# Patient Record
Sex: FEMALE | Race: WHITE | Employment: FULL TIME | URBAN - METROPOLITAN AREA
[De-identification: names, ages, dates, MRNs, and addresses within clinical notes are randomized per-mention and may not be internally consistent; named-entity substitution may affect disease eponyms.]

---
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
## 2019-03-26 ENCOUNTER — NURSE TRIAGE (OUTPATIENT)
Dept: OTHER | Facility: CLINIC | Age: 19
End: 2019-03-26

## 2019-03-26 ENCOUNTER — HOSPITAL ENCOUNTER (EMERGENCY)
Age: 19
Discharge: HOME | End: 2019-03-26
Payer: COMMERCIAL

## 2019-03-26 ENCOUNTER — APPOINTMENT (OUTPATIENT)
Dept: GENERAL RADIOLOGY | Age: 19
End: 2019-03-26
Payer: COMMERCIAL

## 2019-03-26 VITALS
BODY MASS INDEX: 45.48 KG/M2 | TEMPERATURE: 97.2 F | WEIGHT: 283 LBS | HEIGHT: 66 IN | DIASTOLIC BLOOD PRESSURE: 89 MMHG | RESPIRATION RATE: 15 BRPM | OXYGEN SATURATION: 99 % | HEART RATE: 84 BPM | SYSTOLIC BLOOD PRESSURE: 133 MMHG

## 2019-03-26 DIAGNOSIS — M62.838 SPASM OF MUSCLE: ICD-10-CM

## 2019-03-26 DIAGNOSIS — S16.1XXA ACUTE STRAIN OF NECK MUSCLE, INITIAL ENCOUNTER: Primary | ICD-10-CM

## 2019-03-26 DIAGNOSIS — S39.012A STRAIN OF LUMBAR REGION, INITIAL ENCOUNTER: ICD-10-CM

## 2019-03-26 LAB
HCG, URINE, POC: NEGATIVE
Lab: NORMAL
NEGATIVE QC PASS/FAIL: NORMAL
POSITIVE QC PASS/FAIL: NORMAL

## 2019-03-26 PROCEDURE — 96372 THER/PROPH/DIAG INJ SC/IM: CPT

## 2019-03-26 PROCEDURE — 72070 X-RAY EXAM THORAC SPINE 2VWS: CPT

## 2019-03-26 PROCEDURE — 6370000000 HC RX 637 (ALT 250 FOR IP): Performed by: NURSE PRACTITIONER

## 2019-03-26 PROCEDURE — 99283 EMERGENCY DEPT VISIT LOW MDM: CPT

## 2019-03-26 PROCEDURE — 72050 X-RAY EXAM NECK SPINE 4/5VWS: CPT

## 2019-03-26 PROCEDURE — 6360000002 HC RX W HCPCS: Performed by: NURSE PRACTITIONER

## 2019-03-26 PROCEDURE — 72110 X-RAY EXAM L-2 SPINE 4/>VWS: CPT

## 2019-03-26 RX ORDER — ORPHENADRINE CITRATE 100 MG/1
100 TABLET, EXTENDED RELEASE ORAL 2 TIMES DAILY
Qty: 20 TABLET | Refills: 0 | Status: SHIPPED | OUTPATIENT
Start: 2019-03-26 | End: 2019-04-05

## 2019-03-26 RX ORDER — ORPHENADRINE CITRATE 30 MG/ML
60 INJECTION INTRAMUSCULAR; INTRAVENOUS ONCE
Status: COMPLETED | OUTPATIENT
Start: 2019-03-26 | End: 2019-03-26

## 2019-03-26 RX ORDER — IBUPROFEN 400 MG/1
400 TABLET ORAL ONCE
Status: COMPLETED | OUTPATIENT
Start: 2019-03-26 | End: 2019-03-26

## 2019-03-26 RX ORDER — NAPROXEN 500 MG/1
500 TABLET ORAL 2 TIMES DAILY PRN
Qty: 28 TABLET | Refills: 0 | Status: SHIPPED | OUTPATIENT
Start: 2019-03-26 | End: 2019-09-14 | Stop reason: ALTCHOICE

## 2019-03-26 RX ADMIN — IBUPROFEN 400 MG: 400 TABLET, FILM COATED ORAL at 22:11

## 2019-03-26 RX ADMIN — ORPHENADRINE CITRATE 60 MG: 30 INJECTION INTRAMUSCULAR; INTRAVENOUS at 22:11

## 2019-03-26 ASSESSMENT — PAIN SCALES - GENERAL
PAINLEVEL_OUTOF10: 3
PAINLEVEL_OUTOF10: 5

## 2019-03-26 ASSESSMENT — PAIN DESCRIPTION - LOCATION: LOCATION: BACK;NECK

## 2019-03-26 ASSESSMENT — PAIN DESCRIPTION - PAIN TYPE: TYPE: ACUTE PAIN

## 2019-09-14 ENCOUNTER — HOSPITAL ENCOUNTER (EMERGENCY)
Age: 19
Discharge: HOME | End: 2019-09-14
Payer: COMMERCIAL

## 2019-09-14 VITALS
RESPIRATION RATE: 16 BRPM | HEIGHT: 66 IN | SYSTOLIC BLOOD PRESSURE: 116 MMHG | HEART RATE: 87 BPM | BODY MASS INDEX: 46.28 KG/M2 | OXYGEN SATURATION: 96 % | TEMPERATURE: 98.8 F | DIASTOLIC BLOOD PRESSURE: 88 MMHG | WEIGHT: 288 LBS

## 2019-09-14 DIAGNOSIS — J02.9 ACUTE PHARYNGITIS, UNSPECIFIED ETIOLOGY: Primary | ICD-10-CM

## 2019-09-14 LAB — STREP GRP A PCR: NEGATIVE

## 2019-09-14 PROCEDURE — 99282 EMERGENCY DEPT VISIT SF MDM: CPT

## 2019-09-14 PROCEDURE — 87880 STREP A ASSAY W/OPTIC: CPT

## 2019-09-14 PROCEDURE — 6370000000 HC RX 637 (ALT 250 FOR IP): Performed by: NURSE PRACTITIONER

## 2019-09-14 RX ORDER — PREDNISONE 20 MG/1
40 TABLET ORAL DAILY
Qty: 10 TABLET | Refills: 0 | Status: SHIPPED | OUTPATIENT
Start: 2019-09-14 | End: 2019-09-19

## 2019-09-14 RX ORDER — FLUTICASONE PROPIONATE 50 MCG
1 SPRAY, SUSPENSION (ML) NASAL DAILY
Qty: 1 BOTTLE | Refills: 0 | Status: SHIPPED | OUTPATIENT
Start: 2019-09-14 | End: 2020-05-25 | Stop reason: ALTCHOICE

## 2019-09-14 RX ORDER — PREDNISONE 20 MG/1
60 TABLET ORAL ONCE
Status: COMPLETED | OUTPATIENT
Start: 2019-09-14 | End: 2019-09-14

## 2019-09-14 RX ORDER — LORATADINE 10 MG/1
10 TABLET ORAL DAILY
Qty: 30 TABLET | Refills: 0 | Status: SHIPPED | OUTPATIENT
Start: 2019-09-14 | End: 2019-10-14

## 2019-09-14 RX ADMIN — PREDNISONE 60 MG: 20 TABLET ORAL at 19:29

## 2020-03-15 ENCOUNTER — HOSPITAL ENCOUNTER (EMERGENCY)
Age: 20
Discharge: HOME | End: 2020-03-15
Payer: COMMERCIAL

## 2020-03-15 VITALS
OXYGEN SATURATION: 98 % | WEIGHT: 288 LBS | DIASTOLIC BLOOD PRESSURE: 90 MMHG | HEART RATE: 99 BPM | TEMPERATURE: 98.9 F | RESPIRATION RATE: 16 BRPM | SYSTOLIC BLOOD PRESSURE: 138 MMHG | BODY MASS INDEX: 46.28 KG/M2 | HEIGHT: 66 IN

## 2020-03-15 LAB
INFLUENZA A BY PCR: NOT DETECTED
INFLUENZA B BY PCR: NOT DETECTED

## 2020-03-15 PROCEDURE — 99282 EMERGENCY DEPT VISIT SF MDM: CPT

## 2020-03-15 PROCEDURE — 87502 INFLUENZA DNA AMP PROBE: CPT

## 2020-03-15 RX ORDER — AMOXICILLIN 500 MG/1
500 CAPSULE ORAL 3 TIMES DAILY
Qty: 21 CAPSULE | Refills: 0 | Status: SHIPPED | OUTPATIENT
Start: 2020-03-15 | End: 2020-03-22

## 2020-03-15 RX ORDER — BROMPHENIRAMINE MALEATE, PSEUDOEPHEDRINE HYDROCHLORIDE, AND DEXTROMETHORPHAN HYDROBROMIDE 2; 30; 10 MG/5ML; MG/5ML; MG/5ML
5 SYRUP ORAL 4 TIMES DAILY PRN
Qty: 120 ML | Refills: 0 | Status: SHIPPED | OUTPATIENT
Start: 2020-03-15 | End: 2020-03-20

## 2020-03-15 ASSESSMENT — PAIN DESCRIPTION - LOCATION: LOCATION: THROAT

## 2020-03-15 ASSESSMENT — PAIN DESCRIPTION - PAIN TYPE: TYPE: ACUTE PAIN

## 2020-03-15 ASSESSMENT — PAIN SCALES - GENERAL: PAINLEVEL_OUTOF10: 3

## 2020-03-15 NOTE — ED PROVIDER NOTES
Independent MLP     HPI: Ivory Vela is a 20 y.o. female with a past medical history of  has a past medical history of Asperger syndrome and Autism disorder. presenting with complaints of a cough with nasal congestion. The patient states that these symptoms began gradually. The history is obtained from the patient. The patient states that he has had some subjective chills at home. Patient does complain of a mild cough associated with it that is nonproductive. Patient denies excessive fatigue or sleeping greater than 18 hours a day. Patient denies exposure to mononucleosis. The patient denies any abdominal pain, left upper quadrant fullness, or early satiety. The patient also denies difficulty breathing, hemoptysis, neck pain/stiffness, or blurry vision. Sx have persisted and are mildly worse which is what prompted the visit today. Has had exposure to sick contacts, states began having onset of symptoms last evening of cough, nasal congestion dry nonproductive cough with generalized body aches and a mild sore throat. States her son is positive influenza A. Denies any fever, body aches or chills.        ROS:   Pertinent positives and negatives are stated within HPI, all other systems reviewed and are negative.      --------------------------------------------- PAST HISTORY ---------------------------------------------  Past Medical History:  has a past medical history of Asperger syndrome and Autism disorder. Past Surgical History:  has a past surgical history that includes REMOVAL FOREIGN BODY EAR and pr lap,cholecystectomy/graph (N/A, 3/15/2018). Social History:  reports that she is a non-smoker but has been exposed to tobacco smoke. She has never used smokeless tobacco. She reports that she does not drink alcohol or use drugs. Family History: family history includes No Known Problems in her father and mother. The patients home medications have been reviewed.     Allergies: Patient has no known

## 2020-05-25 ENCOUNTER — HOSPITAL ENCOUNTER (EMERGENCY)
Age: 20
Discharge: HOME | End: 2020-05-25
Payer: COMMERCIAL

## 2020-05-25 ENCOUNTER — APPOINTMENT (OUTPATIENT)
Dept: GENERAL RADIOLOGY | Age: 20
End: 2020-05-25
Payer: COMMERCIAL

## 2020-05-25 VITALS
SYSTOLIC BLOOD PRESSURE: 126 MMHG | TEMPERATURE: 98 F | DIASTOLIC BLOOD PRESSURE: 82 MMHG | HEART RATE: 109 BPM | OXYGEN SATURATION: 96 % | BODY MASS INDEX: 47.09 KG/M2 | HEIGHT: 66 IN | WEIGHT: 293 LBS | RESPIRATION RATE: 16 BRPM

## 2020-05-25 PROCEDURE — 73630 X-RAY EXAM OF FOOT: CPT

## 2020-05-25 PROCEDURE — 99283 EMERGENCY DEPT VISIT LOW MDM: CPT

## 2020-05-25 RX ORDER — IBUPROFEN 600 MG/1
600 TABLET ORAL EVERY 6 HOURS PRN
Qty: 60 TABLET | Refills: 0 | Status: SHIPPED | OUTPATIENT
Start: 2020-05-25 | End: 2020-06-13

## 2020-05-25 ASSESSMENT — PAIN DESCRIPTION - LOCATION: LOCATION: FOOT

## 2020-05-25 ASSESSMENT — PAIN DESCRIPTION - ORIENTATION: ORIENTATION: RIGHT

## 2020-05-25 ASSESSMENT — PAIN SCALES - GENERAL: PAINLEVEL_OUTOF10: 6

## 2020-05-25 ASSESSMENT — PAIN DESCRIPTION - PAIN TYPE: TYPE: ACUTE PAIN

## 2020-05-25 NOTE — ED PROVIDER NOTES
No Known Problems in her father and mother. The patients home medications have been reviewed. Allergies: Patient has no known allergies. --------------------------------- RESULTS ------------------------------------------  All laboratory and radiology results have been personally reviewed by myself   LABS:  No results found for this visit on 05/25/20. RADIOLOGY:  Interpreted by Radiologist.  XR FOOT RIGHT (MIN 3 VIEWS)   Final Result      No acute fracture is identified.          ----------------- NURSING NOTES AND VITALS REVIEWED ---------------   The nursing notes within the ED encounter and vital signs as below have been reviewed. /82   Pulse 109   Temp 98 °F (36.7 °C) (Infrared)   Resp 16   Ht 5' 6\" (1.676 m)   Wt (!) 308 lb (139.7 kg)   SpO2 96%   BMI 49.71 kg/m²   Oxygen Saturation Interpretation: Normal      --------------------------------PHYSICAL EXAM------------------------------------      Constitutional/General: Alert and oriented x3, well appearing, non toxic in NAD  Head: NC/AT  Eyes: PERRL, EOMI  Mouth: Oropharynx clear, handling secretions, no trismus  Neck: Supple, full ROM, no meningeal signs  Pulmonary: Lungs clear to auscultation bilaterally, no wheezes, rales, or rhonchi. Not in respiratory distress  Cardiovascular:  Regular rate and rhythm, no murmurs, gallops, or rubs. 2+ distal pulses  Extremities: Moves all extremities x 4. The right foot negative for any obvious bruising or deformity. There is some mild swelling over the dorsal surface of the foot proximally. Point tender over the proximal third and fourth metatarsals. Pulses and sensation intact. Good range of motion of the right ankle noted with some discomfort reported with lateral ROM. warm and well perfused  Skin: warm and dry without rash  Neurologic: GCS 15,  Intact.   No focal deficits  Psych: Normal Affect      ------------------------ ED COURSE/MEDICAL DECISION MAKING----------------------  Medications - No data to display      Medical Decision Making:    No acute fracture or bony abnormality of the identified on x-ray. The patient was reassured. Certainly if she has persistent pain she can F/U with Podiatry or Ortho. Pt aware and agreeable to plan       Counseling: The emergency provider has spoken with the patient and discussed todays results, in addition to providing specific details for the plan of care and counseling regarding the diagnosis and prognosis. Questions are answered at this time and they are agreeable with the plan.      ------------------------ IMPRESSION AND DISPOSITION -------------------------------    IMPRESSION  1.  Right foot pain        DISPOSITION  Disposition: Discharge to home  Patient condition is stable                   Sydni Jones PA-C  05/25/20 8540

## 2020-06-13 ENCOUNTER — HOSPITAL ENCOUNTER (EMERGENCY)
Age: 20
Discharge: HOME | End: 2020-06-13
Attending: EMERGENCY MEDICINE
Payer: COMMERCIAL

## 2020-06-13 VITALS
HEIGHT: 66 IN | WEIGHT: 293 LBS | TEMPERATURE: 98 F | DIASTOLIC BLOOD PRESSURE: 67 MMHG | RESPIRATION RATE: 18 BRPM | HEART RATE: 67 BPM | BODY MASS INDEX: 47.09 KG/M2 | OXYGEN SATURATION: 98 % | SYSTOLIC BLOOD PRESSURE: 113 MMHG

## 2020-06-13 LAB
ALBUMIN SERPL-MCNC: 4.2 G/DL (ref 3.5–5.2)
ALP BLD-CCNC: 68 U/L (ref 35–104)
ALT SERPL-CCNC: 24 U/L (ref 0–32)
ANION GAP SERPL CALCULATED.3IONS-SCNC: 7 MMOL/L (ref 7–16)
AST SERPL-CCNC: 21 U/L (ref 0–31)
BASOPHILS ABSOLUTE: 0.07 E9/L (ref 0–0.2)
BASOPHILS RELATIVE PERCENT: 0.7 % (ref 0–2)
BILIRUB SERPL-MCNC: 0.4 MG/DL (ref 0–1.2)
BILIRUBIN URINE: NEGATIVE
BLOOD, URINE: NEGATIVE
BUN BLDV-MCNC: 13 MG/DL (ref 6–20)
CALCIUM SERPL-MCNC: 9.7 MG/DL (ref 8.6–10.2)
CHLORIDE BLD-SCNC: 100 MMOL/L (ref 98–107)
CLARITY: CLEAR
CO2: 26 MMOL/L (ref 22–29)
COLOR: YELLOW
CREAT SERPL-MCNC: 0.5 MG/DL (ref 0.5–1)
EOSINOPHILS ABSOLUTE: 0.19 E9/L (ref 0.05–0.5)
EOSINOPHILS RELATIVE PERCENT: 2 % (ref 0–6)
GFR AFRICAN AMERICAN: >60
GFR NON-AFRICAN AMERICAN: >60 ML/MIN/1.73
GLUCOSE BLD-MCNC: 101 MG/DL (ref 74–99)
GLUCOSE URINE: NEGATIVE MG/DL
HCG, URINE, POC: NEGATIVE
HCT VFR BLD CALC: 41.7 % (ref 34–48)
HEMOGLOBIN: 14.2 G/DL (ref 11.5–15.5)
IMMATURE GRANULOCYTES #: 0.03 E9/L
IMMATURE GRANULOCYTES %: 0.3 % (ref 0–5)
KETONES, URINE: NEGATIVE MG/DL
LACTIC ACID: 0.6 MMOL/L (ref 0.5–2.2)
LEUKOCYTE ESTERASE, URINE: NEGATIVE
LIPASE: 24 U/L (ref 13–60)
LYMPHOCYTES ABSOLUTE: 2.77 E9/L (ref 1.5–4)
LYMPHOCYTES RELATIVE PERCENT: 29.3 % (ref 20–42)
Lab: NORMAL
MCH RBC QN AUTO: 28.9 PG (ref 26–35)
MCHC RBC AUTO-ENTMCNC: 34.1 % (ref 32–34.5)
MCV RBC AUTO: 84.9 FL (ref 80–99.9)
MONOCYTES ABSOLUTE: 0.54 E9/L (ref 0.1–0.95)
MONOCYTES RELATIVE PERCENT: 5.7 % (ref 2–12)
NEGATIVE QC PASS/FAIL: NORMAL
NEUTROPHILS ABSOLUTE: 5.87 E9/L (ref 1.8–7.3)
NEUTROPHILS RELATIVE PERCENT: 62 % (ref 43–80)
NITRITE, URINE: NEGATIVE
PDW BLD-RTO: 12.9 FL (ref 11.5–15)
PH UA: 7 (ref 5–9)
PLATELET # BLD: 288 E9/L (ref 130–450)
PMV BLD AUTO: 10.4 FL (ref 7–12)
POSITIVE QC PASS/FAIL: NORMAL
POTASSIUM SERPL-SCNC: 4.5 MMOL/L (ref 3.5–5)
PROTEIN UA: NEGATIVE MG/DL
RBC # BLD: 4.91 E12/L (ref 3.5–5.5)
SODIUM BLD-SCNC: 133 MMOL/L (ref 132–146)
SPECIFIC GRAVITY UA: 1.02 (ref 1–1.03)
TOTAL PROTEIN: 7.5 G/DL (ref 6.4–8.3)
UROBILINOGEN, URINE: 0.2 E.U./DL
WBC # BLD: 9.5 E9/L (ref 4.5–11.5)

## 2020-06-13 PROCEDURE — 6360000002 HC RX W HCPCS: Performed by: STUDENT IN AN ORGANIZED HEALTH CARE EDUCATION/TRAINING PROGRAM

## 2020-06-13 PROCEDURE — 80053 COMPREHEN METABOLIC PANEL: CPT

## 2020-06-13 PROCEDURE — 99284 EMERGENCY DEPT VISIT MOD MDM: CPT

## 2020-06-13 PROCEDURE — 96374 THER/PROPH/DIAG INJ IV PUSH: CPT

## 2020-06-13 PROCEDURE — 85025 COMPLETE CBC W/AUTO DIFF WBC: CPT

## 2020-06-13 PROCEDURE — 83690 ASSAY OF LIPASE: CPT

## 2020-06-13 PROCEDURE — 81003 URINALYSIS AUTO W/O SCOPE: CPT

## 2020-06-13 PROCEDURE — 2580000003 HC RX 258: Performed by: STUDENT IN AN ORGANIZED HEALTH CARE EDUCATION/TRAINING PROGRAM

## 2020-06-13 PROCEDURE — 83605 ASSAY OF LACTIC ACID: CPT

## 2020-06-13 RX ORDER — 0.9 % SODIUM CHLORIDE 0.9 %
1000 INTRAVENOUS SOLUTION INTRAVENOUS ONCE
Status: COMPLETED | OUTPATIENT
Start: 2020-06-13 | End: 2020-06-13

## 2020-06-13 RX ORDER — ONDANSETRON 4 MG/1
4 TABLET, ORALLY DISINTEGRATING ORAL 3 TIMES DAILY PRN
Qty: 21 TABLET | Refills: 0 | Status: SHIPPED | OUTPATIENT
Start: 2020-06-13 | End: 2020-08-12 | Stop reason: ALTCHOICE

## 2020-06-13 RX ORDER — FAMOTIDINE 20 MG/1
20 TABLET, FILM COATED ORAL DAILY
Qty: 30 TABLET | Refills: 0 | Status: SHIPPED | OUTPATIENT
Start: 2020-06-13 | End: 2020-12-03 | Stop reason: ALTCHOICE

## 2020-06-13 RX ORDER — ONDANSETRON 2 MG/ML
4 INJECTION INTRAMUSCULAR; INTRAVENOUS ONCE
Status: COMPLETED | OUTPATIENT
Start: 2020-06-13 | End: 2020-06-13

## 2020-06-13 RX ADMIN — ONDANSETRON 4 MG: 2 INJECTION INTRAMUSCULAR; INTRAVENOUS at 10:58

## 2020-06-13 RX ADMIN — SODIUM CHLORIDE 1000 ML: 9 INJECTION, SOLUTION INTRAVENOUS at 10:57

## 2020-06-13 ASSESSMENT — ENCOUNTER SYMPTOMS
SORE THROAT: 0
VOMITING: 1
SHORTNESS OF BREATH: 0
ABDOMINAL PAIN: 1
DIARRHEA: 0
CONSTIPATION: 0
CHEST TIGHTNESS: 0
RHINORRHEA: 0
HEMATOCHEZIA: 0
COUGH: 0
HEMATEMESIS: 0
NAUSEA: 1
PHOTOPHOBIA: 0

## 2020-06-13 ASSESSMENT — PAIN DESCRIPTION - LOCATION: LOCATION: ABDOMEN

## 2020-06-13 ASSESSMENT — PAIN DESCRIPTION - PROGRESSION: CLINICAL_PROGRESSION: NOT CHANGED

## 2020-06-13 ASSESSMENT — PAIN DESCRIPTION - PAIN TYPE: TYPE: ACUTE PAIN

## 2020-06-13 ASSESSMENT — PAIN DESCRIPTION - FREQUENCY: FREQUENCY: INTERMITTENT

## 2020-06-13 ASSESSMENT — PAIN DESCRIPTION - DESCRIPTORS: DESCRIPTORS: PATIENT UNABLE TO DESCRIBE

## 2020-06-13 ASSESSMENT — PAIN SCALES - GENERAL: PAINLEVEL_OUTOF10: 5

## 2020-06-20 LAB
SARS-COV-2, PCR: NOT DETECTED
SPECIMEN SOURCE: NORMAL

## 2020-08-12 ENCOUNTER — APPOINTMENT (OUTPATIENT)
Dept: CT IMAGING | Age: 20
End: 2020-08-12
Payer: COMMERCIAL

## 2020-08-12 ENCOUNTER — HOSPITAL ENCOUNTER (EMERGENCY)
Age: 20
Discharge: HOME | End: 2020-08-12
Payer: COMMERCIAL

## 2020-08-12 VITALS
BODY MASS INDEX: 47.09 KG/M2 | TEMPERATURE: 98.3 F | HEIGHT: 66 IN | DIASTOLIC BLOOD PRESSURE: 85 MMHG | OXYGEN SATURATION: 97 % | SYSTOLIC BLOOD PRESSURE: 137 MMHG | WEIGHT: 293 LBS | RESPIRATION RATE: 16 BRPM | HEART RATE: 78 BPM

## 2020-08-12 LAB
ALBUMIN SERPL-MCNC: 4 G/DL (ref 3.5–5.2)
ALP BLD-CCNC: 69 U/L (ref 35–104)
ALT SERPL-CCNC: 23 U/L (ref 0–32)
ANION GAP SERPL CALCULATED.3IONS-SCNC: 12 MMOL/L (ref 7–16)
AST SERPL-CCNC: 22 U/L (ref 0–31)
BACTERIA: ABNORMAL /HPF
BASOPHILS ABSOLUTE: 0.07 E9/L (ref 0–0.2)
BASOPHILS RELATIVE PERCENT: 0.7 % (ref 0–2)
BILIRUB SERPL-MCNC: 0.4 MG/DL (ref 0–1.2)
BILIRUBIN URINE: NEGATIVE
BLOOD, URINE: NEGATIVE
BUN BLDV-MCNC: 8 MG/DL (ref 6–20)
CALCIUM SERPL-MCNC: 9.3 MG/DL (ref 8.6–10.2)
CHLORIDE BLD-SCNC: 102 MMOL/L (ref 98–107)
CLARITY: CLEAR
CO2: 23 MMOL/L (ref 22–29)
COLOR: YELLOW
CREAT SERPL-MCNC: 0.6 MG/DL (ref 0.5–1)
EOSINOPHILS ABSOLUTE: 0.23 E9/L (ref 0.05–0.5)
EOSINOPHILS RELATIVE PERCENT: 2.4 % (ref 0–6)
EPITHELIAL CELLS, UA: ABNORMAL /HPF
GFR AFRICAN AMERICAN: >60
GFR NON-AFRICAN AMERICAN: >60 ML/MIN/1.73
GLUCOSE BLD-MCNC: 88 MG/DL (ref 74–99)
GLUCOSE URINE: NEGATIVE MG/DL
HCG, URINE, POC: NEGATIVE
HCT VFR BLD CALC: 41.6 % (ref 34–48)
HEMOGLOBIN: 14.4 G/DL (ref 11.5–15.5)
IMMATURE GRANULOCYTES #: 0.04 E9/L
IMMATURE GRANULOCYTES %: 0.4 % (ref 0–5)
KETONES, URINE: NEGATIVE MG/DL
LACTIC ACID: 0.8 MMOL/L (ref 0.5–2.2)
LEUKOCYTE ESTERASE, URINE: ABNORMAL
LIPASE: 29 U/L (ref 13–60)
LYMPHOCYTES ABSOLUTE: 3.47 E9/L (ref 1.5–4)
LYMPHOCYTES RELATIVE PERCENT: 36.2 % (ref 20–42)
Lab: NORMAL
MCH RBC QN AUTO: 29 PG (ref 26–35)
MCHC RBC AUTO-ENTMCNC: 34.6 % (ref 32–34.5)
MCV RBC AUTO: 83.9 FL (ref 80–99.9)
MONOCYTES ABSOLUTE: 0.58 E9/L (ref 0.1–0.95)
MONOCYTES RELATIVE PERCENT: 6.1 % (ref 2–12)
NEGATIVE QC PASS/FAIL: NORMAL
NEUTROPHILS ABSOLUTE: 5.19 E9/L (ref 1.8–7.3)
NEUTROPHILS RELATIVE PERCENT: 54.2 % (ref 43–80)
NITRITE, URINE: NEGATIVE
PDW BLD-RTO: 12.5 FL (ref 11.5–15)
PH UA: 7 (ref 5–9)
PLATELET # BLD: 287 E9/L (ref 130–450)
PMV BLD AUTO: 10.5 FL (ref 7–12)
POSITIVE QC PASS/FAIL: NORMAL
POTASSIUM REFLEX MAGNESIUM: 3.9 MMOL/L (ref 3.5–5)
PROTEIN UA: NEGATIVE MG/DL
RBC # BLD: 4.96 E12/L (ref 3.5–5.5)
RBC UA: ABNORMAL /HPF (ref 0–2)
SODIUM BLD-SCNC: 137 MMOL/L (ref 132–146)
SPECIFIC GRAVITY UA: 1.02 (ref 1–1.03)
TOTAL PROTEIN: 7.5 G/DL (ref 6.4–8.3)
UROBILINOGEN, URINE: 0.2 E.U./DL
WBC # BLD: 9.6 E9/L (ref 4.5–11.5)
WBC UA: ABNORMAL /HPF (ref 0–5)

## 2020-08-12 PROCEDURE — 99283 EMERGENCY DEPT VISIT LOW MDM: CPT

## 2020-08-12 PROCEDURE — 81001 URINALYSIS AUTO W/SCOPE: CPT

## 2020-08-12 PROCEDURE — 6360000002 HC RX W HCPCS: Performed by: NURSE PRACTITIONER

## 2020-08-12 PROCEDURE — 83605 ASSAY OF LACTIC ACID: CPT

## 2020-08-12 PROCEDURE — 83690 ASSAY OF LIPASE: CPT

## 2020-08-12 PROCEDURE — 96375 TX/PRO/DX INJ NEW DRUG ADDON: CPT

## 2020-08-12 PROCEDURE — 96374 THER/PROPH/DIAG INJ IV PUSH: CPT

## 2020-08-12 PROCEDURE — 80053 COMPREHEN METABOLIC PANEL: CPT

## 2020-08-12 PROCEDURE — 2580000003 HC RX 258: Performed by: NURSE PRACTITIONER

## 2020-08-12 PROCEDURE — 6360000004 HC RX CONTRAST MEDICATION: Performed by: RADIOLOGY

## 2020-08-12 PROCEDURE — 74178 CT ABD&PLV WO CNTR FLWD CNTR: CPT

## 2020-08-12 PROCEDURE — 85025 COMPLETE CBC W/AUTO DIFF WBC: CPT

## 2020-08-12 RX ORDER — CEFDINIR 300 MG/1
300 CAPSULE ORAL 2 TIMES DAILY
Qty: 20 CAPSULE | Refills: 0 | Status: SHIPPED | OUTPATIENT
Start: 2020-08-12 | End: 2020-08-22

## 2020-08-12 RX ORDER — MORPHINE SULFATE 4 MG/ML
4 INJECTION, SOLUTION INTRAMUSCULAR; INTRAVENOUS ONCE
Status: COMPLETED | OUTPATIENT
Start: 2020-08-12 | End: 2020-08-12

## 2020-08-12 RX ORDER — METRONIDAZOLE 500 MG/1
500 TABLET ORAL 3 TIMES DAILY
Qty: 30 TABLET | Refills: 0 | Status: SHIPPED | OUTPATIENT
Start: 2020-08-12 | End: 2020-08-22

## 2020-08-12 RX ORDER — ONDANSETRON 2 MG/ML
4 INJECTION INTRAMUSCULAR; INTRAVENOUS ONCE
Status: COMPLETED | OUTPATIENT
Start: 2020-08-12 | End: 2020-08-12

## 2020-08-12 RX ORDER — ONDANSETRON 4 MG/1
4 TABLET, ORALLY DISINTEGRATING ORAL EVERY 8 HOURS PRN
Qty: 10 TABLET | Refills: 0 | Status: SHIPPED | OUTPATIENT
Start: 2020-08-12 | End: 2020-12-03 | Stop reason: ALTCHOICE

## 2020-08-12 RX ORDER — 0.9 % SODIUM CHLORIDE 0.9 %
1000 INTRAVENOUS SOLUTION INTRAVENOUS ONCE
Status: COMPLETED | OUTPATIENT
Start: 2020-08-12 | End: 2020-08-12

## 2020-08-12 RX ORDER — HYDROCODONE BITARTRATE AND ACETAMINOPHEN 5; 325 MG/1; MG/1
1 TABLET ORAL EVERY 6 HOURS PRN
Qty: 12 TABLET | Refills: 0 | Status: SHIPPED | OUTPATIENT
Start: 2020-08-12 | End: 2020-08-15

## 2020-08-12 RX ADMIN — ONDANSETRON 4 MG: 2 INJECTION INTRAMUSCULAR; INTRAVENOUS at 15:02

## 2020-08-12 RX ADMIN — SODIUM CHLORIDE 1000 ML: 9 INJECTION, SOLUTION INTRAVENOUS at 14:53

## 2020-08-12 RX ADMIN — MORPHINE SULFATE 4 MG: 4 INJECTION, SOLUTION INTRAMUSCULAR; INTRAVENOUS at 15:02

## 2020-08-12 RX ADMIN — IOPAMIDOL 110 ML: 755 INJECTION, SOLUTION INTRAVENOUS at 15:46

## 2020-08-12 ASSESSMENT — PAIN SCALES - GENERAL: PAINLEVEL_OUTOF10: 6

## 2020-08-12 ASSESSMENT — PAIN DESCRIPTION - PAIN TYPE: TYPE: ACUTE PAIN

## 2020-08-12 NOTE — ED NOTES
Discharge instructions given, medications and follow up instructions reviewed. Patient verbalized understanding, no other noted or stated problems at this time. Patient will follow up with physicians as directed.       Norm Sicard, RN  08/12/20 3520

## 2020-08-12 NOTE — ED NOTES
Bed:  HALL-07  Expected date:   Expected time:   Means of arrival:   Comments:  [address] RN  08/12/20 6240

## 2020-08-13 NOTE — ED PROVIDER NOTES
ED Attending  CC: Sherie          Department of Emergency Medicine   ED  Provider Note  Admit Date/RoomTime: 2020  2:04 PM  ED Room: [place] [address]  MRN: [id number]  Chief Complaint       Abdominal Pain (right side, shooitng pain, constant since 0800 today)    History of Present Illness   Source of history provided by:  patient. History/Exam Limitations: none. Manjit Olguin is a 20 y.o. old female who has a past medical history of:   Past Medical History:   Diagnosis Date    Asperger syndrome     Autism disorder     presents to the emergency department by private vehicle, for complaints of sudden onset aching, cramping, sharp pain in the RLQ without radiation which began a few hour(s) prior to arrival.   There has been no similar episodes in the past.  Since onset the symptoms have been persistent. The pain is associated with abdominal pain, urinary frequency and vomiting. The pain is aggravated by none and relieved by nothing. There has been NO back pain, cloudy urine, constipation, headache, hematuria, urinary incontinence, urinary urgency, vaginal discharge or vaginal itching. GYN History: irregular periods. STD History: no history of PID, STD's. No LMP recorded. Patient has had an implant. Current Pregnancy: No.     Birth Control: Abstinence. Gravid Status:     . ROS   Pertinent positives and negatives are stated within HPI, all other systems reviewed and are negative. Past Surgical History:   Procedure Laterality Date    CHOLECYSTECTOMY      RI LAP,CHOLECYSTECTOMY/GRAPH N/A 3/15/2018    CHOLECYSTECTOMY LAPAROSCOPIC WITH INTRAOP CHOLANGIOGRAM performed by Talita Diaz MD at [facility]     Social History:  reports that she has never smoked. She has never used smokeless tobacco. She reports current alcohol use. She reports that she does not use drugs. Family History: family history includes No Known Problems in her father and mother.    Allergies: Patient has no known allergies. Physical Exam           ED Triage Vitals   BP Temp Temp src Pulse Resp SpO2 Height Weight   08/12/20 1341 08/12/20 1328 -- 08/12/20 1328 08/12/20 1341 08/12/20 1328 08/12/20 1341 08/12/20 1341   137/85 98.3 °F (36.8 °C)  80 16 99 % 5' 6\" (1.676 m) 300 lb (136.1 kg)      Oxygen Saturation Interpretation: Normal.    · General Appearance/Constitutional:  Alert, development consistent with age. · HEENT:  NC/NT. PERRLA. Airway patent. · Neck:  Supple. No lymphadenopathy. · Respiratory:  No retractions. Lungs Clear to auscultation and breath sounds equal.  · CV:  Regular rate and rhythm. · GI:  General Appearance: normal.         Bowel sounds: normal bowel sounds. Distension:  Mild. Tenderness: moderate tenderness is present in the RLQ, no rebound tenderness, no guarding. No evidence of Rovsing sign         Liver: non-tender. Spleen:  enlarged- 2 cm below costal margin. Pulsatile Mass: absent. Hernia:  no inguinal or femoral hernias noted. · Back: CVA Tenderness: Yes, Right. · : Not examined   · integument:  Normal turgor. Warm, dry, without visible rash, unless noted elsewhere. · Lymphatics: No edema, cap.refill <3sec. · Neurological:  Orientation age-appropriate. Motor functions intact.     Lab / Imaging Results   (All laboratory and radiology results have been personally reviewed by myself)  Labs:  Results for orders placed or performed during the hospital encounter of 08/12/20   CBC Auto Differential   Result Value Ref Range    WBC 9.6 4.5 - 11.5 E9/L    RBC 4.96 3.50 - 5.50 E12/L    Hemoglobin 14.4 11.5 - 15.5 g/dL    Hematocrit 41.6 34.0 - 48.0 %    MCV 83.9 80.0 - 99.9 fL    MCH 29.0 26.0 - 35.0 pg    MCHC 34.6 (H) 32.0 - 34.5 %    RDW 12.5 11.5 - 15.0 fL    Platelets 669 102 - 088 E9/L    MPV 10.5 7.0 - 12.0 fL    Neutrophils % 54.2 43.0 - 80.0 %    Immature Granulocytes % 0.4 0.0 - 5.0 %    Lymphocytes % 36.2 20.0 - 42.0 %    Monocytes % 6.1 2.0 - 12.0 %    Eosinophils % 2.4 0.0 - 6.0 %    Basophils % 0.7 0.0 - 2.0 %    Neutrophils Absolute 5.19 1.80 - 7.30 E9/L    Immature Granulocytes # 0.04 E9/L    Lymphocytes Absolute 3.47 1.50 - 4.00 E9/L    Monocytes Absolute 0.58 0.10 - 0.95 E9/L    Eosinophils Absolute 0.23 0.05 - 0.50 E9/L    Basophils Absolute 0.07 0.00 - 0.20 E9/L   Comprehensive Metabolic Panel w/ Reflex to MG   Result Value Ref Range    Sodium 137 132 - 146 mmol/L    Potassium reflex Magnesium 3.9 3.5 - 5.0 mmol/L    Chloride 102 98 - 107 mmol/L    CO2 23 22 - 29 mmol/L    Anion Gap 12 7 - 16 mmol/L    Glucose 88 74 - 99 mg/dL    BUN 8 6 - 20 mg/dL    CREATININE 0.6 0.5 - 1.0 mg/dL    GFR Non-African American >60 >=60 mL/min/1.73    GFR African American >60     Calcium 9.3 8.6 - 10.2 mg/dL    Total Protein 7.5 6.4 - 8.3 g/dL    Alb 4.0 3.5 - 5.2 g/dL    Total Bilirubin 0.4 0.0 - 1.2 mg/dL    Alkaline Phosphatase 69 35 - 104 U/L    ALT 23 0 - 32 U/L    AST 22 0 - 31 U/L   Lipase   Result Value Ref Range    Lipase 29 13 - 60 U/L   Urinalysis, reflex to microscopic   Result Value Ref Range    Color, UA Yellow Straw/Yellow    Clarity, UA Clear Clear    Glucose, Ur Negative Negative mg/dL    Bilirubin Urine Negative Negative    Ketones, Urine Negative Negative mg/dL    Specific Gravity, UA 1.020 1.005 - 1.030    Blood, Urine Negative Negative    pH, UA 7.0 5.0 - 9.0    Protein, UA Negative Negative mg/dL    Urobilinogen, Urine 0.2 <2.0 E.U./dL    Nitrite, Urine Negative Negative    Leukocyte Esterase, Urine TRACE (A) Negative   Lactic Acid, Plasma   Result Value Ref Range    Lactic Acid 0.8 0.5 - 2.2 mmol/L   Microscopic Urinalysis   Result Value Ref Range    WBC, UA 5-10 (A) 0 - 5 /HPF    RBC, UA NONE 0 - 2 /HPF    Epithelial Cells, UA MANY /HPF    Bacteria, UA MANY (A) None Seen /HPF   POC Pregnancy Urine   Result Value Ref Range    HCG, Urine, POC Negative Negative    Lot Number [id number]     Positive QC Pass/Fail Pass     Negative QC Pass/Fail Pass      Imaging: All Radiology results interpreted by Radiologist unless otherwise noted. CT ABDOMEN PELVIS W WO CONTRAST Additional Contrast? None   Final Result   There are multiple diverticula seen    Findings compatible with fatty infiltration of the liver   Splenomegaly   Low density left adnexal mass likely an ovarian cyst                                ED Course / Medical Decision Making     Medications   0.9 % sodium chloride bolus (0 mLs Intravenous Stopped 8/12/20 1553)   ondansetron (ZOFRAN) injection 4 mg (4 mg Intravenous Given 8/12/20 1502)   morphine sulfate (PF) injection 4 mg (4 mg Intravenous Given 8/12/20 1502)   iopamidol (ISOVUE-370) 76 % injection 110 mL (110 mLs Intravenous Given 8/12/20 1546)        Re-examination:  8/12/20       Time: 1430    Patients symptoms are improving. Consults:   None    Procedures:   none    MDM:   CT with contrast to evaluate for pain with palpation to RLQ. Patient also had pain with palpation to CVA so scan without contrast in addition. No evidence of kidney stone. No evidence of appendicitis on scan. Patient's urine did show evidence of UTI. Scan also showed evidence of diverticulitis. Patient treated outpatient for diverticulitis with Omnicef and Flagyl. Frederic Palafox will also cover for UTI. Patient also given antiemetic and narcotic pain medicine for symptomatic relief. Patient advised if symptoms persist or worsen to return emergency department immediately, otherwise follow-up with primary care provider. Patient also seen the emergency department Dr. Pura Glasgow. Counseling: The emergency provider has spoken with the patient and discussed todays results, in addition to providing specific details for the plan of care and counseling regarding the diagnosis and prognosis.   Questions are answered at this time and they are agreeable with the plan for outpatient treatment for diverticulitis, coverage for diverticulitis with Omnicef also covers for UTI. Assessment      1. Diverticulitis of colon    2. Urinary tract infection without hematuria, site unspecified      Plan   Discharge to home  Patient condition is stable    New Medications     Discharge Medication List as of 8/12/2020  5:06 PM      START taking these medications    Details   cefdinir (OMNICEF) 300 MG capsule Take 1 capsule by mouth 2 times daily for 10 days, Disp-20 capsule,R-0Print      metroNIDAZOLE (FLAGYL) 500 MG tablet Take 1 tablet by mouth 3 times daily for 10 days, Disp-30 tablet,R-0Print      ondansetron (ZOFRAN ODT) 4 MG disintegrating tablet Take 1 tablet by mouth every 8 hours as needed for Nausea or Vomiting, Disp-10 tablet,R-0Print      HYDROcodone-acetaminophen (NORCO) 5-325 MG per tablet Take 1 tablet by mouth every 6 hours as needed for Pain for up to 3 days. Intended supply: 3 days. Take lowest dose possible to manage pain, Disp-12 tablet,R-0Print           Electronically signed by AZEEM Baptiste CNP   DD: 8/12/20  **This report was transcribed using voice recognition software. Every effort was made to ensure accuracy; however, inadvertent computerized transcription errors may be present.   END OF ED PROVIDER NOTE        AZEEM Rolon CNP  08/12/20 6360

## 2020-12-03 ENCOUNTER — HOSPITAL ENCOUNTER (EMERGENCY)
Age: 20
Discharge: HOME | End: 2020-12-03
Payer: COMMERCIAL

## 2020-12-03 ENCOUNTER — APPOINTMENT (OUTPATIENT)
Dept: GENERAL RADIOLOGY | Age: 20
End: 2020-12-03
Payer: COMMERCIAL

## 2020-12-03 VITALS
BODY MASS INDEX: 47.09 KG/M2 | TEMPERATURE: 97.3 F | RESPIRATION RATE: 16 BRPM | HEIGHT: 66 IN | DIASTOLIC BLOOD PRESSURE: 82 MMHG | HEART RATE: 82 BPM | OXYGEN SATURATION: 98 % | WEIGHT: 293 LBS | SYSTOLIC BLOOD PRESSURE: 124 MMHG

## 2020-12-03 PROCEDURE — 71045 X-RAY EXAM CHEST 1 VIEW: CPT

## 2020-12-03 PROCEDURE — 99283 EMERGENCY DEPT VISIT LOW MDM: CPT

## 2020-12-03 RX ORDER — BROMPHENIRAMINE MALEATE, PSEUDOEPHEDRINE HYDROCHLORIDE, AND DEXTROMETHORPHAN HYDROBROMIDE 2; 30; 10 MG/5ML; MG/5ML; MG/5ML
5 SYRUP ORAL 4 TIMES DAILY PRN
Qty: 118 ML | Refills: 0 | Status: SHIPPED | OUTPATIENT
Start: 2020-12-03 | End: 2021-01-14

## 2020-12-03 RX ORDER — ALBUTEROL SULFATE 90 UG/1
2 AEROSOL, METERED RESPIRATORY (INHALATION) 4 TIMES DAILY PRN
Qty: 1 INHALER | Refills: 0 | Status: SHIPPED | OUTPATIENT
Start: 2020-12-03 | End: 2021-01-14

## 2020-12-03 ASSESSMENT — PAIN DESCRIPTION - PROGRESSION: CLINICAL_PROGRESSION: NOT CHANGED

## 2020-12-03 ASSESSMENT — PAIN SCALES - GENERAL: PAINLEVEL_OUTOF10: 5

## 2020-12-03 ASSESSMENT — PAIN DESCRIPTION - DESCRIPTORS: DESCRIPTORS: ACHING;CONSTANT

## 2020-12-03 ASSESSMENT — PAIN - FUNCTIONAL ASSESSMENT: PAIN_FUNCTIONAL_ASSESSMENT: PREVENTS OR INTERFERES SOME ACTIVE ACTIVITIES AND ADLS

## 2020-12-03 ASSESSMENT — PAIN DESCRIPTION - LOCATION: LOCATION: GENERALIZED

## 2020-12-03 ASSESSMENT — PAIN DESCRIPTION - FREQUENCY: FREQUENCY: CONTINUOUS

## 2020-12-03 ASSESSMENT — PAIN DESCRIPTION - ONSET: ONSET: ON-GOING

## 2020-12-03 ASSESSMENT — PAIN DESCRIPTION - PAIN TYPE: TYPE: ACUTE PAIN

## 2020-12-04 NOTE — ED NOTES
Patient verbalized understanding of d/c, f/u & Rx instructions. Patient ambulatory to exit.      Darinel Denny RN  12/03/20 1899

## 2020-12-06 NOTE — ED PROVIDER NOTES
Independent MLP      HPI:  12/6/20,   Time: 1:18 PM TRISTON Whitlock is a 23 y.o. female presenting to the ED for cough, sneezing body aches intermittent shortness of breath with coughing low-grade fever and fatigue over the last several days. Patient states that she was tested for Covid and is positive she states that her symptoms have not improved she also states that she has a history of asthma and does not have her inhaler. Patient denies any chest pain she denies any nausea vomiting diarrhea. Patient states that nothing makes her symptoms better nothing makes her symptoms worse. ROS:   Pertinent positives and negatives are stated within HPI, all other systems reviewed and are negative.  --------------------------------------------- PAST HISTORY ---------------------------------------------  Past Medical History:  has a past medical history of Asperger syndrome and Autism disorder. Past Surgical History:  has a past surgical history that includes REMOVAL FOREIGN BODY EAR; pr lap,cholecystectomy/graph (N/A, 3/15/2018); and Cholecystectomy. Social History:  reports that she has never smoked. She has never used smokeless tobacco. She reports current alcohol use. She reports that she does not use drugs. Family History: family history includes No Known Problems in her father and mother. The patients home medications have been reviewed. Allergies: Patient has no known allergies. -------------------------------------------------- RESULTS -------------------------------------------------  All laboratory and radiology results have been personally reviewed by myself   LABS:  No results found for this visit on 12/03/20. RADIOLOGY:  Interpreted by Radiologist.  XR CHEST PORTABLE   Final Result   No acute process.           ------------------------- NURSING NOTES AND VITALS REVIEWED ---------------------------   The nursing notes within the ED encounter and vital signs as below have been reviewed. /82   Pulse 82   Temp 97.3 °F (36.3 °C) (Temporal)   Resp 16   Ht 5' 6\" (1.676 m)   Wt 300 lb (136.1 kg)   LMP 12/03/2020   SpO2 98%   BMI 48.42 kg/m²   Oxygen Saturation Interpretation: Normal      ---------------------------------------------------PHYSICAL EXAM--------------------------------------      Constitutional/General: Alert and oriented x3, well appearing, non toxic in NAD  Head: NC/AT  Eyes: PERRL, EOMI  Mouth: Oropharynx clear, handling secretions, no trismus  Neck: Supple, full ROM, no meningeal signs  Pulmonary: Lungs clear to auscultation bilaterally, no wheezes, rales, or rhonchi. Not in respiratory distress  Cardiovascular:  Regular rate and rhythm, no murmurs, gallops, or rubs. 2+ distal pulses  Abdomen: Soft, non tender, non distended,   Extremities: Moves all extremities x 4. Warm and well perfused  Skin: warm and dry without rash  Neurologic: GCS 15,  Psych: Normal Affect      ------------------------------ ED COURSE/MEDICAL DECISION MAKING----------------------  Medications - No data to display      Medical Decision Making: At this time the patient is without objective evidence of an acute process requiring hospitalization or inpatient management. They have remained hemodynamically stable throughout their entire ED visit and are stable for discharge with outpatient follow-up. The plan has been discussed in detail and they are aware of the specific conditions for emergent return, as well as the importance of follow-up. Patient was given scription for albuterol inhaler and Bromfed. Patient was educated on close follow-up especially due to her history of asthma. Patient at this time is agreeable to plan of care she is nontoxic in appearance she is not hypoxic. Patient is stable for outpatient follow-up. Counseling:    The emergency provider has spoken with the patient and discussed todays results, in addition to providing specific details for the plan of care and counseling regarding the diagnosis and prognosis. Questions are answered at this time and they are agreeable with the plan.      --------------------------------- IMPRESSION AND DISPOSITION ---------------------------------    IMPRESSION  1. Cough    2.  COVID-19        DISPOSITION  Disposition: Discharge to home  Patient condition is good                 Missy Carlton, AZEEM - CNP  12/06/20 3921

## 2021-01-14 ENCOUNTER — APPOINTMENT (OUTPATIENT)
Dept: GENERAL RADIOLOGY | Age: 21
End: 2021-01-14
Payer: COMMERCIAL

## 2021-01-14 ENCOUNTER — HOSPITAL ENCOUNTER (EMERGENCY)
Age: 21
Discharge: HOME | End: 2021-01-14
Payer: COMMERCIAL

## 2021-01-14 VITALS
HEART RATE: 96 BPM | DIASTOLIC BLOOD PRESSURE: 88 MMHG | TEMPERATURE: 96.4 F | WEIGHT: 293 LBS | HEIGHT: 66 IN | RESPIRATION RATE: 16 BRPM | BODY MASS INDEX: 47.09 KG/M2 | OXYGEN SATURATION: 98 % | SYSTOLIC BLOOD PRESSURE: 131 MMHG

## 2021-01-14 DIAGNOSIS — S29.012A UPPER BACK STRAIN, INITIAL ENCOUNTER: Primary | ICD-10-CM

## 2021-01-14 LAB — HCG(URINE) PREGNANCY TEST: NEGATIVE

## 2021-01-14 PROCEDURE — 81025 URINE PREGNANCY TEST: CPT

## 2021-01-14 PROCEDURE — 72072 X-RAY EXAM THORAC SPINE 3VWS: CPT

## 2021-01-14 PROCEDURE — 99283 EMERGENCY DEPT VISIT LOW MDM: CPT

## 2021-01-14 RX ORDER — LIDOCAINE 50 MG/G
1 PATCH TOPICAL DAILY
Qty: 10 PATCH | Refills: 0 | Status: SHIPPED | OUTPATIENT
Start: 2021-01-14 | End: 2021-01-24

## 2021-01-14 RX ORDER — IBUPROFEN 800 MG/1
800 TABLET ORAL EVERY 6 HOURS PRN
Qty: 20 TABLET | Refills: 0 | Status: SHIPPED | OUTPATIENT
Start: 2021-01-14 | End: 2021-02-15 | Stop reason: ALTCHOICE

## 2021-01-14 RX ORDER — CYCLOBENZAPRINE HCL 10 MG
10 TABLET ORAL 3 TIMES DAILY PRN
COMMUNITY
End: 2021-02-15 | Stop reason: ALTCHOICE

## 2021-01-14 ASSESSMENT — PAIN SCALES - GENERAL: PAINLEVEL_OUTOF10: 6

## 2021-01-14 ASSESSMENT — PAIN DESCRIPTION - FREQUENCY: FREQUENCY: CONTINUOUS

## 2021-01-14 ASSESSMENT — PAIN DESCRIPTION - ORIENTATION: ORIENTATION: MID;UPPER

## 2021-01-14 ASSESSMENT — PAIN DESCRIPTION - PAIN TYPE: TYPE: ACUTE PAIN

## 2021-01-14 ASSESSMENT — PAIN DESCRIPTION - DESCRIPTORS: DESCRIPTORS: DISCOMFORT

## 2021-02-15 ENCOUNTER — HOSPITAL ENCOUNTER (EMERGENCY)
Age: 21
Discharge: HOME | End: 2021-02-15
Attending: EMERGENCY MEDICINE
Payer: COMMERCIAL

## 2021-02-15 VITALS
OXYGEN SATURATION: 97 % | WEIGHT: 293 LBS | BODY MASS INDEX: 47.09 KG/M2 | RESPIRATION RATE: 18 BRPM | DIASTOLIC BLOOD PRESSURE: 80 MMHG | HEIGHT: 66 IN | SYSTOLIC BLOOD PRESSURE: 120 MMHG | HEART RATE: 103 BPM | TEMPERATURE: 96.9 F

## 2021-02-15 DIAGNOSIS — H92.02 OTALGIA OF LEFT EAR: Primary | ICD-10-CM

## 2021-02-15 PROCEDURE — 99283 EMERGENCY DEPT VISIT LOW MDM: CPT

## 2021-02-15 PROCEDURE — 6360000002 HC RX W HCPCS: Performed by: EMERGENCY MEDICINE

## 2021-02-15 PROCEDURE — 96372 THER/PROPH/DIAG INJ SC/IM: CPT

## 2021-02-15 RX ORDER — KETOROLAC TROMETHAMINE 30 MG/ML
30 INJECTION, SOLUTION INTRAMUSCULAR; INTRAVENOUS ONCE
Status: COMPLETED | OUTPATIENT
Start: 2021-02-15 | End: 2021-02-15

## 2021-02-15 RX ORDER — PSEUDOEPHEDRINE HYDROCHLORIDE 30 MG/1
30 TABLET ORAL EVERY 4 HOURS PRN
Qty: 30 TABLET | Refills: 1 | Status: SHIPPED | OUTPATIENT
Start: 2021-02-15 | End: 2021-02-22

## 2021-02-15 RX ORDER — FLUTICASONE PROPIONATE 50 MCG
1 SPRAY, SUSPENSION (ML) NASAL DAILY
Qty: 1 BOTTLE | Refills: 0 | Status: SHIPPED | OUTPATIENT
Start: 2021-02-15 | End: 2021-03-03

## 2021-02-15 RX ADMIN — KETOROLAC TROMETHAMINE 30 MG: 30 INJECTION, SOLUTION INTRAMUSCULAR at 20:18

## 2021-02-15 ASSESSMENT — ENCOUNTER SYMPTOMS
EYE DISCHARGE: 0
DIARRHEA: 0
VOMITING: 0
FACIAL SWELLING: 0
SORE THROAT: 0
SHORTNESS OF BREATH: 0
SINUS PRESSURE: 0
WHEEZING: 0
NAUSEA: 0
EYE REDNESS: 0
RHINORRHEA: 0
COUGH: 0
ABDOMINAL DISTENTION: 0
TROUBLE SWALLOWING: 0
EYE PAIN: 0
BACK PAIN: 0

## 2021-02-15 ASSESSMENT — PAIN DESCRIPTION - FREQUENCY: FREQUENCY: CONTINUOUS

## 2021-02-15 ASSESSMENT — PAIN DESCRIPTION - DESCRIPTORS: DESCRIPTORS: SHOOTING;SHARP

## 2021-02-15 ASSESSMENT — PAIN DESCRIPTION - PAIN TYPE: TYPE: ACUTE PAIN

## 2021-02-15 ASSESSMENT — PAIN SCALES - GENERAL: PAINLEVEL_OUTOF10: 5

## 2021-02-16 NOTE — ED PROVIDER NOTES
Department of Emergency Medicine   ED  Provider Note  Admit Date/RoomTime: 2/15/2021  7:47 PM  ED Room: 09/09              2/15/21  8:16 PM EST    History of Present Illness:   Duy Wade is a 23 y.o. female presenting to the ED for otalgia, beginning today. The complaint has been persistent, moderate in severity, and worsened by burping, speaking loudly, coughing, as well as applying pressure in front of tragus. Patient reports she is chronically itchy ears with intermittent episodes of pain today she has an onset pop in her left ear with pain. She has not taken anything for the pain. She reports her PCP doctor for her chronically itchy ears and he told her to apply cream.  She has never seen an ENT doctor. She reports she was recently treated with amoxicillin for strep throat and is no longer having throat pain. Review of Systems:   Pertinent positives and negatives are stated within HPI, all other systems reviewed and are negative. Review of Systems   Constitutional: Negative for chills, fatigue and fever. HENT: Positive for ear pain. Negative for congestion, dental problem, facial swelling, hearing loss, rhinorrhea, sinus pressure, sore throat and trouble swallowing. Eyes: Negative for pain, discharge and redness. Respiratory: Negative for cough, shortness of breath and wheezing. Cardiovascular: Negative for chest pain. Gastrointestinal: Negative for abdominal distention, diarrhea, nausea and vomiting. Genitourinary: Negative for dysuria and frequency. Musculoskeletal: Negative for arthralgias and back pain. Skin: Negative for rash and wound. Neurological: Negative for weakness and headaches. Hematological: Negative for adenopathy.    All other systems reviewed and are negative.           --------------------------------------------- PAST HISTORY ---------------------------------------------  Past Medical History:  has a past medical history of Asperger syndrome and normal. No respiratory distress. Breath sounds: Normal breath sounds. No wheezing or rales. Abdominal:      General: Bowel sounds are normal.      Palpations: Abdomen is soft. Tenderness: There is no abdominal tenderness. There is no guarding or rebound. Skin:     General: Skin is warm and dry. Neurological:      Mental Status: She is alert and oriented to person, place, and time. Cranial Nerves: No cranial nerve deficit. Coordination: Coordination normal.            -------------------------------------------------- RESULTS -------------------------------------------------  All laboratory and radiology results have been personally reviewed by myself   LABS:  No results found for this visit on 02/15/21. RADIOLOGY:  Interpreted by Radiologist.  No orders to display           ------------------------------ ED COURSE/MEDICAL DECISION MAKING----------------------  Medications   ketorolac (TORADOL) injection 30 mg (has no administration in time range)               Medical Decision Making:    Patient with acute on chronic left ear pain with a crackling and popping noise. No signs of otitis media or externa. Suspect eustachian tube dysfunction will give symptomatic supportive care and refer to ENT if symptoms persist    Counseling: The emergency provider has spoken with the patient and discussed todays results, in addition to providing specific details for the plan of care and counseling regarding the diagnosis and prognosis. Questions are answered at this time and they are agreeable with the plan.      --------------------------------- IMPRESSION AND DISPOSITION ---------------------------------    IMPRESSION  1. Otalgia of left ear     suspect eustachian tube dysfunction    DISPOSITION  Disposition: Discharge to home  Patient condition is stable      I, Dr. Cherre Dancer, am the primary doctor of record. NOTE: This report was transcribed using voice recognition software.  Effort was made to ensure accuracy; however, inadvertent computerized transcription errors may be present         Marilou Inman DO  02/15/21 2022

## 2021-02-16 NOTE — ED NOTES
Patient verbalized understanding of d/c, f/u & Rx instructions. Patient was provided with a work note as well. Patient ambulatory to exit.       Nila Stern RN  02/15/21 2030

## 2021-02-22 NOTE — PLAN OF CARE
Problem: Pain:  Goal: Control of acute pain  Control of acute pain   Outcome: Met This Shift Trilobed Flap Text: We discussed various closure modalities with the patient, including healing by second intention, primary closure, skin graft and various flaps.  The location and configuration of the defect indicated that a trilobed transposition flap would result in the least disturbance of the position and function of the surrounding anatomic structures, and provide the best result.  The technique, its benefits, alternatives and risks were discussed with the patient.  The patient underwent the procedure as follows: The patient was positioned supine on the operating room table.  The area of the defect and the surrounding skin were anesthetized with buffered 1% lidocaine with epinephrine.  The area was washed with chlorhexidine.  Sterile drapes were applied. \\n\\nThe wound edges were debeveled and extensively undermined.  A trilobed transposition flap was designed, incised, and elevated.  Hemostasis was achieved with spot electrodesiccation.  The quaterinary and tertiary defects created in design of the flap were closed using buried interrupted sutures.  The flap was then transposed to cover the primary defect, trimmed to more accurately fit the defect, and secured in position.  The standing cone so created was removed to fat by triangulation.  The 2nd lobe of the flap was trimmed to fit the secondary defect created in the flap design.  Additional buried interrupted sutures were used to achieve dermal wound apposition and secure the flap in place.  Final cutaneous approximation was achieved with running epidermal sutures.  The closure was manually tested and found to be sound for strength and hemostasis.

## 2021-02-25 ENCOUNTER — TELEPHONE (OUTPATIENT)
Dept: ADMINISTRATIVE | Age: 21
End: 2021-02-25

## 2021-02-25 NOTE — TELEPHONE ENCOUNTER
Patient states her ears are in pain has taken multiple antibiotics showing no relief. Patient has tested positive for strep 10 times in the last 2 months.

## 2021-02-25 NOTE — TELEPHONE ENCOUNTER
Patient is scheduled for next avail for ears but Dr. Manuel Dupont office said they sent over another referral for strep throat and patient needs seen asap.  [phone]

## 2021-03-03 ENCOUNTER — APPOINTMENT (OUTPATIENT)
Dept: GENERAL RADIOLOGY | Age: 21
End: 2021-03-03
Payer: COMMERCIAL

## 2021-03-03 ENCOUNTER — HOSPITAL ENCOUNTER (EMERGENCY)
Age: 21
Discharge: HOME | End: 2021-03-03
Payer: COMMERCIAL

## 2021-03-03 VITALS
SYSTOLIC BLOOD PRESSURE: 124 MMHG | BODY MASS INDEX: 47.09 KG/M2 | HEART RATE: 84 BPM | HEIGHT: 66 IN | WEIGHT: 293 LBS | OXYGEN SATURATION: 98 % | DIASTOLIC BLOOD PRESSURE: 78 MMHG | RESPIRATION RATE: 16 BRPM | TEMPERATURE: 98.1 F

## 2021-03-03 DIAGNOSIS — M79.671 RIGHT FOOT PAIN: Primary | ICD-10-CM

## 2021-03-03 DIAGNOSIS — S93.401A SPRAIN OF RIGHT ANKLE, UNSPECIFIED LIGAMENT, INITIAL ENCOUNTER: ICD-10-CM

## 2021-03-03 PROCEDURE — 99283 EMERGENCY DEPT VISIT LOW MDM: CPT

## 2021-03-03 PROCEDURE — 73610 X-RAY EXAM OF ANKLE: CPT

## 2021-03-03 PROCEDURE — 6370000000 HC RX 637 (ALT 250 FOR IP): Performed by: NURSE PRACTITIONER

## 2021-03-03 PROCEDURE — 73630 X-RAY EXAM OF FOOT: CPT

## 2021-03-03 RX ORDER — IBUPROFEN 800 MG/1
800 TABLET ORAL ONCE
Status: COMPLETED | OUTPATIENT
Start: 2021-03-03 | End: 2021-03-03

## 2021-03-03 RX ORDER — IBUPROFEN 800 MG/1
800 TABLET ORAL EVERY 8 HOURS PRN
Qty: 21 TABLET | Refills: 0 | Status: SHIPPED | OUTPATIENT
Start: 2021-03-03 | End: 2021-05-15

## 2021-03-03 RX ADMIN — IBUPROFEN 800 MG: 800 TABLET, FILM COATED ORAL at 18:50

## 2021-03-03 ASSESSMENT — PAIN SCALES - GENERAL: PAINLEVEL_OUTOF10: 6

## 2021-03-03 ASSESSMENT — PAIN DESCRIPTION - PROGRESSION: CLINICAL_PROGRESSION: GRADUALLY WORSENING

## 2021-03-03 ASSESSMENT — PAIN DESCRIPTION - PAIN TYPE: TYPE: ACUTE PAIN

## 2021-03-03 NOTE — LETTER
[address] [facility] Emergency Department  [id number] [facility] [address]  Phone: [phone]             March 3, 2021    Patient: Orin Camargo   YOB: 2000   Date of Visit: 3/3/2021       To Whom It May Concern:    Christina Grove was seen and treated in our emergency department on 3/3/2021. No work 3/4/2021.       Sincerely,             Signature:__________________________________

## 2021-03-04 NOTE — ED PROVIDER NOTES
[address] [facility] [place]  Department of Emergency Medicine   ED  Encounter Note  Admit Date/RoomTime: 3/3/2021  6:19 PM  ED Room:     NAME: Iliana Beckford  : 2000  MRN: [id number]     Chief Complaint:  Toe Pain (foot started burning today around 8am, took tylenol. Now unable to put pressure on her right foot due to pain. NKI. )    History of Present Illness         Iliana Beckford is a 23 y.o. old female presenting to the emergency department for complaint of right foot pain. She reports that around 8 AM while she was walking at work she develops significant burning to her right foot. Denies any known injury. Denies any medical history including DM. Reports that the pain has increased throughout the day. States that due to the pain she rolled her ankle as well. Denies any previous injury to right foot or ankle. Reports that she has been ambulating but with discomfort. 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 Denies numbness, tingling, paresthesias, extremity weakness, fever, chills, or any other complaints at this time. Since onset the symptoms have been unchanged with ability to bear weight, but with some pain. Her pain is aggraveated by pressure on or palpation of, bending or walking and relieved by nothing. Tetanus Status: up to date. ROS   Pertinent positives and negatives are stated within HPI, all other systems reviewed and are negative. Past Medical History:  has a past medical history of Asperger syndrome and Autism disorder. Surgical History:  has a past surgical history that includes REMOVAL FOREIGN BODY EAR; pr lap,cholecystectomy/graph (N/A, 3/15/2018); and Cholecystectomy. Social History:  reports that she has never smoked. She has never used smokeless tobacco. She reports current alcohol use. She reports that she does not use drugs.     Family History: family history includes No Known Problems in her father and mother. Allergies: Patient has no known allergies. Physical Exam   Oxygen Saturation Interpretation: Normal.        ED Triage Vitals   BP Temp Temp Source Pulse Resp SpO2 Height Weight   03/03/21 1753 03/03/21 1753 03/03/21 1753 03/03/21 1753 03/03/21 1753 03/03/21 1753 03/03/21 1806 03/03/21 1806   124/78 98.1 °F (36.7 °C) Temporal 84 16 98 % 5' 6\" (1.676 m) (!) 308 lb (139.7 kg)         Constitutional:  Alert, development consistent with age. Neck:  Normal ROM. Supple. Foot: Right dorsal 4th, 5th metatarsal(s). Tenderness:  mild. Swelling: None. Deformity: no.              ROM: full range with pain. Skin:  no erythema, rash or wounds noted. Neurovascular: Motor deficit: none. Sensory deficit:   none. Pulse deficit: none. Capillary refill: normal.  Ankle:               Tenderness:  none. Swelling: None. Deformity: no.             ROM: full range of motion. Skin:  normal exam; no wounds, erythema, or swelling. No neurovascular deficits. No motor or sensory deficits. Compartments soft and compressible. Gait:  limp due to affected limb. Lymphatics: No lymphangitis or adenopathy noted. Neurological:  Oriented. Motor functions intact. Lab / Imaging Results   (All laboratory and radiology results have been personally reviewed by myself)  Labs:  No results found for this visit on 03/03/21. Imaging: All Radiology results interpreted by Radiologist unless otherwise noted. XR FOOT RIGHT (MIN 3 VIEWS)   Final Result   No osseous abnormality seen about the right ankle nor right foot at this time. RECOMMENDATION:   In the setting of trauma, if there is persistent symptoms and physical exam   warrants a repeat radiograph in 10-14 days could be considered as occult   fractures may not be evident on initial imaging evaluation.          XR ANKLE importance of follow-up. Plan of Care/Counseling:  I reviewed today's visit with the patient in addition to providing specific details for the plan of care and counseling regarding the diagnosis and prognosis. Questions are answered at this time and are agreeable with the plan. Assessment      1. Right foot pain    2. Sprain of right ankle, unspecified ligament, initial encounter      Plan   Discharge home. Patient condition is good    New Medications     Discharge Medication List as of 3/3/2021  7:51 PM      START taking these medications    Details   ibuprofen (ADVIL;MOTRIN) 800 MG tablet Take 1 tablet by mouth every 8 hours as needed for Pain, Disp-21 tablet, R-0Print           Electronically signed by AZEEM Dickson CNP   DD: 3/3/21  **This report was transcribed using voice recognition software. Every effort was made to ensure accuracy; however, inadvertent computerized transcription errors may be present.   END OF ED PROVIDER NOTE      AZEEM Dickson CNP  03/03/21 2120       AZEEM Dickson CNP  03/03/21 2121

## 2021-04-09 ENCOUNTER — OFFICE VISIT (OUTPATIENT)
Dept: ENT CLINIC | Age: 21
End: 2021-04-09
Payer: COMMERCIAL

## 2021-04-09 VITALS
HEIGHT: 66 IN | OXYGEN SATURATION: 98 % | DIASTOLIC BLOOD PRESSURE: 87 MMHG | BODY MASS INDEX: 47.09 KG/M2 | HEART RATE: 83 BPM | RESPIRATION RATE: 14 BRPM | SYSTOLIC BLOOD PRESSURE: 133 MMHG | WEIGHT: 293 LBS

## 2021-04-09 DIAGNOSIS — J35.1 ENLARGED TONSILS: ICD-10-CM

## 2021-04-09 DIAGNOSIS — Z87.09 HISTORY OF STREP PHARYNGITIS: ICD-10-CM

## 2021-04-09 DIAGNOSIS — L29.9 EAR ITCHING: Primary | ICD-10-CM

## 2021-04-09 PROCEDURE — G8427 DOCREV CUR MEDS BY ELIG CLIN: HCPCS | Performed by: OTOLARYNGOLOGY

## 2021-04-09 PROCEDURE — 4004F PT TOBACCO SCREEN RCVD TLK: CPT | Performed by: OTOLARYNGOLOGY

## 2021-04-09 PROCEDURE — 99204 OFFICE O/P NEW MOD 45 MIN: CPT | Performed by: OTOLARYNGOLOGY

## 2021-04-09 PROCEDURE — G8417 CALC BMI ABV UP PARAM F/U: HCPCS | Performed by: OTOLARYNGOLOGY

## 2021-04-09 RX ORDER — CYCLOBENZAPRINE HCL 10 MG
TABLET ORAL NIGHTLY PRN
Status: ON HOLD | COMMUNITY
End: 2021-08-12

## 2021-04-09 ASSESSMENT — ENCOUNTER SYMPTOMS
TROUBLE SWALLOWING: 0
CONSTIPATION: 0
COLOR CHANGE: 0
VOMITING: 0
BLOOD IN STOOL: 0
DIARRHEA: 0
RHINORRHEA: 0
EYE ITCHING: 0
EYE DISCHARGE: 0
ABDOMINAL PAIN: 0
SORE THROAT: 0
COUGH: 0
NAUSEA: 1
SHORTNESS OF BREATH: 0
WHEEZING: 0

## 2021-04-09 NOTE — PROGRESS NOTES
and Sexual Activity    Alcohol use: Yes     Comment: occasionally    Drug use: No    Sexual activity: Yes     Partners: Male   Lifestyle    Physical activity     Days per week: None     Minutes per session: None    Stress: None   Relationships    Social connections     Talks on phone: None     Gets together: None     Attends Yazidism service: None     Active member of club or organization: None     Attends meetings of clubs or organizations: None     Relationship status: None    Intimate partner violence     Fear of current or ex partner: None     Emotionally abused: None     Physically abused: None     Forced sexual activity: None   Other Topics Concern    None   Social History Narrative    None     No Known Allergies    Review of Systems   Constitutional: Negative for chills and fever. HENT: Positive for ear discharge, ear pain and hearing loss. Negative for congestion, nosebleeds, rhinorrhea, sore throat, tinnitus and trouble swallowing. Eyes: Negative for discharge, itching and visual disturbance. Respiratory: Negative for cough, shortness of breath and wheezing. Cardiovascular: Negative for chest pain and palpitations. Gastrointestinal: Positive for nausea. Negative for abdominal pain, blood in stool, constipation, diarrhea and vomiting. Skin: Negative for color change and rash. Neurological: Negative for dizziness, syncope and light-headedness. Objective:     Vitals:    04/09/21 1151   BP: 133/87   Pulse: 83   Resp: 14   SpO2: 98%     Physical Exam  Vitals signs reviewed. Constitutional:       General: She is awake. She is not in acute distress. Appearance: She is well-developed. She is morbidly obese. She is not ill-appearing, toxic-appearing or diaphoretic. HENT:      Head: Normocephalic and atraumatic. Right Ear: There is no impacted cerumen. Tympanic membrane is not erythematous or bulging. Left Ear: There is no impacted cerumen.  Tympanic membrane is not erythematous or bulging. Ears:      Comments: Bilateral ear canals show scaling of skin with mild inflammation and wet debris      Nose: Congestion present. No nasal tenderness or rhinorrhea. Right Nostril: Foreign body (has nasal piercing) present. No epistaxis. Left Nostril: Foreign body (has nasal piercing) present. No epistaxis. Right Turbinates: Not swollen. Left Turbinates: Not swollen. Mouth/Throat:      Lips: Pink. Mouth: Mucous membranes are moist.      Pharynx: Uvula midline. No oropharyngeal exudate or posterior oropharyngeal erythema. Tonsils: No tonsillar exudate. 2+ on the right. 2+ on the left. Comments: Patient has tongue piercing  Eyes:      General: No scleral icterus. Right eye: No discharge. Left eye: No discharge. Pulmonary:      Effort: No accessory muscle usage or respiratory distress. Skin:     General: Skin is warm and dry. Neurological:      General: No focal deficit present. Mental Status: She is alert. Psychiatric:         Attention and Perception: Attention normal.         Mood and Affect: Mood normal.         Speech: Speech normal.         Behavior: Behavior is cooperative. Thought Content: Thought content normal.         Cognition and Memory: Cognition normal.         Judgment: Judgment normal.         Assessment:       Diagnosis Orders   1. Ear itching     2. History of strep pharyngitis     3. Enlarged tonsils         Plan: For ear itching, will do trial of vinegar and alcohol solution. This should be applied daily to both ear canals. Will follow-up in 2 months regarding ear itching. As patient has only had 1 episode of strep pharyngitis, will continue to monitor. If patient has additional strep infections prior to follow-up appointment in 2 months, will consider tonsillectomy at that time, as patient has enlarged tonsils as well.      Electronically signed by Abril Benavidez DO on 4/9/2021 at 3:08 PM  [facility] Family Medicine Resident, PGY-3                     Boni Key  2000    I have discussed the case, including pertinent history and exam findings with the resident. I have seen and examined the patient and the key elements of the encounter have been performed by me. I agree with the assessment, plan and orders as documented by the  resident              Remainder of medical problems as per  resident note. Patient seen and examined. Agree with above exam, assessment and plan.       Electronically signed by Paradise Ferrell DO on 4/12/21 at 1:53 PM EDT

## 2021-05-15 ENCOUNTER — APPOINTMENT (OUTPATIENT)
Dept: GENERAL RADIOLOGY | Age: 21
End: 2021-05-15
Payer: COMMERCIAL

## 2021-05-15 ENCOUNTER — HOSPITAL ENCOUNTER (EMERGENCY)
Age: 21
Discharge: HOME | End: 2021-05-15
Attending: STUDENT IN AN ORGANIZED HEALTH CARE EDUCATION/TRAINING PROGRAM
Payer: COMMERCIAL

## 2021-05-15 VITALS
BODY MASS INDEX: 47.09 KG/M2 | OXYGEN SATURATION: 98 % | HEIGHT: 66 IN | WEIGHT: 293 LBS | TEMPERATURE: 97.7 F | RESPIRATION RATE: 16 BRPM | SYSTOLIC BLOOD PRESSURE: 134 MMHG | DIASTOLIC BLOOD PRESSURE: 80 MMHG | HEART RATE: 78 BPM

## 2021-05-15 DIAGNOSIS — J40 BRONCHITIS: Primary | ICD-10-CM

## 2021-05-15 LAB
ANION GAP SERPL CALCULATED.3IONS-SCNC: 11 MMOL/L (ref 7–16)
BASOPHILS ABSOLUTE: 0.08 E9/L (ref 0–0.2)
BASOPHILS RELATIVE PERCENT: 0.8 % (ref 0–2)
BUN BLDV-MCNC: 7 MG/DL (ref 6–20)
CALCIUM SERPL-MCNC: 9.9 MG/DL (ref 8.6–10.2)
CHLORIDE BLD-SCNC: 104 MMOL/L (ref 98–107)
CO2: 25 MMOL/L (ref 22–29)
CREAT SERPL-MCNC: 0.6 MG/DL (ref 0.5–1)
D DIMER: <200 NG/ML DDU
EOSINOPHILS ABSOLUTE: 0.32 E9/L (ref 0.05–0.5)
EOSINOPHILS RELATIVE PERCENT: 3.1 % (ref 0–6)
GFR AFRICAN AMERICAN: >60
GFR NON-AFRICAN AMERICAN: >60 ML/MIN/1.73
GLUCOSE BLD-MCNC: 104 MG/DL (ref 74–99)
HCG(URINE) PREGNANCY TEST: NEGATIVE
HCT VFR BLD CALC: 42 % (ref 34–48)
HEMOGLOBIN: 14.8 G/DL (ref 11.5–15.5)
IMMATURE GRANULOCYTES #: 0.03 E9/L
IMMATURE GRANULOCYTES %: 0.3 % (ref 0–5)
LYMPHOCYTES ABSOLUTE: 3.44 E9/L (ref 1.5–4)
LYMPHOCYTES RELATIVE PERCENT: 33.4 % (ref 20–42)
MCH RBC QN AUTO: 29.7 PG (ref 26–35)
MCHC RBC AUTO-ENTMCNC: 35.2 % (ref 32–34.5)
MCV RBC AUTO: 84.2 FL (ref 80–99.9)
MONOCYTES ABSOLUTE: 0.74 E9/L (ref 0.1–0.95)
MONOCYTES RELATIVE PERCENT: 7.2 % (ref 2–12)
NEUTROPHILS ABSOLUTE: 5.7 E9/L (ref 1.8–7.3)
NEUTROPHILS RELATIVE PERCENT: 55.2 % (ref 43–80)
PDW BLD-RTO: 12.4 FL (ref 11.5–15)
PLATELET # BLD: 309 E9/L (ref 130–450)
PMV BLD AUTO: 10.1 FL (ref 7–12)
POTASSIUM SERPL-SCNC: 4 MMOL/L (ref 3.5–5)
RBC # BLD: 4.99 E12/L (ref 3.5–5.5)
SODIUM BLD-SCNC: 140 MMOL/L (ref 132–146)
TROPONIN: <0.01 NG/ML (ref 0–0.03)
WBC # BLD: 10.3 E9/L (ref 4.5–11.5)

## 2021-05-15 PROCEDURE — 84484 ASSAY OF TROPONIN QUANT: CPT

## 2021-05-15 PROCEDURE — U0003 INFECTIOUS AGENT DETECTION BY NUCLEIC ACID (DNA OR RNA); SEVERE ACUTE RESPIRATORY SYNDROME CORONAVIRUS 2 (SARS-COV-2) (CORONAVIRUS DISEASE [COVID-19]), AMPLIFIED PROBE TECHNIQUE, MAKING USE OF HIGH THROUGHPUT TECHNOLOGIES AS DESCRIBED BY CMS-2020-01-R: HCPCS

## 2021-05-15 PROCEDURE — 85025 COMPLETE CBC W/AUTO DIFF WBC: CPT

## 2021-05-15 PROCEDURE — 36415 COLL VENOUS BLD VENIPUNCTURE: CPT

## 2021-05-15 PROCEDURE — 93005 ELECTROCARDIOGRAM TRACING: CPT | Performed by: STUDENT IN AN ORGANIZED HEALTH CARE EDUCATION/TRAINING PROGRAM

## 2021-05-15 PROCEDURE — 85378 FIBRIN DEGRADE SEMIQUANT: CPT

## 2021-05-15 PROCEDURE — 99284 EMERGENCY DEPT VISIT MOD MDM: CPT

## 2021-05-15 PROCEDURE — 81025 URINE PREGNANCY TEST: CPT

## 2021-05-15 PROCEDURE — U0005 INFEC AGEN DETEC AMPLI PROBE: HCPCS

## 2021-05-15 PROCEDURE — 71046 X-RAY EXAM CHEST 2 VIEWS: CPT

## 2021-05-15 PROCEDURE — 80048 BASIC METABOLIC PNL TOTAL CA: CPT

## 2021-05-15 RX ORDER — LISDEXAMFETAMINE DIMESYLATE 10 MG/1
40 CAPSULE ORAL DAILY
Status: ON HOLD | COMMUNITY
End: 2021-08-16 | Stop reason: HOSPADM

## 2021-05-15 RX ORDER — BENZONATATE 200 MG/1
200 CAPSULE ORAL 3 TIMES DAILY PRN
Qty: 30 CAPSULE | Refills: 0 | Status: SHIPPED | OUTPATIENT
Start: 2021-05-15 | End: 2021-05-25

## 2021-05-15 ASSESSMENT — PAIN DESCRIPTION - DESCRIPTORS: DESCRIPTORS: SHARP;SORE

## 2021-05-15 ASSESSMENT — PAIN DESCRIPTION - PAIN TYPE: TYPE: ACUTE PAIN

## 2021-05-15 ASSESSMENT — PAIN DESCRIPTION - PROGRESSION: CLINICAL_PROGRESSION: GRADUALLY WORSENING

## 2021-05-15 ASSESSMENT — PAIN DESCRIPTION - FREQUENCY: FREQUENCY: CONTINUOUS

## 2021-05-15 ASSESSMENT — PAIN DESCRIPTION - ORIENTATION: ORIENTATION: RIGHT;LEFT

## 2021-05-16 LAB
EKG ATRIAL RATE: 81 BPM
EKG P-R INTERVAL: 150 MS
EKG Q-T INTERVAL: 394 MS
EKG QRS DURATION: 108 MS
EKG QTC CALCULATION (BAZETT): 457 MS
EKG R AXIS: 28 DEGREES
EKG T AXIS: 29 DEGREES
EKG VENTRICULAR RATE: 81 BPM

## 2021-05-16 PROCEDURE — 93010 ELECTROCARDIOGRAM REPORT: CPT | Performed by: INTERNAL MEDICINE

## 2021-05-16 NOTE — ED PROVIDER NOTES
HPI:     Peyton Odonnell is a 23 y.o. female presenting to the ED for cough and chest pain, beginning 9:00 this morning. The complaint has been persistent, moderate in severity, and worsened by nothing. Patient states that approximately o'clock this morning she began having some pain in her back and then subsequent began coughing. States that she has had a consistent cough ever since. States that she has some mild chest pain as well and she feels this is likely from the coughing. She states that the chest pain is generalized. Denies any headache, dizziness, fever, shortness of breath, nausea, vomiting, diarrhea, constipation, urinary symptoms. Review of Systems:   Please see HPI above. All bolded are positive. All un-bolded are negative. Constitutional Symptoms: fever, chills, fatigue, generalized weakness, diaphoresis, increase in thirst, loss of appetite  Eyes: vision change   Ears, Nose, Mouth, Throat: hearing loss, nasal congestion, sores in the mouth  Cardiovascular: chest pain, chest heaviness, palpitations  Respiratory: shortness of breath, wheezing, coughing  Gastrointestinal: abdominal pain, nausea, vomiting, diarrhea, constipation, melena, hematochezia, hematemesis  Genitourinary: dysuria, hematuria, increased frequency  Musculoskeletal: lower extremity edema, myalgias, arthralgias, back pain  Integumentary: rashes, itching   Neurological: headache, lightheadedness, dizziness, confusion, syncope, numbness, tingling, focal weakness  Psychiatric: depression, suicidal ideation, anxiety  Endocrine: unintentional weight change  Hematologic/Lymphatic: lymphadenopathy, easy bruising, easy bleeding   Allergic/Immunologic: recurrent infections            --------------------------------------------- PAST HISTORY ---------------------------------------------  Past Medical History:  has a past medical history of Asperger syndrome and Autism disorder.     Past Surgical History:  has a past surgical history that includes REMOVAL FOREIGN BODY EAR; pr lap,cholecystectomy/graph (N/A, 3/15/2018); and Cholecystectomy. Social History:  reports that she has been smoking cigarettes. She has never used smokeless tobacco. She reports current alcohol use. She reports that she does not use drugs. Family History: family history includes No Known Problems in her father and mother. The patients home medications have been reviewed. Allergies: Patient has no known allergies.     -------------------------------------------------- RESULTS -------------------------------------------------  All laboratory and radiology results have been personally reviewed by myself   LABS:  Results for orders placed or performed during the hospital encounter of 05/15/21   CBC auto differential   Result Value Ref Range    WBC 10.3 4.5 - 11.5 E9/L    RBC 4.99 3.50 - 5.50 E12/L    Hemoglobin 14.8 11.5 - 15.5 g/dL    Hematocrit 42.0 34.0 - 48.0 %    MCV 84.2 80.0 - 99.9 fL    MCH 29.7 26.0 - 35.0 pg    MCHC 35.2 (H) 32.0 - 34.5 %    RDW 12.4 11.5 - 15.0 fL    Platelets 191 951 - 962 E9/L    MPV 10.1 7.0 - 12.0 fL    Neutrophils % 55.2 43.0 - 80.0 %    Immature Granulocytes % 0.3 0.0 - 5.0 %    Lymphocytes % 33.4 20.0 - 42.0 %    Monocytes % 7.2 2.0 - 12.0 %    Eosinophils % 3.1 0.0 - 6.0 %    Basophils % 0.8 0.0 - 2.0 %    Neutrophils Absolute 5.70 1.80 - 7.30 E9/L    Immature Granulocytes # 0.03 E9/L    Lymphocytes Absolute 3.44 1.50 - 4.00 E9/L    Monocytes Absolute 0.74 0.10 - 0.95 E9/L    Eosinophils Absolute 0.32 0.05 - 0.50 E9/L    Basophils Absolute 0.08 0.00 - 0.20 A3/X   Basic metabolic panel   Result Value Ref Range    Sodium 140 132 - 146 mmol/L    Potassium 4.0 3.5 - 5.0 mmol/L    Chloride 104 98 - 107 mmol/L    CO2 25 22 - 29 mmol/L    Anion Gap 11 7 - 16 mmol/L    Glucose 104 (H) 74 - 99 mg/dL    BUN 7 6 - 20 mg/dL    CREATININE 0.6 0.5 - 1.0 mg/dL    GFR Non-African American >60 >=60 mL/min/1.73    GFR African American >60 Calcium 9.9 8.6 - 10.2 mg/dL   PREGNANCY, URINE   Result Value Ref Range    HCG(Urine) Pregnancy Test NEGATIVE NEGATIVE   D-Dimer, Quantitative   Result Value Ref Range    D-Dimer, Quant <200 ng/mL DDU   Troponin   Result Value Ref Range    Troponin <0.01 0.00 - 0.03 ng/mL   EKG 12 Lead   Result Value Ref Range    Ventricular Rate 81 BPM    Atrial Rate 81 BPM    P-R Interval 150 ms    QRS Duration 108 ms    Q-T Interval 394 ms    QTc Calculation (Bazett) 457 ms    R Axis 28 degrees    T Axis 29 degrees       RADIOLOGY:  Interpreted by Radiologist.  XR CHEST (2 VW)   Final Result   No evidence of active cardiopulmonary pathology. ------------------------- NURSING NOTES AND VITALS REVIEWED ---------------------------   The nursing notes within the ED encounter and vital signs as below have been reviewed. /80   Pulse 78   Temp 97.7 °F (36.5 °C) (Temporal)   Resp 16   Ht 5' 6\" (1.676 m)   Wt (!) 307 lb (139.3 kg)   SpO2 98%   BMI 49.55 kg/m²   Oxygen Saturation Interpretation: Normal      ---------------------------------------------------PHYSICAL EXAM--------------------------------------      Constitutional/General: Alert and oriented x3, well appearing, non toxic in NAD, morbidly obese   Head: Normocephalic and atraumatic  Eyes: PERRL, EOMI  Mouth: Oropharynx clear, handling secretions, no trismus  Neck: Supple, full ROM, phonation normal, no meningismus  Pulmonary: Lungs clear to auscultation bilaterally, no wheezes, rales, or rhonchi. Not in respiratory distress  Cardiovascular:  Regular rate and rhythm, no murmurs, gallops, or rubs. 2+ distal pulses  Abdomen: Soft, non tender, non distended, no guarding, rigidity, rebound tenderness  Extremities: Moves all extremities x 4. Warm and well perfused  Skin: warm and dry without rash  Neurologic: GCS 15, no focal deficit noted  Psych: Normal Affect      EKG: This EKG is signed and interpreted by me.     Rate: 81  Rhythm: Sinus  Interpretation: Normal sinus rhythm, no acute change noted, no axis deviation, no STEMI,  ms  Comparison: stable as compared to patient's most recent EKG    ------------------------------ ED COURSE/MEDICAL DECISION MAKING----------------------  Medications - No data to display      ED COURSE:       Medical Decision Making:    Labs all within normal limits, EKG negative. Patient will swab for Covid and instructed to isolate until results come back. Dimer was negative as well. I feel patient likely has bronchitis. She is agreeable to discharge at this time. She will be discharged Tessalon Perles. Strict return precautions given. Patient instructed to follow with PCP. She is agreeable to plan. All questions have been answered. Counseling: The emergency provider has spoken with the patient and discussed todays results, in addition to providing specific details for the plan of care and counseling regarding the diagnosis and prognosis. Questions are answered at this time and they are agreeable with the plan.      --------------------------------- IMPRESSION AND DISPOSITION ---------------------------------    IMPRESSION  1. Bronchitis        Discharge Medication List as of 5/15/2021 10:48 PM      START taking these medications    Details   benzonatate (TESSALON) 200 MG capsule Take 1 capsule by mouth 3 times daily as needed for Cough, Disp-30 capsule, R-0Print             DISPOSITION  Disposition: Discharge to home  Patient condition is stable      NOTE: This report was transcribed using voice recognition software.  Every effort was made to ensure accuracy; however, inadvertent computerized transcription errors may be present       Larisa Lawrence DO  Resident  05/15/21 7895

## 2021-05-16 NOTE — ED NOTES
Rounded on pt, advised pt we are just waiting for the rest of the blood work to result. Will continue to monitor.        aSndy Byrnes RN  05/15/21 0043

## 2021-05-17 ENCOUNTER — CARE COORDINATION (OUTPATIENT)
Dept: CARE COORDINATION | Age: 21
End: 2021-05-17

## 2021-05-17 LAB — SARS-COV-2, PCR: NOT DETECTED

## 2021-05-17 NOTE — CARE COORDINATION
Patient contacted regarding [id number] covid. Discussed COVID-19 related testing which was pending at this time. Test results were pending. Patient informed of results, if available? No      Ambulatory Care Manager contacted the patient by telephone to perform post discharge assessment. Call within 2 business days of discharge: Yes. Verified name and  with patient as identifiers. Provided introduction to self, and explanation of the CTN/ACM role, and reason for call due to risk factors for infection and/or exposure to COVID-19. Symptoms reviewed with patient who verbalized the following symptoms: cough, chest pain, no new symptoms and no worsening symptoms. Due to no new or worsening symptoms encounter was not routed to provider for escalation. Discussed follow-up appointments. If no appointment was previously scheduled, appointment scheduling offered: Yes  [facility] follow up appointment(s):   Future Appointments   Date Time Provider Milton Jo   2021  9:15 AM DO Jose J Desir [facility]     Non-[facility] follow up appointment(s): n/a    Non-face-to-face services provided:  Obtained and reviewed discharge summary and/or continuity of care documents     Advance Care Planning:   Does patient have an Advance Directive:  reviewed and current. Educated patient about risk for severe COVID-19 due to risk factors according to CDC guidelines. ACM reviewed discharge instructions, medical action plan and red flag symptoms patient who verbalized understanding. Discussed COVID vaccination status Yes. Education provided on COVID-19 vaccination as appropriate. Discussed exposure protocols and quarantine with CDC Guidelines. Patient was given an opportunity to verbalize any questions and concerns and agrees to contact ACM or health care provider for questions related to their healthcare.     Reviewed and educated patient on any new and changed medications related to discharge diagnosis     Was patient discharged with a pulse oximeter? No Discussed and confirmed pulse oximeter discharge instructions and when to notify provider or seek emergency care. For patients discharged due to monoclonal antibody infusion or pulse ox at discharge; information provided for remote patient monitoring, and agrees to enroll for follow up N/a. Patient's preferred e-mail: n/a   Patient's preferred phone number: n/a  Based on remote monitoring alert triggers, patient will be contacted by nurse care manager for worsening symptoms. ACM provided contact information. No further follow-up call identified based on severity of symptoms and risk factors.

## 2021-06-05 ENCOUNTER — APPOINTMENT (OUTPATIENT)
Dept: CT IMAGING | Age: 21
End: 2021-06-05
Payer: COMMERCIAL

## 2021-06-05 ENCOUNTER — HOSPITAL ENCOUNTER (EMERGENCY)
Age: 21
Discharge: HOME | End: 2021-06-06
Attending: STUDENT IN AN ORGANIZED HEALTH CARE EDUCATION/TRAINING PROGRAM
Payer: COMMERCIAL

## 2021-06-05 DIAGNOSIS — H60.392 INFECTIVE OTITIS EXTERNA OF LEFT EAR: Primary | ICD-10-CM

## 2021-06-05 LAB
ANION GAP SERPL CALCULATED.3IONS-SCNC: 11 MMOL/L (ref 7–16)
BASOPHILS ABSOLUTE: 0.07 E9/L (ref 0–0.2)
BASOPHILS RELATIVE PERCENT: 0.6 % (ref 0–2)
BUN BLDV-MCNC: 9 MG/DL (ref 6–20)
CALCIUM SERPL-MCNC: 9.1 MG/DL (ref 8.6–10.2)
CHLORIDE BLD-SCNC: 103 MMOL/L (ref 98–107)
CO2: 24 MMOL/L (ref 22–29)
CREAT SERPL-MCNC: 0.5 MG/DL (ref 0.5–1)
EOSINOPHILS ABSOLUTE: 0.33 E9/L (ref 0.05–0.5)
EOSINOPHILS RELATIVE PERCENT: 3 % (ref 0–6)
GFR AFRICAN AMERICAN: >60
GFR NON-AFRICAN AMERICAN: >60 ML/MIN/1.73
GLUCOSE BLD-MCNC: 115 MG/DL (ref 74–99)
HCG(URINE) PREGNANCY TEST: NEGATIVE
HCT VFR BLD CALC: 40.3 % (ref 34–48)
HEMOGLOBIN: 14.4 G/DL (ref 11.5–15.5)
IMMATURE GRANULOCYTES #: 0.03 E9/L
IMMATURE GRANULOCYTES %: 0.3 % (ref 0–5)
LYMPHOCYTES ABSOLUTE: 3.03 E9/L (ref 1.5–4)
LYMPHOCYTES RELATIVE PERCENT: 27.6 % (ref 20–42)
MCH RBC QN AUTO: 29.9 PG (ref 26–35)
MCHC RBC AUTO-ENTMCNC: 35.7 % (ref 32–34.5)
MCV RBC AUTO: 83.6 FL (ref 80–99.9)
MONOCYTES ABSOLUTE: 0.77 E9/L (ref 0.1–0.95)
MONOCYTES RELATIVE PERCENT: 7 % (ref 2–12)
NEUTROPHILS ABSOLUTE: 6.74 E9/L (ref 1.8–7.3)
NEUTROPHILS RELATIVE PERCENT: 61.5 % (ref 43–80)
PDW BLD-RTO: 12.4 FL (ref 11.5–15)
PLATELET # BLD: 292 E9/L (ref 130–450)
PMV BLD AUTO: 10.3 FL (ref 7–12)
POTASSIUM SERPL-SCNC: 4 MMOL/L (ref 3.5–5)
RBC # BLD: 4.82 E12/L (ref 3.5–5.5)
SODIUM BLD-SCNC: 138 MMOL/L (ref 132–146)
WBC # BLD: 11 E9/L (ref 4.5–11.5)

## 2021-06-05 PROCEDURE — 96374 THER/PROPH/DIAG INJ IV PUSH: CPT

## 2021-06-05 PROCEDURE — 70487 CT MAXILLOFACIAL W/DYE: CPT

## 2021-06-05 PROCEDURE — 96375 TX/PRO/DX INJ NEW DRUG ADDON: CPT

## 2021-06-05 PROCEDURE — 6360000002 HC RX W HCPCS: Performed by: STUDENT IN AN ORGANIZED HEALTH CARE EDUCATION/TRAINING PROGRAM

## 2021-06-05 PROCEDURE — 6360000004 HC RX CONTRAST MEDICATION: Performed by: RADIOLOGY

## 2021-06-05 PROCEDURE — 81025 URINE PREGNANCY TEST: CPT

## 2021-06-05 PROCEDURE — 36415 COLL VENOUS BLD VENIPUNCTURE: CPT

## 2021-06-05 PROCEDURE — 80048 BASIC METABOLIC PNL TOTAL CA: CPT

## 2021-06-05 PROCEDURE — 85025 COMPLETE CBC W/AUTO DIFF WBC: CPT

## 2021-06-05 PROCEDURE — 99284 EMERGENCY DEPT VISIT MOD MDM: CPT

## 2021-06-05 PROCEDURE — 2580000003 HC RX 258: Performed by: RADIOLOGY

## 2021-06-05 RX ORDER — CIPROFLOXACIN AND DEXAMETHASONE 3; 1 MG/ML; MG/ML
4 SUSPENSION/ DROPS AURICULAR (OTIC) ONCE
Status: DISCONTINUED | OUTPATIENT
Start: 2021-06-06 | End: 2021-06-05

## 2021-06-05 RX ORDER — DEXAMETHASONE SODIUM PHOSPHATE 10 MG/ML
10 INJECTION, SOLUTION INTRAMUSCULAR; INTRAVENOUS ONCE
Status: COMPLETED | OUTPATIENT
Start: 2021-06-05 | End: 2021-06-05

## 2021-06-05 RX ORDER — FENTANYL CITRATE 50 UG/ML
50 INJECTION, SOLUTION INTRAMUSCULAR; INTRAVENOUS ONCE
Status: COMPLETED | OUTPATIENT
Start: 2021-06-05 | End: 2021-06-05

## 2021-06-05 RX ORDER — NEOMYCIN SULFATE, POLYMYXIN B SULFATE AND HYDROCORTISONE 10; 3.5; 1 MG/ML; MG/ML; [USP'U]/ML
4 SUSPENSION/ DROPS AURICULAR (OTIC) ONCE
Status: COMPLETED | OUTPATIENT
Start: 2021-06-06 | End: 2021-06-06

## 2021-06-05 RX ORDER — SODIUM CHLORIDE 0.9 % (FLUSH) 0.9 %
10 SYRINGE (ML) INJECTION PRN
Status: COMPLETED | OUTPATIENT
Start: 2021-06-05 | End: 2021-06-05

## 2021-06-05 RX ADMIN — DEXAMETHASONE SODIUM PHOSPHATE 10 MG: 10 INJECTION, SOLUTION INTRAMUSCULAR; INTRAVENOUS at 22:36

## 2021-06-05 RX ADMIN — SODIUM CHLORIDE, PRESERVATIVE FREE 10 ML: 5 INJECTION INTRAVENOUS at 22:55

## 2021-06-05 RX ADMIN — IOPAMIDOL 75 ML: 755 INJECTION, SOLUTION INTRAVENOUS at 22:55

## 2021-06-05 RX ADMIN — FENTANYL CITRATE 50 MCG: 50 INJECTION, SOLUTION INTRAMUSCULAR; INTRAVENOUS at 22:36

## 2021-06-05 ASSESSMENT — PAIN DESCRIPTION - DESCRIPTORS
DESCRIPTORS: ACHING
DESCRIPTORS: ACHING

## 2021-06-05 ASSESSMENT — PAIN DESCRIPTION - ORIENTATION
ORIENTATION: LEFT
ORIENTATION: LEFT

## 2021-06-05 ASSESSMENT — PAIN DESCRIPTION - LOCATION: LOCATION: EAR

## 2021-06-05 ASSESSMENT — PAIN DESCRIPTION - PROGRESSION: CLINICAL_PROGRESSION: NOT CHANGED

## 2021-06-05 ASSESSMENT — PAIN SCALES - GENERAL
PAINLEVEL_OUTOF10: 6
PAINLEVEL_OUTOF10: 9
PAINLEVEL_OUTOF10: 2

## 2021-06-05 ASSESSMENT — PAIN DESCRIPTION - FREQUENCY: FREQUENCY: CONTINUOUS

## 2021-06-05 ASSESSMENT — PAIN DESCRIPTION - PAIN TYPE
TYPE: ACUTE PAIN
TYPE: ACUTE PAIN

## 2021-06-05 ASSESSMENT — PAIN DESCRIPTION - ONSET: ONSET: ON-GOING

## 2021-06-06 VITALS
SYSTOLIC BLOOD PRESSURE: 138 MMHG | RESPIRATION RATE: 15 BRPM | DIASTOLIC BLOOD PRESSURE: 88 MMHG | HEART RATE: 88 BPM | OXYGEN SATURATION: 98 % | BODY MASS INDEX: 47.09 KG/M2 | TEMPERATURE: 98.1 F | WEIGHT: 293 LBS | HEIGHT: 66 IN

## 2021-06-06 PROCEDURE — 6370000000 HC RX 637 (ALT 250 FOR IP): Performed by: STUDENT IN AN ORGANIZED HEALTH CARE EDUCATION/TRAINING PROGRAM

## 2021-06-06 RX ADMIN — NEOMYCIN SULFATE, POLYMYXIN B SULFATE AND HYDROCORTISONE 4 DROP: 10; 3.5; 1 SUSPENSION/ DROPS AURICULAR (OTIC) at 00:37

## 2021-06-06 NOTE — ED PROVIDER NOTES
HPI:     Naveen Cross is a 23 y.o. female presenting to the ED for left-sided ear pain, beginning 2 days ago. The complaint has been persistent, moderate in severity, and worsened by nothing. Patient states that she said left-sided ear pain for 2 days, states that today became excruciatingly painful so she came to the ER for further evaluation. Denies any other symptoms including headache, dizziness, fever, chest pain, cough, nausea, vomiting, abdominal pain, diarrhea, constipation, urinary symptoms. Review of Systems:   Please see HPI above. All bolded are positive. All un-bolded are negative. Constitutional Symptoms: fever, chills, fatigue, generalized weakness, diaphoresis, increase in thirst, loss of appetite  Eyes: vision change   Ears, Nose, Mouth, Throat: hearing loss, nasal congestion, sores in the mouth, left-sided ear pain  Cardiovascular: chest pain, chest heaviness, palpitations  Respiratory: shortness of breath, wheezing, coughing  Gastrointestinal: abdominal pain, nausea, vomiting, diarrhea, constipation, melena, hematochezia, hematemesis  Genitourinary: dysuria, hematuria, increased frequency  Musculoskeletal: lower extremity edema, myalgias, arthralgias, back pain  Integumentary: rashes, itching   Neurological: headache, lightheadedness, dizziness, confusion, syncope, numbness, tingling, focal weakness  Psychiatric: depression, suicidal ideation, anxiety  Endocrine: unintentional weight change  Hematologic/Lymphatic: lymphadenopathy, easy bruising, easy bleeding   Allergic/Immunologic: recurrent infections            --------------------------------------------- PAST HISTORY ---------------------------------------------  Past Medical History:  has a past medical history of Asperger syndrome and Autism disorder. Past Surgical History:  has a past surgical history that includes REMOVAL FOREIGN BODY EAR; pr lap,cholecystectomy/graph (N/A, 3/15/2018); and Cholecystectomy.     Social RADIOLOGY:  Interpreted by RadiologistNieves Romano   Final Result   1. Left otitis externa. No CT evidence for mastoiditis at this time. 2. Nonspecific fluid in the left middle ear. Correlate with presentation for   superimposed otitis media. 3. Other findings as described.             ------------------------- NURSING NOTES AND VITALS REVIEWED ---------------------------   The nursing notes within the ED encounter and vital signs as below have been reviewed. BP (!) 147/90   Pulse 95   Temp 97.7 °F (36.5 °C) (Temporal)   Resp 16   Ht 5' 6\" (1.676 m)   Wt 300 lb (136.1 kg)   LMP 06/04/2021   SpO2 98%   BMI 48.42 kg/m²   Oxygen Saturation Interpretation: Normal      ---------------------------------------------------PHYSICAL EXAM--------------------------------------      Constitutional/General: Alert and oriented x3, well appearing, non toxic in NAD  Head: Normocephalic and atraumatic  Eyes: PERRL, EOMI  Mouth: Oropharynx clear, handling secretions, no trismus  Ears: Hearing intact, right-sided external ear, canal, tympanic membrane normal. Tenderness to palpation of the left ear, there is erythema along left ear and along the mastoid process. Unable to visualize tympanic membrane secondary to significant otitis externa. Neck: Supple, full ROM, phonation normal, no meningismus, no midline cervical spinal tenderness  Pulmonary: Lungs clear to auscultation bilaterally, no wheezes, rales, or rhonchi. Not in respiratory distress  Cardiovascular:  Regular rate and rhythm, no murmurs, gallops, or rubs. 2+ distal pulses  Abdomen: Soft, non tender, non distended, no guarding, rigidity, rebound tenderness  Extremities: Moves all extremities x 4.  Warm and well perfused  Skin: warm and dry without rash  Neurologic: GCS 15, no focal deficit noted  Psych: Normal Affect      ------------------------------ ED COURSE/MEDICAL DECISION MAKING----------------------  Medications neomycin-polymyxin-hydrocortisone (CORTISPORIN) otic suspension 4 drop (has no administration in time range)   dexamethasone (PF) (DECADRON) injection 10 mg (10 mg Intravenous Given 6/5/21 2236)   fentaNYL (SUBLIMAZE) injection 50 mcg (50 mcg Intravenous Given 6/5/21 2236)   iopamidol (ISOVUE-370) 76 % injection 75 mL (75 mLs Intravenous Given 6/5/21 2255)   sodium chloride flush 0.9 % injection 10 mL (10 mLs Intravenous Given 6/5/21 2255)         ED COURSE:       Medical Decision Making:    Patient presents with otitis externa. There is tenderness and redness to the mastoid, therefore CT of the maxillofacial bones were ordered to rule out mastoiditis. Fortunately, this was negative. Patient given Decadron and fentanyl the emergency department along with Cortisporin. First dose given here in the emergency department. She will be discharged with a bottle. Instructed to follow with PCP and return to the ER if symptoms return or worsen. She verbalized understanding of plan and is agreeable to discharge. All questions have been answered. Counseling: The emergency provider has spoken with the patient and discussed todays results, in addition to providing specific details for the plan of care and counseling regarding the diagnosis and prognosis. Questions are answered at this time and they are agreeable with the plan.      --------------------------------- IMPRESSION AND DISPOSITION ---------------------------------    IMPRESSION  1. Infective otitis externa of left ear        New Prescriptions    No medications on file       DISPOSITION  Disposition: Discharge to home  Patient condition is stable      NOTE: This report was transcribed using voice recognition software.  Every effort was made to ensure accuracy; however, inadvertent computerized transcription errors may be present       DO Becca Dubose  06/06/21 1678

## 2021-06-07 ENCOUNTER — HOSPITAL ENCOUNTER (EMERGENCY)
Age: 21
Discharge: HOME | End: 2021-06-07
Attending: EMERGENCY MEDICINE
Payer: COMMERCIAL

## 2021-06-07 ENCOUNTER — APPOINTMENT (OUTPATIENT)
Dept: GENERAL RADIOLOGY | Age: 21
End: 2021-06-07
Payer: COMMERCIAL

## 2021-06-07 VITALS
HEIGHT: 66 IN | RESPIRATION RATE: 16 BRPM | DIASTOLIC BLOOD PRESSURE: 67 MMHG | BODY MASS INDEX: 47.09 KG/M2 | SYSTOLIC BLOOD PRESSURE: 123 MMHG | OXYGEN SATURATION: 97 % | WEIGHT: 293 LBS | TEMPERATURE: 97.9 F | HEART RATE: 80 BPM

## 2021-06-07 DIAGNOSIS — M25.572 ACUTE LEFT ANKLE PAIN: Primary | ICD-10-CM

## 2021-06-07 PROCEDURE — 99283 EMERGENCY DEPT VISIT LOW MDM: CPT

## 2021-06-07 PROCEDURE — 73610 X-RAY EXAM OF ANKLE: CPT

## 2021-06-07 RX ORDER — NAPROXEN 375 MG/1
375 TABLET ORAL 2 TIMES DAILY WITH MEALS
Qty: 60 TABLET | Refills: 0 | Status: SHIPPED | OUTPATIENT
Start: 2021-06-07 | End: 2021-07-16

## 2021-06-07 ASSESSMENT — PAIN SCALES - GENERAL: PAINLEVEL_OUTOF10: 5

## 2021-06-07 ASSESSMENT — PAIN DESCRIPTION - DESCRIPTORS: DESCRIPTORS: DISCOMFORT

## 2021-06-07 ASSESSMENT — PAIN DESCRIPTION - PAIN TYPE: TYPE: ACUTE PAIN

## 2021-06-07 ASSESSMENT — PAIN DESCRIPTION - LOCATION: LOCATION: ANKLE

## 2021-06-07 ASSESSMENT — PAIN DESCRIPTION - ORIENTATION: ORIENTATION: LEFT

## 2021-06-07 ASSESSMENT — PAIN DESCRIPTION - FREQUENCY: FREQUENCY: CONTINUOUS

## 2021-06-07 NOTE — ED PROVIDER NOTES
This is a 59-year-old female with a past medical history of ADHD who presents to the ED for evaluation of ankle pain. Patient states earlier this morning she was walking up her stairs at home missed a step and fell down. Patient states that she rolled her left ankle and developed pain immediately to her left lateral ankle. Patient states this pain is worse when she tries to walk or bear weight. Patient states she never did hit her head nor did she lose consciousness. Patient describes her pain is severe. Denies any numbness tingling knee pain or ankle pain. Denies any open wounds or other changes. The history is provided by the patient. No  was used. Review of Systems   Musculoskeletal: Negative for gait problem. Skin: Negative for wound. Allergic/Immunologic: Negative for immunocompromised state. Neurological: Negative for syncope. All other systems reviewed and are negative. Physical Exam  Vitals and nursing note reviewed. Constitutional:       General: She is not in acute distress. Appearance: She is well-developed. She is obese. HENT:      Head: Normocephalic and atraumatic. Eyes:      Extraocular Movements: Extraocular movements intact. Neck:      Vascular: No JVD. Cardiovascular:      Rate and Rhythm: Normal rate and regular rhythm. Pulmonary:      Effort: Pulmonary effort is normal.      Breath sounds: No wheezing or rhonchi. Abdominal:      General: There is no distension. Palpations: Abdomen is soft. Musculoskeletal:      Cervical back: Normal range of motion and neck supple. No tenderness. Right lower leg: No edema. Left lower leg: No edema. Comments: Tenderness elicited over left lateral malleolus no obvious deformity able to plantar and dorsiflex ankle. DP and PT pulses intact no significant knee tenderness or hip tenderness to palpation   Skin:     General: Skin is warm and dry.       Capillary Refill: Capillary refill takes less than 2 seconds. Neurological:      General: No focal deficit present. Mental Status: She is alert and oriented to person, place, and time. Mental status is at baseline. Cranial Nerves: No cranial nerve deficit. Psychiatric:         Mood and Affect: Mood normal.         Behavior: Behavior normal.          Procedures     MDM  Number of Diagnoses or Management Options  Acute left ankle pain  Diagnosis management comments: Patient is a 29-year-old female who had a mechanical fall earlier today never did hit her head is complaining of left lateral ankle pain. Patient was nontoxic in no distress. There is no signs of compartment syndrome or vascular compromise. Ridging do not demonstrate any acute fracture. Patient was given naproxen advised to use rest elevation ice and anti-inflammatories. Patient was given strict return precautions and was agreeable plan.                    --------------------------------------------- PAST HISTORY ---------------------------------------------  Past Medical History:  has a past medical history of Asperger syndrome and Autism disorder. Past Surgical History:  has a past surgical history that includes REMOVAL FOREIGN BODY EAR; pr lap,cholecystectomy/graph (N/A, 3/15/2018); and Cholecystectomy. Social History:  reports that she has been smoking cigarettes. She has never used smokeless tobacco. She reports current alcohol use. She reports that she does not use drugs. Family History: family history includes No Known Problems in her father and mother. The patients home medications have been reviewed. Allergies: Patient has no known allergies. -------------------------------------------------- RESULTS -------------------------------------------------  Labs:  No results found for this visit on 06/07/21.     Radiology:  XR ANKLE LEFT (MIN 3 VIEWS)   Final Result   No osseous abnormality             ------------------------- NURSING NOTES

## 2021-06-11 ENCOUNTER — OFFICE VISIT (OUTPATIENT)
Dept: ENT CLINIC | Age: 21
End: 2021-06-11
Payer: COMMERCIAL

## 2021-06-11 VITALS — HEIGHT: 66 IN | WEIGHT: 293 LBS | BODY MASS INDEX: 47.09 KG/M2

## 2021-06-11 DIAGNOSIS — H60.333 ACUTE SWIMMER'S EAR OF BOTH SIDES: Primary | ICD-10-CM

## 2021-06-11 DIAGNOSIS — J35.1 ENLARGED TONSILS: ICD-10-CM

## 2021-06-11 DIAGNOSIS — L29.9 EAR ITCHING: ICD-10-CM

## 2021-06-11 DIAGNOSIS — G47.30 SLEEP-DISORDERED BREATHING: ICD-10-CM

## 2021-06-11 PROCEDURE — 99213 OFFICE O/P EST LOW 20 MIN: CPT | Performed by: OTOLARYNGOLOGY

## 2021-06-11 PROCEDURE — G8427 DOCREV CUR MEDS BY ELIG CLIN: HCPCS | Performed by: OTOLARYNGOLOGY

## 2021-06-11 PROCEDURE — 4130F TOPICAL PREP RX AOE: CPT | Performed by: OTOLARYNGOLOGY

## 2021-06-11 PROCEDURE — G8417 CALC BMI ABV UP PARAM F/U: HCPCS | Performed by: OTOLARYNGOLOGY

## 2021-06-11 PROCEDURE — 4004F PT TOBACCO SCREEN RCVD TLK: CPT | Performed by: OTOLARYNGOLOGY

## 2021-06-11 RX ORDER — ARIPIPRAZOLE 10 MG/1
5 TABLET ORAL DAILY
Status: ON HOLD | COMMUNITY
Start: 2021-06-08 | End: 2021-08-16 | Stop reason: HOSPADM

## 2021-06-11 ASSESSMENT — ENCOUNTER SYMPTOMS
SORE THROAT: 1
ALLERGIC/IMMUNOLOGIC NEGATIVE: 1
EYES NEGATIVE: 1
RESPIRATORY NEGATIVE: 1

## 2021-06-11 NOTE — PATIENT INSTRUCTIONS
Thank you for choosing our [facility] or [facility] practice. We are committed to your medical treatment and  care. If you need to reschedule or cancel your surgery or follow up  appointment, please call the surgery scheduler at [phone]. INSTRUCTIONS FOR SURGERY T&A    Nothing to eat or drink after midnight the night before surgery unless surgery is at [facility] or otherwise instructed by the hospital.    DO NOT TAKE ANY ASPIRIN PRODUCTS 7 days prior to surgery-unless required by your cardiologist or primary care physician. Tylenol only. No Advil, Motrin, Aleve, or Ibuprofen    Any illegal drugs in your system (including Marijuana even if legally prescribed) will result in your surgery being cancelled. Please be sure to check with our office or the hospital on time frame for the drugs to be out of your system. Should your insurance change at any time you must contact our office. Failure to do so may result in your surgery being rescheduled. If you need paperwork filled out for work, you must give the office 2 weeks to complete and submit the forms.       [address], [address] [facility] will call you a couple days prior to your surgery and give you further instructions, if any questions call them at [phone]

## 2021-06-11 NOTE — PROGRESS NOTES
Subjective:      Patient ID:  Francesca Kennedy is a 23 y.o. female. HPI:    Patient presents today for recheck of the bilateral ear. Patient is not doing better. Drops:yes   Type: Cortisporin otic  Pain: yes  Drainage: yes -left   Wick: yes-1 week ago but subsequently fell out  Cerumen impaction: no    She has had left ear pain and drainage for one week that was progressively worsening, describes pain as sharp stabbing in nature, radiating down face, talking made it worse. Was seen in the ED 6 days ago, dx with Otitis externa Left ear, given corticosporin drops with improvement of symptoms, she notes itching and fullness today but without pain. Has not been recently swimming     Patient is also here for follow up of pharyngitis, tonsil hypertrophy. She reports nightly snoring, unrestful sleep, frequent nigh time wakening, day time fatigue. She has had 2 episodes of strep pharyngitis but has none since last visit. Patient's medications, allergies, past medical, surgical, social and family histories were reviewedand updated as appropriate. Review of Systems   Constitutional: Negative. HENT: Positive for ear pain and sore throat. Negative for ear discharge. Eyes: Negative. Respiratory: Negative. Skin: Negative. Allergic/Immunologic: Negative. Neurological: Negative. Hematological: Negative. Psychiatric/Behavioral: Negative. All other systems reviewed and are negative. Objective:   Physical Exam  Vitals reviewed. Constitutional:       Appearance: Normal appearance. HENT:      Head: Normocephalic. Right Ear: Tympanic membrane, ear canal and external ear normal.      Left Ear: Tympanic membrane and external ear normal. Swelling and tenderness present. No middle ear effusion. No mastoid tenderness. Tympanic membrane is not perforated, erythematous, retracted or bulging.       Ears:      Comments: Mild erythema and swelling of canal, no debris noted Nose: Nose normal.      Mouth/Throat:      Mouth: Mucous membranes are moist.      Pharynx: Oropharynx is clear. Uvula midline. Posterior oropharyngeal erythema present. Tonsils: No tonsillar exudate. 3+ on the right. 3+ on the left. Eyes:      Conjunctiva/sclera: Conjunctivae normal.      Pupils: Pupils are equal, round, and reactive to light. Cardiovascular:      Rate and Rhythm: Normal rate. Pulses: Normal pulses. Pulmonary:      Effort: Pulmonary effort is normal.   Musculoskeletal:      Cervical back: Normal range of motion and neck supple. Lymphadenopathy:      Cervical: No cervical adenopathy. Skin:     Capillary Refill: Capillary refill takes less than 2 seconds. Neurological:      Mental Status: She is alert and oriented to person, place, and time. Assessment:       Diagnosis Orders   1. Acute swimmer's ear of both sides     2. Ear itching     3. Enlarged tonsils     4. Sleep-disordered breathing                Plan:      Acute otitis externa  -s/p corticosporin course  -residual erythema and wet edema of the canal  -placed boric acid in the left canal today  -do not place anything in the ear, keep ear clean and dry  -follow up in 2 weeks for reevaluation    Sleep disordered breathing:  I recommend:    Tonsillectomy and possible adenoidectomy. I will not keep the patient overnight for observation after surgery  The procedure risks and benefits were discussed with the patient and family including:      --Bleeding occurs in 1 to 4% of patients  --Poor speech (hyper nasal speech) occurs in 1/3000 patients. --Nasopharyngeal Stenosis  --Chipped Teeth  --Electrocautery Rose  --Death      Pt and family understood and decided to proceed with the surgery. Follow up in 2 week(s)                         Peyton Odonnell  2000    I have discussed the case, including pertinent history and exam findings with the resident.  I have seen and examined the patient and the key

## 2021-06-25 ENCOUNTER — OFFICE VISIT (OUTPATIENT)
Dept: ENT CLINIC | Age: 21
End: 2021-06-25
Payer: COMMERCIAL

## 2021-06-25 VITALS — BODY MASS INDEX: 47.09 KG/M2 | WEIGHT: 293 LBS | HEIGHT: 66 IN

## 2021-06-25 DIAGNOSIS — H60.333 ACUTE SWIMMER'S EAR OF BOTH SIDES: Primary | ICD-10-CM

## 2021-06-25 DIAGNOSIS — J35.1 ENLARGED TONSILS: ICD-10-CM

## 2021-06-25 DIAGNOSIS — L29.9 EAR ITCHING: ICD-10-CM

## 2021-06-25 DIAGNOSIS — Z87.09 HISTORY OF STREP PHARYNGITIS: ICD-10-CM

## 2021-06-25 DIAGNOSIS — G47.30 SLEEP-DISORDERED BREATHING: ICD-10-CM

## 2021-06-25 PROCEDURE — 99213 OFFICE O/P EST LOW 20 MIN: CPT | Performed by: OTOLARYNGOLOGY

## 2021-06-25 PROCEDURE — 4130F TOPICAL PREP RX AOE: CPT | Performed by: OTOLARYNGOLOGY

## 2021-06-25 PROCEDURE — G8427 DOCREV CUR MEDS BY ELIG CLIN: HCPCS | Performed by: OTOLARYNGOLOGY

## 2021-06-25 PROCEDURE — 4004F PT TOBACCO SCREEN RCVD TLK: CPT | Performed by: OTOLARYNGOLOGY

## 2021-06-25 PROCEDURE — G8417 CALC BMI ABV UP PARAM F/U: HCPCS | Performed by: OTOLARYNGOLOGY

## 2021-06-25 RX ORDER — TRIAMCINOLONE ACETONIDE 1 MG/G
CREAM TOPICAL
Qty: 1 TUBE | Refills: 3 | Status: SHIPPED
Start: 2021-06-25 | End: 2021-07-16

## 2021-06-25 ASSESSMENT — ENCOUNTER SYMPTOMS
EYES NEGATIVE: 1
SORE THROAT: 1
RESPIRATORY NEGATIVE: 1
ALLERGIC/IMMUNOLOGIC NEGATIVE: 1

## 2021-06-25 NOTE — PROGRESS NOTES
Subjective:      Patient ID:  Aron Stern is a 23 y.o. female. HPI:    Patient presents today for ear check. Condition has been present for 2 week(s). Boric acid helped but itching continue bilaterally. Past Medical History:   Diagnosis Date    Asperger syndrome     Autism disorder      Past Surgical History:   Procedure Laterality Date    CHOLECYSTECTOMY      [place] LAP,CHOLECYSTECTOMY/GRAPH N/A 3/15/2018    CHOLECYSTECTOMY LAPAROSCOPIC WITH INTRAOP CHOLANGIOGRAM performed by Sigifredo Hightower MD at [address] [facility]       Family History   Problem Relation Age of Onset    No Known Problems Mother     No Known Problems Father      Social History     Socioeconomic History    Marital status: Single     Spouse name: None    Number of children: None    Years of education: None    Highest education level: None   Occupational History    None   Tobacco Use    Smoking status: Current Some Day Smoker     Types: Cigarettes    Smokeless tobacco: Never Used   Vaping Use    Vaping Use: Some days    Start date: 10/2/2015    Substances: Nicotine, Flavoring    Devices: Disposable, Pre-filled or refillable cartridge, Refillable tank, Pre-filled pod   Substance and Sexual Activity    Alcohol use: Yes     Comment: socially    Drug use: No    Sexual activity: Yes     Partners: Male   Other Topics Concern    None   Social History Narrative    None     Social Determinants of Health     Financial Resource Strain:     Difficulty of Paying Living Expenses:    Food Insecurity:     Worried About Running Out of Food in the Last Year:     Ran Out of Food in the Last Year:    Transportation Needs:     Lack of Transportation (Medical):      Lack of Transportation (Non-Medical):    Physical Activity:     Days of Exercise per Week:     Minutes of Exercise per Session:    Stress:     Feeling of Stress :    Social Connections:     Frequency of Communication with Friends and Family:     Frequency of Social Gatherings with Friends and Family:     Attends Denominational Services:     Active Member of Clubs or Organizations:     Attends Club or Organization Meetings:     Marital Status:    Intimate Partner Violence:     Fear of Current or Ex-Partner:     Emotionally Abused:     Physically Abused:     Sexually Abused:      No Known Allergies    Review of Systems   Constitutional: Negative. HENT: Positive for ear pain and sore throat. Negative for ear discharge. Eyes: Negative. Respiratory: Negative. Skin: Negative. Allergic/Immunologic: Negative. Neurological: Negative. Hematological: Negative. Psychiatric/Behavioral: Negative. All other systems reviewed and are negative. Objective: There were no vitals filed for this visit. Physical Exam  Vitals reviewed. Constitutional:       Appearance: Normal appearance. HENT:      Head: Normocephalic. Right Ear: Tympanic membrane, ear canal and external ear normal.      Left Ear: Tympanic membrane and external ear normal. No swelling or tenderness. No middle ear effusion. No mastoid tenderness. Tympanic membrane is not perforated, erythematous, retracted or bulging. Ears:      Comments: Left ear improved     Nose: Nose normal.      Mouth/Throat:      Mouth: Mucous membranes are moist.      Pharynx: Oropharynx is clear. Uvula midline. Posterior oropharyngeal erythema present. Tonsils: No tonsillar exudate. 3+ on the right. 3+ on the left. Eyes:      Conjunctiva/sclera: Conjunctivae normal.      Pupils: Pupils are equal, round, and reactive to light. Cardiovascular:      Rate and Rhythm: Normal rate. Pulses: Normal pulses. Pulmonary:      Effort: Pulmonary effort is normal.   Musculoskeletal:      Cervical back: Normal range of motion and neck supple. Lymphadenopathy:      Cervical: No cervical adenopathy. Skin:     Capillary Refill: Capillary refill takes less than 2 seconds.    Neurological:

## 2021-07-02 ENCOUNTER — OFFICE VISIT (OUTPATIENT)
Dept: ENT CLINIC | Age: 21
End: 2021-07-02
Payer: COMMERCIAL

## 2021-07-02 VITALS — WEIGHT: 293 LBS | BODY MASS INDEX: 47.09 KG/M2 | HEIGHT: 66 IN

## 2021-07-02 DIAGNOSIS — L29.9 EAR ITCHING: ICD-10-CM

## 2021-07-02 DIAGNOSIS — H60.333 ACUTE SWIMMER'S EAR OF BOTH SIDES: Primary | ICD-10-CM

## 2021-07-02 PROCEDURE — 4130F TOPICAL PREP RX AOE: CPT | Performed by: OTOLARYNGOLOGY

## 2021-07-02 PROCEDURE — G8417 CALC BMI ABV UP PARAM F/U: HCPCS | Performed by: OTOLARYNGOLOGY

## 2021-07-02 PROCEDURE — G8427 DOCREV CUR MEDS BY ELIG CLIN: HCPCS | Performed by: OTOLARYNGOLOGY

## 2021-07-02 PROCEDURE — 99213 OFFICE O/P EST LOW 20 MIN: CPT | Performed by: OTOLARYNGOLOGY

## 2021-07-02 PROCEDURE — 4004F PT TOBACCO SCREEN RCVD TLK: CPT | Performed by: OTOLARYNGOLOGY

## 2021-07-02 RX ORDER — CEPHALEXIN 500 MG/1
500 CAPSULE ORAL 2 TIMES DAILY
Qty: 14 CAPSULE | Refills: 0 | Status: SHIPPED | OUTPATIENT
Start: 2021-07-02 | End: 2021-07-09

## 2021-07-02 ASSESSMENT — ENCOUNTER SYMPTOMS
EYES NEGATIVE: 1
SORE THROAT: 1
ALLERGIC/IMMUNOLOGIC NEGATIVE: 1
RESPIRATORY NEGATIVE: 1

## 2021-07-02 NOTE — PROGRESS NOTES
Subjective:      Patient ID:  Ivonne Najera is a 23 y.o. female. HPI:    Patient presents today for itchy ears. Condition has been present for 3 week(s). Ears have been dry but still bothering her. No drainage      Past Medical History:   Diagnosis Date    Asperger syndrome     Autism disorder      Past Surgical History:   Procedure Laterality Date    CHOLECYSTECTOMY      [place] LAP,CHOLECYSTECTOMY/GRAPH N/A 3/15/2018    CHOLECYSTECTOMY LAPAROSCOPIC WITH INTRAOP CHOLANGIOGRAM performed by Dustin Toledo MD at [facility] E[facility]       Family History   Problem Relation Age of Onset    No Known Problems Mother     No Known Problems Father      Social History     Socioeconomic History    Marital status: Single     Spouse name: None    Number of children: None    Years of education: None    Highest education level: None   Occupational History    None   Tobacco Use    Smoking status: Current Some Day Smoker     Types: Cigarettes    Smokeless tobacco: Never Used   Vaping Use    Vaping Use: Some days    Start date: 10/2/2015    Substances: Nicotine, Flavoring    Devices: Disposable, Pre-filled or refillable cartridge, Refillable tank, Pre-filled pod   Substance and Sexual Activity    Alcohol use: Yes     Comment: socially    Drug use: No    Sexual activity: Yes     Partners: Male   Other Topics Concern    None   Social History Narrative    None     Social Determinants of Health     Financial Resource Strain:     Difficulty of Paying Living Expenses:    Food Insecurity:     Worried About Running Out of Food in the Last Year:     Ran Out of Food in the Last Year:    Transportation Needs:     Lack of Transportation (Medical):      Lack of Transportation (Non-Medical):    Physical Activity:     Days of Exercise per Week:     Minutes of Exercise per Session:    Stress:     Feeling of Stress :    Social Connections:     Frequency of Communication with Friends and Family:     Frequency of Social Gatherings with Friends and Family:     Attends Mandaen Services:     Active Member of Clubs or Organizations:     Attends Club or Organization Meetings:     Marital Status:    Intimate Partner Violence:     Fear of Current or Ex-Partner:     Emotionally Abused:     Physically Abused:     Sexually Abused:      No Known Allergies    Review of Systems   Constitutional: Negative. HENT: Positive for ear pain and sore throat. Negative for ear discharge. Eyes: Negative. Respiratory: Negative. Skin: Negative. Allergic/Immunologic: Negative. Neurological: Negative. Hematological: Negative. Psychiatric/Behavioral: Negative. All other systems reviewed and are negative. Objective: There were no vitals filed for this visit. Physical Exam  Vitals reviewed. Constitutional:       Appearance: Normal appearance. HENT:      Head: Normocephalic. Right Ear: Tympanic membrane, ear canal and external ear normal. No swelling or tenderness. No middle ear effusion. No foreign body. No mastoid tenderness. Tympanic membrane is not perforated, erythematous, retracted or bulging. Left Ear: Tympanic membrane and external ear normal. No swelling or tenderness. No middle ear effusion. No mastoid tenderness. Tympanic membrane is not perforated, erythematous, retracted or bulging. Ears:      Comments: Ears normal today     Nose: Nose normal.      Mouth/Throat:      Mouth: Mucous membranes are moist.      Pharynx: Oropharynx is clear. Uvula midline. Posterior oropharyngeal erythema present. Tonsils: No tonsillar exudate. 3+ on the right. 3+ on the left. Eyes:      Conjunctiva/sclera: Conjunctivae normal.      Pupils: Pupils are equal, round, and reactive to light. Cardiovascular:      Rate and Rhythm: Normal rate. Pulses: Normal pulses.    Pulmonary:      Effort: Pulmonary effort is normal.   Musculoskeletal:      Cervical back: Normal range of motion and neck supple. Lymphadenopathy:      Cervical: No cervical adenopathy. Skin:     Capillary Refill: Capillary refill takes less than 2 seconds. Neurological:      Mental Status: She is alert and oriented to person, place, and time. Assessment:       Diagnosis Orders   1. Acute swimmer's ear of both sides     2. Ear itching                Plan:      I will try an aBX to see if it help the ears.    Follow up in 2 week(s)

## 2021-07-08 ENCOUNTER — TELEPHONE (OUTPATIENT)
Dept: ENT CLINIC | Age: 21
End: 2021-07-08

## 2021-07-08 NOTE — TELEPHONE ENCOUNTER
Prior Authorization Form:      DEMOGRAPHICS:                     Patient Name:  Mabel Bear  Patient :  2000            Insurance:  Payor: Leroy Sanders / Plan: Renny Sotelo / Product Type: *No Product type* /   Insurance ID Number:    Payor/Plan Subscr  Sex Relation Sub.  Ins. ID Effective Group Num   1. SHARONATERI - * DRISS WILLS N 2000 Female Self [id number] [address] [facility] [address]         DIAGNOSIS & PROCEDURE:                       Procedure/Operation: Tonsillectomy,Adenoidectomy           CPT Code: 64878    Diagnosis:  Chronic Tonsillitis     ICD10 Code: J35.01    Location:  [facility]    Surgeon:  Lara Hodge INFORMATION:                          Date: 2021    Time: N/A              Anesthesia:  General                                                       Status:  Outpatient        Special Comments:  N/A       Electronically signed by Patricia Aguilar MA on 2021 at 7:55 AM

## 2021-07-13 ENCOUNTER — HOSPITAL ENCOUNTER (EMERGENCY)
Age: 21
Discharge: HOME | End: 2021-07-13
Payer: COMMERCIAL

## 2021-07-13 ENCOUNTER — APPOINTMENT (OUTPATIENT)
Dept: GENERAL RADIOLOGY | Age: 21
End: 2021-07-13
Payer: COMMERCIAL

## 2021-07-13 VITALS
HEART RATE: 105 BPM | TEMPERATURE: 98 F | DIASTOLIC BLOOD PRESSURE: 78 MMHG | RESPIRATION RATE: 14 BRPM | WEIGHT: 293 LBS | OXYGEN SATURATION: 98 % | BODY MASS INDEX: 47.09 KG/M2 | SYSTOLIC BLOOD PRESSURE: 119 MMHG | HEIGHT: 66 IN

## 2021-07-13 DIAGNOSIS — J02.0 STREPTOCOCCAL PHARYNGITIS: Primary | ICD-10-CM

## 2021-07-13 LAB
MONO TEST: NEGATIVE
STREP GRP A PCR: POSITIVE

## 2021-07-13 PROCEDURE — 6370000000 HC RX 637 (ALT 250 FOR IP): Performed by: NURSE PRACTITIONER

## 2021-07-13 PROCEDURE — 86308 HETEROPHILE ANTIBODY SCREEN: CPT

## 2021-07-13 PROCEDURE — 87880 STREP A ASSAY W/OPTIC: CPT

## 2021-07-13 PROCEDURE — 71046 X-RAY EXAM CHEST 2 VIEWS: CPT

## 2021-07-13 PROCEDURE — 99284 EMERGENCY DEPT VISIT MOD MDM: CPT

## 2021-07-13 RX ORDER — IBUPROFEN 800 MG/1
800 TABLET ORAL EVERY 6 HOURS PRN
Qty: 20 TABLET | Refills: 0 | Status: ON HOLD | OUTPATIENT
Start: 2021-07-13 | End: 2021-08-12

## 2021-07-13 RX ORDER — IBUPROFEN 800 MG/1
800 TABLET ORAL ONCE
Status: COMPLETED | OUTPATIENT
Start: 2021-07-13 | End: 2021-07-13

## 2021-07-13 RX ORDER — AMOXICILLIN AND CLAVULANATE POTASSIUM 875; 125 MG/1; MG/1
1 TABLET, FILM COATED ORAL 2 TIMES DAILY
Qty: 20 TABLET | Refills: 0 | Status: SHIPPED | OUTPATIENT
Start: 2021-07-13 | End: 2021-07-23

## 2021-07-13 RX ORDER — PREDNISONE 20 MG/1
60 TABLET ORAL ONCE
Status: COMPLETED | OUTPATIENT
Start: 2021-07-13 | End: 2021-07-13

## 2021-07-13 RX ADMIN — IBUPROFEN 800 MG: 800 TABLET, FILM COATED ORAL at 17:28

## 2021-07-13 RX ADMIN — PREDNISONE 60 MG: 20 TABLET ORAL at 17:29

## 2021-07-13 ASSESSMENT — PAIN SCALES - GENERAL: PAINLEVEL_OUTOF10: 5

## 2021-07-13 NOTE — ED PROVIDER NOTES
[facility]\         [facility]  Department of Emergency Medicine   ED  Encounter Note  Admit Date/RoomTime: 2021  4:22 PM  ED Room: /      NAME: Shayla Huber  : 2000  MRN: [id number]     Chief Complaint:  Cough (pt reports white spots in back of throat. scheduled to have tonsils and adnoids removed in 9 days with pascolini.)    History of Present Illness      Shayla Huber is a 23 y.o. old female who presents to the emergency department by private vehicle, for gradual onset of bilateral sore throat pain with white spots on the back of which occured 2 day(s) prior to arrival. Associated Signs & Symptoms: nonproductive cough Since onset the symptoms have been persistent and gradually worsening. She is drinking plenty of fluids. Reports her boyfriend was recently diagnosed with strep pharyngitis. She denies any COVID-19 exposures or symptoms. Exposed To: Streptococcus: yes. Infectious Mononucleosis:  unknown. Symptoms:  Pain:  Yes. Muffled Voice:  No.                            Hoarse:  No.                            Difficulty with Secretions:  No.    Centor Score (MODIFIED) For Strep Pharyngitis:    Age 3-14 Years   no (0)     Age >44 Years   NO     Exudate or Swelling on Tonsils   yes (1)     Tender/Swollen Anterior Cervical Nodes   yes (1)     Fever? (T > 38°C, 100.4°F)   no (0)     Absence of Cough   no (0)   TOTAL POINTS   2   Score of Zero = Probability of Strep Pharyngitis: 1% - 2.5%. ,   No further testing nor antibiotics. Score of 1 = Probability of Strep Pharyngitis: 5% - 10%. ,   No further testing nor antibiotics. Score of 2 = Probability of Strep Phar: 11% - 17%. Culture/test all, ATB's only for positive culture results. Score of 3 = Probability of Strep Phar: 28% - 35%.    Culture/test all, ATB's only for positive culture results  Score of 4 or 5 = Probability of Strep Pharyngitis: 51% - 53%. Treat empirically with antibiotics. ROS   Pertinent positives and negatives are stated within HPI, all other systems reviewed and are negative. Past Medical History:  has a past medical history of Asperger syndrome and Autism disorder. Surgical History:  has a past surgical history that includes REMOVAL FOREIGN BODY EAR; pr lap,cholecystectomy/graph (N/A, 3/15/2018); and Cholecystectomy. Social History:  reports that she has been smoking cigarettes. She has never used smokeless tobacco. She reports current alcohol use. She reports that she does not use drugs. Family History: family history includes No Known Problems in her father and mother. Allergies: Patient has no known allergies. Physical Exam   Oxygen Saturation Interpretation: Normal.        ED Triage Vitals   BP Temp Temp src Pulse Resp SpO2 Height Weight   119/78  98 °F -- 105  14  98% -- --         Constitutional:  Alert, development consistent with age. .  Ears:  TMs without perforation, injection, or bulging. External canals clear without exudate. Throat: Airway Patent. Teeth and gums normal., moderate erythema, tonsillar hypertrophy, 2+, exudates present, throat culture taken and mucous membranes moist.       Neck/Lymphatic:  Supple. There is no  preauricular, submental, parotid, posterior cervical, supraclavicular, epitrochlear and axillary node tenderness. respiratory:  Clear to auscultation and breath sounds equal.    CV: Regular rate and rhythm, normal heart sounds, without pathological murmurs, ectopy, gallops, or rubs. GI:  Abdomen Soft, nontender, good bowel sounds. No firm or pulsatile mass. Inegument:  No rashes, erythema present. Neurological:  Oriented. Motor functions intact.     Lab / Imaging Results   (All laboratory and radiology results have been personally reviewed by myself)  Labs:  Results for orders placed or performed during the hospital encounter of 07/13/21   Strep Screen Group A Throat Specimen: Throat   Result Value Ref Range    Strep Grp A PCR POSITIVE Negative   Mononucleosis Screen   Result Value Ref Range    Mono Test Negative Negative     Imaging: All Radiology results interpreted by Radiologist unless otherwise noted. XR CHEST (2 VW)   Final Result   No acute process. ED Course / Medical Decision Making     Medications   predniSONE (DELTASONE) tablet 60 mg (60 mg Oral Given 7/13/21 1729)   ibuprofen (ADVIL;MOTRIN) tablet 800 mg (800 mg Oral Given 7/13/21 1728)        Consult(s):   None    Procedure(s):   none    MDM:   Based on high suspicion for Strep as per history/physical findings, Rapid Strep/Throat Culture testing was done and confirms the above findings  Pharyngitis is likely to  be Strep. Antibiotics are indicated at this time based on clinical presentation and physical findings. Not hypoxic, nothing to suggest pneumonia. Patient is well appearing, non toxic and appropriate for outpatient management. Plan of Care: Normal progression of disease discussed. All questions answered. Instruction provided in the use of fluids, vaporizer, acetaminophen, and other OTC medication for symptom control. Extra fluids  Analgesics as needed, dose reviewed. Follow up as needed should symptoms fail to improve. Follow-up in 2 days, or sooner should symptoms worsen. Plan of Care/Counseling:  AZEEM Henderson CNP reviewed today's visit with the patient in addition to providing specific details for the plan of care and counseling regarding the diagnosis and prognosis. Questions are answered at this time and are agreeable with the plan. Assessment     1. Streptococcal pharyngitis      Plan   Discharged home.   Patient condition is good    New Medications     Discharge Medication List as of 7/13/2021  5:41 PM      START taking these medications    Details   amoxicillin-clavulanate (AUGMENTIN) 875-125 MG per tablet Take 1 tablet by mouth 2 times daily for 10 days, Disp-20 tablet, R-0Normal      ibuprofen (ADVIL;MOTRIN) 800 MG tablet Take 1 tablet by mouth every 6 hours as needed for Pain, Disp-20 tablet, R-0Normal           Electronically signed by AZEEM Anguiano CNP   DD: 7/13/21  **This report was transcribed using voice recognition software. Every effort was made to ensure accuracy; however, inadvertent computerized transcription errors may be present.   END OF ED PROVIDER NOTE      AZEEM Anguiano CNP  07/15/21 0102

## 2021-07-13 NOTE — ED NOTES
Patient complains of a cough that has been persistent for roughly a week. Patient states that her boyfriend tested positive for strept throat. Patient has white spots noted on left side of tonsils upon assessment, no complaints of painful swallowing.        Andrew Leal RN  07/13/21 [id number]

## 2021-07-16 ENCOUNTER — OFFICE VISIT (OUTPATIENT)
Dept: ENT CLINIC | Age: 21
End: 2021-07-16
Payer: COMMERCIAL

## 2021-07-16 VITALS — WEIGHT: 293 LBS | BODY MASS INDEX: 47.09 KG/M2 | HEIGHT: 66 IN

## 2021-07-16 DIAGNOSIS — J35.1 ENLARGED TONSILS: ICD-10-CM

## 2021-07-16 DIAGNOSIS — L29.9 EAR ITCHING: Primary | ICD-10-CM

## 2021-07-16 PROCEDURE — 99213 OFFICE O/P EST LOW 20 MIN: CPT | Performed by: OTOLARYNGOLOGY

## 2021-07-16 PROCEDURE — G8417 CALC BMI ABV UP PARAM F/U: HCPCS | Performed by: OTOLARYNGOLOGY

## 2021-07-16 PROCEDURE — 4004F PT TOBACCO SCREEN RCVD TLK: CPT | Performed by: OTOLARYNGOLOGY

## 2021-07-16 PROCEDURE — G8427 DOCREV CUR MEDS BY ELIG CLIN: HCPCS | Performed by: OTOLARYNGOLOGY

## 2021-07-16 RX ORDER — ARIPIPRAZOLE 5 MG/1
TABLET ORAL
COMMUNITY
Start: 2021-06-29 | End: 2021-07-16

## 2021-07-16 RX ORDER — TRIAMCINOLONE ACETONIDE 5 MG/G
0.5 CREAM TOPICAL 3 TIMES DAILY
Status: ON HOLD | COMMUNITY
End: 2021-08-12

## 2021-07-16 RX ORDER — LISDEXAMFETAMINE DIMESYLATE 40 MG/1
CAPSULE ORAL
COMMUNITY
Start: 2021-06-29 | End: 2021-07-16

## 2021-07-16 RX ORDER — TRIAMCINOLONE ACETONIDE 5 MG/G
CREAM TOPICAL
Qty: 1 TUBE | Refills: 3 | Status: SHIPPED
Start: 2021-07-16 | End: 2021-07-16

## 2021-07-16 RX ORDER — ARIPIPRAZOLE 10 MG/1
TABLET ORAL
COMMUNITY
End: 2021-07-16

## 2021-07-16 RX ORDER — IBUPROFEN 800 MG/1
TABLET ORAL
COMMUNITY
End: 2021-07-16

## 2021-07-16 ASSESSMENT — ENCOUNTER SYMPTOMS
RESPIRATORY NEGATIVE: 1
SORE THROAT: 1
EYES NEGATIVE: 1
ALLERGIC/IMMUNOLOGIC NEGATIVE: 1

## 2021-07-16 NOTE — PROGRESS NOTES
Subjective:      Patient ID:  Gonzales Pratt is a 23 y.o. female. HPI:    Patient presents today for reacheck ears. Condition has been present for 3 week(s). PT states her ears are still bothering her. No draining but itchy      Past Medical History:   Diagnosis Date    Asperger syndrome     Autism disorder      Past Surgical History:   Procedure Laterality Date    CHOLECYSTECTOMY      [place] LAP,CHOLECYSTECTOMY/GRAPH N/A 3/15/2018    CHOLECYSTECTOMY LAPAROSCOPIC WITH INTRAOP CHOLANGIOGRAM performed by Lesia Mejia MD at [address] [facility] EAR       Family History   Problem Relation Age of Onset    No Known Problems Mother     No Known Problems Father      Social History     Socioeconomic History    Marital status: Single     Spouse name: None    Number of children: None    Years of education: None    Highest education level: None   Occupational History    None   Tobacco Use    Smoking status: Current Some Day Smoker     Types: Cigarettes    Smokeless tobacco: Never Used   Vaping Use    Vaping Use: Some days    Start date: 10/2/2015    Substances: Nicotine, Flavoring    Devices: Disposable, Pre-filled or refillable cartridge, Refillable tank, Pre-filled pod   Substance and Sexual Activity    Alcohol use: Yes     Comment: socially    Drug use: No    Sexual activity: Yes     Partners: Male   Other Topics Concern    None   Social History Narrative    None     Social Determinants of Health     Financial Resource Strain:     Difficulty of Paying Living Expenses:    Food Insecurity:     Worried About Running Out of Food in the Last Year:     Ran Out of Food in the Last Year:    Transportation Needs:     Lack of Transportation (Medical):      Lack of Transportation (Non-Medical):    Physical Activity:     Days of Exercise per Week:     Minutes of Exercise per Session:    Stress:     Feeling of Stress :    Social Connections:     Frequency of Communication with Friends and Family:     Frequency of Social Gatherings with Friends and Family:     Attends Druze Services:     Active Member of Clubs or Organizations:     Attends Club or Organization Meetings:     Marital Status:    Intimate Partner Violence:     Fear of Current or Ex-Partner:     Emotionally Abused:     Physically Abused:     Sexually Abused:      No Known Allergies    Review of Systems   Constitutional: Negative. HENT: Positive for ear pain and sore throat. Negative for ear discharge. Eyes: Negative. Respiratory: Negative. Skin: Negative. Allergic/Immunologic: Negative. Neurological: Negative. Hematological: Negative. Psychiatric/Behavioral: Negative. All other systems reviewed and are negative. Objective: There were no vitals filed for this visit. Physical Exam  Vitals reviewed. Constitutional:       Appearance: Normal appearance. HENT:      Head: Normocephalic. Right Ear: Tympanic membrane, ear canal and external ear normal. No swelling or tenderness. No middle ear effusion. No foreign body. No mastoid tenderness. Tympanic membrane is not perforated, erythematous, retracted or bulging. Left Ear: Tympanic membrane and external ear normal. No swelling or tenderness. No middle ear effusion. No mastoid tenderness. Tympanic membrane is not perforated, erythematous, retracted or bulging. Ears:      Comments: Ears normal today     Nose: Nose normal.      Mouth/Throat:      Mouth: Mucous membranes are moist.      Pharynx: Oropharynx is clear. Uvula midline. Posterior oropharyngeal erythema present. Tonsils: No tonsillar exudate. 3+ on the right. 3+ on the left. Eyes:      Conjunctiva/sclera: Conjunctivae normal.      Pupils: Pupils are equal, round, and reactive to light. Cardiovascular:      Rate and Rhythm: Normal rate. Pulses: Normal pulses.    Pulmonary:      Effort: Pulmonary effort is normal.   Musculoskeletal:      Cervical back: Normal range of motion and neck supple. Lymphadenopathy:      Cervical: No cervical adenopathy. Skin:     Capillary Refill: Capillary refill takes less than 2 seconds. Neurological:      Mental Status: She is alert and oriented to person, place, and time. Assessment:       Diagnosis Orders   1. Ear itching     2.  Enlarged tonsils                Plan:      I will give the patient a stronger steroid cream kenalog 0.5 %  Follow up in 2 week(s)

## 2021-07-16 NOTE — LETTER
[facility] AT [facility]  21 [facility]  [address] [address]  Phone: [phone]  Fax: [phone] [facility] 18 [facility],         July 16, 2021     Patient: Gonzales Pratt   YOB: 2000   Date of Visit: 7/16/2021       To Whom it May Concern:    Nikolai Galan was seen in my clinic on 7/16/2021. Please excuse patient from work she is able to return to work 7/16/2021. If you have any questions or concerns, please don't hesitate to call.     Sincerely,     Erin Alfonso, DO

## 2021-07-16 NOTE — PROGRESS NOTES
[facility] PRE-ADMISSION TESTING INSTRUCTIONS    The Preadmission Testing patient is instructed accordingly using the following criteria (check applicable):    ARRIVAL INSTRUCTIONS:  [x] Parking the day of Surgery is located in the Main Entrance lot. Upon entering the door, make an immediate right to the surgery reception desk    [x] Bring photo ID and insurance card    [] Bring in a copy of Living will or Durable Power of  papers. [x] Please be sure to arrange for responsible adult to provide transportation to and from the hospital    [x] Please arrange for responsible adult to be with you for the 24 hour period post procedure due to having anesthesia      GENERAL INSTRUCTIONS:    [x] Nothing by mouth after midnight, including gum, candy, mints or water    [x] You may brush your teeth, but do not swallow any water    [x] Take medications as instructed with 1-2 oz of water    [x] Stop herbal supplements and vitamins 5 days prior to procedure    [] Follow preop dosing of blood thinners per physician instructions    [] Take 1/2 dose of evening insulin, but no insulin after midnight    [] No oral diabetic medications after midnight    [] If diabetic and have low blood sugar or feel symptomatic, take 1-2oz apple juice only    [] Bring inhalers day of surgery    [] Bring C-PAP/ Bi-Pap day of surgery    [x] Bring urine specimen day of surgery    [x] Shower or bath with soap, lather and rinse well, AM of Surgery, no lotion, powders or creams to surgical site    [] Follow bowel prep as instructed per surgeon    [x] No tobacco products within 24 hours of surgery     [x] No alcohol or illegal drug use within 24 hours of surgery.     [x] Jewelry, body piercing's, eyeglasses, contact lenses and dentures are not permitted into surgery (bring cases)      [x] Please do not wear any nail polish, make up or hair products on the day of surgery    [x] You can expect a call the business day prior

## 2021-07-16 NOTE — PROGRESS NOTES
Have you been tested for COVID  No vaccinated          Have you been told you were positive for COVID No  Have you had any known exposure to someone that is positive for COVID No  Do you have a cough                   No              Do you have shortness of breath No                 Do you have a sore throat            No                Are you having chills                    No                Are you having muscle aches. No                    Please come to the hospital wearing a mask and have your significant other wear a mask as well. Both of you should check your temperature before leaving to come here,  if it is 100 or higher please call [phone] for instruction.

## 2021-07-21 ENCOUNTER — ANESTHESIA EVENT (OUTPATIENT)
Dept: OPERATING ROOM | Age: 21
End: 2021-07-21
Payer: COMMERCIAL

## 2021-07-21 NOTE — H&P
Subjective:      Patient ID:  Sigifredo Young is a 23 y.o. female.     HPI:     Patient presents today for recheck of the bilateral ear. Patient is not doing better. Drops:yes              Type: Cortisporin otic  Pain: yes  Drainage: yes  -left   Wick: yes-1 week ago but subsequently fell out  Cerumen impaction: no     She has had left ear pain and drainage for one week that was progressively worsening, describes pain as sharp stabbing in nature, radiating down face, talking made it worse. Was seen in the ED 6 days ago, dx with Otitis externa Left ear, given corticosporin drops with improvement of symptoms, she notes itching and fullness today but without pain. Has not been recently swimming      Patient is also here for follow up of pharyngitis, tonsil hypertrophy. She reports nightly snoring, unrestful sleep, frequent nigh time wakening, day time fatigue. She has had 2 episodes of strep pharyngitis but has none since last visit.        Patient's medications, allergies, past medical, surgical, social and family histories were reviewedand updated as appropriate.           Review of Systems   Constitutional: Negative. HENT: Positive for ear pain and sore throat. Negative for ear discharge. Eyes: Negative. Respiratory: Negative. Skin: Negative. Allergic/Immunologic: Negative. Neurological: Negative. Hematological: Negative. Psychiatric/Behavioral: Negative. All other systems reviewed and are negative.                    Objective:   Physical Exam  Vitals reviewed. Constitutional:       Appearance: Normal appearance. HENT:      Head: Normocephalic. Right Ear: Tympanic membrane, ear canal and external ear normal.      Left Ear: Tympanic membrane and external ear normal. Swelling and tenderness present. No middle ear effusion. No mastoid tenderness. Tympanic membrane is not perforated, erythematous, retracted or bulging.       Ears:      Comments: Mild erythema and swelling of canal, no debris noted     Nose: Nose normal.      Mouth/Throat:      Mouth: Mucous membranes are moist.      Pharynx: Oropharynx is clear. Uvula midline. Posterior oropharyngeal erythema present. Tonsils: No tonsillar exudate. 3+ on the right. 3+ on the left. Eyes:      Conjunctiva/sclera: Conjunctivae normal.      Pupils: Pupils are equal, round, and reactive to light. Cardiovascular:      Rate and Rhythm: Normal rate. Pulses: Normal pulses. Pulmonary:      Effort: Pulmonary effort is normal.   Musculoskeletal:      Cervical back: Normal range of motion and neck supple. Lymphadenopathy:      Cervical: No cervical adenopathy. Skin:     Capillary Refill: Capillary refill takes less than 2 seconds. Neurological:      Mental Status: She is alert and oriented to person, place, and time.                Assessment:        Diagnosis Orders   1. Acute swimmer's ear of both sides      2. Ear itching      3. Enlarged tonsils      4. Sleep-disordered breathing                             Plan:      Acute otitis externa  -s/p corticosporin course  -residual erythema and wet edema of the canal  -placed boric acid in the left canal today  -do not place anything in the ear, keep ear clean and dry  -follow up in 2 weeks for reevaluation     Sleep disordered breathing:  I recommend:     Tonsillectomy and possible adenoidectomy. I will not keep the patient overnight for observation after surgery  The procedure risks and benefits were discussed with the patient and family including:      --Bleeding occurs in 1 to 4% of patients  --Poor speech (hyper nasal speech) occurs in 1/3000 patients.   --Nasopharyngeal Stenosis  --Chipped Teeth  --Electrocautery Rose  --Death

## 2021-07-22 ENCOUNTER — ANESTHESIA (OUTPATIENT)
Dept: OPERATING ROOM | Age: 21
End: 2021-07-22
Payer: COMMERCIAL

## 2021-07-22 ENCOUNTER — HOSPITAL ENCOUNTER (OUTPATIENT)
Age: 21
Setting detail: OUTPATIENT SURGERY
Discharge: HOME | End: 2021-07-22
Attending: OTOLARYNGOLOGY | Admitting: OTOLARYNGOLOGY
Payer: COMMERCIAL

## 2021-07-22 VITALS
TEMPERATURE: 97 F | WEIGHT: 293 LBS | HEIGHT: 66 IN | SYSTOLIC BLOOD PRESSURE: 127 MMHG | DIASTOLIC BLOOD PRESSURE: 75 MMHG | BODY MASS INDEX: 47.09 KG/M2 | HEART RATE: 104 BPM | OXYGEN SATURATION: 96 % | RESPIRATION RATE: 18 BRPM

## 2021-07-22 VITALS
SYSTOLIC BLOOD PRESSURE: 147 MMHG | DIASTOLIC BLOOD PRESSURE: 86 MMHG | OXYGEN SATURATION: 100 % | RESPIRATION RATE: 6 BRPM

## 2021-07-22 DIAGNOSIS — G89.18 POST-OP PAIN: Primary | ICD-10-CM

## 2021-07-22 LAB
HCG, URINE, POC: NEGATIVE
Lab: NORMAL
NEGATIVE QC PASS/FAIL: NORMAL
POSITIVE QC PASS/FAIL: NORMAL

## 2021-07-22 PROCEDURE — 7100000011 HC PHASE II RECOVERY - ADDTL 15 MIN: Performed by: OTOLARYNGOLOGY

## 2021-07-22 PROCEDURE — 2500000003 HC RX 250 WO HCPCS: Performed by: NURSE ANESTHETIST, CERTIFIED REGISTERED

## 2021-07-22 PROCEDURE — 6360000002 HC RX W HCPCS: Performed by: ANESTHESIOLOGY

## 2021-07-22 PROCEDURE — 42826 REMOVAL OF TONSILS: CPT | Performed by: OTOLARYNGOLOGY

## 2021-07-22 PROCEDURE — 6360000002 HC RX W HCPCS: Performed by: NURSE ANESTHETIST, CERTIFIED REGISTERED

## 2021-07-22 PROCEDURE — 7100000010 HC PHASE II RECOVERY - FIRST 15 MIN: Performed by: OTOLARYNGOLOGY

## 2021-07-22 PROCEDURE — 88304 TISSUE EXAM BY PATHOLOGIST: CPT

## 2021-07-22 PROCEDURE — 2709999900 HC NON-CHARGEABLE SUPPLY: Performed by: OTOLARYNGOLOGY

## 2021-07-22 PROCEDURE — 2720000010 HC SURG SUPPLY STERILE: Performed by: OTOLARYNGOLOGY

## 2021-07-22 PROCEDURE — 3700000000 HC ANESTHESIA ATTENDED CARE: Performed by: OTOLARYNGOLOGY

## 2021-07-22 PROCEDURE — 3600000012 HC SURGERY LEVEL 2 ADDTL 15MIN: Performed by: OTOLARYNGOLOGY

## 2021-07-22 PROCEDURE — 3600000002 HC SURGERY LEVEL 2 BASE: Performed by: OTOLARYNGOLOGY

## 2021-07-22 PROCEDURE — 7100000001 HC PACU RECOVERY - ADDTL 15 MIN: Performed by: OTOLARYNGOLOGY

## 2021-07-22 PROCEDURE — 2580000003 HC RX 258: Performed by: OTOLARYNGOLOGY

## 2021-07-22 PROCEDURE — 6370000000 HC RX 637 (ALT 250 FOR IP): Performed by: OTOLARYNGOLOGY

## 2021-07-22 PROCEDURE — 3700000001 HC ADD 15 MINUTES (ANESTHESIA): Performed by: OTOLARYNGOLOGY

## 2021-07-22 PROCEDURE — 7100000000 HC PACU RECOVERY - FIRST 15 MIN: Performed by: OTOLARYNGOLOGY

## 2021-07-22 RX ORDER — LIDOCAINE HYDROCHLORIDE 20 MG/ML
INJECTION, SOLUTION EPIDURAL; INFILTRATION; INTRACAUDAL; PERINEURAL PRN
Status: DISCONTINUED | OUTPATIENT
Start: 2021-07-22 | End: 2021-07-22 | Stop reason: SDUPTHER

## 2021-07-22 RX ORDER — ROCURONIUM BROMIDE 10 MG/ML
INJECTION, SOLUTION INTRAVENOUS PRN
Status: DISCONTINUED | OUTPATIENT
Start: 2021-07-22 | End: 2021-07-22 | Stop reason: SDUPTHER

## 2021-07-22 RX ORDER — NEOSTIGMINE METHYLSULFATE 1 MG/ML
INJECTION, SOLUTION INTRAVENOUS PRN
Status: DISCONTINUED | OUTPATIENT
Start: 2021-07-22 | End: 2021-07-22 | Stop reason: SDUPTHER

## 2021-07-22 RX ORDER — PROPOFOL 10 MG/ML
INJECTION, EMULSION INTRAVENOUS PRN
Status: DISCONTINUED | OUTPATIENT
Start: 2021-07-22 | End: 2021-07-22 | Stop reason: SDUPTHER

## 2021-07-22 RX ORDER — SODIUM CHLORIDE 0.9 % (FLUSH) 0.9 %
5-40 SYRINGE (ML) INJECTION PRN
Status: DISCONTINUED | OUTPATIENT
Start: 2021-07-22 | End: 2021-07-22 | Stop reason: HOSPADM

## 2021-07-22 RX ORDER — SODIUM CHLORIDE 0.9 % (FLUSH) 0.9 %
5-40 SYRINGE (ML) INJECTION EVERY 12 HOURS SCHEDULED
Status: DISCONTINUED | OUTPATIENT
Start: 2021-07-22 | End: 2021-07-22 | Stop reason: HOSPADM

## 2021-07-22 RX ORDER — SODIUM CHLORIDE 9 MG/ML
25 INJECTION, SOLUTION INTRAVENOUS PRN
Status: DISCONTINUED | OUTPATIENT
Start: 2021-07-22 | End: 2021-07-22 | Stop reason: HOSPADM

## 2021-07-22 RX ORDER — PREDNISONE 20 MG/1
40 TABLET ORAL DAILY
Qty: 5 TABLET | Refills: 0 | Status: SHIPPED | OUTPATIENT
Start: 2021-07-22 | End: 2021-07-27

## 2021-07-22 RX ORDER — FENTANYL CITRATE 50 UG/ML
INJECTION, SOLUTION INTRAMUSCULAR; INTRAVENOUS PRN
Status: DISCONTINUED | OUTPATIENT
Start: 2021-07-22 | End: 2021-07-22 | Stop reason: SDUPTHER

## 2021-07-22 RX ORDER — ONDANSETRON 4 MG/1
4 TABLET, ORALLY DISINTEGRATING ORAL EVERY 8 HOURS PRN
Qty: 30 TABLET | Refills: 0 | Status: SHIPPED | OUTPATIENT
Start: 2021-07-22 | End: 2021-08-01

## 2021-07-22 RX ORDER — DEXAMETHASONE SODIUM PHOSPHATE 4 MG/ML
INJECTION, SOLUTION INTRA-ARTICULAR; INTRALESIONAL; INTRAMUSCULAR; INTRAVENOUS; SOFT TISSUE PRN
Status: DISCONTINUED | OUTPATIENT
Start: 2021-07-22 | End: 2021-07-22 | Stop reason: SDUPTHER

## 2021-07-22 RX ORDER — MIDAZOLAM HYDROCHLORIDE 1 MG/ML
INJECTION INTRAMUSCULAR; INTRAVENOUS PRN
Status: DISCONTINUED | OUTPATIENT
Start: 2021-07-22 | End: 2021-07-22 | Stop reason: SDUPTHER

## 2021-07-22 RX ORDER — MEPERIDINE HYDROCHLORIDE 25 MG/ML
12.5 INJECTION INTRAMUSCULAR; INTRAVENOUS; SUBCUTANEOUS EVERY 5 MIN PRN
Status: DISCONTINUED | OUTPATIENT
Start: 2021-07-22 | End: 2021-07-22 | Stop reason: HOSPADM

## 2021-07-22 RX ORDER — HYDROCODONE BITARTRATE AND ACETAMINOPHEN 7.5; 325 MG/1; MG/1
1 TABLET ORAL EVERY 6 HOURS PRN
Qty: 28 TABLET | Refills: 0 | Status: SHIPPED | OUTPATIENT
Start: 2021-07-22 | End: 2021-07-29

## 2021-07-22 RX ORDER — GLYCOPYRROLATE 1 MG/5 ML
SYRINGE (ML) INTRAVENOUS PRN
Status: DISCONTINUED | OUTPATIENT
Start: 2021-07-22 | End: 2021-07-22 | Stop reason: SDUPTHER

## 2021-07-22 RX ORDER — ONDANSETRON 2 MG/ML
INJECTION INTRAMUSCULAR; INTRAVENOUS PRN
Status: DISCONTINUED | OUTPATIENT
Start: 2021-07-22 | End: 2021-07-22 | Stop reason: SDUPTHER

## 2021-07-22 RX ORDER — ONDANSETRON 2 MG/ML
4 INJECTION INTRAMUSCULAR; INTRAVENOUS
Status: DISCONTINUED | OUTPATIENT
Start: 2021-07-22 | End: 2021-07-22 | Stop reason: HOSPADM

## 2021-07-22 RX ADMIN — HYDROMORPHONE HYDROCHLORIDE 0.5 MG: 1 INJECTION, SOLUTION INTRAMUSCULAR; INTRAVENOUS; SUBCUTANEOUS at 08:31

## 2021-07-22 RX ADMIN — MIDAZOLAM 2 MG: 1 INJECTION INTRAMUSCULAR; INTRAVENOUS at 06:28

## 2021-07-22 RX ADMIN — SODIUM CHLORIDE: 9 INJECTION, SOLUTION INTRAVENOUS at 06:48

## 2021-07-22 RX ADMIN — HYDROCODONE BITARTRATE AND ACETAMINOPHEN 5 ML: 7.5; 325 SOLUTION ORAL at 10:00

## 2021-07-22 RX ADMIN — PROPOFOL 200 MG: 10 INJECTION, EMULSION INTRAVENOUS at 06:58

## 2021-07-22 RX ADMIN — FENTANYL CITRATE 100 MCG: 50 INJECTION, SOLUTION INTRAMUSCULAR; INTRAVENOUS at 06:59

## 2021-07-22 RX ADMIN — Medication 0.6 MG: at 07:50

## 2021-07-22 RX ADMIN — ROCURONIUM BROMIDE 50 MG: 10 INJECTION, SOLUTION INTRAVENOUS at 06:59

## 2021-07-22 RX ADMIN — LIDOCAINE HYDROCHLORIDE 100 MG: 20 INJECTION, SOLUTION EPIDURAL; INFILTRATION; INTRACAUDAL; PERINEURAL at 06:59

## 2021-07-22 RX ADMIN — Medication 3 MG: at 07:50

## 2021-07-22 RX ADMIN — Medication 0.2 MG: at 07:08

## 2021-07-22 RX ADMIN — DEXAMETHASONE SODIUM PHOSPHATE 10 MG: 4 INJECTION, SOLUTION INTRAMUSCULAR; INTRAVENOUS at 07:08

## 2021-07-22 RX ADMIN — MEPERIDINE HYDROCHLORIDE 12.5 MG: 25 INJECTION, SOLUTION INTRAMUSCULAR; INTRAVENOUS; SUBCUTANEOUS at 08:26

## 2021-07-22 RX ADMIN — ONDANSETRON 4 MG: 2 INJECTION INTRAMUSCULAR; INTRAVENOUS at 07:08

## 2021-07-22 ASSESSMENT — PULMONARY FUNCTION TESTS
PIF_VALUE: 25
PIF_VALUE: 24
PIF_VALUE: 24
PIF_VALUE: 20
PIF_VALUE: 24
PIF_VALUE: 30
PIF_VALUE: 5
PIF_VALUE: 25
PIF_VALUE: 0
PIF_VALUE: 24
PIF_VALUE: 21
PIF_VALUE: 18
PIF_VALUE: 24
PIF_VALUE: 2
PIF_VALUE: 21
PIF_VALUE: 24
PIF_VALUE: 24
PIF_VALUE: 1
PIF_VALUE: 24
PIF_VALUE: 25
PIF_VALUE: 20
PIF_VALUE: 20
PIF_VALUE: 24
PIF_VALUE: 18
PIF_VALUE: 24
PIF_VALUE: 24
PIF_VALUE: 2
PIF_VALUE: 24
PIF_VALUE: 24
PIF_VALUE: 25
PIF_VALUE: 24
PIF_VALUE: 24
PIF_VALUE: 21
PIF_VALUE: 18
PIF_VALUE: 21
PIF_VALUE: 18
PIF_VALUE: 24
PIF_VALUE: 22
PIF_VALUE: 25
PIF_VALUE: 24
PIF_VALUE: 21
PIF_VALUE: 0
PIF_VALUE: 24
PIF_VALUE: 20
PIF_VALUE: 0
PIF_VALUE: 1
PIF_VALUE: 21
PIF_VALUE: 24
PIF_VALUE: 3
PIF_VALUE: 0
PIF_VALUE: 21
PIF_VALUE: 24
PIF_VALUE: 3
PIF_VALUE: 27
PIF_VALUE: 21
PIF_VALUE: 24
PIF_VALUE: 25
PIF_VALUE: 24
PIF_VALUE: 21
PIF_VALUE: 7
PIF_VALUE: 22
PIF_VALUE: 2
PIF_VALUE: 24
PIF_VALUE: 6
PIF_VALUE: 20

## 2021-07-22 ASSESSMENT — PAIN DESCRIPTION - PAIN TYPE
TYPE: SURGICAL PAIN

## 2021-07-22 ASSESSMENT — PAIN DESCRIPTION - LOCATION
LOCATION: THROAT

## 2021-07-22 ASSESSMENT — PAIN SCALES - GENERAL
PAINLEVEL_OUTOF10: 3
PAINLEVEL_OUTOF10: 8
PAINLEVEL_OUTOF10: 3
PAINLEVEL_OUTOF10: 6

## 2021-07-22 ASSESSMENT — PAIN DESCRIPTION - ORIENTATION
ORIENTATION: INNER

## 2021-07-22 ASSESSMENT — PAIN DESCRIPTION - FREQUENCY
FREQUENCY: CONTINUOUS
FREQUENCY: INTERMITTENT

## 2021-07-22 ASSESSMENT — PAIN DESCRIPTION - DESCRIPTORS
DESCRIPTORS: DISCOMFORT
DESCRIPTORS: SORE

## 2021-07-22 ASSESSMENT — PAIN - FUNCTIONAL ASSESSMENT: PAIN_FUNCTIONAL_ASSESSMENT: 0-10

## 2021-07-22 NOTE — H&P
H&P reviewed, no changes. No issues. Questions and concerns were answered to the patient's satisfaction.  Will proceed with procedure    Will discuss with attending     Electronically signed by Maria Luisa Burns DO on 7/22/21 at 6:20 AM EDT

## 2021-07-22 NOTE — OP NOTE
7/22/2021  Merari Tatum  [id number]      Pre-operative Diagnosis: snoring, tonsillar hypertrophy or recurrent tonsillitis    Post-operative Diagnosis: same    Procedure: Tonsillectomy     Anesthesia: General endotracheal anesthesia    Surgeons/Assistants: Mary Hinson    Estimated Blood Loss: less than 50     Complications: None    Specimens: was Obtained: bilateral tonsils    Indication: Pt presented with a history of snoring, tonsillar hypertrophy or recurrent tonsillitis for the last few years. Procedure: Pt was consented preoperatively. Taken back into the OR and identified appropriately. Placed in a supine position and given to anesthesia for general induction. Once properly intubated, pt was turned to a 90 deg angle from anesthesia. Pt was prepped and draped in a sterile fashion. A False Pass-Jon mouth gag was placed into the pt's oral cavity to give exposure to the tonsils. The instrument was suspended from the angel stand. Starting with the Right, the tonsil was grasped with a curved calvin forceps and retracted medially to expose the capsule. The Coblator instrument, with a setting of 7 ablate and 3 coag, was used to dissect the tonsil from the capsule and tonsil bed. Bleeding was controlled with the coag setting of the coblator. The tonsillar tissue was noted to be chronically inflamed and very friable. Once the tonsil was removed, it was given off the table for permanent pathology. Attention was then turned to the Left tonsil, the tonsil was grasped with a curved calvin forceps and retracted medially to expose the capsule. The Coblator instrument, with a setting of 7 ablate and 3 coag, was used to dissect the tonsil from the capsule and tonsil bed. Bleeding was controlled with the coag setting of the coblator. he tonsillar tissue was noted to be chronically inflamed and very friable. Once the tonsil was removed, it was given off the table for permanent pathology.   The coblator was then turned to a setting of 1 ablate and 5 coag. The tonsillar beds were then treated until hemostasis was achieved. Tannic powder was applied over bilateral tonsillar beds. A red rubber catheter was placed into the nasopharynx and the adenoid pad was evaluated. Minimal adenoid tissue was seen, no adenoidectomy was performed. Pt's stomach was suctioned out with a Naoma Sump, minimal bleeding seen. Pt was turned back to anesthesia for awakening. Pt tolerated procedure well.

## 2021-07-22 NOTE — ANESTHESIA PRE PROCEDURE
Department of Anesthesiology  Preprocedure Note       Name:  Susan Varela   Age:  23 y.o.  :  2000                                          MRN:  [id number]         Date:  2021      Surgeon: Grabiel Goodman):  Shon Lackey DO    Procedure: Procedure(s):  TONSILLECTOMY POSSIBLE ADENOIDECTOMY    Medications prior to admission:   Prior to Admission medications    Medication Sig Start Date End Date Taking? Authorizing Provider   triamcinolone (ARISTOCORT) 0.5 % cream Apply 0.5 mg topically 3 times daily Apply topically 3 times daily. Historical Provider, MD   amoxicillin-clavulanate (AUGMENTIN) 875-125 MG per tablet Take 1 tablet by mouth 2 times daily for 10 days 21  AZEEM Laird CNP   ibuprofen (ADVIL;MOTRIN) 800 MG tablet Take 1 tablet by mouth every 6 hours as needed for Pain 21  AZEEM Laird CNP   ARIPiprazole (ABILIFY) 10 MG tablet Take 5 mg by mouth daily  21   Historical Provider, MD   Lisdexamfetamine Dimesylate (VYVANSE) 10 MG CAPS Take 40 mg by mouth daily. Historical Provider, MD   cyclobenzaprine (FLEXERIL) 10 MG tablet nightly as needed     Historical Provider, MD       Current medications:    No current facility-administered medications for this visit. No current outpatient medications on file.      Facility-Administered Medications Ordered in Other Visits   Medication Dose Route Frequency Provider Last Rate Last Admin    sodium chloride flush 0.9 % injection 5-40 mL  5-40 mL Intravenous 2 times per day Gretta Sifuentes DO        sodium chloride flush 0.9 % injection 5-40 mL  5-40 mL Intravenous PRN Rito Hinson, DO        0.9 % sodium chloride infusion  25 mL Intravenous PRN Kilee A Sravan, DO           Allergies:  No Known Allergies    Problem List:    Patient Active Problem List   Diagnosis Code    Right-sided abdominal pain of unknown cause R10.9    36 weeks gestation of pregnancy Z3A.36    Elevated blood pressure affecting pregnancy in third trimester, antepartum O16.3    37 weeks gestation of pregnancy Z3A.37    Abdominal pain, right upper quadrant R10.11    Symptomatic cholelithiasis K80.20    Severe major depression (HCC) F32.2    Major depressive disorder, severe (HCC) F32.2       Past Medical History:        Diagnosis Date    Asperger syndrome     Autism disorder        Past Surgical History:        Procedure Laterality Date    CHOLECYSTECTOMY      AL LAP,CHOLECYSTECTOMY/GRAPH N/A 3/15/2018    CHOLECYSTECTOMY LAPAROSCOPIC WITH INTRAOP CHOLANGIOGRAM performed by Bridget Souza MD [facility]         Social History:    Social History     Tobacco Use    Smoking status: Current Some Day Smoker     Packs/day: 0.50     Years: 1.00     Pack years: 0.50     Types: Cigarettes    Smokeless tobacco: Never Used   Substance Use Topics    Alcohol use: Yes     Comment:  1 vodka per week                                Ready to quit: Not Answered  Counseling given: Not Answered      Vital Signs (Current): There were no vitals filed for this visit.                                            BP Readings from Last 3 Encounters:   07/22/21 126/68   07/13/21 119/78   06/07/21 123/67       NPO Status:                                                                                 BMI:   Wt Readings from Last 3 Encounters:   07/22/21 (!) 303 lb (137.4 kg)   07/16/21 300 lb (136.1 kg)   07/13/21 300 lb (136.1 kg)     There is no height or weight on file to calculate BMI.    CBC:   Lab Results   Component Value Date    WBC 11.0 06/05/2021    RBC 4.82 06/05/2021    HGB 14.4 06/05/2021    HCT 40.3 06/05/2021    MCV 83.6 06/05/2021    RDW 12.4 06/05/2021     06/05/2021       CMP:   Lab Results   Component Value Date     06/05/2021    K 4.0 06/05/2021    K 3.9 08/12/2020     06/05/2021    CO2 24 06/05/2021    BUN 9 06/05/2021    CREATININE 0.5 06/05/2021    GFRAA >60 06/05/2021    LABGLOM >60 06/05/2021    GLUCOSE 115 06/05/2021    PROT 7.5 08/12/2020    CALCIUM 9.1 06/05/2021    BILITOT 0.4 08/12/2020    ALKPHOS 69 08/12/2020    AST 22 08/12/2020    ALT 23 08/12/2020       POC Tests: No results for input(s): POCGLU, POCNA, POCK, POCCL, POCBUN, POCHEMO, POCHCT in the last 72 hours. Coags:   Lab Results   Component Value Date    PROTIME 10.7 02/03/2018    INR 1.0 02/03/2018    APTT 27.2 02/03/2018       HCG (If Applicable):   Lab Results   Component Value Date    PREGTESTUR NEGATIVE 06/05/2021        ABGs: No results found for: PHART, PO2ART, NKI1RMO, PXR8DPA, BEART, I1HBPPQM     Type & Screen (If Applicable):  No results found for: [facility]    Anesthesia Evaluation  Patient summary reviewed no history of anesthetic complications:   Airway: Mallampati: II  TM distance: >3 FB   Neck ROM: full  Mouth opening: > = 3 FB Dental:          Pulmonary:Negative Pulmonary ROS breath sounds clear to auscultation                             Cardiovascular:        (-) hypertension and past MI    ECG reviewed  Rhythm: regular  Rate: normal                    Neuro/Psych:   (+) psychiatric history (autism, Asperger;  appears normal functioning;  answered all questions appropriately ): stable with treatmentdepression/anxiety             GI/Hepatic/Renal:   (+) morbid obesity          Endo/Other: Negative Endo/Other ROS                    Abdominal:   (+) obese,           Vascular: negative vascular ROS. Other Findings:               Anesthesia Plan      general     ASA 3       Induction: intravenous. MIPS: Postoperative opioids intended and Prophylactic antiemetics administered. Anesthetic plan and risks discussed with patient and spouse. Plan discussed with CRNA.                   Ayo Kingston MD   7/22/2021
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
## 2021-07-22 NOTE — ANESTHESIA POSTPROCEDURE EVALUATION
Department of Anesthesiology  Postprocedure Note    Patient: Gonzales Pratt  MRN: [id number]  YOB: 2000  Date of evaluation: 7/22/2021  Time:  9:56 AM     Procedure Summary     Date: 07/22/21 Room / Location: [facility] OR 09 / [facility]    Anesthesia Start: 3704 Anesthesia Stop: 0813    Procedure: TONSILLECTOMY (N/A Throat) Diagnosis: (TONSILLAR HYPERTROPHY SLEEP DISORDERED BREATHING)    Surgeons: Erin Alfonso DO Responsible Provider: Arlinda Mcburney, MD    Anesthesia Type: general ASA Status: 3          Anesthesia Type: general    Maria Luisa Phase I: Maria Luisa Score: 10    Maria Luisa Phase II:      Last vitals: Reviewed and per EMR flowsheets.        Anesthesia Post Evaluation    Level of consciousness: awake and alert  Airway patency: patent  Nausea & Vomiting: no vomiting and no nausea  Complications: no  Cardiovascular status: hemodynamically stable  Respiratory status: acceptable  Hydration status: stable

## 2021-07-23 ENCOUNTER — TELEPHONE (OUTPATIENT)
Dept: ADMINISTRATIVE | Age: 21
End: 2021-07-23

## 2021-07-23 NOTE — TELEPHONE ENCOUNTER
[facility] will contact patient about scheduled appointment.     Electronically signed by Fahad Sapp MA on 7/23/21 at 9:23 AM EDT

## 2021-08-05 ENCOUNTER — OFFICE VISIT (OUTPATIENT)
Dept: ENT CLINIC | Age: 21
End: 2021-08-05

## 2021-08-05 VITALS
SYSTOLIC BLOOD PRESSURE: 120 MMHG | BODY MASS INDEX: 47.09 KG/M2 | HEIGHT: 66 IN | DIASTOLIC BLOOD PRESSURE: 89 MMHG | TEMPERATURE: 98.3 F | OXYGEN SATURATION: 98 % | HEART RATE: 117 BPM | WEIGHT: 293 LBS

## 2021-08-05 DIAGNOSIS — Z90.89 S/P TONSILLECTOMY AND ADENOIDECTOMY: Primary | ICD-10-CM

## 2021-08-05 PROCEDURE — 99024 POSTOP FOLLOW-UP VISIT: CPT | Performed by: OTOLARYNGOLOGY

## 2021-08-05 PROCEDURE — 4004F PT TOBACCO SCREEN RCVD TLK: CPT | Performed by: OTOLARYNGOLOGY

## 2021-08-05 PROCEDURE — G8417 CALC BMI ABV UP PARAM F/U: HCPCS | Performed by: OTOLARYNGOLOGY

## 2021-08-05 PROCEDURE — G8427 DOCREV CUR MEDS BY ELIG CLIN: HCPCS | Performed by: OTOLARYNGOLOGY

## 2021-08-05 NOTE — LETTER
[facility]  [address]  [address] [address]  Phone: [phone]  Fax: [phone] [address],         August 5, 2021     Patient: Candace Gallagher   YOB: 2000   Date of Visit: 8/5/2021       To Whom it May Concern:    Please excuse Precious Kelley from work for the following dates for surgery and recovery. 7/22/21-8/7/2. If you have any questions or concerns, please don't hesitate to call.     Sincerely,     Bruce Sever, DO

## 2021-08-05 NOTE — PROGRESS NOTES
Subjective:      Patient ID:   Josephine Chavez is a 23 y. o.female. HPI Comments: Pt returns for recheck after T&A. Pt had problems with dehydration and pain but is now improved. Complaining of slight difficulty with swallowing liquids. Review of Systems   HENT: Positive for sore throat, trouble swallowing and voice change. All other systems reviewed and are negative. Objective:   Physical Exam   Constitutional: She appears well-developed and well-nourished. HENT:   Head: Normocephalic and atraumatic. Right Ear: Tympanic membrane, external ear, pinna and canal normal.   Left Ear: Tympanic membrane, external ear, pinna and canal normal.   Nose: Nose normal.   Mouth/Throat: Mucous membranes are moist. Dentition is normal. Oropharynx is clear. Tonsillar fossa healing well with minimal eschar bilaterally   Eyes: Conjunctivae are normal. Pupils are equal, round, and reactive to light. Neck: Normal range of motion. Neck supple. Cardiovascular: Regular rhythm, S1 normal and S2 normal.    Pulmonary/Chest: Effort normal and breath sounds normal.   Abdominal: Full and soft. Bowel sounds are normal.   Musculoskeletal: Normal range of motion. Neurological: She is alert. Skin: Skin is warm and moist.       Assessment:       Diagnosis Orders   1. S/P tonsillectomy and adenoidectomy                Plan:      Pt may return to normal activities. Follow up in 3-4 weeks for recheck. Patient seen, examined and case discussed with Dr. Iris Irving    Electronically signed by Anna Marie Sampson DO on 8/5/2021 at 3:11 PM            Josephine Chavez  2000    I have discussed the case, including pertinent history and exam findings with the resident. I have seen and examined the patient and the key elements of the encounter have been performed by me. I agree with the assessment, plan and orders as documented by the  resident              Remainder of medical problems as per  resident note. Patient seen and examined. Agree with above exam, assessment and plan.       Electronically signed by Darcy Montgomery DO on 8/18/21 at 9:51 AM EDT

## 2021-08-11 ENCOUNTER — HOSPITAL ENCOUNTER (INPATIENT)
Age: 21
LOS: 5 days | Discharge: HOME | DRG: 753 | End: 2021-08-16
Attending: EMERGENCY MEDICINE | Admitting: PSYCHIATRY & NEUROLOGY
Payer: COMMERCIAL

## 2021-08-11 DIAGNOSIS — F32.A DEPRESSION, UNSPECIFIED DEPRESSION TYPE: Primary | ICD-10-CM

## 2021-08-11 PROBLEM — R45.851 DEPRESSION WITH SUICIDAL IDEATION: Status: ACTIVE | Noted: 2021-08-11

## 2021-08-11 LAB
ACETAMINOPHEN LEVEL: <5 MCG/ML (ref 10–30)
ALBUMIN SERPL-MCNC: 4.2 G/DL (ref 3.5–5.2)
ALP BLD-CCNC: 67 U/L (ref 35–104)
ALT SERPL-CCNC: 32 U/L (ref 0–32)
AMPHETAMINE SCREEN, URINE: POSITIVE
ANION GAP SERPL CALCULATED.3IONS-SCNC: 12 MMOL/L (ref 7–16)
AST SERPL-CCNC: 19 U/L (ref 0–31)
BARBITURATE SCREEN URINE: NOT DETECTED
BASOPHILS ABSOLUTE: 0.09 E9/L (ref 0–0.2)
BASOPHILS RELATIVE PERCENT: 1.2 % (ref 0–2)
BENZODIAZEPINE SCREEN, URINE: NOT DETECTED
BILIRUB SERPL-MCNC: 0.5 MG/DL (ref 0–1.2)
BILIRUBIN URINE: NEGATIVE
BLOOD, URINE: NEGATIVE
BUN BLDV-MCNC: 8 MG/DL (ref 6–20)
CALCIUM SERPL-MCNC: 9.2 MG/DL (ref 8.6–10.2)
CANNABINOID SCREEN URINE: NOT DETECTED
CHLORIDE BLD-SCNC: 105 MMOL/L (ref 98–107)
CLARITY: CLEAR
CO2: 22 MMOL/L (ref 22–29)
COCAINE METABOLITE SCREEN URINE: NOT DETECTED
COLOR: YELLOW
CREAT SERPL-MCNC: 0.6 MG/DL (ref 0.5–1)
EOSINOPHILS ABSOLUTE: 0.29 E9/L (ref 0.05–0.5)
EOSINOPHILS RELATIVE PERCENT: 3.7 % (ref 0–6)
ETHANOL: <10 MG/DL (ref 0–0.08)
FENTANYL SCREEN, URINE: NOT DETECTED
GFR AFRICAN AMERICAN: >60
GFR NON-AFRICAN AMERICAN: >60 ML/MIN/1.73
GLUCOSE BLD-MCNC: 85 MG/DL (ref 74–99)
GLUCOSE URINE: NEGATIVE MG/DL
HCG(URINE) PREGNANCY TEST: NEGATIVE
HCT VFR BLD CALC: 38.5 % (ref 34–48)
HEMOGLOBIN: 13.6 G/DL (ref 11.5–15.5)
IMMATURE GRANULOCYTES #: 0.02 E9/L
IMMATURE GRANULOCYTES %: 0.3 % (ref 0–5)
INFLUENZA A: NOT DETECTED
INFLUENZA B: NOT DETECTED
KETONES, URINE: NEGATIVE MG/DL
LEUKOCYTE ESTERASE, URINE: NEGATIVE
LYMPHOCYTES ABSOLUTE: 2.84 E9/L (ref 1.5–4)
LYMPHOCYTES RELATIVE PERCENT: 36.5 % (ref 20–42)
Lab: ABNORMAL
MCH RBC QN AUTO: 29.4 PG (ref 26–35)
MCHC RBC AUTO-ENTMCNC: 35.3 % (ref 32–34.5)
MCV RBC AUTO: 83.3 FL (ref 80–99.9)
METHADONE SCREEN, URINE: NOT DETECTED
MONOCYTES ABSOLUTE: 0.38 E9/L (ref 0.1–0.95)
MONOCYTES RELATIVE PERCENT: 4.9 % (ref 2–12)
NEUTROPHILS ABSOLUTE: 4.16 E9/L (ref 1.8–7.3)
NEUTROPHILS RELATIVE PERCENT: 53.4 % (ref 43–80)
NITRITE, URINE: NEGATIVE
OPIATE SCREEN URINE: NOT DETECTED
OXYCODONE URINE: NOT DETECTED
PDW BLD-RTO: 12.6 FL (ref 11.5–15)
PH UA: 5.5 (ref 5–9)
PHENCYCLIDINE SCREEN URINE: NOT DETECTED
PLATELET # BLD: 305 E9/L (ref 130–450)
PMV BLD AUTO: 10.1 FL (ref 7–12)
POTASSIUM REFLEX MAGNESIUM: 3.8 MMOL/L (ref 3.5–5)
PROTEIN UA: NEGATIVE MG/DL
RBC # BLD: 4.62 E12/L (ref 3.5–5.5)
SALICYLATE, SERUM: <0.3 MG/DL (ref 0–30)
SARS-COV-2 RNA, RT PCR: NOT DETECTED
SODIUM BLD-SCNC: 139 MMOL/L (ref 132–146)
SPECIFIC GRAVITY UA: >=1.03 (ref 1–1.03)
TOTAL PROTEIN: 7.4 G/DL (ref 6.4–8.3)
TRICYCLIC ANTIDEPRESSANTS SCREEN SERUM: NEGATIVE NG/ML
UROBILINOGEN, URINE: 0.2 E.U./DL
WBC # BLD: 7.8 E9/L (ref 4.5–11.5)

## 2021-08-11 PROCEDURE — 80307 DRUG TEST PRSMV CHEM ANLYZR: CPT

## 2021-08-11 PROCEDURE — 93005 ELECTROCARDIOGRAM TRACING: CPT | Performed by: EMERGENCY MEDICINE

## 2021-08-11 PROCEDURE — 82077 ASSAY SPEC XCP UR&BREATH IA: CPT

## 2021-08-11 PROCEDURE — 80053 COMPREHEN METABOLIC PANEL: CPT

## 2021-08-11 PROCEDURE — 36415 COLL VENOUS BLD VENIPUNCTURE: CPT

## 2021-08-11 PROCEDURE — 80061 LIPID PANEL: CPT

## 2021-08-11 PROCEDURE — 81003 URINALYSIS AUTO W/O SCOPE: CPT

## 2021-08-11 PROCEDURE — 99284 EMERGENCY DEPT VISIT MOD MDM: CPT

## 2021-08-11 PROCEDURE — 87636 SARSCOV2 & INF A&B AMP PRB: CPT

## 2021-08-11 PROCEDURE — 83036 HEMOGLOBIN GLYCOSYLATED A1C: CPT

## 2021-08-11 PROCEDURE — 1240000000 HC EMOTIONAL WELLNESS R&B

## 2021-08-11 PROCEDURE — 6370000000 HC RX 637 (ALT 250 FOR IP): Performed by: PSYCHIATRY & NEUROLOGY

## 2021-08-11 PROCEDURE — 81025 URINE PREGNANCY TEST: CPT

## 2021-08-11 PROCEDURE — 80143 DRUG ASSAY ACETAMINOPHEN: CPT

## 2021-08-11 PROCEDURE — 85025 COMPLETE CBC W/AUTO DIFF WBC: CPT

## 2021-08-11 PROCEDURE — 80179 DRUG ASSAY SALICYLATE: CPT

## 2021-08-11 RX ORDER — DIAZEPAM 5 MG/1
10 TABLET ORAL EVERY 6 HOURS PRN
Status: DISCONTINUED | OUTPATIENT
Start: 2021-08-11 | End: 2021-08-16 | Stop reason: HOSPADM

## 2021-08-11 RX ORDER — HALOPERIDOL 5 MG
5 TABLET ORAL EVERY 6 HOURS PRN
Status: DISCONTINUED | OUTPATIENT
Start: 2021-08-11 | End: 2021-08-16 | Stop reason: HOSPADM

## 2021-08-11 RX ORDER — HALOPERIDOL 5 MG/ML
5 INJECTION INTRAMUSCULAR EVERY 6 HOURS PRN
Status: DISCONTINUED | OUTPATIENT
Start: 2021-08-11 | End: 2021-08-16 | Stop reason: HOSPADM

## 2021-08-11 RX ORDER — HYDROXYZINE PAMOATE 50 MG/1
50 CAPSULE ORAL 3 TIMES DAILY PRN
Status: DISCONTINUED | OUTPATIENT
Start: 2021-08-11 | End: 2021-08-16 | Stop reason: HOSPADM

## 2021-08-11 RX ORDER — MAGNESIUM HYDROXIDE/ALUMINUM HYDROXICE/SIMETHICONE 120; 1200; 1200 MG/30ML; MG/30ML; MG/30ML
30 SUSPENSION ORAL PRN
Status: DISCONTINUED | OUTPATIENT
Start: 2021-08-11 | End: 2021-08-16 | Stop reason: HOSPADM

## 2021-08-11 RX ORDER — ACETAMINOPHEN 325 MG/1
650 TABLET ORAL EVERY 6 HOURS PRN
Status: DISCONTINUED | OUTPATIENT
Start: 2021-08-11 | End: 2021-08-16 | Stop reason: HOSPADM

## 2021-08-11 RX ORDER — DIVALPROEX SODIUM 250 MG/1
250 TABLET, DELAYED RELEASE ORAL 2 TIMES DAILY
Status: DISCONTINUED | OUTPATIENT
Start: 2021-08-12 | End: 2021-08-12

## 2021-08-11 RX ADMIN — HYDROXYZINE PAMOATE 50 MG: 50 CAPSULE ORAL at 23:04

## 2021-08-11 ASSESSMENT — PATIENT HEALTH QUESTIONNAIRE - PHQ9: SUM OF ALL RESPONSES TO PHQ QUESTIONS 1-9: 21

## 2021-08-11 ASSESSMENT — SLEEP AND FATIGUE QUESTIONNAIRES
DO YOU USE A SLEEP AID: NO
AVERAGE NUMBER OF SLEEP HOURS: 5
RESTFUL SLEEP: NO
DO YOU HAVE DIFFICULTY SLEEPING: YES
DIFFICULTY FALLING ASLEEP: YES
DIFFICULTY ARISING: YES
SLEEP PATTERN: DIFFICULTY FALLING ASLEEP;DIFFICULTY ARISING
DIFFICULTY STAYING ASLEEP: YES

## 2021-08-11 ASSESSMENT — LIFESTYLE VARIABLES: HISTORY_ALCOHOL_USE: YES

## 2021-08-11 ASSESSMENT — PAIN SCALES - GENERAL: PAINLEVEL_OUTOF10: 0
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
## 2021-08-11 NOTE — ED PROVIDER NOTES
distress  Cardiovascular:  Regular rate. Regular rhythm. No murmurs, gallops, or rubs. 2+ distal pulses  Chest: No chest wall tenderness  GI:  Abdomen Soft, Non tender, Non distended. +BS. No organomegaly, no palpable masses,  No rebound, guarding, or rigidity. Musculoskeletal: Moves all extremities x 4. Warm and well perfused, no clubbing, cyanosis, or edema. Capillary refill <3 seconds  Integument: skin warm and dry. No rashes. Lymphatic: no lymphadenopathy noted  Neurologic: GCS 15, no focal deficits, symmetric strength 5/5 in the upper and lower extremities bilaterally  Psychiatric: flat Affect    -------------------------------------------------- RESULTS -------------------------------------------------  I have personally reviewed all laboratory and imaging results for this patient. Results are listed below.      LABS:  Results for orders placed or performed during the hospital encounter of 08/11/21   COVID-19 & Influenza Combo    Specimen: Nasopharyngeal Swab   Result Value Ref Range    SARS-CoV-2 RNA, RT PCR NOT DETECTED NOT DETECTED    INFLUENZA A NOT DETECTED NOT DETECTED    INFLUENZA B NOT DETECTED NOT DETECTED   CBC Auto Differential   Result Value Ref Range    WBC 7.8 4.5 - 11.5 E9/L    RBC 4.62 3.50 - 5.50 E12/L    Hemoglobin 13.6 11.5 - 15.5 g/dL    Hematocrit 38.5 34.0 - 48.0 %    MCV 83.3 80.0 - 99.9 fL    MCH 29.4 26.0 - 35.0 pg    MCHC 35.3 (H) 32.0 - 34.5 %    RDW 12.6 11.5 - 15.0 fL    Platelets 375 155 - 825 E9/L    MPV 10.1 7.0 - 12.0 fL    Neutrophils % 53.4 43.0 - 80.0 %    Immature Granulocytes % 0.3 0.0 - 5.0 %    Lymphocytes % 36.5 20.0 - 42.0 %    Monocytes % 4.9 2.0 - 12.0 %    Eosinophils % 3.7 0.0 - 6.0 %    Basophils % 1.2 0.0 - 2.0 %    Neutrophils Absolute 4.16 1.80 - 7.30 E9/L    Immature Granulocytes # 0.02 E9/L    Lymphocytes Absolute 2.84 1.50 - 4.00 E9/L    Monocytes Absolute 0.38 0.10 - 0.95 E9/L    Eosinophils Absolute 0.29 0.05 - 0.50 E9/L    Basophils Absolute 0.09 0.00 non-specific EKG  Comparison: None      ------------------------- NURSING NOTES AND VITALS REVIEWED ---------------------------   The nursing notes within the ED encounter and vital signs as below have been reviewed by myself. /80   Pulse 100   Temp 98 °F (36.7 °C)   Resp 18   SpO2 96%   Oxygen Saturation Interpretation: Normal    The patients available past medical records and past encounters were reviewed. ------------------------------ ED COURSE/MEDICAL DECISION MAKING----------------------  Medications - No data to display      ED COURSE:       Medical Decision Making:    Medically clear, psych admit per social work      This patient's ED course included: a personal history and physicial examination    This patient has remained hemodynamically stable during their ED course. Re-Evaluations:             Re-evaluation. Patients symptoms show no change            Consultations:             Social work            Counseling: The emergency provider has spoken with the patient and discussed todays results, in addition to providing specific details for the plan of care and counseling regarding the diagnosis and prognosis. Questions are answered at this time and they are agreeable with the plan.       --------------------------------- IMPRESSION AND DISPOSITION ---------------------------------    IMPRESSION  1. Depression, unspecified depression type        DISPOSITION  Disposition: Admit to mental health unit - medically cleared for admission  Patient condition is stable    NOTE: This report was transcribed using voice recognition software.  Every effort was made to ensure accuracy; however, inadvertent computerized transcription errors may be present        Celeste Rose MD  08/11/21 0316

## 2021-08-12 PROBLEM — F60.89 CLUSTER B PERSONALITY DISORDER (HCC): Status: ACTIVE | Noted: 2021-08-12

## 2021-08-12 PROBLEM — F31.81 MILD MIXED BIPOLAR II DISORDER (HCC): Status: ACTIVE | Noted: 2021-08-12

## 2021-08-12 LAB
CHOLESTEROL, TOTAL: 174 MG/DL (ref 0–199)
EKG ATRIAL RATE: 78 BPM
EKG P AXIS: 0 DEGREES
EKG P-R INTERVAL: 148 MS
EKG Q-T INTERVAL: 404 MS
EKG QRS DURATION: 108 MS
EKG QTC CALCULATION (BAZETT): 460 MS
EKG R AXIS: 27 DEGREES
EKG T AXIS: 31 DEGREES
EKG VENTRICULAR RATE: 78 BPM
HBA1C MFR BLD: 5.2 % (ref 4–5.6)
HDLC SERPL-MCNC: 42 MG/DL
LDL CHOLESTEROL CALCULATED: 119 MG/DL (ref 0–99)
TRIGL SERPL-MCNC: 66 MG/DL (ref 0–149)
VLDLC SERPL CALC-MCNC: 13 MG/DL

## 2021-08-12 PROCEDURE — 6370000000 HC RX 637 (ALT 250 FOR IP): Performed by: PSYCHIATRY & NEUROLOGY

## 2021-08-12 PROCEDURE — 6370000000 HC RX 637 (ALT 250 FOR IP): Performed by: NURSE PRACTITIONER

## 2021-08-12 PROCEDURE — 99221 1ST HOSP IP/OBS SF/LOW 40: CPT | Performed by: NURSE PRACTITIONER

## 2021-08-12 PROCEDURE — 93010 ELECTROCARDIOGRAM REPORT: CPT | Performed by: INTERNAL MEDICINE

## 2021-08-12 PROCEDURE — 1240000000 HC EMOTIONAL WELLNESS R&B

## 2021-08-12 RX ORDER — CITALOPRAM 20 MG/1
10 TABLET ORAL DAILY
Status: DISCONTINUED | OUTPATIENT
Start: 2021-08-12 | End: 2021-08-16 | Stop reason: HOSPADM

## 2021-08-12 RX ORDER — ARIPIPRAZOLE 10 MG/1
5 TABLET ORAL DAILY
Status: ON HOLD | COMMUNITY
End: 2021-08-16 | Stop reason: HOSPADM

## 2021-08-12 RX ORDER — OXCARBAZEPINE 300 MG/1
150 TABLET, FILM COATED ORAL 2 TIMES DAILY
Status: DISCONTINUED | OUTPATIENT
Start: 2021-08-12 | End: 2021-08-13

## 2021-08-12 RX ADMIN — ACETAMINOPHEN 650 MG: 325 TABLET ORAL at 08:17

## 2021-08-12 RX ADMIN — DIVALPROEX SODIUM 250 MG: 250 TABLET, DELAYED RELEASE ORAL at 09:29

## 2021-08-12 RX ADMIN — OXCARBAZEPINE 150 MG: 300 TABLET, FILM COATED ORAL at 12:25

## 2021-08-12 RX ADMIN — ACETAMINOPHEN 650 MG: 325 TABLET ORAL at 12:49

## 2021-08-12 RX ADMIN — OXCARBAZEPINE 150 MG: 300 TABLET, FILM COATED ORAL at 20:34

## 2021-08-12 RX ADMIN — CITALOPRAM HYDROBROMIDE 10 MG: 20 TABLET ORAL at 12:25

## 2021-08-12 ASSESSMENT — SLEEP AND FATIGUE QUESTIONNAIRES
DIFFICULTY ARISING: YES
DIFFICULTY FALLING ASLEEP: YES
DO YOU HAVE DIFFICULTY SLEEPING: YES
SLEEP PATTERN: DIFFICULTY FALLING ASLEEP;DIFFICULTY ARISING
RESTFUL SLEEP: NO
DIFFICULTY STAYING ASLEEP: YES
AVERAGE NUMBER OF SLEEP HOURS: 5
DO YOU USE A SLEEP AID: NO

## 2021-08-12 ASSESSMENT — LIFESTYLE VARIABLES: HISTORY_ALCOHOL_USE: YES

## 2021-08-12 ASSESSMENT — PATIENT HEALTH QUESTIONNAIRE - PHQ9: SUM OF ALL RESPONSES TO PHQ QUESTIONS 1-9: 21

## 2021-08-12 ASSESSMENT — PAIN SCALES - GENERAL
PAINLEVEL_OUTOF10: 3
PAINLEVEL_OUTOF10: 3
PAINLEVEL_OUTOF10: 0

## 2021-08-12 NOTE — PROGRESS NOTES
Unable to verify home medications. [facility] pharmacy closed at this time.            .Electronically signed by Marbella Chopra RN on 8/11/2021 at 11:20 PM

## 2021-08-12 NOTE — PLAN OF CARE
Denies suicidal thoughts or intent to harm herself or others. No voiced delusions. Denies hallucinations.
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
## 2021-08-12 NOTE — PROGRESS NOTES
Complains of \"dizziness\" after dinner. Blaming it on a med she took yesterday (vistaril). States she is getting a panic attack and requesting a \"not as ordered\" medication. Explained to her she should try to relax laying down and wait for the dizziness to pass.

## 2021-08-12 NOTE — PLAN OF CARE
Problem: Suicide risk  Goal: Provide patient with safe environment  Description: Provide patient with safe environment  Outcome: Met This Shift     Problem: Depressive Behavior With or Without Suicide Precautions:  Goal: Ability to disclose and discuss suicidal ideas will improve  Description: Ability to disclose and discuss suicidal ideas will improve  Outcome: Met This Shift  Goal: Absence of self-harm  Description: Absence of self-harm  Outcome: Met This Shift     Problem: Depressive Behavior With or Without Suicide Precautions:  Goal: Able to verbalize acceptance of life and situations over which he or she has no control  Description: Able to verbalize acceptance of life and situations over which he or she has no control  Outcome: Not Met This Shift  Goal: Able to verbalize and/or display a decrease in depressive symptoms  Description: Able to verbalize and/or display a decrease in depressive symptoms  Outcome: Not Met This Shift

## 2021-08-12 NOTE — PLAN OF CARE
Denies SI/HI  denies hallucinations   complaint of a headache this a.m.  medicated with tylenol   needy this a.m.  asking if she will be getting her vivance    will continue to monitor
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
## 2021-08-12 NOTE — PROGRESS NOTES
[facility]  Admission Note     Admission Type:   Admission Type: Involuntary    Reason for admission:  Reason for Admission: \"I wanted to kill myself. I was going to shoot myself. \"    PATIENT STRENGTHS:  Strengths: Communication, Positive Support, Social Skills    Patient Strengths and Limitations:  Limitations: Hopeless about future, General negative or hopeless attitude about future/recovery    Addictive Behavior:   Addictive Behavior  In the past 3 months, have you felt or has someone told you that you have a problem with:  : Shopping  Do you have a history of Chemical Use?: No  Do you have a history of Alcohol Use?: Yes  Do you have a history of Street Drug Abuse?: No  Histroy of Prescripton Drug Abuse?: No    Medical Problems:   Past Medical History:   Diagnosis Date    Asperger syndrome     Autism disorder        Status EXAM:  Status and Exam  Normal: No  Facial Expression: Sad, Worried  Affect: Congruent  Level of Consciousness: Alert  Mood:Normal: No  Mood: Depressed, Anxious, Sad  Motor Activity:Normal: Yes  Interview Behavior: Cooperative  Preception: [place] to Person, Sandra Grime to Time, [place] to Place, [place] to Situation  Attention:Normal: No  Attention: Unable to Concentrate  Thought Processes: Circumstantial  Thought Content:Normal: No  Thought Content: Preoccupations  Hallucinations: None  Delusions: No  Memory:Normal: Yes  Insight and Judgment: No  Insight and Judgment: Poor Judgment, Poor Insight  Present Suicidal Ideation: No  Present Homicidal Ideation: No    Tobacco Screening:  Practical Counseling, on admission, chapin X, if applicable and completed (first 3 are required if patient doesn't refuse):            (x)  Recognizing danger situations (included triggers and roadblocks)                    (x)  Coping skills (new ways to manage stress, exercise, relaxation techniques, changing routine, distraction)                                                           (x)  Basic information about quitting (benefits of quitting, techniques in how to quit, available resources  ( ) Referral for counseling faxed to [facility]                                  () Patient refused counseling  ( ) Patient has not smoked in the last 30 days    Metabolic Screening:    Lab Results   Component Value Date    LABA1C 5.4 08/09/2018       Lab Results   Component Value Date    CHOL 126 08/09/2018     Lab Results   Component Value Date    TRIG 127 08/09/2018     Lab Results   Component Value Date    HDL 29 08/09/2018     No components found for: [facility] AND TREAT[facility] [place]  Lab Results   Component Value Date    LABVLDL 25 08/09/2018         Body mass index is 48.42 kg/m². BP Readings from Last 2 Encounters:   08/11/21 (!) 139/93   08/05/21 120/89           Pt admitted with followings belongings:  Dentures: None  Vision - Corrective Lenses: None  Hearing Aid: None  Jewelry: None  Body Piercings Removed: No  Clothing: Pants, Shirt, Socks, Undergarments (Comment), Jacket / coat, Footwear, Pajamas  Were All Patient Medications Collected?: Not Applicable  Other Valuables: Cell phone, Elda Lis (stuffed animal, book)     Patient's home medications were n/a. Patient oriented to surroundings and program expectations and copy of patient rights given. Received admission packet:  Yes. Consents reviewed, signed Yes. Refused n/a. Patient verbalize understanding:  Yes. Patient education on precautions:  Yes                   Julian Aguilar RN

## 2021-08-12 NOTE — ED NOTES
The pt was accepted to [facility] bed 3912. Disposition called to Bret Carroll in admitting.      [facility]  08/11/21 [id number]

## 2021-08-12 NOTE — PLAN OF CARE
[address] [place] [address]  Initial Interdisciplinary Treatment Plan NOTE    Review Date & Time: 8/12/21 0900    Patient was in treatment team    Admission Type:   Admission Type: Involuntary    Reason for admission:  Reason for Admission: \"I wanted to kill myself. I was going to shoot myself. \"      Estimated Length of Stay Update:  3-5 days   Estimated Discharge Date Update: 5-7 days    EDUCATION:   Learner Progress Toward Treatment Goals: Reviewed results and recommendations of this team    Method: Group    Outcome: Verbalized understanding    PATIENT GOALS:     PLAN/TREATMENT RECOMMENDATIONS UPDATE:day 3    GOALS UPDATE:   Time frame for Short-Term Goals: 3-5 days    Cindy Gilbert RN

## 2021-08-13 PROCEDURE — 6370000000 HC RX 637 (ALT 250 FOR IP): Performed by: NURSE PRACTITIONER

## 2021-08-13 PROCEDURE — 99232 SBSQ HOSP IP/OBS MODERATE 35: CPT | Performed by: NURSE PRACTITIONER

## 2021-08-13 PROCEDURE — 6370000000 HC RX 637 (ALT 250 FOR IP): Performed by: PSYCHIATRY & NEUROLOGY

## 2021-08-13 PROCEDURE — 1240000000 HC EMOTIONAL WELLNESS R&B

## 2021-08-13 RX ORDER — OXCARBAZEPINE 300 MG/1
300 TABLET, FILM COATED ORAL 2 TIMES DAILY
Status: DISCONTINUED | OUTPATIENT
Start: 2021-08-13 | End: 2021-08-16 | Stop reason: HOSPADM

## 2021-08-13 RX ADMIN — CITALOPRAM HYDROBROMIDE 10 MG: 20 TABLET ORAL at 09:17

## 2021-08-13 RX ADMIN — ACETAMINOPHEN 650 MG: 325 TABLET ORAL at 21:26

## 2021-08-13 RX ADMIN — OXCARBAZEPINE 300 MG: 300 TABLET, FILM COATED ORAL at 21:04

## 2021-08-13 RX ADMIN — OXCARBAZEPINE 150 MG: 300 TABLET, FILM COATED ORAL at 09:17

## 2021-08-13 RX ADMIN — HYDROXYZINE PAMOATE 50 MG: 50 CAPSULE ORAL at 17:04

## 2021-08-13 ASSESSMENT — PAIN SCALES - GENERAL
PAINLEVEL_OUTOF10: 3
PAINLEVEL_OUTOF10: 0

## 2021-08-13 NOTE — PROGRESS NOTES
Attended morning community meeting. Updated with staffing, and daily expectations. Shared goal for the day as to catch up on sleep and try to do groups.

## 2021-08-13 NOTE — PROGRESS NOTES
English Pt. Reports improved mood, less suicidal thoughts. Pt. Is medication compliant and reports drowsiness from same.

## 2021-08-13 NOTE — PLAN OF CARE
[facility]  Day 3 Interdisciplinary Treatment Plan NOTE    Review Date & Time: 8/13/21 1000    Patient was in treatment team    Estimated Length of Stay Update:   7 DAYS  Estimated Discharge Date Update: MONDAY    EDUCATION:   Learner Progress Toward Treatment Goals: Reviewed results and recommendations of this team    Method: Small group    Outcome: Verbalized understanding and Needs reinforcement    PATIENT GOALS: \"CATCH UP ON MY SLEEP AND TRY TO DO GROUPS\"    PLAN/TREATMENT RECOMMENDATIONS UPDATE: CONTINUE MEDICATIONS, SUPPORTIVE CARE, ENCOURAGE GROUPS, DISCHARGE PLANNING AND FOLLOW UP FOR MONDAY    GOALS UPDATE:   Time frame for Short-Term Goals:  7 DAYS      Jazlyn Catalan RN

## 2021-08-13 NOTE — GROUP NOTE
Group Therapy Note    Date: 8/13/2021    Group Start Time: 1005  Group End Time: 1344  Group Topic: Psychoeducation    [facility]  [id number] [place]                                                                        Group Therapy Note    Date: 8/13/2021    Type of Group: Psychoeducation    Wellness Binder Information  Module Name: goals and change       Patient's Goal: patient will be able to id small daily changes that can help pt make small changes to be well. Notes: pleasant and engaged in group. Status After Intervention:  Improved    Participation Level:  Active Listener and Interactive    Participation Quality: Appropriate, Attentive, Sharing, and Supportive      Speech:normal      Thought Process/Content: Logical      Affective Functioning: Congruent      Mood: euthymic      Level of consciousness:  Alert, Oriented x4, and Attentive      Response to Learning: Able to verbalize/acknowledge new learning, Able to retain information, and Progressing to goal      Endings: None Reported    Modes of Intervention: Education, Support, Socialization, Exploration, and Problem-solving      Discipline Responsible: Psychoeducational Specialist      Signature:  Kehinde Rodríguez     Group Therapy Note

## 2021-08-13 NOTE — GROUP NOTE
Group Therapy Note    Date: 8/13/2021    Group Start Time: 1100  Group End Time: 0018  Group Topic: Cognitive Skills    [facility]  Michael 15, MSW, LSW        Group Therapy Note    Attendees: 8         Patient's Goal:  To discuss grounding techniques. Notes:  Pt was engaged in group discussion and activity. Pt was receptive of group topic. Pt made positive connections with other group members. Status After Intervention:  Unchanged    Participation Level:  Active Listener    Participation Quality: Appropriate      Speech:  normal      Thought Process/Content: Logical      Affective Functioning: Congruent      Mood: anxious      Level of consciousness:  Alert and Oriented x4      Response to Learning: Able to verbalize current knowledge/experience      Endings: None Reported    Modes of Intervention: Education, Support, Socialization, Exploration, Clarifying, Problem-solving and Activity      Discipline Responsible: /Counselor

## 2021-08-13 NOTE — PROGRESS NOTES
Attended afternoon meet and greet. Updated on staffing and evening expectations. Participated in family feud trivia.

## 2021-08-13 NOTE — PROGRESS NOTES
BEHAVIORAL HEALTH FOLLOW-UP NOTE     8/13/2021     Patient was seen and examined in person, Chart reviewed   Patient's case discussed with staff/team    Chief Complaint: \" I have been sleeping because I am tired. \"    Interim History: Patient seen during treatment team.  She states that she has been sleeping because she is tired. She has been medication compliant she denies any side effects of medications. She denies SI/HI intent or plan denies any auditory or visual hallucinations. She is encouraged to attend groups.       Appetite:   [x] Normal/Unchanged  [] Increased  [] Decreased      Sleep:       [x] Normal/Unchanged  [] Fair       [] Poor              Energy:    [x] Normal/Unchanged  [] Increased  [] Decreased        SI [] Present  [x] Absent    HI  []Present  [x] Absent     Aggression:  [] yes  [x] no    Patient is [x] able  [] unable to CONTRACT FOR SAFETY     PAST MEDICAL/PSYCHIATRIC HISTORY:   Past Medical History:   Diagnosis Date    Asperger syndrome     Autism disorder        FAMILY/SOCIAL HISTORY:  Family History   Problem Relation Age of Onset    No Known Problems Mother     No Known Problems Father      Social History     Socioeconomic History    Marital status: Single     Spouse name: Not on file    Number of children: Not on file    Years of education: Not on file    Highest education level: Not on file   Occupational History    Not on file   Tobacco Use    Smoking status: Current Some Day Smoker     Packs/day: 1.00     Years: 1.00     Pack years: 1.00     Types: Cigarettes    Smokeless tobacco: Never Used    Tobacco comment: vaping   Vaping Use    Vaping Use: Every day    Start date: 10/2/2015    Substances: Nicotine, Flavoring    Devices: Disposable, Pre-filled or refillable cartridge, Refillable tank, Pre-filled pod   Substance and Sexual Activity    Alcohol use: Yes     Comment:  1 vodka per week    Drug use: No    Sexual activity: Yes     Partners: Male   Other Topics Concern    Not on file   Social History Narrative    Not on file     Social Determinants of Health     Financial Resource Strain:     Difficulty of Paying Living Expenses:    Food Insecurity:     Worried About Running Out of Food in the Last Year:     [address] in the Last Year:    Transportation Needs:     Lack of Transportation (Medical):  Lack of Transportation (Non-Medical):    Physical Activity:     Days of Exercise per Week:     Minutes of Exercise per Session:    Stress:     Feeling of Stress :    Social Connections:     Frequency of Communication with Friends and Family:     Frequency of Social Gatherings with Friends and Family:     Attends [employer] Services:     Active Member of Clubs or Organizations:     Attends Club or Organization Meetings:     Marital Status:    Intimate Partner Violence:     Fear of Current or Ex-Partner:     Emotionally Abused:     Physically Abused:     Sexually Abused:            ROS:  [x] All negative/unchanged except if checked.  Explain positive(checked items) below:  [] Constitutional  [] Eyes  [] Ear/Nose/Mouth/Throat  [] Respiratory  [] CV  [] GI  []   [] Musculoskeletal  [] Skin/Breast  [] Neurological  [] Endocrine  [] Heme/Lymph  [] Allergic/Immunologic    Explanation:     MEDICATIONS:    Current Facility-Administered Medications:     OXcarbazepine (TRILEPTAL) tablet 300 mg, 300 mg, Oral, BID, AZEEM Gonsalez CNP    citalopram (CELEXA) tablet 10 mg, 10 mg, Oral, Daily, AZEEM Monroy CNP, 10 mg at 08/13/21 0917    acetaminophen (TYLENOL) tablet 650 mg, 650 mg, Oral, Q6H PRN, Rocael Powers MD, 650 mg at 08/12/21 1249    magnesium hydroxide (MILK OF MAGNESIA) 400 MG/5ML suspension 30 mL, 30 mL, Oral, Daily PRN, Rocael Powers MD    aluminum & magnesium hydroxide-simethicone (MAALOX) 200-200-20 MG/5ML suspension 30 mL, 30 mL, Oral, PRN, Rocael Powers MD    hydrOXYzine (VISTARIL) capsule 50 mg, 50 mg, Oral, TID PRN, Juli Cash MD, 50 mg at 08/11/21 2304    haloperidol (HALDOL) tablet 5 mg, 5 mg, Oral, Q6H PRN **OR** haloperidol lactate (HALDOL) injection 5 mg, 5 mg, Intramuscular, Q6H PRN, Juli Cash MD    diazePAM (VALIUM) tablet 10 mg, 10 mg, Oral, Q6H PRN, Juli Cash MD      Examination:  BP (!) 127/58   Pulse 65   Temp 99 °F (37.2 °C) (Temporal)   Resp 16   Ht 5' 6\" (1.676 m)   Wt 300 lb (136.1 kg)   SpO2 97%   BMI 48.42 kg/m²   Gait - steady  Medication side effects(SE): Denies    Mental Status Examination:    Level of consciousness:  within normal limits   Appearance:  fair grooming and fair hygiene  Behavior/Motor:  no abnormalities noted  Attitude toward examiner:  cooperative  Speech:  spontaneous, normal rate and normal volume   Mood: \" I am feeling better. \"  Affect: Appropriate and pleasant  Thought processes: Linear without flight of ideas loose associations  Thought content: Devoid of any auditory visual loose Nations delusions during the perceptual arise. Denies SI/HI intent or plan  Cognition:  oriented to person, place, and time   Concentration intact  Insight improving  Judgement improving    ASSESSMENT:   Patient symptoms are:  [] Well controlled  [x] Improving  [] Worsening  [] No change      Diagnosis:   Principal Problem:    Mild mixed bipolar II disorder (HCC)  Active Problems:    Cluster B personality disorder ([facility] [address]  Resolved Problems:    * No resolved hospital problems.  *      LABS:    Recent Labs     08/11/21  1823   WBC 7.8   HGB 13.6        Recent Labs     08/11/21  1823      K 3.8      CO2 22   BUN 8   CREATININE 0.6   GLUCOSE 85     Recent Labs     08/11/21  1823   BILITOT 0.5   ALKPHOS 67   AST 19   ALT 32     Lab Results   Component Value Date    LABAMPH POSITIVE 08/11/2021    BARBSCNU NOT DETECTED 08/11/2021    LABBENZ NOT DETECTED 08/11/2021    LABMETH NOT DETECTED 08/11/2021    OPIATESCREENURINE NOT DETECTED 08/11/2021 PHENCYCLIDINESCREENURINE NOT DETECTED 08/11/2021    ETOH <10 08/11/2021     No results found for: TSH, FREET4  No results found for: LITHIUM  No results found for: VALPROATE, CBMZ        Treatment Plan:  Reviewed current Medications with the patient. Risks, benefits, side effects, drug-to-drug interactions and alternatives to treatment were discussed. Collateral information:   CD evaluation  Encourage patient to attend group and other milieu activities.   Discharge planning discussed with the patient and treatment team.    Increase Trileptal to 300 mg twice daily for mood stabilization  Continue Celexa 10 mg daily for impulsivity related to cluster B personality disorder    PSYCHOTHERAPY/COUNSELING:  [x] Therapeutic interview  [x] Supportive  [] CBT  [] Ongoing  [] Other    [x] Patient continues to need, on a daily basis, active treatment furnished directly by or requiring the supervision of inpatient psychiatric personnel      Anticipated Length of stay: 3 to 7 days based on stability            Electronically signed by AZEEM Alvarado CNP on 1/91/0182 at 9:42 AM

## 2021-08-13 NOTE — PROGRESS NOTES
Pt. Denies hallucinations. No voiced delusions. Attends groups and is medication compliant. No self harm, contracts for safety.

## 2021-08-14 PROCEDURE — 1240000000 HC EMOTIONAL WELLNESS R&B

## 2021-08-14 PROCEDURE — 6370000000 HC RX 637 (ALT 250 FOR IP): Performed by: PSYCHIATRY & NEUROLOGY

## 2021-08-14 PROCEDURE — 6370000000 HC RX 637 (ALT 250 FOR IP): Performed by: NURSE PRACTITIONER

## 2021-08-14 PROCEDURE — 99231 SBSQ HOSP IP/OBS SF/LOW 25: CPT | Performed by: NURSE PRACTITIONER

## 2021-08-14 RX ADMIN — OXCARBAZEPINE 300 MG: 300 TABLET, FILM COATED ORAL at 10:09

## 2021-08-14 RX ADMIN — OXCARBAZEPINE 300 MG: 300 TABLET, FILM COATED ORAL at 20:17

## 2021-08-14 RX ADMIN — CITALOPRAM HYDROBROMIDE 10 MG: 20 TABLET ORAL at 10:09

## 2021-08-14 RX ADMIN — HYDROXYZINE PAMOATE 50 MG: 50 CAPSULE ORAL at 17:02

## 2021-08-14 RX ADMIN — ACETAMINOPHEN 650 MG: 325 TABLET ORAL at 16:04

## 2021-08-14 RX ADMIN — NICOTINE POLACRILEX 4 MG: 2 GUM, CHEWING BUCCAL at 20:55

## 2021-08-14 ASSESSMENT — PAIN SCALES - GENERAL
PAINLEVEL_OUTOF10: 0
PAINLEVEL_OUTOF10: 4
PAINLEVEL_OUTOF10: 0
PAINLEVEL_OUTOF10: 0

## 2021-08-14 ASSESSMENT — PAIN DESCRIPTION - DESCRIPTORS: DESCRIPTORS: ACHING;DULL;DISCOMFORT

## 2021-08-14 ASSESSMENT — PAIN DESCRIPTION - PAIN TYPE: TYPE: ACUTE PAIN

## 2021-08-14 ASSESSMENT — PAIN DESCRIPTION - LOCATION: LOCATION: BACK

## 2021-08-14 ASSESSMENT — PAIN DESCRIPTION - ORIENTATION: ORIENTATION: MID

## 2021-08-14 ASSESSMENT — PAIN DESCRIPTION - ONSET: ONSET: UNABLE TO TELL

## 2021-08-14 ASSESSMENT — PAIN DESCRIPTION - FREQUENCY: FREQUENCY: INTERMITTENT

## 2021-08-14 NOTE — GROUP NOTE
Group Therapy Note    Date: 8/14/2021    Group Start Time: 1000  Group End Time: 8277  Group Topic: Psychoeducation    [facility]    [place], [place]                                                                        Group Therapy Note    Date: 8/14/2021    Type of Group: Psychoeducation    Wellness Binder Information  Module Name:  coping skills   Session Number: na    Patient's Goal: patient will be able to id beneficial coping skills to help one manage stressors. Notes:  pleasant and sharing thru group, able to id specific skills he/she will use in future. Status After Intervention:  Improved    Participation Level:  Active Listener and Interactive    Participation Quality: Appropriate, Attentive, and Sharing      Speech:normal       Thought Process/Content: Logical      Affective Functioning: Congruent      Mood: euthymic      Level of consciousness:  Alert, Oriented x4, and Attentive      Response to Learning: Able to verbalize/acknowledge new learning, Able to retain information, and Progressing to goal      Endings: None Reported    Modes of Intervention: Education, Support, Socialization, Exploration, and Problem-solving      Discipline Responsible: Psychoeducational Specialist      Signature:  Lois Deutsch

## 2021-08-14 NOTE — PLAN OF CARE
Problem: Depressive Behavior With or Without Suicide Precautions:  Goal: Able to verbalize and/or display a decrease in depressive symptoms  Description: Able to verbalize and/or display a decrease in depressive symptoms  8/14/2021 1418 by Alana Aguillon RN  Outcome: Ongoing     Problem: Depressive Behavior With or Without Suicide Precautions:  Goal: Ability to disclose and discuss suicidal ideas will improve  Description: Ability to disclose and discuss suicidal ideas will improve  8/14/2021 1418 by Reanna Andrews RN  Outcome: Ongoing

## 2021-08-14 NOTE — PROGRESS NOTES
BEHAVIORAL HEALTH FOLLOW-UP NOTE     8/14/2021     Patient was seen and examined in person, Chart reviewed   Patient's case discussed with staff/team    Chief Complaint: \" I have been sleeping because I am tired. \"    Interim History: Patient seen in her room, she is bright laughing social demonstrating a good sense of humor. She is linear and goal-directed. She states that she has been sleeping because she is tired. She has been medication compliant she denies any side effects of medications. She denies SI/HI intent or plan denies any auditory or visual hallucinations. She is encouraged to attend groups.       Appetite:   [x] Normal/Unchanged  [] Increased  [] Decreased      Sleep:       [x] Normal/Unchanged  [] Fair       [] Poor              Energy:    [x] Normal/Unchanged  [] Increased  [] Decreased        SI [] Present  [x] Absent    HI  []Present  [x] Absent     Aggression:  [] yes  [x] no    Patient is [x] able  [] unable to CONTRACT FOR SAFETY     PAST MEDICAL/PSYCHIATRIC HISTORY:   Past Medical History:   Diagnosis Date    Asperger syndrome     Autism disorder        FAMILY/SOCIAL HISTORY:  Family History   Problem Relation Age of Onset    No Known Problems Mother     No Known Problems Father      Social History     Socioeconomic History    Marital status: Single     Spouse name: Not on file    Number of children: Not on file    Years of education: Not on file    Highest education level: Not on file   Occupational History    Not on file   Tobacco Use    Smoking status: Current Some Day Smoker     Packs/day: 1.00     Years: 1.00     Pack years: 1.00     Types: Cigarettes    Smokeless tobacco: Never Used    Tobacco comment: vaping   Vaping Use    Vaping Use: Every day    Start date: 10/2/2015    Substances: Nicotine, Flavoring    Devices: Disposable, Pre-filled or refillable cartridge, Refillable tank, Pre-filled pod   Substance and Sexual Activity    Alcohol use: Yes     Comment:  1 vodka suspension 30 mL, 30 mL, Oral, PRN, Elijah Reyes MD    hydrOXYzine (VISTARIL) capsule 50 mg, 50 mg, Oral, TID PRN, Elijah Reyes MD, 50 mg at 08/13/21 1704    haloperidol (HALDOL) tablet 5 mg, 5 mg, Oral, Q6H PRN **OR** haloperidol lactate (HALDOL) injection 5 mg, 5 mg, Intramuscular, Q6H PRN, Elijah Reyes MD    diazePAM (VALIUM) tablet 10 mg, 10 mg, Oral, Q6H PRN, Elijah Reyes MD      Examination:  /64   Pulse 62   Temp 97.8 °F (36.6 °C)   Resp 15   Ht 5' 6\" (1.676 m)   Wt 300 lb (136.1 kg)   SpO2 97%   BMI 48.42 kg/m²   Gait - steady  Medication side effects(SE): Denies    Mental Status Examination:    Level of consciousness:  within normal limits   Appearance:  fair grooming and fair hygiene  Behavior/Motor:  no abnormalities noted  Attitude toward examiner:  cooperative  Speech:  spontaneous, normal rate and normal volume   Mood: \" I am feeling better. \"  Affect: Appropriate and pleasant  Thought processes: Linear without flight of ideas loose associations  Thought content: Devoid of any auditory visual loose [place] delusions during the perceptual arise. Denies SI/HI intent or plan  Cognition:  oriented to person, place, and time   Concentration intact  Insight improving  Judgement improving    ASSESSMENT:   Patient symptoms are:  [] Well controlled  [x] Improving  [] Worsening  [] No change      Diagnosis:   Principal Problem:    Mild mixed bipolar II disorder (HCC)  Active Problems:    [facility]  Resolved Problems:    * No resolved hospital problems.  *      LABS:    Recent Labs     08/11/21  1823   WBC 7.8   HGB 13.6        Recent Labs     08/11/21  1823      K 3.8      CO2 22   BUN 8   CREATININE 0.6   GLUCOSE 85     Recent Labs     08/11/21  1823   BILITOT 0.5   ALKPHOS 67   AST 19   ALT 32     Lab Results   Component Value Date    LABAMPH POSITIVE 08/11/2021    BARBSCNU NOT DETECTED 08/11/2021    LABBENZ NOT DETECTED 08/11/2021    LABMETH NOT DETECTED 08/11/2021    OPIATESCREENURINE NOT DETECTED 08/11/2021    PHENCYCLIDINESCREENURINE NOT DETECTED 08/11/2021    ETOH <10 08/11/2021     No results found for: TSH, FREET4  No results found for: LITHIUM  No results found for: VALPROATE, CBMZ        Treatment Plan:  The patient's diagnosis, treatment plan, medication management were formulated after patient was seen directly by the attending physician and myself and all relevant documentation was reviewed. Reviewed current Medications with the patient. Risk, benefit, side effects, possible outcomes of the medication and alternatives discussed with the patient and the patient demonstrated understanding. The patient was also educated that the outcome of treatment will depend on the medication compliance as directed by the prescribers along with regular follow-up, compliance with the labs and other work-up, as clinically indicated. Collateral information:   CD evaluation  Encourage patient to attend group and other milieu activities. Discharge planning discussed with the patient and treatment team.    Increase Trileptal to 300 mg twice daily for mood stabilization  Continue Celexa 10 mg daily for impulsivity related to cluster B personality disorder    PSYCHOTHERAPY/COUNSELING:  [x] Therapeutic interview  [x] Supportive  [] CBT  [] Ongoing  [] Other    [x] Patient continues to need, on a daily basis, active treatment furnished directly by or requiring the supervision of inpatient psychiatric personnel      Anticipated Length of stay: 3 to 7 days based on stability       NOTE: This report was transcribed using voice recognition software. Every effort was made to ensure accuracy; however, inadvertent computerized transcription errors may be present.     Electronically signed by AZEEM Travis CNP on 8/14/2021 at 12:33 PM

## 2021-08-15 PROCEDURE — 6370000000 HC RX 637 (ALT 250 FOR IP): Performed by: NURSE PRACTITIONER

## 2021-08-15 PROCEDURE — 99231 SBSQ HOSP IP/OBS SF/LOW 25: CPT | Performed by: NURSE PRACTITIONER

## 2021-08-15 PROCEDURE — 1240000000 HC EMOTIONAL WELLNESS R&B

## 2021-08-15 PROCEDURE — 6370000000 HC RX 637 (ALT 250 FOR IP): Performed by: PSYCHIATRY & NEUROLOGY

## 2021-08-15 RX ADMIN — ACETAMINOPHEN 650 MG: 325 TABLET ORAL at 21:58

## 2021-08-15 RX ADMIN — NICOTINE POLACRILEX 4 MG: 2 GUM, CHEWING BUCCAL at 11:17

## 2021-08-15 RX ADMIN — OXCARBAZEPINE 300 MG: 300 TABLET, FILM COATED ORAL at 09:14

## 2021-08-15 RX ADMIN — OXCARBAZEPINE 300 MG: 300 TABLET, FILM COATED ORAL at 21:56

## 2021-08-15 RX ADMIN — CITALOPRAM HYDROBROMIDE 10 MG: 20 TABLET ORAL at 09:14

## 2021-08-15 RX ADMIN — NICOTINE POLACRILEX 4 MG: 2 GUM, CHEWING BUCCAL at 18:49

## 2021-08-15 ASSESSMENT — PAIN SCALES - GENERAL
PAINLEVEL_OUTOF10: 2
PAINLEVEL_OUTOF10: 0
PAINLEVEL_OUTOF10: 0

## 2021-08-15 NOTE — PLAN OF CARE
Problem: Depressive Behavior With or Without Suicide Precautions:  Goal: Able to verbalize and/or display a decrease in depressive symptoms  Description: Able to verbalize and/or display a decrease in depressive symptoms  8/15/2021 0858 by Cammie Cabrera RN  Outcome: Ongoing     Problem: Depressive Behavior With or Without Suicide Precautions:  Goal: Ability to disclose and discuss suicidal ideas will improve  Description: Ability to disclose and discuss suicidal ideas will improve  8/15/2021 0858 by Cammie Cabrera RN  Outcome: Ongoing

## 2021-08-15 NOTE — PLAN OF CARE
Problem: Depressive Behavior With or Without Suicide Precautions:  Goal: Able to verbalize and/or display a decrease in depressive symptoms  Description: Able to verbalize and/or display a decrease in depressive symptoms  8/15/2021 1738 by Baudilio Roque RN  Outcome: Met This Shift     Problem: Depressive Behavior With or Without Suicide Precautions:  Goal: Ability to disclose and discuss suicidal ideas will improve  Description: Ability to disclose and discuss suicidal ideas will improve  8/15/2021 1738 by Baudilio Roque RN  Outcome: Met This Shift   Pt has been out on the unit and is social with peers. Her affect is bright and she states that she is feeling good. She denies any harmful ideations and states that she is ready to go home tomorrow.

## 2021-08-15 NOTE — GROUP NOTE
Group Therapy Note    Date: 8/15/2021    Group Start Time: 4692  Group End Time: 3485  Group Topic: Healthy Living/Wellness    [facility]  [id number], [facility]        Patient attended morning community meeting. Updated on staffing and daily expectations. Able to set goal for the day. Participated in music therapy group. Able to clearly state music preference and song choice. Patient 1 of 17 in attendance.

## 2021-08-15 NOTE — PROGRESS NOTES
BEHAVIORAL HEALTH FOLLOW-UP NOTE     8/15/2021     Patient was seen and examined in person, Chart reviewed   Patient's case discussed with staff/team    Chief Complaint: \" Really looking forward to being discharged, I have another job lined up. \"    Interim History: Patient seen in her room, she is bright laughing social demonstrating a good sense of humor. She is linear and goal-directed tells me she is excited because she has another job lined up. She is looking forward to discharge so she can pursue this job opportunity. She states that she has been sleeping because she is tired. She has been medication compliant she denies any side effects of medications. She denies SI/HI intent or plan denies any auditory or visual hallucinations.         Appetite:   [x] Normal/Unchanged  [] Increased  [] Decreased      Sleep:       [x] Normal/Unchanged  [] Fair       [] Poor              Energy:    [x] Normal/Unchanged  [] Increased  [] Decreased        SI [] Present  [x] Absent    HI  []Present  [x] Absent     Aggression:  [] yes  [x] no    Patient is [x] able  [] unable to CONTRACT FOR SAFETY     PAST MEDICAL/PSYCHIATRIC HISTORY:   Past Medical History:   Diagnosis Date    Asperger syndrome     Autism disorder        FAMILY/SOCIAL HISTORY:  Family History   Problem Relation Age of Onset    No Known Problems Mother     No Known Problems Father      Social History     Socioeconomic History    Marital status: Single     Spouse name: Not on file    Number of children: Not on file    Years of education: Not on file    Highest education level: Not on file   Occupational History    Not on file   Tobacco Use    Smoking status: Current Some Day Smoker     Packs/day: 1.00     Years: 1.00     Pack years: 1.00     Types: Cigarettes    Smokeless tobacco: Never Used    Tobacco comment: vaping   Vaping Use    Vaping Use: Every day    Start date: 10/2/2015    Substances: Nicotine, Flavoring    Devices: Disposable, Pre-filled or refillable cartridge, Refillable tank, Pre-filled pod   Substance and Sexual Activity    Alcohol use: Yes     Comment:  1 vodka per week    Drug use: No    Sexual activity: Yes     Partners: Male   Other Topics Concern    Not on file   Social History Narrative    Not on file     Social Determinants of Health     Financial Resource Strain:     Difficulty of Paying Living Expenses:    Food Insecurity:     Worried About Running Out of Food in the Last Year:     [address] in the Last Year:    Transportation Needs:     Lack of Transportation (Medical):  Lack of Transportation (Non-Medical):    Physical Activity:     Days of Exercise per Week:     Minutes of Exercise per Session:    Stress:     Feeling of Stress :    Social Connections:     Frequency of Communication with Friends and Family:     Frequency of Social Gatherings with Friends and Family:     Attends [employer] Services:     Active Member of Clubs or Organizations:     Attends Club or Organization Meetings:     Marital Status:    Intimate Partner Violence:     Fear of Current or Ex-Partner:     Emotionally Abused:     Physically Abused:     Sexually Abused:            ROS:  [x] All negative/unchanged except if checked.  Explain positive(checked items) below:  [] Constitutional  [] Eyes  [] Ear/Nose/Mouth/Throat  [] Respiratory  [] CV  [] GI  []   [] Musculoskeletal  [] Skin/Breast  [] Neurological  [] Endocrine  [] Heme/Lymph  [] Allergic/Immunologic    Explanation:     MEDICATIONS:    Current Facility-Administered Medications:     nicotine polacrilex (NICORETTE) gum 4 mg, 4 mg, Oral, PRN, Charyl Bamberger Dellick, APRN - CNP, 4 mg at 08/15/21 1117    OXcarbazepine (TRILEPTAL) tablet 300 mg, 300 mg, Oral, BID, Charyl Bamberger Dellick, APRN - CNP, 961 mg at 08/15/21 0914    citalopram (CELEXA) tablet 10 mg, 10 mg, Oral, Daily, Charyl Bamberger Dellick, APRN - CNP, 10 mg at 08/15/21 0914    acetaminophen (TYLENOL) tablet 650 mg, 650 mg, Oral, Q6H PRN, Braulio Aquino MD, 650 mg at 08/14/21 1604    magnesium hydroxide (MILK OF MAGNESIA) 400 MG/5ML suspension 30 mL, 30 mL, Oral, Daily PRN, Braulio Aquino MD    aluminum & magnesium hydroxide-simethicone (MAALOX) 200-200-20 MG/5ML suspension 30 mL, 30 mL, Oral, PRN, Braulio Aquino MD    hydrOXYzine (VISTARIL) capsule 50 mg, 50 mg, Oral, TID PRN, Braulio Aquino MD, 50 mg at 08/14/21 1702    haloperidol (HALDOL) tablet 5 mg, 5 mg, Oral, Q6H PRN **OR** haloperidol lactate (HALDOL) injection 5 mg, 5 mg, Intramuscular, Q6H PRN, Braulio Aquino MD    diazePAM (VALIUM) tablet 10 mg, 10 mg, Oral, Q6H PRN, Braulio Aquino MD      Examination:  /73   Pulse 71   Temp 98.5 °F (36.9 °C)   Resp 15   Ht 5' 6\" (1.676 m)   Wt 300 lb (136.1 kg)   SpO2 97%   BMI 48.42 kg/m²   Gait - steady  Medication side effects(SE): Denies    Mental Status Examination:    Level of consciousness:  within normal limits   Appearance:  fair grooming and fair hygiene  Behavior/Motor:  no abnormalities noted  Attitude toward examiner:  cooperative  Speech:  spontaneous, normal rate and normal volume   Mood: \" I am feeling better. \"  Affect: Appropriate and pleasant  Thought processes: Linear without flight of ideas loose associations  Thought content: Devoid of any auditory visual loose [place] delusions during the perceptual arise. Denies SI/HI intent or plan  Cognition:  oriented to person, place, and time   Concentration intact  Insight improving  Judgement improving    ASSESSMENT:   Patient symptoms are:  [x] Well controlled  [x] Improving  [] Worsening  [] No change      Diagnosis:   Principal Problem:    Mild mixed bipolar II disorder (HCC)  Active Problems:    Cluster B personality disorder ([facility] [address]  Resolved Problems:    * No resolved hospital problems. *      LABS:    No results for input(s): WBC, HGB, PLT in the last 72 hours.   No results for input(s): NA, K, CL, CO2, BUN, CREATININE, GLUCOSE in the last 72 hours. No results for input(s): BILITOT, ALKPHOS, AST, ALT in the last 72 hours. Lab Results   Component Value Date    LABAMPH POSITIVE 08/11/2021    BARBSCNU NOT DETECTED 08/11/2021    LABBENZ NOT DETECTED 08/11/2021    LABMETH NOT DETECTED 08/11/2021    OPIATESCREENURINE NOT DETECTED 08/11/2021    PHENCYCLIDINESCREENURINE NOT DETECTED 08/11/2021    ETOH <10 08/11/2021     No results found for: TSH, FREET4  No results found for: LITHIUM  No results found for: VALPROATE, CBMZ        Treatment Plan:  The patient's diagnosis, treatment plan, medication management were formulated after patient was seen directly by the attending physician and myself and all relevant documentation was reviewed. Reviewed current Medications with the patient. Risk, benefit, side effects, possible outcomes of the medication and alternatives discussed with the patient and the patient demonstrated understanding. The patient was also educated that the outcome of treatment will depend on the medication compliance as directed by the prescribers along with regular follow-up, compliance with the labs and other work-up, as clinically indicated. Collateral information:   CD evaluation  Encourage patient to attend group and other milieu activities. Discharge planning discussed with the patient and treatment team.    Increase Trileptal to 300 mg twice daily for mood stabilization  Continue Celexa 10 mg daily for impulsivity related to cluster B personality disorder    PSYCHOTHERAPY/COUNSELING:  [x] Therapeutic interview  [x] Supportive  [] CBT  [] Ongoing  [] Other    [x] Patient continues to need, on a daily basis, active treatment furnished directly by or requiring the supervision of inpatient psychiatric personnel      Anticipated Length of stay: 3 to 7 days based on stability       NOTE: This report was transcribed using voice recognition software.  Every effort was made to ensure accuracy; however, inadvertent computerized transcription errors may be present.     Electronically signed by AZEEM Palma CNP on 8/15/2021 at 12:18 PM

## 2021-08-15 NOTE — PLAN OF CARE
Problem: Suicide risk  Goal: Provide patient with safe environment  Description: Provide patient with safe environment  Outcome: Met This Shift     Problem: Depressive Behavior With or Without Suicide Precautions:  Goal: Able to verbalize acceptance of life and situations over which he or she has no control  Description: Able to verbalize acceptance of life and situations over which he or she has no control  Outcome: Ongoing  Goal: Able to verbalize and/or display a decrease in depressive symptoms  Description: Able to verbalize and/or display a decrease in depressive symptoms  8/14/2021 2300 by Minus Opitz, RN  Outcome: Met This Shift  8/14/2021 1418 by Sendy Graham RN  Outcome: Ongoing  Goal: Ability to disclose and discuss suicidal ideas will improve  Description: Ability to disclose and discuss suicidal ideas will improve  8/14/2021 2300 by Minus Opitz, RN  Outcome: Met This Shift  8/14/2021 1418 by Sendy Graham RN  Outcome: Ongoing  Goal: Absence of self-harm  Description: Absence of self-harm  Outcome: Met This Shift       Pt denies suicidal ideations, homicidal ideations and hallucinations. PT is out on the unit at intervals. Pt able to make needs known. Anxious and worried. Pt is concerned about not being able to drink on Trileptal. Pt given education on medication. Cooperative. Medication compliant. Attended group. Appetite appropriate. Will continue to monitor.

## 2021-08-16 VITALS
OXYGEN SATURATION: 97 % | BODY MASS INDEX: 47.09 KG/M2 | WEIGHT: 293 LBS | TEMPERATURE: 96.9 F | SYSTOLIC BLOOD PRESSURE: 129 MMHG | DIASTOLIC BLOOD PRESSURE: 78 MMHG | HEART RATE: 76 BPM | HEIGHT: 66 IN | RESPIRATION RATE: 15 BRPM

## 2021-08-16 PROCEDURE — 6370000000 HC RX 637 (ALT 250 FOR IP): Performed by: NURSE PRACTITIONER

## 2021-08-16 PROCEDURE — 99239 HOSP IP/OBS DSCHRG MGMT >30: CPT | Performed by: NURSE PRACTITIONER

## 2021-08-16 RX ORDER — OXCARBAZEPINE 300 MG/1
300 TABLET, FILM COATED ORAL 2 TIMES DAILY
Qty: 60 TABLET | Refills: 0 | Status: SHIPPED | OUTPATIENT
Start: 2021-08-16 | End: 2022-04-21

## 2021-08-16 RX ORDER — CITALOPRAM 10 MG/1
10 TABLET ORAL DAILY
Qty: 30 TABLET | Refills: 0 | Status: SHIPPED | OUTPATIENT
Start: 2021-08-17 | End: 2022-10-25

## 2021-08-16 RX ADMIN — NICOTINE POLACRILEX 4 MG: 2 GUM, CHEWING BUCCAL at 08:37

## 2021-08-16 RX ADMIN — OXCARBAZEPINE 300 MG: 300 TABLET, FILM COATED ORAL at 08:37

## 2021-08-16 RX ADMIN — CITALOPRAM HYDROBROMIDE 10 MG: 20 TABLET ORAL at 08:37

## 2021-08-16 ASSESSMENT — PAIN SCALES - GENERAL: PAINLEVEL_OUTOF10: 0

## 2021-08-16 NOTE — CARE COORDINATION
In order to ensure appropriate transition and discharge planning is in place, the following documents have been transmitted to [facility], as the new outpatient provider:     The d/c diagnosis was transmitted to the next care provider   The reason for hospitalization was transmitted to the next care provider   The d/c medications (dosage and indication) were transmitted to the next care provider    The continuing care plan was transmitted to the next care provider

## 2021-08-16 NOTE — CARE COORDINATION
SW called pt's boyfriend Belén [phone] and [facility] to discuss final DC plan. CELE received a call from pt's boyfriend belén who has no concerns about pt's DC plan and he will be able to pick pt up today when she is ready.

## 2021-08-16 NOTE — PROGRESS NOTES
CLINICAL PHARMACY NOTE: MEDS TO BEDS    Total # of Prescriptions Filled: 2   The following medications were delivered to the patient:  · TRILEPTAL 300 MG  · CELEXA 10 MG    Additional Documentation:

## 2021-08-16 NOTE — GROUP NOTE
Group Therapy Note    Date: 8/16/2021    Group Start Time: 1010  Group End Time: 2531  Group Topic: Psychoeducation    [facility]  [id number] [place], [place]        Group Therapy Note    Date: 8/16/2021  Number of Participants: 14  Type of Group: Psychoeducation  Wellness Binder Information  Module Name:  increasing your own happiness   Patient's Goal:  patient will be able to id ways to increase his/her own happiness on a daily basis. Notes:  pleasant and engaged in group. Status After Intervention:  Improved  Participation Level:  Active Listener and Interactive  Participation Quality: Appropriate, Attentive, and Sharing  Speech:  normal   Thought Process/Content: Logical  Affective Functioning: Congruent  Mood: euthymic  Level of consciousness:  Alert, Oriented x4, and Attentive  Response to Learning: Able to verbalize/acknowledge new learning, Able to retain information, and Progressing to goal  Endings: None Reported  Modes of Intervention: Education, Support, Socialization, and Problem-solving  Discipline Responsible: Psychoeducational Specialist      Signature:  Jose Barker

## 2021-08-16 NOTE — PROGRESS NOTES
[facility]  Discharge Note    Pt discharged with followings belongings:   Dentures: None  Vision - Corrective Lenses: None  Hearing Aid: None  Jewelry: None  Body Piercings Removed: No  Clothing: Pants, Shirt, Socks, Undergarments (Comment), Jacket / coat, Footwear, Pajamas  Were All Patient Medications Collected?: Not Applicable  Other Valuables: Cell phone, Elda Lis (stuffed animal, book)   Valuables sent home with patient. Patient education on aftercare instructions: YES. Patient verbalize understanding of AVS:  YES.     Status EXAM upon discharge:  Status and Exam  Normal: Yes  Facial Expression: Brightened  Affect: Appropriate  Level of Consciousness: Alert  Mood:Normal: Yes  Mood: Anxious  Motor Activity:Normal: Yes  Motor Activity: Decreased  Interview Behavior: Cooperative  Preception: [place] to Person, Sandra Grime to Time, [place] to Place, [place] to Situation  Attention:Normal: No  Attention: Distractible  Thought Processes: Perseveration  Thought Content:Normal: No  Thought Content: Preoccupations  Hallucinations: None  Delusions: No  Memory:Normal: Yes  Insight and Judgment: No  Insight and Judgment: Poor Insight  Present Suicidal Ideation: No  Present Homicidal Ideation: No      Metabolic Screening:    Lab Results   Component Value Date    LABA1C 5.2 08/11/2021       Lab Results   Component Value Date    CHOL 174 08/11/2021    CHOL 126 08/09/2018     Lab Results   Component Value Date    TRIG 66 08/11/2021    TRIG 127 08/09/2018     Lab Results   Component Value Date    HDL 42 08/11/2021    HDL 29 08/09/2018     No components found for: [facility]  Lab Results   Component Value Date    LABVLDL 13 08/11/2021    LABVLDL 25 08/09/2018       Arminda Maldonado RN

## 2021-08-16 NOTE — PROGRESS NOTES
Denies suicidal ideation, denies homicidal ideation, and denies hallucinations. PT. SIGNED IN VOLUNTARILY AFTER VOLUNTARY RIGHTS REVIEWED.

## 2021-08-16 NOTE — SUICIDE SAFETY PLAN
SAFETY PLAN    A suicide Safety Plan is a document that supports someone when they are having thoughts of suicide. Warning Signs that indicate a suicidal crisis may be developing: What (situations, thoughts, feelings, body sensations, behaviors, etc.) do you experience that lets you know you are beginning to think about suicide? \"IF I AM HAVING THOUGHTS OF HURTING MYSELF, IF I CAN'T CONTROL MY EMOTIONS\"    Internal Coping Strategies:  What things can I do (relaxation techniques, hobbies, physical activities, etc.) to take my mind off my problems without contacting another person? '[address], TALK TO A FRIEND\"    People and social settings that provide distraction: Who can I call or where can I go to distract me? \"TALK TO A FRIEND,GO TO THE PARK\"    People whom I can ask for help: Who can I call when I need help - for example, friends, family, clergy, someone else? \"STEPHANIE,MY PSYCHIATRIST, OR MY PARENTS\"    Professionals or Behavioral Health agencies I can contact during a crisis: Who can I call for help - for example, my doctor, my psychiatrist, my psychologist, a mental health provider, a suicide hotline? CALL 211     3. Suicide Prevention Lifeline: 9-425-429-TALK (6648)        Making the environment safe: How can I make my environment (house/apartment/living space) safer? For example, can I remove guns, medications, and other items?  \"IT'S SAFE NOW\"

## 2021-08-16 NOTE — DISCHARGE SUMMARY
DISCHARGE SUMMARY        Patient personally seen and examined by me mental status examination performed. I agree the below assessment by the medical student. Psychomotor evaluation revealed no agitation retardation any abnormal movements. Her eye contact is fair her speech is normal rate rhythm and tone. Her mood is \"I am feeling a lot better. \"  Affect is mood congruent appropriate pleasant. Thought process is linear without flight of ideas loose associations. Thought contents devoid of any auditory visualizations delusions or other perceptual abnormalities.   She denies suicidal homicidal ideations intent or plan her impulse control is adequate her cognitive function was to be at her baseline her insight judgment is fair she is alert oriented time place and person        Patient ID:  Randie Dubin  [id number]  70 y.o.  2000    Admit date: 8/11/2021    Discharge date and time: 8/16/2021    Admitting Physician: Adis Cruz MD     Discharge Physician: Dr Wendi Marcos MD    Discharge Diagnoses:   Patient Active Problem List   Diagnosis    Right-sided abdominal pain of unknown cause    36 weeks gestation of pregnancy    Elevated blood pressure affecting pregnancy in third trimester, antepartum    37 weeks gestation of pregnancy    Abdominal pain, right upper quadrant    Symptomatic cholelithiasis    Severe major depression ([address]    Major depressive disorder, severe (HCC)    Chronic tonsillitis    Mild mixed bipolar II disorder ([address]    Cluster B personality disorder ([address]       Admission Condition: poor    Discharged Condition: stable    Admission Circumstance: presented to the ED after threatening to kill herself and her boyfriend with a gun they own        PAST MEDICAL/PSYCHIATRIC HISTORY:   Past Medical History:   Diagnosis Date    Asperger syndrome     Autism disorder        FAMILY/SOCIAL HISTORY:  Family History   Problem Relation Age of Onset    No Known Problems Mother     No Known Problems Father      Social History     Socioeconomic History    Marital status: Single     Spouse name: Not on file    Number of children: Not on file    Years of education: Not on file    Highest education level: Not on file   Occupational History    Not on file   Tobacco Use    Smoking status: Current Some Day Smoker     Packs/day: 1.00     Years: 1.00     Pack years: 1.00     Types: Cigarettes    Smokeless tobacco: Never Used    Tobacco comment: vaping   Vaping Use    Vaping Use: Every day    Start date: 10/2/2015    Substances: Nicotine, Flavoring    Devices: Disposable, Pre-filled or refillable cartridge, Refillable tank, Pre-filled pod   Substance and Sexual Activity    Alcohol use: Yes     Comment:  1 vodka per week    Drug use: No    Sexual activity: Yes     Partners: Male   Other Topics Concern    Not on file   Social History Narrative    Not on file     Social Determinants of Health     Financial Resource Strain:     Difficulty of Paying Living Expenses:    Food Insecurity:     Worried About Running Out of Food in the Last Year:     Ran Out of Food in the Last Year:    Transportation Needs:     Lack of Transportation (Medical):      Lack of Transportation (Non-Medical):    Physical Activity:     Days of Exercise per Week:     Minutes of Exercise per Session:    Stress:     Feeling of Stress :    Social Connections:     Frequency of Communication with Friends and Family:     Frequency of Social Gatherings with Friends and Family:     Attends Restorationist Services:     Active Member of Clubs or Organizations:     Attends Club or Organization Meetings:     Marital Status:    Intimate Partner Violence:     Fear of Current or Ex-Partner:     Emotionally Abused:     Physically Abused:     Sexually Abused:        MEDICATIONS:    Current Facility-Administered Medications:     nicotine polacrilex (NICORETTE) gum 4 mg, 4 mg, Oral, Shane CRUMP APRN - CNP, 4 mg at 08/16/21 3762    OXcarbazepine (TRILEPTAL) tablet 300 mg, 300 mg, Oral, BID, Chepe Garza, APRN - CNP, 280 mg at 08/16/21 0837    citalopram (CELEXA) tablet 10 mg, 10 mg, Oral, Daily, Avinash Mart APRN - CNP, 10 mg at 08/16/21 0837    acetaminophen (TYLENOL) tablet 650 mg, 650 mg, Oral, Q6H PRN, Juli Cash MD, 650 mg at 08/15/21 2158    magnesium hydroxide (MILK OF MAGNESIA) 400 MG/5ML suspension 30 mL, 30 mL, Oral, Daily PRN, Juli Cash MD    aluminum & magnesium hydroxide-simethicone (MAALOX) 200-200-20 MG/5ML suspension 30 mL, 30 mL, Oral, PRN, Juli Cash MD    hydrOXYzine (VISTARIL) capsule 50 mg, 50 mg, Oral, TID PRN, Juli Cash MD, 50 mg at 08/14/21 1702    haloperidol (HALDOL) tablet 5 mg, 5 mg, Oral, Q6H PRN **OR** haloperidol lactate (HALDOL) injection 5 mg, 5 mg, Intramuscular, Q6H PRN, Juli Cash MD    diazePAM (VALIUM) tablet 10 mg, 10 mg, Oral, Q6H PRN, Juli Cash MD    Examination:  /78   Pulse 76   Temp 96.9 °F (36.1 °C)   Resp 15   Ht 5' 6\" (1.676 m)   Wt 300 lb (136.1 kg)   SpO2 97%   BMI 48.42 kg/m²   Gait - steady    HOSPITAL COURSE[de-identified]  Patient was admitted to the unit on 8/11/2021 and was monitored closely for suicidal ideations. Patient was evaluated and treated with Celexa 10mg  daily for impulsivity related to Cluster B personality disorder and started on Trileptal 150mg BID and then increased to Trileptol 300mg BID for mood stabilization. Medical events were insignificant and patient continued to improve on the floor. She started to come out of her room she was attending groups and socializing with her peers. She never made any suicidal statements or gestures while on the unit.  obtained confirmation from her boyfriend who was able to voice any concerns that he had. He reported no safety concerns or access to any weapons. Treatment team felt the patient obtained the maximum benefit for her hospitalization.  She was set up with outpatient mental health agency for out patient follow up services. She was up on the unit attending groups and socializing with peers. She vehemently denied any suicidal or homicidal ideations intent or plan. She was eating well and sleeping well there are no neurovegetative signs or symptoms of depression. She denies any auditory or visual hallucinations. There are no overt or covert signs of psychosis. Patient no longer meets criteria for inpatient hospitalization. No AVH or paranoid thoughts  No hopeless or worthless feeling  No active SI/HI    Appetite:  [x] Normal  [] Increased  [] Decreased    Sleep:       [x] Normal  [] Fair       [] Poor            Energy:    [x] Normal  [] Increased  [] Decreased     SI [] Present  [x] Absent  HI  []Present  [x] Absent   Aggression:  [] yes  [x] no  Patient is [x] able  [] unable to CONTRACT FOR SAFETY   Medication side effects(SE):  [x] None(Psych.  Meds.) [] Other      Mental Status Examination on discharge:    Level of consciousness:  within normal limits   Appearance:  well-appearing  Behavior/Motor:  no abnormalities noted  Attitude toward examiner:  attentive and good eye contact  Speech:  spontaneous, normal rate and normal volume   Mood: \"I am feeling better\"  Affect: Appropriate and pleasant   Thought processes:  Linear without flight of ideas and loose associations   Thought content: Devoid of auditory and visual hallucinations delusions or any other perceptual abnormalities denies suicidal and homicidal ideation intent or plan  Cognition:  oriented to person, place, and time   Concentration intact  Memory intact  Insight good   Judgement fair   Fund of Knowledge adequate      ASSESSMENT:  Patient symptoms are:  [x] Well controlled  [x] Improving  [] Worsening  [] No change    Reason for more than one antipsychotic:  [x] N/A  [] 3 Failed Monotherapy attempts (Drugs tried:)  [] Crossover to a new antipsychotic  [] Taper to Monotherapy from Polypharmacy  [] Augmentation of clozapine therapy due to treatment resistance to single therapy    Diagnosis:  Principal Problem:    Mild mixed bipolar II disorder (HCC)  Active Problems:    Cluster B personality disorder ([facility]  Resolved Problems:    * No resolved hospital problems. *      LABS:    No results for input(s): WBC, HGB, PLT in the last 72 hours. No results for input(s): NA, K, CL, CO2, BUN, CREATININE, GLUCOSE in the last 72 hours. No results for input(s): BILITOT, ALKPHOS, AST, ALT in the last 72 hours. Lab Results   Component Value Date    LABAMPH POSITIVE 08/11/2021    BARBSCNU NOT DETECTED 08/11/2021    LABBENZ NOT DETECTED 08/11/2021    LABMETH NOT DETECTED 08/11/2021    OPIATESCREENURINE NOT DETECTED 08/11/2021    PHENCYCLIDINESCREENURINE NOT DETECTED 08/11/2021    ETOH <10 08/11/2021     No results found for: TSH, FREET4  No results found for: LITHIUM  No results found for: VALPROATE, CBMZ    RISK ASSESSMENT AT DISCHARGE: Low risk for suicide and homicide. Treatment Plan:  Reviewed current Medications with the patient. Education provided on the complaince with treatment. Risks, benefits, side effects, drug-to-drug interactions and alternatives to treatment were discussed. Encourage patient to attend outpatient follow up appointment and therapy. Patient was advised to call the outpatient provider, visit the nearest ED or call 911 if symptoms are not manageable. Patient's family member was contacted prior to the discharge. Medication List      ASK your doctor about these medications    * ARIPiprazole 10 MG tablet  Commonly known as: ABILIFY     * ARIPiprazole 10 MG tablet  Commonly known as: ABILIFY     sertraline 50 MG tablet  Commonly known as: ZOLOFT     Vyvanse 10 MG Caps  Generic drug: Lisdexamfetamine Dimesylate         * This list has 2 medication(s) that are the same as other medications prescribed for you.  Read the directions carefully, and ask your doctor or other care provider to review them with you.               Patient is counseled she must been compliant with all medications outpatient follow-up appointments    Patient is discharged home in stable condition    TIME SPEND - 35 MINUTES TO COMPLETE THE EVALUATION, DISCHARGE SUMMARY, MEDICATION RECONCILIATION AND FOLLOW UP CARE     Signed:  Demarco Castro  8/16/2021  11:12 AM

## 2021-08-16 NOTE — PROGRESS NOTES
Attended community meeting. Updated on staffing and daily expectations. Shared goal for the day as to be patient.

## 2021-08-16 NOTE — SUICIDE SAFETY PLAN
SAFETY PLAN    A suicide Safety Plan is a document that supports someone when they are having thoughts of suicide. Warning Signs that indicate a suicidal crisis may be developing: What (situations, thoughts, feelings, body sensations, behaviors, etc.) do you experience that lets you know you are beginning to think about suicide? \"IF IM HAVING SUICIDAL THOUGHTS, THOUGHTS OF [address] SOMEONE ELSE\"    Internal Coping Strategies:  What things can I do (relaxation techniques, hobbies, physical activities, etc.) to take my mind off my problems without contacting another ? \"READING COLORING AND PLAYING VIDEO GAMES\"    People and social settings that provide distraction: Who can I call or where can I go to distract me? \"STEPHANIE, FRIENDS OR PSYCHIATRIST, GOING TO THE PARK\"    People whom I can ask for help: Who can I call when I need help - for example, friends, family, clergy, someone else? \"STEPHANIE OR MY PHARMACIST    Professionals or Behavioral Health agencies I can contact during a crisis: Who can I call for help - for example, my doctor, my psychiatrist, my psychologist, a mental health provider, a suicide hotline? CALL 211     3. Suicide Prevention Lifeline: 1-031-443-TALK (3879)    Making the environment safe: How can I make my environment (house/apartment/living space) safer? For example, can I remove guns, medications, and other items?   \"I MADE MY ENVIRONMENT SAFE ALREADY\"

## 2021-08-17 NOTE — FLOWSHEET NOTE
Pt stated that she needs a work excuse. SW provided her with SW phone number. Pt stated she is feeling better than when she was admitted.  Outpatient appt is tomorrow

## 2021-09-17 ENCOUNTER — APPOINTMENT (OUTPATIENT)
Dept: GENERAL RADIOLOGY | Age: 21
End: 2021-09-17
Payer: COMMERCIAL

## 2021-09-17 ENCOUNTER — HOSPITAL ENCOUNTER (EMERGENCY)
Age: 21
Discharge: HOME | End: 2021-09-18
Payer: COMMERCIAL

## 2021-09-17 VITALS
BODY MASS INDEX: 47.09 KG/M2 | OXYGEN SATURATION: 97 % | WEIGHT: 293 LBS | RESPIRATION RATE: 16 BRPM | HEIGHT: 66 IN | DIASTOLIC BLOOD PRESSURE: 92 MMHG | SYSTOLIC BLOOD PRESSURE: 130 MMHG | TEMPERATURE: 98.8 F | HEART RATE: 105 BPM

## 2021-09-17 DIAGNOSIS — J98.01 BRONCHOSPASM: ICD-10-CM

## 2021-09-17 DIAGNOSIS — J18.9 PNEUMONIA OF BOTH LOWER LOBES DUE TO INFECTIOUS ORGANISM: Primary | ICD-10-CM

## 2021-09-17 LAB
ALBUMIN SERPL-MCNC: 4.3 G/DL (ref 3.5–5.2)
ALP BLD-CCNC: 74 U/L (ref 35–104)
ALT SERPL-CCNC: 41 U/L (ref 0–32)
ANION GAP SERPL CALCULATED.3IONS-SCNC: 14 MMOL/L (ref 7–16)
AST SERPL-CCNC: 35 U/L (ref 0–31)
BACTERIA: ABNORMAL /HPF
BASOPHILS ABSOLUTE: 0.09 E9/L (ref 0–0.2)
BASOPHILS RELATIVE PERCENT: 0.9 % (ref 0–2)
BILIRUB SERPL-MCNC: 0.5 MG/DL (ref 0–1.2)
BILIRUBIN URINE: NEGATIVE
BLOOD, URINE: NEGATIVE
BUN BLDV-MCNC: 7 MG/DL (ref 6–20)
CALCIUM SERPL-MCNC: 9.3 MG/DL (ref 8.6–10.2)
CHLORIDE BLD-SCNC: 104 MMOL/L (ref 98–107)
CLARITY: CLEAR
CO2: 20 MMOL/L (ref 22–29)
COLOR: YELLOW
CREAT SERPL-MCNC: 0.6 MG/DL (ref 0.5–1)
D DIMER: 259 NG/ML DDU
EOSINOPHILS ABSOLUTE: 0.34 E9/L (ref 0.05–0.5)
EOSINOPHILS RELATIVE PERCENT: 3.4 % (ref 0–6)
GFR AFRICAN AMERICAN: >60
GFR NON-AFRICAN AMERICAN: >60 ML/MIN/1.73
GLUCOSE BLD-MCNC: 117 MG/DL (ref 74–99)
GLUCOSE URINE: NEGATIVE MG/DL
HCG(URINE) PREGNANCY TEST: NEGATIVE
HCT VFR BLD CALC: 42.4 % (ref 34–48)
HEMOGLOBIN: 15 G/DL (ref 11.5–15.5)
IMMATURE GRANULOCYTES #: 0.06 E9/L
IMMATURE GRANULOCYTES %: 0.6 % (ref 0–5)
KETONES, URINE: NEGATIVE MG/DL
LEUKOCYTE ESTERASE, URINE: NEGATIVE
LYMPHOCYTES ABSOLUTE: 2.31 E9/L (ref 1.5–4)
LYMPHOCYTES RELATIVE PERCENT: 22.8 % (ref 20–42)
MCH RBC QN AUTO: 30 PG (ref 26–35)
MCHC RBC AUTO-ENTMCNC: 35.4 % (ref 32–34.5)
MCV RBC AUTO: 84.8 FL (ref 80–99.9)
MONOCYTES ABSOLUTE: 0.75 E9/L (ref 0.1–0.95)
MONOCYTES RELATIVE PERCENT: 7.4 % (ref 2–12)
NEUTROPHILS ABSOLUTE: 6.59 E9/L (ref 1.8–7.3)
NEUTROPHILS RELATIVE PERCENT: 64.9 % (ref 43–80)
NITRITE, URINE: NEGATIVE
PDW BLD-RTO: 12.6 FL (ref 11.5–15)
PH UA: 6 (ref 5–9)
PLATELET # BLD: 310 E9/L (ref 130–450)
PMV BLD AUTO: 10.2 FL (ref 7–12)
POTASSIUM REFLEX MAGNESIUM: 4.3 MMOL/L (ref 3.5–5)
PROTEIN UA: NEGATIVE MG/DL
RBC # BLD: 5 E12/L (ref 3.5–5.5)
RBC UA: ABNORMAL /HPF (ref 0–2)
REASON FOR REJECTION: NORMAL
REJECTED TEST: NORMAL
SARS-COV-2, NAAT: NOT DETECTED
SODIUM BLD-SCNC: 138 MMOL/L (ref 132–146)
SPECIFIC GRAVITY UA: <=1.005 (ref 1–1.03)
TOTAL PROTEIN: 7.6 G/DL (ref 6.4–8.3)
UROBILINOGEN, URINE: 0.2 E.U./DL
WBC # BLD: 10.1 E9/L (ref 4.5–11.5)
WBC UA: ABNORMAL /HPF (ref 0–5)

## 2021-09-17 PROCEDURE — 71045 X-RAY EXAM CHEST 1 VIEW: CPT

## 2021-09-17 PROCEDURE — 6370000000 HC RX 637 (ALT 250 FOR IP): Performed by: PHYSICIAN ASSISTANT

## 2021-09-17 PROCEDURE — 2580000003 HC RX 258: Performed by: PHYSICIAN ASSISTANT

## 2021-09-17 PROCEDURE — 85378 FIBRIN DEGRADE SEMIQUANT: CPT

## 2021-09-17 PROCEDURE — 6360000002 HC RX W HCPCS: Performed by: PHYSICIAN ASSISTANT

## 2021-09-17 PROCEDURE — 99284 EMERGENCY DEPT VISIT MOD MDM: CPT

## 2021-09-17 PROCEDURE — 96375 TX/PRO/DX INJ NEW DRUG ADDON: CPT

## 2021-09-17 PROCEDURE — 85025 COMPLETE CBC W/AUTO DIFF WBC: CPT

## 2021-09-17 PROCEDURE — 80053 COMPREHEN METABOLIC PANEL: CPT

## 2021-09-17 PROCEDURE — 87635 SARS-COV-2 COVID-19 AMP PRB: CPT

## 2021-09-17 PROCEDURE — 81001 URINALYSIS AUTO W/SCOPE: CPT

## 2021-09-17 PROCEDURE — 36415 COLL VENOUS BLD VENIPUNCTURE: CPT

## 2021-09-17 PROCEDURE — 81025 URINE PREGNANCY TEST: CPT

## 2021-09-17 PROCEDURE — 93005 ELECTROCARDIOGRAM TRACING: CPT | Performed by: PHYSICIAN ASSISTANT

## 2021-09-17 PROCEDURE — 96365 THER/PROPH/DIAG IV INF INIT: CPT

## 2021-09-17 RX ORDER — IPRATROPIUM BROMIDE AND ALBUTEROL SULFATE 2.5; .5 MG/3ML; MG/3ML
1 SOLUTION RESPIRATORY (INHALATION) ONCE
Status: COMPLETED | OUTPATIENT
Start: 2021-09-17 | End: 2021-09-17

## 2021-09-17 RX ORDER — DEXAMETHASONE SODIUM PHOSPHATE 10 MG/ML
6 INJECTION, SOLUTION INTRAMUSCULAR; INTRAVENOUS ONCE
Status: COMPLETED | OUTPATIENT
Start: 2021-09-17 | End: 2021-09-17

## 2021-09-17 RX ORDER — 0.9 % SODIUM CHLORIDE 0.9 %
1000 INTRAVENOUS SOLUTION INTRAVENOUS ONCE
Status: COMPLETED | OUTPATIENT
Start: 2021-09-17 | End: 2021-09-18

## 2021-09-17 RX ORDER — DOXYCYCLINE HYCLATE 100 MG/1
100 CAPSULE ORAL ONCE
Status: COMPLETED | OUTPATIENT
Start: 2021-09-17 | End: 2021-09-17

## 2021-09-17 RX ADMIN — IPRATROPIUM BROMIDE AND ALBUTEROL SULFATE 1 AMPULE: .5; 2.5 SOLUTION RESPIRATORY (INHALATION) at 22:27

## 2021-09-17 RX ADMIN — IPRATROPIUM BROMIDE AND ALBUTEROL SULFATE 1 AMPULE: .5; 2.5 SOLUTION RESPIRATORY (INHALATION) at 20:59

## 2021-09-17 RX ADMIN — DEXAMETHASONE SODIUM PHOSPHATE 6 MG: 10 INJECTION, SOLUTION INTRAMUSCULAR; INTRAVENOUS at 21:36

## 2021-09-17 RX ADMIN — CEFTRIAXONE 1000 MG: 1 INJECTION, POWDER, FOR SOLUTION INTRAMUSCULAR; INTRAVENOUS at 23:27

## 2021-09-17 RX ADMIN — SODIUM CHLORIDE 1000 ML: 9 INJECTION, SOLUTION INTRAVENOUS at 21:36

## 2021-09-17 RX ADMIN — DOXYCYCLINE HYCLATE 100 MG: 100 CAPSULE ORAL at 23:27

## 2021-09-17 NOTE — ED PROVIDER NOTES
Independent:    HPI:  9/17/21, Time: 7:57 PM EDT         Nely Hallman is a 24 y.o. female presenting to the ED for cough and congestion, beginning over the last 3 to 4 days. The patient states she was at a local concert over the last week and and was unmasked and around a lot of people. She states that she was outdoors and there was a lot of dust around and she thought she stirred up some allergies. She began to have a persistent cough followed by congestion which seemed to get worse each day this week. She states she then began to have some more persistent cough and body aches and generalized ill feeling. She denies any loss of taste or smell. 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  She normally wears a mask when she works at the nursing facility regularly. Her cough has been productive of some discolored mucus. She has not recorded an actual fever at home. The complaint has been persistent, moderate in severity, and worsened by her cough. She reports that her boyfriend came with her and he also has a cough, but not as severe as her. Review of Systems:   A complete review of systems was performed and pertinent positives and negatives are stated within HPI, all other systems reviewed and are negative.      -------------------------------------------- PAST HISTORY ---------------------------------------------  Past Medical History:  has a past medical history of Asperger syndrome and Autism disorder. Past Surgical History:  has a past surgical history that includes REMOVAL FOREIGN BODY EAR; pr lap,cholecystectomy/graph (N/A, 3/15/2018); Cholecystectomy; and Tonsillectomy and adenoidectomy (N/A, 7/22/2021). Social History:  reports that she has been smoking cigarettes. She has a 1.00 pack-year smoking history.  She has never used smokeless tobacco. She reports current alcohol use. She reports that she does not use drugs. Family History: family history includes No Known Problems in her father and mother. The patients home medications have been reviewed. Allergies: Patient has no known allergies.     -------------------------------------------------- RESULTS -------------------------------------------------  All laboratory and radiology results have been personally reviewed by myself   LABS:  Results for orders placed or performed during the hospital encounter of 09/17/21   COVID-19, Rapid    Specimen: Nasopharyngeal Swab   Result Value Ref Range    SARS-CoV-2, NAAT Not Detected Not Detected   Urinalysis with Microscopic   Result Value Ref Range    Color, UA Yellow Straw/Yellow    Clarity, UA Clear Clear    Glucose, Ur Negative Negative mg/dL    Bilirubin Urine Negative Negative    Ketones, Urine Negative Negative mg/dL    Specific Gravity, UA <=1.005 1.005 - 1.030    Blood, Urine Negative Negative    pH, UA 6.0 5.0 - 9.0    Protein, UA Negative Negative mg/dL    Urobilinogen, Urine 0.2 <2.0 E.U./dL    Nitrite, Urine Negative Negative    Leukocyte Esterase, Urine Negative Negative    WBC, UA 0-1 0 - 5 /HPF    RBC, UA NONE 0 - 2 /HPF    Bacteria, UA FEW (A) None Seen /HPF   Pregnancy, urine   Result Value Ref Range    HCG(Urine) Pregnancy Test NEGATIVE NEGATIVE   CBC auto differential   Result Value Ref Range    WBC 10.1 4.5 - 11.5 E9/L    RBC 5.00 3.50 - 5.50 E12/L    Hemoglobin 15.0 11.5 - 15.5 g/dL    Hematocrit 42.4 34.0 - 48.0 %    MCV 84.8 80.0 - 99.9 fL    MCH 30.0 26.0 - 35.0 pg    MCHC 35.4 (H) 32.0 - 34.5 %    RDW 12.6 11.5 - 15.0 fL    Platelets 888 354 - 692 E9/L    MPV 10.2 7.0 - 12.0 fL    Neutrophils % 64.9 43.0 - 80.0 %    Immature Granulocytes % 0.6 0.0 - 5.0 %    Lymphocytes % 22.8 20.0 - 42.0 %    Monocytes % 7.4 2.0 - 12.0 %    Eosinophils % 3.4 0.0 - 6.0 %    Basophils % 0.9 0.0 - 2.0 %    Neutrophils Absolute 6.59 1.80 - 7.30 E9/L    Immature Granulocytes # (!) 130/92   Pulse 105   Temp 98.8 °F (37.1 °C) (Temporal)   Resp 16   Ht 5' 6\" (1.676 m)   Wt (!) 312 lb (141.5 kg)   SpO2 97%   BMI 50.36 kg/m²   Oxygen Saturation Interpretation: Normal      ---------------------------------------------------PHYSICAL EXAM--------------------------------------      Constitutional/General: Alert and oriented x3, ill, but stable appearing, non toxic in NAD  Head: Normocephalic and atraumatic  Eyes: PERRL, EOMI  Ears: TM's dull, mild erythema  Nose: Turbinates swollen, some thick mucus noted  Mouth: Posterior pharyngeal erythema, no uvular edema, thick yellow-tinged mucus to posterior throat, handling secretions, no trismus  Neck: Supple, full ROM, some shotty anterior cervical nodes, no rigidity or stridor  Pulmonary: Lungs with forced expiratory wheezes noted and some scattered coarse breath sounds , no obvious area of consolidation. Not in respiratory distress  Cardiovascular: Tachycardic rate, appears regular, 2+ distal pulses  Abdomen: Soft, obese, nontender, bowel sounds active  Extremities: Moves all extremities x 4. No pitting edema.   Warm and well perfused  Skin: warm and dry without rash  Neurologic: GCS 15  Psych: Normal Affect      ------------------------------ ED COURSE/MEDICAL DECISION MAKING----------------------  Medications   ipratropium-albuterol (DUONEB) nebulizer solution 1 ampule (1 ampule Inhalation Given 9/17/21 2059)   dexamethasone (PF) (DECADRON) injection 6 mg (6 mg IntraVENous Given 9/17/21 2136)   0.9 % sodium chloride bolus (0 mLs IntraVENous Stopped 9/18/21 0014)   ipratropium-albuterol (DUONEB) nebulizer solution 1 ampule (1 ampule Inhalation Given 9/17/21 2227)   cefTRIAXone (ROCEPHIN) 1,000 mg in sterile water 10 mL IV syringe (0 mg IntraVENous Stopped 9/18/21 0000)   doxycycline hyclate (VIBRAMYCIN) capsule 100 mg (100 mg Oral Given 9/17/21 2327)         ED COURSE:       Medical Decision Making:    Patient to ER, complaints of persistent cough and congestion and feeling some mild SOB onset over the last 3 to 4 days. Patient was at local concert, no mask wearing and patient not COVID vaccinated. Patient with tachycardic rate, pulse ox stable on room air. Patient agrees to undergo Covid testing, chest x-ray as well as labs. Patient EKG noted and sinus tachycardia appreciated. Patient given some IV dexamethasone, several Duonebs and IVF. Patient feeling improved after medications given. Patient HR improved and lung findings improved as well. CXR noted and findings suggestive of bilateral pneumonia. Patient Covid rapid was negative, patient advised would recommend COVID PCR, but she declined. Labs noted, DDimer slightly elevated, but with pulse ox at 100%, CTA of chest discussed with patient and did not appear clinically indicated with CXR findings noted. Patient was given IV Rocephin and oral doxycycline. She will be discharged home on oral Omnicef and oral doxycycline, prednisone wean and albuterol inhaler. She was advised need for close follow-up with PCP as well as need to repeat chest x-ray in a few weeks. She was advised of my concerns that without Covid PCR I cannot be 100% sure that she does not have Covid. She does work at a nursing facility and she states she does get frequent Covid testing. I did give her a note for work that as long as her symptoms continue to improve she can return to work next Wednesday however if her symptoms worsen she was advised reevaluation sooner. She will monitor her oxygen and symptoms and advise reevaluation if her symptoms change or worsen. She was advised to social distance and good handwashing. Counseling: The emergency provider has spoken with the patient and spouse/SO and discussed todays results, in addition to providing specific details for the plan of care and counseling regarding the diagnosis and prognosis.   Questions are answered at this time and they are agreeable with the plan.      --------------------------------- IMPRESSION AND DISPOSITION ---------------------------------    IMPRESSION  1. Pneumonia of both lower lobes due to infectious organism    2. Bronchospasm        DISPOSITION  Disposition: Discharge to home  Patient condition is stable      NOTE: This report was transcribed using voice recognition software.  Every effort was made to ensure accuracy; however, inadvertent computerized transcription errors may be present       Darshana Gary PA-C  09/18/21 4031

## 2021-09-18 LAB
EKG ATRIAL RATE: 105 BPM
EKG P AXIS: 11 DEGREES
EKG P-R INTERVAL: 142 MS
EKG Q-T INTERVAL: 378 MS
EKG QRS DURATION: 92 MS
EKG QTC CALCULATION (BAZETT): 499 MS
EKG R AXIS: 41 DEGREES
EKG T AXIS: 38 DEGREES
EKG VENTRICULAR RATE: 105 BPM

## 2021-09-18 PROCEDURE — 96365 THER/PROPH/DIAG IV INF INIT: CPT

## 2021-09-18 PROCEDURE — 93010 ELECTROCARDIOGRAM REPORT: CPT | Performed by: INTERNAL MEDICINE

## 2021-09-18 RX ORDER — ALBUTEROL SULFATE 90 UG/1
2 AEROSOL, METERED RESPIRATORY (INHALATION) 4 TIMES DAILY PRN
Qty: 18 G | Refills: 0 | Status: SHIPPED | OUTPATIENT
Start: 2021-09-18 | End: 2022-07-07

## 2021-09-18 RX ORDER — PREDNISONE 10 MG/1
TABLET ORAL
Qty: 21 TABLET | Refills: 0 | Status: SHIPPED | OUTPATIENT
Start: 2021-09-18 | End: 2021-10-25

## 2021-09-18 RX ORDER — GUAIFENESIN 600 MG/1
600 TABLET, EXTENDED RELEASE ORAL 2 TIMES DAILY
Qty: 30 TABLET | Refills: 0 | Status: SHIPPED | OUTPATIENT
Start: 2021-09-18 | End: 2021-10-03

## 2021-09-18 RX ORDER — DOXYCYCLINE HYCLATE 100 MG
100 TABLET ORAL 2 TIMES DAILY
Qty: 20 TABLET | Refills: 0 | Status: SHIPPED | OUTPATIENT
Start: 2021-09-18 | End: 2021-09-28

## 2021-09-18 RX ORDER — CEFDINIR 300 MG/1
300 CAPSULE ORAL 2 TIMES DAILY
Qty: 20 CAPSULE | Refills: 0 | Status: SHIPPED | OUTPATIENT
Start: 2021-09-18 | End: 2021-09-28

## 2021-10-25 ENCOUNTER — APPOINTMENT (OUTPATIENT)
Dept: CT IMAGING | Age: 21
End: 2021-10-25
Payer: COMMERCIAL

## 2021-10-25 ENCOUNTER — HOSPITAL ENCOUNTER (EMERGENCY)
Age: 21
Discharge: HOME | End: 2021-10-25
Payer: COMMERCIAL

## 2021-10-25 VITALS
TEMPERATURE: 98.6 F | WEIGHT: 293 LBS | DIASTOLIC BLOOD PRESSURE: 84 MMHG | HEART RATE: 98 BPM | OXYGEN SATURATION: 97 % | RESPIRATION RATE: 16 BRPM | SYSTOLIC BLOOD PRESSURE: 138 MMHG | BODY MASS INDEX: 50.2 KG/M2

## 2021-10-25 DIAGNOSIS — J01.00 ACUTE MAXILLARY SINUSITIS, RECURRENCE NOT SPECIFIED: Primary | ICD-10-CM

## 2021-10-25 DIAGNOSIS — R51.9 ACUTE NONINTRACTABLE HEADACHE, UNSPECIFIED HEADACHE TYPE: ICD-10-CM

## 2021-10-25 PROCEDURE — 70450 CT HEAD/BRAIN W/O DYE: CPT

## 2021-10-25 PROCEDURE — 96375 TX/PRO/DX INJ NEW DRUG ADDON: CPT

## 2021-10-25 PROCEDURE — 6370000000 HC RX 637 (ALT 250 FOR IP): Performed by: PHYSICIAN ASSISTANT

## 2021-10-25 PROCEDURE — 96374 THER/PROPH/DIAG INJ IV PUSH: CPT

## 2021-10-25 PROCEDURE — 99283 EMERGENCY DEPT VISIT LOW MDM: CPT

## 2021-10-25 PROCEDURE — 6360000002 HC RX W HCPCS: Performed by: PHYSICIAN ASSISTANT

## 2021-10-25 PROCEDURE — 2580000003 HC RX 258: Performed by: PHYSICIAN ASSISTANT

## 2021-10-25 RX ORDER — AMOXICILLIN AND CLAVULANATE POTASSIUM 875; 125 MG/1; MG/1
1 TABLET, FILM COATED ORAL 2 TIMES DAILY
Qty: 20 TABLET | Refills: 0 | Status: SHIPPED | OUTPATIENT
Start: 2021-10-25 | End: 2021-11-04

## 2021-10-25 RX ORDER — KETOROLAC TROMETHAMINE 30 MG/ML
30 INJECTION, SOLUTION INTRAMUSCULAR; INTRAVENOUS ONCE
Status: COMPLETED | OUTPATIENT
Start: 2021-10-25 | End: 2021-10-25

## 2021-10-25 RX ORDER — 0.9 % SODIUM CHLORIDE 0.9 %
1500 INTRAVENOUS SOLUTION INTRAVENOUS ONCE
Status: COMPLETED | OUTPATIENT
Start: 2021-10-25 | End: 2021-10-25

## 2021-10-25 RX ORDER — DIPHENHYDRAMINE HYDROCHLORIDE 50 MG/ML
50 INJECTION INTRAMUSCULAR; INTRAVENOUS ONCE
Status: COMPLETED | OUTPATIENT
Start: 2021-10-25 | End: 2021-10-25

## 2021-10-25 RX ORDER — AMOXICILLIN AND CLAVULANATE POTASSIUM 875; 125 MG/1; MG/1
1 TABLET, FILM COATED ORAL ONCE
Status: COMPLETED | OUTPATIENT
Start: 2021-10-25 | End: 2021-10-25

## 2021-10-25 RX ORDER — FLUTICASONE PROPIONATE 50 MCG
2 SPRAY, SUSPENSION (ML) NASAL DAILY
Qty: 16 G | Refills: 0 | Status: SHIPPED | OUTPATIENT
Start: 2021-10-25

## 2021-10-25 RX ORDER — LISDEXAMFETAMINE DIMESYLATE 40 MG/1
70 CAPSULE ORAL DAILY
COMMUNITY
End: 2022-10-25

## 2021-10-25 RX ORDER — IBUPROFEN 800 MG/1
800 TABLET ORAL
Qty: 90 TABLET | Refills: 0 | Status: SHIPPED | OUTPATIENT
Start: 2021-10-25 | End: 2022-07-07

## 2021-10-25 RX ORDER — METOCLOPRAMIDE HYDROCHLORIDE 5 MG/ML
10 INJECTION INTRAMUSCULAR; INTRAVENOUS ONCE
Status: COMPLETED | OUTPATIENT
Start: 2021-10-25 | End: 2021-10-25

## 2021-10-25 RX ADMIN — DIPHENHYDRAMINE HYDROCHLORIDE 50 MG: 50 INJECTION, SOLUTION INTRAMUSCULAR; INTRAVENOUS at 20:05

## 2021-10-25 RX ADMIN — AMOXICILLIN AND CLAVULANATE POTASSIUM 1 TABLET: 875; 125 TABLET, FILM COATED ORAL at 20:56

## 2021-10-25 RX ADMIN — SODIUM CHLORIDE 1500 ML: 9 INJECTION, SOLUTION INTRAVENOUS at 20:04

## 2021-10-25 RX ADMIN — KETOROLAC TROMETHAMINE 30 MG: 30 INJECTION, SOLUTION INTRAMUSCULAR at 20:05

## 2021-10-25 RX ADMIN — METOCLOPRAMIDE 10 MG: 5 INJECTION, SOLUTION INTRAMUSCULAR; INTRAVENOUS at 20:05

## 2021-10-25 ASSESSMENT — PAIN SCALES - GENERAL
PAINLEVEL_OUTOF10: 6
PAINLEVEL_OUTOF10: 7

## 2021-10-25 NOTE — Clinical Note
Lore Stock was seen and treated in our emergency department on 10/25/2021. She may return to work on 10/27/2021. Recommend off work on 10-26-21. If you have any questions or concerns, please don't hesitate to call.       Martine Terrazas PA-C

## 2021-10-26 NOTE — ED PROVIDER NOTES
Independent:      HPI:  10/25/21, Time: 8:12 PM EDT         Patricia Taylor is a 24 y.o. female presenting to the ED for evaluation of severe headache onset around 11am today. The patient reports she has had a lot of pressure to her head with persistent headache, nausea and some blurring of her vision. She recently was in ER about 1 month ago with pneumonia. She completed her medication and has been doing well. She reports she tried to take some medication for the headache and drink some caffeine and lay down at home without relief and decided to get checked as the headache was not improving. She reports no numbness or tingling to her upper or lower extremities. She denies any chance of pregnancy. The complaint has been constant, moderate to severe in severity. Review of Systems:   A complete review of systems was performed and pertinent positives and negatives are stated within HPI, all other systems reviewed and are negative.      --------------------------------------------- PAST HISTORY ---------------------------------------------  Past Medical History:  has a past medical history of Asperger syndrome and Autism disorder. Past Surgical History:  has a past surgical history that includes REMOVAL FOREIGN BODY EAR; pr lap,cholecystectomy/graph (N/A, 3/15/2018); Cholecystectomy; and Tonsillectomy and adenoidectomy (N/A, 7/22/2021). Social History:  reports that she has been smoking cigarettes. She has a 1.00 pack-year smoking history. She has never used smokeless tobacco. She reports current alcohol use. She reports that she does not use drugs. Family History: family history includes No Known Problems in her father and mother. The patients home medications have been reviewed. Allergies: Patient has no known allergies.     -------------------------------------------------- RESULTS -------------------------------------------------  All laboratory and radiology results have been personally reviewed by myself   LABS:  No results found for this visit on 10/25/21. RADIOLOGY:  Interpreted by Radiologist.  [address]   Final Result   No acute intracranial abnormality. Sinus disease as noted above.             ------------------------- NURSING NOTES AND VITALS REVIEWED ---------------------------   The nursing notes within the ED encounter and vital signs as below have been reviewed. /84   Pulse 98   Temp 98.6 °F (37 °C) (Temporal)   Resp 16   Wt (!) 311 lb (141.1 kg)   SpO2 97%   BMI 50.20 kg/m²   Oxygen Saturation Interpretation: Normal      ---------------------------------------------------PHYSICAL EXAM--------------------------------------      Constitutional/General: Alert and oriented x3, stable appearing, non toxic in NAD  Head: Normocephalic and atraumatic  Eyes: PERRL, EOMI, some light sensitivity initially  Ears and Nose: no discharge or bleeding. Some frontal sinus tenderness to palpation. Mouth: Oropharynx clear, tongue midline,  handling secretions, no trismus  Neck: Supple, full ROM, no rigidity or stridor. Pulmonary: Lungs clear to auscultation bilaterally,  Not in respiratory distress  Cardiovascular:  Regular rate and rhythm, . 2+ distal pulses  Abdomen: Soft, non tender, BS active. Extremities: Moves all extremities x 4. Full active ROM of UE and LE bilaterally. Muscle strength 5/5 equal bilaterally. Warm and well perfused  Skin: warm and dry without rash  Neurologic: GCS 15, CN 2 through 12 grossly intact.   Psych: Normal Affect      ------------------------------ ED COURSE/MEDICAL DECISION MAKING----------------------  Medications   0.9 % sodium chloride bolus (0 mLs IntraVENous Stopped 10/25/21 2148)   diphenhydrAMINE (BENADRYL) injection 50 mg (50 mg IntraVENous Given 10/25/21 2005)   metoclopramide (REGLAN) injection 10 mg (10 mg IntraVENous Given 10/25/21 2005)   ketorolac (TORADOL) injection 30 mg (30 mg IntraVENous Given 10/25/21 2005) amoxicillin-clavulanate (AUGMENTIN) 875-125 MG per tablet 1 tablet (1 tablet Oral Given 10/25/21 2056)         ED COURSE:       Medical Decision Making:    Patient to ER, complaints of persistent headache onset today with nausea. Patient advised would recommend CT imaging of head, IVF as well as Toradol, Reglan and Benadryl. Patient resting, much improvement with meds given, HA resolved. Patient advised of CT findings and evidence of sinus disease. Would recommend oral antibiotic(Augmentin),  Flonase as well as OTC non drowsy Sudafed. Recommend off work tomorrow, rest.  Rx's sent to pharmacy. Close follow up with PCP. Counseling: The emergency provider has spoken with the patient and spouse/SO and discussed todays results, in addition to providing specific details for the plan of care and counseling regarding the diagnosis and prognosis. Questions are answered at this time and they are agreeable with the plan.      --------------------------------- IMPRESSION AND DISPOSITION ---------------------------------    IMPRESSION  1. Acute maxillary sinusitis, recurrence not specified    2. Acute nonintractable headache, unspecified headache type        DISPOSITION  Disposition: Discharge to home  Patient condition is stable      NOTE: This report was transcribed using voice recognition software.  Every effort was made to ensure accuracy; however, inadvertent computerized transcription errors may be present       James Wall PA-C  10/26/21 1415

## 2021-10-26 NOTE — ED NOTES
Iv established and labs drawn/sent, medicated per orders, liter ns bolus infusing,     Waleska Morales RN  10/25/21 2007

## 2021-11-10 ENCOUNTER — OFFICE VISIT (OUTPATIENT)
Dept: ENT CLINIC | Age: 21
End: 2021-11-10
Payer: COMMERCIAL

## 2021-11-10 VITALS
OXYGEN SATURATION: 96 % | TEMPERATURE: 97.8 F | HEART RATE: 90 BPM | SYSTOLIC BLOOD PRESSURE: 123 MMHG | HEIGHT: 66 IN | BODY MASS INDEX: 47.09 KG/M2 | WEIGHT: 293 LBS | DIASTOLIC BLOOD PRESSURE: 73 MMHG

## 2021-11-10 DIAGNOSIS — M26.623 BILATERAL TEMPOROMANDIBULAR JOINT PAIN: Primary | ICD-10-CM

## 2021-11-10 DIAGNOSIS — L30.9 DERMATITIS OF EXTERNAL EAR: ICD-10-CM

## 2021-11-10 PROCEDURE — 4004F PT TOBACCO SCREEN RCVD TLK: CPT | Performed by: OTOLARYNGOLOGY

## 2021-11-10 PROCEDURE — 99213 OFFICE O/P EST LOW 20 MIN: CPT | Performed by: OTOLARYNGOLOGY

## 2021-11-10 PROCEDURE — G8484 FLU IMMUNIZE NO ADMIN: HCPCS | Performed by: OTOLARYNGOLOGY

## 2021-11-10 PROCEDURE — G8427 DOCREV CUR MEDS BY ELIG CLIN: HCPCS | Performed by: OTOLARYNGOLOGY

## 2021-11-10 PROCEDURE — G8417 CALC BMI ABV UP PARAM F/U: HCPCS | Performed by: OTOLARYNGOLOGY

## 2021-11-10 ASSESSMENT — ENCOUNTER SYMPTOMS
EYES NEGATIVE: 1
ALLERGIC/IMMUNOLOGIC NEGATIVE: 1
RESPIRATORY NEGATIVE: 1
SORE THROAT: 1

## 2021-11-10 NOTE — PROGRESS NOTES
Transportation (Non-Medical): Not on file   Physical Activity:     Days of Exercise per Week: Not on file    Minutes of Exercise per Session: Not on file   Stress:     Feeling of Stress : Not on file   Social Connections:     Frequency of Communication with Friends and Family: Not on file    Frequency of Social Gatherings with Friends and Family: Not on file    Attends Protestant Services: Not on file    Active Member of Clubs or Organizations: Not on file    Attends Club or Organization Meetings: Not on file    Marital Status: Not on file   Intimate Partner Violence:     Fear of Current or Ex-Partner: Not on file    Emotionally Abused: Not on file    Physically Abused: Not on file    Sexually Abused: Not on file   Housing Stability:     Unable to Pay for Housing in the Last Year: Not on file    Number of [place] in the Last Year: Not on file    Unstable Housing in the Last Year: Not on file     No Known Allergies    Review of Systems   Constitutional: Negative. HENT: Positive for ear pain and sore throat. Negative for ear discharge. Eyes: Negative. Respiratory: Negative. Skin: Negative. Allergic/Immunologic: Negative. Neurological: Negative. Hematological: Negative. Psychiatric/Behavioral: Negative. All other systems reviewed and are negative. Objective:     Vitals:    11/10/21 1452   BP: 123/73   Pulse: 90   Temp: 97.8 °F (36.6 °C)   SpO2: 96%     Physical Exam  Vitals reviewed. Constitutional:       Appearance: Normal appearance. HENT:      Head: Normocephalic. Right Ear: Tympanic membrane, ear canal and external ear normal. No swelling or tenderness. No middle ear effusion. No foreign body. No mastoid tenderness. Tympanic membrane is not perforated, erythematous, retracted or bulging. Left Ear: Tympanic membrane and external ear normal. No swelling or tenderness. No middle ear effusion. No foreign body. No mastoid tenderness.  Tympanic membrane is not perforated, erythematous, retracted or bulging. Ears:      Comments: Ears normal today     Nose: Nose normal.      Mouth/Throat:      Mouth: Mucous membranes are moist.      Pharynx: Oropharynx is clear. Uvula midline. No posterior oropharyngeal erythema. Tonsils: No tonsillar exudate. 3+ on the right. 3+ on the left. Eyes:      Conjunctiva/sclera: Conjunctivae normal.      Pupils: Pupils are equal, round, and reactive to light. Cardiovascular:      Rate and Rhythm: Normal rate. Pulses: Normal pulses. Pulmonary:      Effort: Pulmonary effort is normal.   Musculoskeletal:      Cervical back: Normal range of motion and neck supple. Lymphadenopathy:      Cervical: No cervical adenopathy. Skin:     Capillary Refill: Capillary refill takes less than 2 seconds. Neurological:      Mental Status: She is alert and oriented to person, place, and time. Assessment:       Diagnosis Orders   1. Bilateral temporomandibular joint pain  Amb External Referral To Physical Therapy   2. Dermatitis of external ear  Angelina Khalil MD,  Dermatology, [facility] (Critical access hospital)              Plan:      . TMJ  Discussed causes of TMJ and the management of it. Patient is to start a soft diet for the next week with warm compresses to the area and OTC NSAIDS. I will also refer pt to TMJ PT for therapy to see if this helps      Also send to dermatologist for evaluation of potential dermatitis of ears.    Follow up prn

## 2021-11-11 ENCOUNTER — TELEPHONE (OUTPATIENT)
Dept: ENT CLINIC | Age: 21
End: 2021-11-11

## 2021-11-11 DIAGNOSIS — L30.9 DERMATITIS OF EXTERNAL EAR: Primary | ICD-10-CM

## 2022-03-24 PROBLEM — L40.9 PSORIASIS: Status: ACTIVE | Noted: 2020-10-21

## 2022-03-24 PROBLEM — H66.009 ACUTE SUPPURATIVE OTITIS MEDIA: Status: ACTIVE | Noted: 2022-03-24

## 2022-03-24 PROBLEM — F84.5 ASPERGER'S DISORDER: Status: ACTIVE | Noted: 2020-08-26

## 2022-03-24 PROBLEM — R51.9 HEADACHE: Status: ACTIVE | Noted: 2022-01-21

## 2022-03-24 PROBLEM — R74.01 ELEVATION OF LEVEL OF TRANSAMINASE AND LACTIC ACID DEHYDROGENASE (LDH): Status: ACTIVE | Noted: 2022-01-21

## 2022-03-24 PROBLEM — J34.89 POSTERIOR RHINORRHEA: Status: ACTIVE | Noted: 2022-03-24

## 2022-03-24 PROBLEM — F34.1 DYSTHYMIA: Status: ACTIVE | Noted: 2020-07-22

## 2022-03-24 PROBLEM — L70.9 ACNE: Status: ACTIVE | Noted: 2022-03-24

## 2022-03-24 PROBLEM — K57.32 DIVERTICULITIS OF COLON: Status: ACTIVE | Noted: 2020-08-26

## 2022-03-24 PROBLEM — L20.9 ATOPIC DERMATITIS: Status: ACTIVE | Noted: 2020-07-21

## 2022-03-24 PROBLEM — T14.8XXA: Status: ACTIVE | Noted: 2022-03-24

## 2022-03-24 PROBLEM — M79.672 PAIN IN LEFT FOOT: Status: ACTIVE | Noted: 2021-09-15

## 2022-03-24 PROBLEM — M54.50 LOW BACK PAIN: Status: ACTIVE | Noted: 2020-07-22

## 2022-03-24 PROBLEM — R19.7 DIARRHEA: Status: ACTIVE | Noted: 2020-09-18

## 2022-03-24 PROBLEM — R10.30 INGUINAL PAIN: Status: ACTIVE | Noted: 2021-03-17

## 2022-03-24 PROBLEM — F43.20 ADJUSTMENT REACTION: Status: ACTIVE | Noted: 2020-07-21

## 2022-03-24 PROBLEM — K62.5 PAINLESS RECTAL BLEEDING: Status: ACTIVE | Noted: 2020-09-18

## 2022-03-24 PROBLEM — B35.6 TINEA CRURIS: Status: ACTIVE | Noted: 2022-01-21

## 2022-03-24 PROBLEM — R73.01 IMPAIRED FASTING GLUCOSE: Status: ACTIVE | Noted: 2021-06-11

## 2022-03-24 PROBLEM — K60.2 ANAL FISSURE: Status: ACTIVE | Noted: 2022-03-24

## 2022-03-24 PROBLEM — E55.9 VITAMIN D DEFICIENCY: Status: ACTIVE | Noted: 2020-10-28

## 2022-03-24 PROBLEM — R42 DIZZINESS: Status: ACTIVE | Noted: 2022-03-24

## 2022-03-24 PROBLEM — F90.9 ATTENTION DEFICIT HYPERACTIVITY DISORDER: Status: ACTIVE | Noted: 2022-03-18

## 2022-03-24 PROBLEM — K64.4 EXTERNAL HEMORRHOIDS: Status: ACTIVE | Noted: 2020-08-26

## 2022-03-24 PROBLEM — R74.02 ELEVATION OF LEVEL OF TRANSAMINASE AND LACTIC ACID DEHYDROGENASE (LDH): Status: ACTIVE | Noted: 2022-01-21

## 2022-03-24 PROBLEM — E66.9 OBESITY WITH BODY MASS INDEX 30 OR GREATER: Status: ACTIVE | Noted: 2020-07-22

## 2022-03-24 PROBLEM — K76.0 STEATOSIS OF LIVER: Status: ACTIVE | Noted: 2020-08-26

## 2022-03-24 PROBLEM — E66.01 MORBID OBESITY (HCC): Status: ACTIVE | Noted: 2020-07-21

## 2022-03-24 RX ORDER — CYCLOBENZAPRINE HCL 10 MG
TABLET ORAL
COMMUNITY
Start: 2021-09-15

## 2022-03-24 RX ORDER — UREA 10 %
LOTION (ML) TOPICAL
COMMUNITY
End: 2022-03-24

## 2022-03-24 RX ORDER — CHOLECALCIFEROL (VITAMIN D3) 125 MCG
1 CAPSULE ORAL
COMMUNITY
Start: 2022-02-25

## 2022-03-24 RX ORDER — LIDOCAINE 50 MG/G
PATCH TOPICAL
COMMUNITY
End: 2022-04-21

## 2022-04-15 ENCOUNTER — APPOINTMENT (OUTPATIENT)
Dept: ULTRASOUND IMAGING | Age: 22
End: 2022-04-15
Payer: COMMERCIAL

## 2022-04-15 ENCOUNTER — HOSPITAL ENCOUNTER (EMERGENCY)
Age: 22
Discharge: HOME | End: 2022-04-16
Attending: EMERGENCY MEDICINE
Payer: COMMERCIAL

## 2022-04-15 DIAGNOSIS — R10.30 LOWER ABDOMINAL PAIN: Primary | ICD-10-CM

## 2022-04-15 LAB
ALBUMIN SERPL-MCNC: 3.8 G/DL (ref 3.5–5.2)
ALP BLD-CCNC: 74 U/L (ref 35–104)
ALT SERPL-CCNC: 28 U/L (ref 0–32)
ANION GAP SERPL CALCULATED.3IONS-SCNC: 14 MMOL/L (ref 7–16)
AST SERPL-CCNC: 21 U/L (ref 0–31)
BASOPHILS ABSOLUTE: 0.07 E9/L (ref 0–0.2)
BASOPHILS RELATIVE PERCENT: 0.7 % (ref 0–2)
BILIRUB SERPL-MCNC: 0.3 MG/DL (ref 0–1.2)
BILIRUBIN URINE: NEGATIVE
BLOOD, URINE: NEGATIVE
BUN BLDV-MCNC: 8 MG/DL (ref 6–20)
CALCIUM SERPL-MCNC: 9 MG/DL (ref 8.6–10.2)
CHLORIDE BLD-SCNC: 103 MMOL/L (ref 98–107)
CLARITY: NORMAL
CO2: 23 MMOL/L (ref 22–29)
COLOR: YELLOW
CREAT SERPL-MCNC: 0.5 MG/DL (ref 0.5–1)
EOSINOPHILS ABSOLUTE: 0.38 E9/L (ref 0.05–0.5)
EOSINOPHILS RELATIVE PERCENT: 3.8 % (ref 0–6)
GFR AFRICAN AMERICAN: >60
GFR NON-AFRICAN AMERICAN: >60 ML/MIN/1.73
GLUCOSE BLD-MCNC: 116 MG/DL (ref 74–99)
GLUCOSE URINE: NEGATIVE MG/DL
HCG, URINE, POC: NEGATIVE
HCT VFR BLD CALC: 40.1 % (ref 34–48)
HEMOGLOBIN: 13.9 G/DL (ref 11.5–15.5)
IMMATURE GRANULOCYTES #: 0.03 E9/L
IMMATURE GRANULOCYTES %: 0.3 % (ref 0–5)
KETONES, URINE: NEGATIVE MG/DL
LACTIC ACID: 1.3 MMOL/L (ref 0.5–2.2)
LEUKOCYTE ESTERASE, URINE: NEGATIVE
LYMPHOCYTES ABSOLUTE: 3.1 E9/L (ref 1.5–4)
LYMPHOCYTES RELATIVE PERCENT: 31.3 % (ref 20–42)
Lab: NORMAL
MCH RBC QN AUTO: 29.7 PG (ref 26–35)
MCHC RBC AUTO-ENTMCNC: 34.7 % (ref 32–34.5)
MCV RBC AUTO: 85.7 FL (ref 80–99.9)
MONOCYTES ABSOLUTE: 0.72 E9/L (ref 0.1–0.95)
MONOCYTES RELATIVE PERCENT: 7.3 % (ref 2–12)
NEGATIVE QC PASS/FAIL: NORMAL
NEUTROPHILS ABSOLUTE: 5.62 E9/L (ref 1.8–7.3)
NEUTROPHILS RELATIVE PERCENT: 56.6 % (ref 43–80)
NITRITE, URINE: NEGATIVE
PDW BLD-RTO: 12.6 FL (ref 11.5–15)
PH UA: 6.5 (ref 5–9)
PLATELET # BLD: 271 E9/L (ref 130–450)
PMV BLD AUTO: 10.3 FL (ref 7–12)
POSITIVE QC PASS/FAIL: NORMAL
POTASSIUM SERPL-SCNC: 3.9 MMOL/L (ref 3.5–5)
PROTEIN UA: NEGATIVE MG/DL
RBC # BLD: 4.68 E12/L (ref 3.5–5.5)
SODIUM BLD-SCNC: 140 MMOL/L (ref 132–146)
SPECIFIC GRAVITY UA: 1.02 (ref 1–1.03)
TOTAL PROTEIN: 6.7 G/DL (ref 6.4–8.3)
UROBILINOGEN, URINE: 0.2 E.U./DL
WBC # BLD: 9.9 E9/L (ref 4.5–11.5)

## 2022-04-15 PROCEDURE — 93975 VASCULAR STUDY: CPT

## 2022-04-15 PROCEDURE — 96372 THER/PROPH/DIAG INJ SC/IM: CPT

## 2022-04-15 PROCEDURE — 2580000003 HC RX 258: Performed by: EMERGENCY MEDICINE

## 2022-04-15 PROCEDURE — 80053 COMPREHEN METABOLIC PANEL: CPT

## 2022-04-15 PROCEDURE — 81001 URINALYSIS AUTO W/SCOPE: CPT

## 2022-04-15 PROCEDURE — 76856 US EXAM PELVIC COMPLETE: CPT

## 2022-04-15 PROCEDURE — 85025 COMPLETE CBC W/AUTO DIFF WBC: CPT

## 2022-04-15 PROCEDURE — 83605 ASSAY OF LACTIC ACID: CPT

## 2022-04-15 PROCEDURE — 99284 EMERGENCY DEPT VISIT MOD MDM: CPT

## 2022-04-15 PROCEDURE — 76830 TRANSVAGINAL US NON-OB: CPT

## 2022-04-15 PROCEDURE — 96374 THER/PROPH/DIAG INJ IV PUSH: CPT

## 2022-04-15 RX ORDER — SODIUM CHLORIDE 9 MG/ML
INJECTION, SOLUTION INTRAVENOUS CONTINUOUS
Status: DISCONTINUED | OUTPATIENT
Start: 2022-04-15 | End: 2022-04-16 | Stop reason: HOSPADM

## 2022-04-15 RX ADMIN — SODIUM CHLORIDE: 9 INJECTION, SOLUTION INTRAVENOUS at 22:47

## 2022-04-16 ENCOUNTER — APPOINTMENT (OUTPATIENT)
Dept: CT IMAGING | Age: 22
End: 2022-04-16
Payer: COMMERCIAL

## 2022-04-16 ENCOUNTER — APPOINTMENT (OUTPATIENT)
Dept: GENERAL RADIOLOGY | Age: 22
End: 2022-04-16
Payer: COMMERCIAL

## 2022-04-16 VITALS
OXYGEN SATURATION: 99 % | HEART RATE: 70 BPM | WEIGHT: 293 LBS | BODY MASS INDEX: 47.09 KG/M2 | HEIGHT: 66 IN | SYSTOLIC BLOOD PRESSURE: 120 MMHG | TEMPERATURE: 97.9 F | DIASTOLIC BLOOD PRESSURE: 70 MMHG | RESPIRATION RATE: 16 BRPM

## 2022-04-16 LAB
BACTERIA: ABNORMAL /HPF
CLUE CELLS: NORMAL
RBC UA: ABNORMAL /HPF (ref 0–2)
SOURCE WET PREP: NORMAL
TRICHOMONAS PREP: NORMAL
WBC UA: ABNORMAL /HPF (ref 0–5)
YEAST WET PREP: NORMAL

## 2022-04-16 PROCEDURE — 87491 CHLMYD TRACH DNA AMP PROBE: CPT

## 2022-04-16 PROCEDURE — 87210 SMEAR WET MOUNT SALINE/INK: CPT

## 2022-04-16 PROCEDURE — 6360000002 HC RX W HCPCS: Performed by: EMERGENCY MEDICINE

## 2022-04-16 PROCEDURE — 6360000004 HC RX CONTRAST MEDICATION: Performed by: RADIOLOGY

## 2022-04-16 PROCEDURE — 74177 CT ABD & PELVIS W/CONTRAST: CPT

## 2022-04-16 PROCEDURE — 6370000000 HC RX 637 (ALT 250 FOR IP): Performed by: EMERGENCY MEDICINE

## 2022-04-16 PROCEDURE — 87591 N.GONORRHOEAE DNA AMP PROB: CPT

## 2022-04-16 PROCEDURE — 71045 X-RAY EXAM CHEST 1 VIEW: CPT

## 2022-04-16 RX ORDER — DOXYCYCLINE HYCLATE 100 MG/1
100 CAPSULE ORAL ONCE
Status: COMPLETED | OUTPATIENT
Start: 2022-04-16 | End: 2022-04-16

## 2022-04-16 RX ORDER — METRONIDAZOLE 500 MG/1
500 TABLET ORAL 2 TIMES DAILY
Qty: 20 TABLET | Refills: 0 | Status: SHIPPED | OUTPATIENT
Start: 2022-04-16 | End: 2022-04-26

## 2022-04-16 RX ORDER — DOXYCYCLINE HYCLATE 100 MG
100 TABLET ORAL 2 TIMES DAILY
Qty: 20 TABLET | Refills: 0 | Status: SHIPPED | OUTPATIENT
Start: 2022-04-16 | End: 2022-04-26

## 2022-04-16 RX ORDER — FLUCONAZOLE 150 MG/1
150 TABLET ORAL ONCE
Qty: 1 TABLET | Refills: 0 | Status: SHIPPED | OUTPATIENT
Start: 2022-04-16 | End: 2022-04-16

## 2022-04-16 RX ORDER — KETOROLAC TROMETHAMINE 30 MG/ML
15 INJECTION, SOLUTION INTRAMUSCULAR; INTRAVENOUS ONCE
Status: COMPLETED | OUTPATIENT
Start: 2022-04-16 | End: 2022-04-16

## 2022-04-16 RX ORDER — NAPROXEN 500 MG/1
500 TABLET ORAL 2 TIMES DAILY
Qty: 14 TABLET | Refills: 0 | Status: SHIPPED | OUTPATIENT
Start: 2022-04-16 | End: 2022-07-07

## 2022-04-16 RX ORDER — CEFTRIAXONE 1 G/1
500 INJECTION, POWDER, FOR SOLUTION INTRAMUSCULAR; INTRAVENOUS ONCE
Status: COMPLETED | OUTPATIENT
Start: 2022-04-16 | End: 2022-04-16

## 2022-04-16 RX ADMIN — KETOROLAC TROMETHAMINE 15 MG: 30 INJECTION, SOLUTION INTRAMUSCULAR at 00:34

## 2022-04-16 RX ADMIN — CEFTRIAXONE 500 MG: 1 INJECTION, POWDER, FOR SOLUTION INTRAMUSCULAR; INTRAVENOUS at 03:23

## 2022-04-16 RX ADMIN — IOPAMIDOL 90 ML: 755 INJECTION, SOLUTION INTRAVENOUS at 02:10

## 2022-04-16 RX ADMIN — DOXYCYCLINE HYCLATE 100 MG: 100 CAPSULE ORAL at 03:23

## 2022-04-16 ASSESSMENT — PAIN SCALES - GENERAL: PAINLEVEL_OUTOF10: 3

## 2022-04-16 NOTE — ED PROVIDER NOTES
HPI:  4/15/22, Time: 8:30 PM EDT         Ivonne Najera is a 24 y.o. female presenting to the ED for lower abdominal pain, beginning short time ago. The complaint has been persistent, moderate in severity, and worsened by nothing. Patient was on the toilet and reportedly started having sudden onset lower abdominal pain. Patient reporting no vomiting or diarrhea. She reports she is urinating and defecating. Patient was given Bentyl prior to arrival.  Patient still having lower abdominal pain. Patient reporting no flank pain. She reports no chest pain or difficulty breathing she reports no headache. ROS:   Pertinent positives and negatives are stated within HPI, all other systems reviewed and are negative.  --------------------------------------------- PAST HISTORY ---------------------------------------------  Past Medical History:  has a past medical history of Asperger syndrome and Autism disorder. Past Surgical History:  has a past surgical history that includes REMOVAL FOREIGN BODY EAR; pr lap,cholecystectomy/graph (N/A, 3/15/2018); Cholecystectomy; and Tonsillectomy and adenoidectomy (N/A, 7/22/2021). Social History:  reports that she has been smoking cigarettes. She has a 6.00 pack-year smoking history. She has never used smokeless tobacco. She reports current alcohol use. She reports that she does not use drugs. Family History: family history includes No Known Problems in her father and mother. The patients home medications have been reviewed. Allergies: Patient has no known allergies.     ---------------------------------------------------PHYSICAL EXAM--------------------------------------    Constitutional/General: Alert and oriented x3, mild distress  Head: Normocephalic and atraumatic  Eyes: PERRL, EOMI  Mouth: Oropharynx clear, handling secretions, no trismus  Neck: Supple, full ROM, non tender to palpation in the midline, no stridor, no crepitus, no meningeal signs  Pulmonary: Metabolic Panel   Result Value Ref Range    Sodium 140 132 - 146 mmol/L    Potassium 3.9 3.5 - 5.0 mmol/L    Chloride 103 98 - 107 mmol/L    CO2 23 22 - 29 mmol/L    Anion Gap 14 7 - 16 mmol/L    Glucose 116 (H) 74 - 99 mg/dL    BUN 8 6 - 20 mg/dL    CREATININE 0.5 0.5 - 1.0 mg/dL    GFR Non-African American >60 >=60 mL/min/1.73    GFR African American >60     Calcium 9.0 8.6 - 10.2 mg/dL    Total Protein 6.7 6.4 - 8.3 g/dL    Albumin 3.8 3.5 - 5.2 g/dL    Total Bilirubin 0.3 0.0 - 1.2 mg/dL    Alkaline Phosphatase 74 35 - 104 U/L    ALT 28 0 - 32 U/L    AST 21 0 - 31 U/L   Urinalysis   Result Value Ref Range    Color, UA Yellow Straw/Yellow    Clarity, UA SL CLOUDY Clear    Glucose, Ur Negative Negative mg/dL    Bilirubin Urine Negative Negative    Ketones, Urine Negative Negative mg/dL    Specific Gravity, UA 1.025 1.005 - 1.030    Blood, Urine Negative Negative    pH, UA 6.5 5.0 - 9.0    Protein, UA Negative Negative mg/dL    Urobilinogen, Urine 0.2 <2.0 E.U./dL    Nitrite, Urine Negative Negative    Leukocyte Esterase, Urine Negative Negative   Lactic Acid   Result Value Ref Range    Lactic Acid 1.3 0.5 - 2.2 mmol/L   Microscopic Urinalysis   Result Value Ref Range    WBC, UA NONE 0 - 5 /HPF    RBC, UA NONE 0 - 2 /HPF    Bacteria, UA FEW (A) None Seen /HPF   POC Pregnancy Urine Qual   Result Value Ref Range    HCG, Urine, POC Negative Negative    Lot Number [id number]     Positive QC Pass/Fail Pass     Negative QC Pass/Fail Pass        RADIOLOGY:  Interpreted by Radiologist.  CT ABDOMEN PELVIS W IV CONTRAST Additional Contrast? None   Final Result   No acute abdominopelvic abnormality. XR CHEST PORTABLE   Final Result   No acute process. US PELVIS COMPLETE   Final Result   Right ovary not visualized. No evidence of left ovarian torsion. US DUP ABD PEL RETRO SCROT COMPLETE   Final Result   Right ovary not visualized. No evidence of left ovarian torsion.          US NON OB TRANSVAGINAL Final Result   Right ovary not visualized. No evidence of left ovarian torsion.                   ------------------------- NURSING NOTES AND VITALS REVIEWED ---------------------------   The nursing notes within the ED encounter and vital signs as below have been reviewed by myself. /66   Pulse 68   Temp 97.9 °F (36.6 °C)   Resp 16   Ht 5' 6\" (1.676 m)   Wt (!) 308 lb (139.7 kg)   SpO2 98%   BMI 49.71 kg/m²   Oxygen Saturation Interpretation: Normal    The patients available past medical records and past encounters were reviewed. ------------------------------ ED COURSE/MEDICAL DECISION MAKING----------------------  Medications   0.9 % sodium chloride infusion ( IntraVENous New Bag 4/15/22 8237)   doxycycline hyclate (VIBRAMYCIN) capsule 100 mg (has no administration in time range)   cefTRIAXone (ROCEPHIN) injection 500 mg (has no administration in time range)   ketorolac (TORADOL) injection 15 mg (15 mg IntraVENous Given 4/16/22 0034)   iopamidol (ISOVUE-370) 76 % injection 90 mL (90 mLs IntraVENous Given 4/16/22 0210)             Medical Decision Making:    Present here because sudden onset lower abdominal pain she received fentanyl prior to arrival.  Patient reporting pain mainly in her lower abdomen. Patient having no vomiting no urinary symptoms. Patient reporting no fever no chills no chest pain patient's labs noted reviewed CT and ultrasound noted she was made aware unable to visualize right ovary. But CT shows no mass. Patient pelvic exam positive for adnexal tenderness and cervical motion tenderness. Patient had culture sent she was ordered antibiotics here. Patient was made aware of findings and plan and need for follow-up with her PCP as well as OB/GYN. Re-Evaluations:             Re-evaluation. Patients symptoms are improving      Consultations:                 Critical Care:          This patient's ED course included: a personal history and physicial eaxmination    This patient has been closely monitored during their ED course. Counseling: The emergency provider has spoken with the patient and discussed todays results, in addition to providing specific details for the plan of care and counseling regarding the diagnosis and prognosis. Questions are answered at this time and they are agreeable with the plan.       --------------------------------- IMPRESSION AND DISPOSITION ---------------------------------    IMPRESSION  1. Lower abdominal pain        DISPOSITION  Disposition: Discharge to home  Patient condition is stable        NOTE: This report was transcribed using voice recognition software.  Every effort was made to ensure accuracy; however, inadvertent computerized transcription errors may be present            Charles Jensen MD  04/16/22 8856

## 2022-04-19 LAB
C TRACH DNA GENITAL QL NAA+PROBE: NEGATIVE
N. GONORRHOEAE DNA: NEGATIVE
SOURCE: NORMAL

## 2022-04-21 ENCOUNTER — HOSPITAL ENCOUNTER (EMERGENCY)
Age: 22
Discharge: HOME | End: 2022-04-21
Attending: STUDENT IN AN ORGANIZED HEALTH CARE EDUCATION/TRAINING PROGRAM
Payer: COMMERCIAL

## 2022-04-21 VITALS
OXYGEN SATURATION: 99 % | DIASTOLIC BLOOD PRESSURE: 88 MMHG | HEART RATE: 96 BPM | SYSTOLIC BLOOD PRESSURE: 149 MMHG | TEMPERATURE: 97.3 F | RESPIRATION RATE: 16 BRPM

## 2022-04-21 DIAGNOSIS — T78.40XA ALLERGIC REACTION, INITIAL ENCOUNTER: Primary | ICD-10-CM

## 2022-04-21 PROCEDURE — 6370000000 HC RX 637 (ALT 250 FOR IP): Performed by: STUDENT IN AN ORGANIZED HEALTH CARE EDUCATION/TRAINING PROGRAM

## 2022-04-21 PROCEDURE — 99283 EMERGENCY DEPT VISIT LOW MDM: CPT

## 2022-04-21 RX ORDER — PREDNISONE 20 MG/1
60 TABLET ORAL ONCE
Status: COMPLETED | OUTPATIENT
Start: 2022-04-21 | End: 2022-04-21

## 2022-04-21 RX ORDER — ALBUTEROL SULFATE 90 UG/1
2 AEROSOL, METERED RESPIRATORY (INHALATION) 4 TIMES DAILY PRN
Qty: 18 G | Refills: 0 | Status: SHIPPED | OUTPATIENT
Start: 2022-04-21 | End: 2022-10-25

## 2022-04-21 RX ORDER — FAMOTIDINE 20 MG/1
20 TABLET, FILM COATED ORAL ONCE
Status: COMPLETED | OUTPATIENT
Start: 2022-04-21 | End: 2022-04-21

## 2022-04-21 RX ORDER — FAMOTIDINE 20 MG/1
20 TABLET, FILM COATED ORAL 2 TIMES DAILY
Qty: 30 TABLET | Refills: 0 | Status: SHIPPED | OUTPATIENT
Start: 2022-04-21 | End: 2022-07-07

## 2022-04-21 RX ORDER — DIPHENHYDRAMINE HCL 25 MG
25 TABLET ORAL ONCE
Status: COMPLETED | OUTPATIENT
Start: 2022-04-21 | End: 2022-04-21

## 2022-04-21 RX ORDER — DIPHENHYDRAMINE HCL 25 MG
25 CAPSULE ORAL NIGHTLY
Qty: 10 CAPSULE | Refills: 0 | Status: SHIPPED | OUTPATIENT
Start: 2022-04-21 | End: 2022-05-01

## 2022-04-21 RX ORDER — PREDNISONE 20 MG/1
20 TABLET ORAL DAILY
Qty: 5 TABLET | Refills: 0 | Status: SHIPPED | OUTPATIENT
Start: 2022-04-21 | End: 2022-04-26

## 2022-04-21 RX ADMIN — PREDNISONE 60 MG: 20 TABLET ORAL at 10:37

## 2022-04-21 RX ADMIN — DIPHENHYDRAMINE HCL 25 MG: 25 TABLET ORAL at 10:37

## 2022-04-21 RX ADMIN — FAMOTIDINE 20 MG: 20 TABLET ORAL at 10:37

## 2022-04-21 ASSESSMENT — PAIN - FUNCTIONAL ASSESSMENT
PAIN_FUNCTIONAL_ASSESSMENT: NONE - DENIES PAIN
PAIN_FUNCTIONAL_ASSESSMENT: NONE - DENIES PAIN

## 2022-04-21 NOTE — ED PROVIDER NOTES
Department of Emergency Medicine   ED  Provider Note  Admit Date/RoomTime: 4/21/2022 10:14 AM  ED Room: 11/11          History of Present Illness:  4/21/22, Time: 10:35 AM EDT  Chief Complaint   Patient presents with    Medication Reaction     started doxycycline on Sunday, today has been itching, abd cramps/diarrhea, sob. Cielo Crawford is a 24 y.o. female presenting to the ED for Allergic reaction. The patient has skin itching. This is been present since taking her doxycycline. Nothing to better or worse it is constant duration. She states she has been on doxycycline as well as received ceftriaxone and Flagyl for STDs. Her symptoms are resolved with regards to the discharge now. However, she denies any throat closure. She states that she does hear some very mild wheezing that she has noticed. Nothing otherwise makes her symptoms better or worse they are constant duration. Review of Systems:  Review of systems obtained and negative unless stated otherwise above in the HPI.    --------------------------------------------- PAST HISTORY ---------------------------------------------  Past Medical History:  has a past medical history of Asperger syndrome and Autism disorder. Past Surgical History:  has a past surgical history that includes REMOVAL FOREIGN BODY EAR; pr lap,cholecystectomy/graph (N/A, 3/15/2018); Cholecystectomy; and Tonsillectomy and adenoidectomy (N/A, 7/22/2021). Social History:  reports that she has been smoking cigarettes. She has a 6.00 pack-year smoking history. She has never used smokeless tobacco. She reports current alcohol use. She reports that she does not use drugs. Family History: family history includes No Known Problems in her father and mother. . Unless otherwise noted, family history is non contributory    The patients home medications have been reviewed.     Allergies: Doxycycline    I have reviewed the past medical history, past surgical history, social history, and family history    ---------------------------------------------------PHYSICAL EXAM--------------------------------------    Constitutional: Appears in no distress  Head: Normocephalic, atraumatic  Eyes: Non-icteric slcera, no conjunctival injection  ENT: Moist mucous membranes, no uvular edema, no posterior oropharyngeal edema  Neck: Trachea midline, no JVD  Respiratory: Nonlabored respirations. Lungs clear to auscultation bilaterally, slight bibasilar wheeze, rales, or rhonchi. Cardiovascular: Regular rate. Regular rhythm. No murmurs, no gallops, no rubs. Gastrointestinal: Abdomen Soft, Non tender, Non distended. No rebound tenderness, guarding, or rigidity. Extremities: No lower extremity edema  Genitourinary: No CVA tenderness, no suprapubic tenderness  Musculoskeletal: Moves all extremities, no deformity  Skin: Excoriation and redness of the skin without hives  Neurologic: Alert, symmetric facies, no aphasia    -------------------------------------------------- RESULTS -------------------------------------------------  I have personally reviewed all laboratory and imaging results for this patient. Results are listed below. LABS: (Lab results interpreted by me)  No results found for this visit on 04/21/22.,       RADIOLOGY:  Interpreted by Radiologist unless otherwise specified  No orders to display         ------------------------- NURSING NOTES AND VITALS REVIEWED ---------------------------   The nursing notes within the ED encounter and vital signs as below have been reviewed by myself  BP (!) 149/88   Pulse 96   Temp 97.3 °F (36.3 °C) (Infrared)   Resp 16   SpO2 99%     Oxygen Saturation Interpretation: Normal    The patients available past medical records and past encounters were reviewed.         ------------------------------ ED COURSE/MEDICAL DECISION MAKING----------------------  Medications   predniSONE (DELTASONE) tablet 60 mg (has no administration in time range) diphenhydrAMINE (BENADRYL) tablet 25 mg (has no administration in time range)   famotidine (PEPCID) tablet 20 mg (has no administration in time range)        Re-Evaluations: This patient's ED course included:a personal history and physicial examination and re-evaluation prior to disposition    This patient has remained hemodynamically stable during their ED course. Consultations:  None    Medical Decision Making:   Patient presents emergency department with skin itching as well as redness. She has evidence of a mild acute allergic reaction possibly secondary to doxycycline. She will be started on Benadryl, steroids, famotidine and an albuterol inhaler per her request.  She is low risk for PE. Her only risk factor is IUD. No airway involvement is noted. She is well-appearing without evidence of anaphylaxis. She will be discharged with outpatient medication. She is advised to discontinue all of her medications as her STD test were negative    Counseling: The emergency provider has spoken with the patient and discussed todays results, in addition to providing specific details for the plan of care and counseling regarding the diagnosis and prognosis. Questions are answered at this time and they are agreeable with the plan.       --------------------------------- IMPRESSION AND DISPOSITION ---------------------------------    IMPRESSION  1. Allergic reaction, initial encounter        DISPOSITION  Disposition: Discharge to home  Patient condition is stable    I, Dr. Therman Osgood, am the primary provider of record    Therman Osgood, DO  Emergency Medicine    NOTE: This report was transcribed using voice recognition software.  Every effort was made to ensure accuracy; however, inadvertent computerized transcription errors may be present         Melissa Kohler DO  04/21/22 1037

## 2022-04-21 NOTE — Clinical Note
Negro Pacheco was seen and treated in our emergency department on 4/21/2022. She may return to work on 04/23/2022. If you have any questions or concerns, please don't hesitate to call.       Iris Sanchez, DO

## 2022-04-21 NOTE — ED NOTES
Pt given work excuse and discharge summary with education on allergic reaction, pt also aware to  her prescriptions at assigned pharmacy and to follow up with her primary care provider     Ulysses Donald RN  04/21/22 1992

## 2022-07-05 ENCOUNTER — APPOINTMENT (OUTPATIENT)
Dept: CT IMAGING | Age: 22
End: 2022-07-05
Payer: COMMERCIAL

## 2022-07-05 ENCOUNTER — HOSPITAL ENCOUNTER (EMERGENCY)
Age: 22
Discharge: HOME | End: 2022-07-05
Attending: EMERGENCY MEDICINE
Payer: COMMERCIAL

## 2022-07-05 VITALS
DIASTOLIC BLOOD PRESSURE: 66 MMHG | TEMPERATURE: 97.4 F | SYSTOLIC BLOOD PRESSURE: 113 MMHG | OXYGEN SATURATION: 99 % | RESPIRATION RATE: 16 BRPM | HEART RATE: 78 BPM

## 2022-07-05 DIAGNOSIS — R51.9 ACUTE NONINTRACTABLE HEADACHE, UNSPECIFIED HEADACHE TYPE: ICD-10-CM

## 2022-07-05 DIAGNOSIS — R19.7 DIARRHEA, UNSPECIFIED TYPE: ICD-10-CM

## 2022-07-05 DIAGNOSIS — J01.90 ACUTE SINUSITIS, RECURRENCE NOT SPECIFIED, UNSPECIFIED LOCATION: Primary | ICD-10-CM

## 2022-07-05 LAB
ALBUMIN SERPL-MCNC: 4.2 G/DL (ref 3.5–5.2)
ALP BLD-CCNC: 76 U/L (ref 35–104)
ALT SERPL-CCNC: 23 U/L (ref 0–32)
ANION GAP SERPL CALCULATED.3IONS-SCNC: 11 MMOL/L (ref 7–16)
AST SERPL-CCNC: 19 U/L (ref 0–31)
BACTERIA: ABNORMAL /HPF
BASOPHILS ABSOLUTE: 0.06 E9/L (ref 0–0.2)
BASOPHILS RELATIVE PERCENT: 0.9 % (ref 0–2)
BILIRUB SERPL-MCNC: 0.6 MG/DL (ref 0–1.2)
BILIRUBIN URINE: NEGATIVE
BLOOD, URINE: NEGATIVE
BUN BLDV-MCNC: 6 MG/DL (ref 6–20)
CALCIUM SERPL-MCNC: 9.5 MG/DL (ref 8.6–10.2)
CHLORIDE BLD-SCNC: 105 MMOL/L (ref 98–107)
CLARITY: CLEAR
CO2: 24 MMOL/L (ref 22–29)
COLOR: YELLOW
CREAT SERPL-MCNC: 0.6 MG/DL (ref 0.5–1)
EOSINOPHILS ABSOLUTE: 0.19 E9/L (ref 0.05–0.5)
EOSINOPHILS RELATIVE PERCENT: 2.7 % (ref 0–6)
EPITHELIAL CELLS, UA: ABNORMAL /HPF
GFR AFRICAN AMERICAN: >60
GFR NON-AFRICAN AMERICAN: >60 ML/MIN/1.73
GLUCOSE BLD-MCNC: 89 MG/DL (ref 74–99)
GLUCOSE URINE: NEGATIVE MG/DL
HCG(URINE) PREGNANCY TEST: NEGATIVE
HCT VFR BLD CALC: 42.1 % (ref 34–48)
HEMOGLOBIN: 15 G/DL (ref 11.5–15.5)
IMMATURE GRANULOCYTES #: 0.03 E9/L
IMMATURE GRANULOCYTES %: 0.4 % (ref 0–5)
KETONES, URINE: NEGATIVE MG/DL
LEUKOCYTE ESTERASE, URINE: NEGATIVE
LYMPHOCYTES ABSOLUTE: 2.12 E9/L (ref 1.5–4)
LYMPHOCYTES RELATIVE PERCENT: 30.3 % (ref 20–42)
MCH RBC QN AUTO: 30.3 PG (ref 26–35)
MCHC RBC AUTO-ENTMCNC: 35.6 % (ref 32–34.5)
MCV RBC AUTO: 85.1 FL (ref 80–99.9)
MONOCYTES ABSOLUTE: 0.55 E9/L (ref 0.1–0.95)
MONOCYTES RELATIVE PERCENT: 7.9 % (ref 2–12)
MUCUS: PRESENT /LPF
NEUTROPHILS ABSOLUTE: 4.05 E9/L (ref 1.8–7.3)
NEUTROPHILS RELATIVE PERCENT: 57.8 % (ref 43–80)
NITRITE, URINE: NEGATIVE
PDW BLD-RTO: 12.3 FL (ref 11.5–15)
PH UA: 6 (ref 5–9)
PLATELET # BLD: 277 E9/L (ref 130–450)
PMV BLD AUTO: 10 FL (ref 7–12)
POTASSIUM REFLEX MAGNESIUM: 4.5 MMOL/L (ref 3.5–5)
PROTEIN UA: NEGATIVE MG/DL
RBC # BLD: 4.95 E12/L (ref 3.5–5.5)
RBC UA: ABNORMAL /HPF (ref 0–2)
SARS-COV-2, NAAT: NOT DETECTED
SODIUM BLD-SCNC: 140 MMOL/L (ref 132–146)
SPECIFIC GRAVITY UA: 1.02 (ref 1–1.03)
TOTAL PROTEIN: 7.6 G/DL (ref 6.4–8.3)
UROBILINOGEN, URINE: 0.2 E.U./DL
WBC # BLD: 7 E9/L (ref 4.5–11.5)
WBC UA: ABNORMAL /HPF (ref 0–5)

## 2022-07-05 PROCEDURE — 96375 TX/PRO/DX INJ NEW DRUG ADDON: CPT

## 2022-07-05 PROCEDURE — 81001 URINALYSIS AUTO W/SCOPE: CPT

## 2022-07-05 PROCEDURE — 93005 ELECTROCARDIOGRAM TRACING: CPT | Performed by: EMERGENCY MEDICINE

## 2022-07-05 PROCEDURE — 85025 COMPLETE CBC W/AUTO DIFF WBC: CPT

## 2022-07-05 PROCEDURE — 81025 URINE PREGNANCY TEST: CPT

## 2022-07-05 PROCEDURE — 2580000003 HC RX 258: Performed by: EMERGENCY MEDICINE

## 2022-07-05 PROCEDURE — 70450 CT HEAD/BRAIN W/O DYE: CPT

## 2022-07-05 PROCEDURE — 99284 EMERGENCY DEPT VISIT MOD MDM: CPT

## 2022-07-05 PROCEDURE — 96374 THER/PROPH/DIAG INJ IV PUSH: CPT

## 2022-07-05 PROCEDURE — 80053 COMPREHEN METABOLIC PANEL: CPT

## 2022-07-05 PROCEDURE — 87635 SARS-COV-2 COVID-19 AMP PRB: CPT

## 2022-07-05 PROCEDURE — 36415 COLL VENOUS BLD VENIPUNCTURE: CPT

## 2022-07-05 PROCEDURE — 6360000002 HC RX W HCPCS: Performed by: EMERGENCY MEDICINE

## 2022-07-05 RX ORDER — AMOXICILLIN AND CLAVULANATE POTASSIUM 875; 125 MG/1; MG/1
1 TABLET, FILM COATED ORAL 2 TIMES DAILY
Qty: 20 TABLET | Refills: 0 | Status: SHIPPED | OUTPATIENT
Start: 2022-07-05 | End: 2022-07-15

## 2022-07-05 RX ORDER — DIPHENHYDRAMINE HYDROCHLORIDE 50 MG/ML
50 INJECTION INTRAMUSCULAR; INTRAVENOUS ONCE
Status: COMPLETED | OUTPATIENT
Start: 2022-07-05 | End: 2022-07-05

## 2022-07-05 RX ORDER — PROCHLORPERAZINE EDISYLATE 5 MG/ML
10 INJECTION INTRAMUSCULAR; INTRAVENOUS ONCE
Status: COMPLETED | OUTPATIENT
Start: 2022-07-05 | End: 2022-07-05

## 2022-07-05 RX ORDER — 0.9 % SODIUM CHLORIDE 0.9 %
1000 INTRAVENOUS SOLUTION INTRAVENOUS ONCE
Status: COMPLETED | OUTPATIENT
Start: 2022-07-05 | End: 2022-07-05

## 2022-07-05 RX ADMIN — PROCHLORPERAZINE EDISYLATE 10 MG: 5 INJECTION INTRAMUSCULAR; INTRAVENOUS at 14:55

## 2022-07-05 RX ADMIN — DIPHENHYDRAMINE HYDROCHLORIDE 50 MG: 50 INJECTION, SOLUTION INTRAMUSCULAR; INTRAVENOUS at 14:55

## 2022-07-05 RX ADMIN — SODIUM CHLORIDE 1000 ML: 9 INJECTION, SOLUTION INTRAVENOUS at 14:54

## 2022-07-05 ASSESSMENT — PAIN SCALES - GENERAL: PAINLEVEL_OUTOF10: 7

## 2022-07-05 ASSESSMENT — PAIN DESCRIPTION - DESCRIPTORS: DESCRIPTORS: ACHING;DISCOMFORT;THROBBING

## 2022-07-05 ASSESSMENT — PAIN DESCRIPTION - LOCATION: LOCATION: HEAD

## 2022-07-05 ASSESSMENT — ENCOUNTER SYMPTOMS
ABDOMINAL PAIN: 0
NAUSEA: 0
VOMITING: 0
DIARRHEA: 1
EYE REDNESS: 0
SHORTNESS OF BREATH: 0

## 2022-07-05 ASSESSMENT — PAIN - FUNCTIONAL ASSESSMENT: PAIN_FUNCTIONAL_ASSESSMENT: 0-10

## 2022-07-05 NOTE — ED PROVIDER NOTES
Chief complaint: Headache and diarrhea      HPI:  7/5/22, Time: 2:25 PM EDT    HPI             Osbaldo Brice is a 24 y.o. female presenting to the ED for headache and diarrhea. History is obtained from patient as well as patient's medical record. Patient's presenting emergency department with a 4-month history of headaches. She states these are described as throbbing located posterior to her eyes. These are moderate in severity. This did wax and wane with no particular any relieving factors. Nothing noted to make them better or worse. She states that she currently has an appointment upcoming with a neurologist.  She states that she woke up today with worsening in her headache as well diffuse body aches as well as multiple episodes of loose stool. There is no melena hematochezia. She denies any fevers, chest pain, shortness of breath, dysuria or hematuria. She denies any known exposure to COVID-19. She states that she did take a negative test at work. ROS:   Review of Systems   Constitutional: Negative for chills and fatigue. HENT: Negative for congestion. Eyes: Negative for redness. Respiratory: Negative for shortness of breath. Cardiovascular: Negative for chest pain. Gastrointestinal: Positive for diarrhea. Negative for abdominal pain, nausea and vomiting. Genitourinary: Negative for dysuria. Musculoskeletal: Positive for arthralgias and myalgias. Skin: Negative for rash. Neurological: Positive for headaches. Negative for light-headedness. Psychiatric/Behavioral: Negative for confusion. All other systems reviewed and are negative.      --------------------------------------------- PAST HISTORY ---------------------------------------------  Past Medical History:  has a past medical history of Asperger syndrome and Autism disorder.     Past Surgical History:  has a past surgical history that includes REMOVAL FOREIGN BODY EAR; pr lap,cholecystectomy/graph (N/A, 3/15/2018); Cholecystectomy; and Tonsillectomy and adenoidectomy (N/A, 7/22/2021). Social History:  reports that she has been smoking cigarettes. She has a 6.00 pack-year smoking history. She has never used smokeless tobacco. She reports current alcohol use. She reports that she does not use drugs. Family History: family history includes No Known Problems in her father and mother. The patients home medications have been reviewed. Allergies: Doxycycline    ---------------------------------------------------PHYSICAL EXAM--------------------------------------    Constitutional/General: Alert and oriented x3, well appearing, non toxic in NAD  Head: Normocephalic and atraumatic  Mouth: Oropharynx clear, handling secretions, no trismus  Neck: Supple, full ROM,  Pulmonary: Lungs clear to auscultation bilaterally, no wheezes, rales, or rhonchi. Not in respiratory distress  Cardiovascular: Tachycardic rate. Regular rhythm. No murmurs  Chest: no chest wall tenderness  Abdomen: Soft. Non tender. Non distended. No rebound, guarding, or rigidity. No pulsatile masses appreciated. Musculoskeletal: Moves all extremities x 4. Warm and well perfused, no clubbing, cyanosis, or edema. Capillary refill <3 seconds  Skin: warm and dry. No rashes. Neurologic: GCS 15, CN 2-12 grossly intact, no focal deficits, symmetric strength 5/5 in the upper and lower extremities bilaterally. Speech normal. Normal finger to nose. No drift.  s  Psych: Normal Affect    -------------------------------------------------- RESULTS -------------------------------------------------  I have personally reviewed all laboratory and imaging results for this patient. Results are listed below.      LABS:  Results for orders placed or performed during the hospital encounter of 07/05/22   COVID-19, Rapid    Specimen: Nasopharyngeal Swab   Result Value Ref Range    SARS-CoV-2, NAAT Not Detected Not Detected   CBC with Auto Differential   Result Value Ref Range WBC 7.0 4.5 - 11.5 E9/L    RBC 4.95 3.50 - 5.50 E12/L    Hemoglobin 15.0 11.5 - 15.5 g/dL    Hematocrit 42.1 34.0 - 48.0 %    MCV 85.1 80.0 - 99.9 fL    MCH 30.3 26.0 - 35.0 pg    MCHC 35.6 (H) 32.0 - 34.5 %    RDW 12.3 11.5 - 15.0 fL    Platelets 762 395 - 857 E9/L    MPV 10.0 7.0 - 12.0 fL    Neutrophils % 57.8 43.0 - 80.0 %    Immature Granulocytes % 0.4 0.0 - 5.0 %    Lymphocytes % 30.3 20.0 - 42.0 %    Monocytes % 7.9 2.0 - 12.0 %    Eosinophils % 2.7 0.0 - 6.0 %    Basophils % 0.9 0.0 - 2.0 %    Neutrophils Absolute 4.05 1.80 - 7.30 E9/L    Immature Granulocytes # 0.03 E9/L    Lymphocytes Absolute 2.12 1.50 - 4.00 E9/L    Monocytes Absolute 0.55 0.10 - 0.95 E9/L    Eosinophils Absolute 0.19 0.05 - 0.50 E9/L    Basophils Absolute 0.06 0.00 - 0.20 E9/L   Comprehensive Metabolic Panel w/ Reflex to MG   Result Value Ref Range    Sodium 140 132 - 146 mmol/L    Potassium reflex Magnesium 4.5 3.5 - 5.0 mmol/L    Chloride 105 98 - 107 mmol/L    CO2 24 22 - 29 mmol/L    Anion Gap 11 7 - 16 mmol/L    Glucose 89 74 - 99 mg/dL    BUN 6 6 - 20 mg/dL    CREATININE 0.6 0.5 - 1.0 mg/dL    GFR Non-African American >60 >=60 mL/min/1.73    GFR African American >60     Calcium 9.5 8.6 - 10.2 mg/dL    Total Protein 7.6 6.4 - 8.3 g/dL    Albumin 4.2 3.5 - 5.2 g/dL    Total Bilirubin 0.6 0.0 - 1.2 mg/dL    Alkaline Phosphatase 76 35 - 104 U/L    ALT 23 0 - 32 U/L    AST 19 0 - 31 U/L   Pregnancy, urine   Result Value Ref Range    HCG(Urine) Pregnancy Test NEGATIVE NEGATIVE   Urinalysis with Microscopic   Result Value Ref Range    Color, UA Yellow Straw/Yellow    Clarity, UA Clear Clear    Glucose, Ur Negative Negative mg/dL    Bilirubin Urine Negative Negative    Ketones, Urine Negative Negative mg/dL    Specific Gravity, UA 1.025 1.005 - 1.030    Blood, Urine Negative Negative    pH, UA 6.0 5.0 - 9.0    Protein, UA Negative Negative mg/dL    Urobilinogen, Urine 0.2 <2.0 E.U./dL    Nitrite, Urine Negative Negative    Leukocyte discharged home with antibiotic and symptomatic treatment. She will follow-up outpatient. Re-Evaluations/Consultations:             ED Course as of 08/14/22 [facility] Jul 05, 2022   1642 The patient is in the bed no acute distress. Results of today discussed. The patient will be discharged [MT]      ED Course User Index  [MT] Kita Byers DO               This patient's ED course included: History, physical examination, reevaluation prior to disposition, labs, imaging, IV medications, IV fluid    This patient has remained hemodynamically stable during their ED course. Counseling: The emergency provider has spoken with the patient and discussed todays results, in addition to providing specific details for the plan of care and counseling regarding the diagnosis and prognosis. Questions are answered at this time and they are agreeable with the plan.       --------------------------------- IMPRESSION AND DISPOSITION ---------------------------------    IMPRESSION  1. Acute sinusitis, recurrence not specified, unspecified location    2. Acute nonintractable headache, unspecified headache type    3. Diarrhea, unspecified type        DISPOSITION  Disposition: Discharge to home  Patient condition is stable        NOTE: This report was transcribed using voice recognition software.  Every effort was made to ensure accuracy; however, inadvertent computerized transcription errors may be present         Kita Byers, DO  07/08/22 [id number] [facility], DO  08/14/22 [id number]

## 2022-07-05 NOTE — ED NOTES
Patient discharged to home states understanding of home instructions      Alex Soliz RN  07/05/22 [id number]

## 2022-07-05 NOTE — ED NOTES
Patient discharged to home states understanding of home instructions      Tariq Marrero RN  07/05/22 3883

## 2022-07-06 LAB
EKG ATRIAL RATE: 89 BPM
EKG P AXIS: 13 DEGREES
EKG P-R INTERVAL: 150 MS
EKG Q-T INTERVAL: 398 MS
EKG QRS DURATION: 110 MS
EKG QTC CALCULATION (BAZETT): 484 MS
EKG R AXIS: 41 DEGREES
EKG T AXIS: 37 DEGREES
EKG VENTRICULAR RATE: 89 BPM

## 2022-07-07 ENCOUNTER — OFFICE VISIT (OUTPATIENT)
Dept: NEUROLOGY | Age: 22
End: 2022-07-07
Payer: COMMERCIAL

## 2022-07-07 VITALS
WEIGHT: 293 LBS | TEMPERATURE: 97 F | OXYGEN SATURATION: 95 % | HEART RATE: 112 BPM | HEIGHT: 67 IN | BODY MASS INDEX: 45.99 KG/M2 | DIASTOLIC BLOOD PRESSURE: 89 MMHG | SYSTOLIC BLOOD PRESSURE: 133 MMHG

## 2022-07-07 DIAGNOSIS — Z72.820 SLEEP DEPRIVATION: ICD-10-CM

## 2022-07-07 DIAGNOSIS — G93.2 BIH (BENIGN INTRACRANIAL HYPERTENSION): Primary | ICD-10-CM

## 2022-07-07 PROCEDURE — 99205 OFFICE O/P NEW HI 60 MIN: CPT | Performed by: CLINICAL NURSE SPECIALIST

## 2022-07-07 PROCEDURE — G8417 CALC BMI ABV UP PARAM F/U: HCPCS | Performed by: CLINICAL NURSE SPECIALIST

## 2022-07-07 PROCEDURE — 4004F PT TOBACCO SCREEN RCVD TLK: CPT | Performed by: CLINICAL NURSE SPECIALIST

## 2022-07-07 PROCEDURE — G8428 CUR MEDS NOT DOCUMENT: HCPCS | Performed by: CLINICAL NURSE SPECIALIST

## 2022-07-07 RX ORDER — LISDEXAMFETAMINE DIMESYLATE 70 MG/1
CAPSULE ORAL
COMMUNITY
Start: 2022-06-10

## 2022-07-07 RX ORDER — CITALOPRAM 20 MG/1
TABLET ORAL
COMMUNITY
Start: 2022-06-10

## 2022-07-07 NOTE — PROGRESS NOTES
Silvio Roque is a 24 y.o. right handed woman    Past Medical History:     Past Medical History:   Diagnosis Date    Asperger syndrome     Autism disorder      Past Surgical History:     Past Surgical History:   Procedure Laterality Date    CHOLECYSTECTOMY      NV LAP,CHOLECYSTECTOMY/GRAPH N/A 3/15/2018    CHOLECYSTECTOMY LAPAROSCOPIC WITH INTRAOP CHOLANGIOGRAM performed by Lilly Sanford MD at [address] N/A 7/22/2021    TONSILLECTOMY performed by [employer] DO Ziyad at [facility] OR     Allergies:     Doxycycline    Medications:     Prior to Admission medications    Medication Sig Start Date End Date Taking? Authorizing Provider   VYVANSE 70 MG capsule TAKE ONE CAPSULE BY MOUTH DAILY FOR 30 DAYS 6/10/22  Yes Historical Provider, MD   citalopram (CELEXA) 20 MG tablet TAKE ONE TABLET BY MOUTH DAILY WITH 10 MG TO EQUAL 30 MG DOSE. 6/10/22  Yes Historical Provider, MD   amoxicillin-clavulanate (AUGMENTIN) 875-125 MG per tablet Take 1 tablet by mouth 2 times daily for 10 days 7/5/22 7/15/22 Yes Elsa Aguilar,    albuterol sulfate HFA (VENTOLIN HFA) 108 (90 Base) MCG/ACT inhaler Inhale 2 puffs into the lungs 4 times daily as needed for Wheezing 4/21/22  Yes Suzanna Meza DO   cyclobenzaprine (FLEXERIL) 10 MG tablet TAKE ONE TABLET BY MOUTH THREE TIMES A DAY 9/15/21  Yes Historical Provider, MD   melatonin 5 MG TABS tablet 1 mg  2/25/22  Yes Historical Provider, MD   Lisdexamfetamine Dimesylate (VYVANSE) 40 MG CAPS Take 70 mg by mouth daily.     Yes Historical Provider, MD   ibuprofen (ADVIL;MOTRIN) 800 MG tablet Take 1 tablet by mouth 3 times daily (with meals) 10/25/21  Yes Francisco Leon PA-C   fluticasone [facility]) 50 MCG/ACT nasal spray 2 sprays by Each Nostril route daily 10/25/21  Yes Francisco Leon PA-C   famotidine (PEPCID) 20 MG tablet Take 1 tablet by mouth 2 times daily for 15 days 4/21/22 5/6/22  Suzanna Meza DO naproxen (NAPROSYN) 500 MG tablet Take 1 tablet by mouth 2 times daily for 7 days 4/16/22 4/23/22  Carlotta Rojas MD   albuterol sulfate HFA (VENTOLIN HFA) 108 (90 Base) MCG/ACT inhaler Inhale 2 puffs into the lungs 4 times daily as needed for Wheezing 9/18/21   Eber Driver PA-C   citalopram (CELEXA) 10 MG tablet Take 1 tablet by mouth daily  Patient taking differently: Take 30 mg by mouth daily  8/17/21 4/82/99  Jay Raza, APRN - CNP       Social History:     Social History     Tobacco Use    Smoking status: Current Some Day Smoker     Packs/day: 1.00     Years: 6.00     Pack years: 6.00     Types: Cigarettes    Smokeless tobacco: Never Used    Tobacco comment: vaping   Vaping Use    Vaping Use: Every day    Start date: 10/2/2015    Substances: Nicotine, Flavoring    Devices: Disposable, Pre-filled or refillable cartridge, Refillable tank, Pre-filled pod   Substance Use Topics    Alcohol use: Yes     Comment: twice monthly    Drug use: No       Family History:     Family History   Problem Relation Age of Onset    No Known Problems Mother     No Known Problems Father         History of Present Illness:     She was referred to our office for headaches. She is here alone and appears to be an excellent historian. Patient states that she began having her \"normal\" headaches when she was about 14years of age. At that time she would describe maybe 1 headache a week however they seem to be tolerable encompassing both temporal regions and at times making her jaw quite painful. Unfortunately in October 2021 she started developing a headache almost every day she describes some days to be excruciating and sees no relief with any over-the-counter medications. Became concerned with increasing blurred vision however never went to an ophthalmologist.    Reportedly, her providers were hesitant to initiate any migraine medicine because of interactions with much of her other medications.   She has tried over-the-counter medications without any success. Her headaches are severe she does describe a pulsating sensations. She is unable to fall asleep and states that she has a hard time sleeping at baseline. She denies ever having a sleep study but does report previous EEGs and epilepsy monitoring unit evaluations though no seizures were ever diagnosed. This evaluation occurred when she was younger-she reports 10years of age.     She denies any loss of vision but does describe some blurred vision  She describes neck discomfort as well as shoulder pains    No prior stroke reported      Objective:   /89 (Site: Left Upper Arm)   Pulse (!) 112   Temp 97 °F (36.1 °C)   Ht 5' 7\" (1.702 m)   Wt (!) 307 lb (139.3 kg)   SpO2 95%   BMI 48.08 kg/m²      General appearance: alert, appears stated age and cooperative  Head: Normocephalic, without obvious abnormality, atraumatic  Extremities: no cyanosis or edema  Pulses: 2+ and symmetric  Skin: no rashes or lesions      Masses to palpation of both trapezius muscles and occipital ridges    Funduscopic examination attempted at this time-however unable to locate her optic disc due to her marked miosis    Mental Status: Alert, oriented to person place and year     Speech: clear  Language: appropriate     Cranial Nerves:  I: smell    II: visual acuity     II: visual fields Full    II: pupils CRISTINA   III,VII: ptosis None   III,IV,VI: extraocular muscles  EOMI without nystagmus    V: mastication Normal   V: facial light touch sensation  Normal   V,VII: corneal reflex  Present   VII: facial muscle function - upper     VII: facial muscle function - lower Normal   VIII: hearing Normal   IX: soft palate elevation  Normal   IX,X: gag reflex    XI: trapezius strength  5/5   XI: sternocleidomastoid strength 5/5   XI: neck extension strength  5/5   XII: tongue strength  Normal     Motor:  5/5 throughout  Normal bulk and tone   No drift    Sensory:  LT normal  Vibration normal     Coordination:   FN, FFM and HOPE normal    Gait:  Normal     DTR:   No reflexes    No Babinski  No Candelario's     Laboratory/Radiology:     CBC with Differential:    Lab Results   Component Value Date/Time    WBC 7.0 07/05/2022 02:30 PM    RBC 4.95 07/05/2022 02:30 PM    HGB 15.0 07/05/2022 02:30 PM    HCT 42.1 07/05/2022 02:30 PM     07/05/2022 02:30 PM    MCV 85.1 07/05/2022 02:30 PM    MCH 30.3 07/05/2022 02:30 PM    MCHC 35.6 07/05/2022 02:30 PM    RDW 12.3 07/05/2022 02:30 PM    LYMPHOPCT 30.3 07/05/2022 02:30 PM    MONOPCT 7.9 07/05/2022 02:30 PM    BASOPCT 0.9 07/05/2022 02:30 PM    MONOSABS 0.55 07/05/2022 02:30 PM    LYMPHSABS 2.12 07/05/2022 02:30 PM    EOSABS 0.19 07/05/2022 02:30 PM    BASOSABS 0.06 07/05/2022 02:30 PM     CMP:    Lab Results   Component Value Date/Time     07/05/2022 02:30 PM    K 4.5 07/05/2022 02:30 PM     07/05/2022 02:30 PM    CO2 24 07/05/2022 02:30 PM    BUN 6 07/05/2022 02:30 PM    CREATININE 0.6 07/05/2022 02:30 PM    GFRAA >60 07/05/2022 02:30 PM    LABGLOM >60 07/05/2022 02:30 PM    GLUCOSE 89 07/05/2022 02:30 PM    PROT 7.6 07/05/2022 02:30 PM    LABALBU 4.2 07/05/2022 02:30 PM    CALCIUM 9.5 07/05/2022 02:30 PM    BILITOT 0.6 07/05/2022 02:30 PM    ALKPHOS 76 07/05/2022 02:30 PM    AST 19 07/05/2022 02:30 PM    ALT 23 07/05/2022 02:30 PM       CT Head: 7/5/2022  No acute intracranial abnormalities    I independently reviewed the labs and imaging studies today. Assessment:     Because of my inability to view her fundus as well as her descriptions in HPI, I do not believe it to be unreasonable to obtain spinal fluid analysis looking for an opening pressure for possible pseudotumor cerebri/benign intracranial hypertension    Marked sleep deprivation which also could be contributing to her chronic daily headaches      Plan:      We will set her up for interventional radiology spinal fluid analysis/opening pressure    Sleep evaluation will also be obtained    Pending on above findings we will plan on appropriate treatment    She will call when above is complete    We did discuss emergency department evaluation should her headaches worsen however it has been 9 months since her symptomology onset.     AZEEM Daley - CNS  10:42 AM  7/7/2022

## 2022-08-05 ENCOUNTER — APPOINTMENT (OUTPATIENT)
Dept: ULTRASOUND IMAGING | Age: 22
End: 2022-08-05
Payer: COMMERCIAL

## 2022-08-05 LAB
ANION GAP SERPL CALCULATED.3IONS-SCNC: 14 MMOL/L (ref 7–16)
BACTERIA: ABNORMAL /HPF
BASOPHILS ABSOLUTE: 0.07 E9/L (ref 0–0.2)
BASOPHILS RELATIVE PERCENT: 0.7 % (ref 0–2)
BILIRUBIN URINE: NEGATIVE
BLOOD, URINE: ABNORMAL
BUN BLDV-MCNC: 5 MG/DL (ref 6–20)
CALCIUM SERPL-MCNC: 9.3 MG/DL (ref 8.6–10.2)
CHLORIDE BLD-SCNC: 104 MMOL/L (ref 98–107)
CLARITY: CLEAR
CO2: 20 MMOL/L (ref 22–29)
COLOR: YELLOW
CREAT SERPL-MCNC: 0.5 MG/DL (ref 0.5–1)
EOSINOPHILS ABSOLUTE: 0.28 E9/L (ref 0.05–0.5)
EOSINOPHILS RELATIVE PERCENT: 2.9 % (ref 0–6)
GFR AFRICAN AMERICAN: >60
GFR NON-AFRICAN AMERICAN: >60 ML/MIN/1.73
GLUCOSE BLD-MCNC: 122 MG/DL (ref 74–99)
GLUCOSE URINE: NEGATIVE MG/DL
HCG, URINE, POC: NEGATIVE
HCT VFR BLD CALC: 42.8 % (ref 34–48)
HEMOGLOBIN: 15 G/DL (ref 11.5–15.5)
IMMATURE GRANULOCYTES #: 0.03 E9/L
IMMATURE GRANULOCYTES %: 0.3 % (ref 0–5)
KETONES, URINE: NEGATIVE MG/DL
LEUKOCYTE ESTERASE, URINE: ABNORMAL
LYMPHOCYTES ABSOLUTE: 2.87 E9/L (ref 1.5–4)
LYMPHOCYTES RELATIVE PERCENT: 30.1 % (ref 20–42)
Lab: NORMAL
MCH RBC QN AUTO: 30.2 PG (ref 26–35)
MCHC RBC AUTO-ENTMCNC: 35 % (ref 32–34.5)
MCV RBC AUTO: 86.1 FL (ref 80–99.9)
MONOCYTES ABSOLUTE: 0.53 E9/L (ref 0.1–0.95)
MONOCYTES RELATIVE PERCENT: 5.6 % (ref 2–12)
NEGATIVE QC PASS/FAIL: NORMAL
NEUTROPHILS ABSOLUTE: 5.74 E9/L (ref 1.8–7.3)
NEUTROPHILS RELATIVE PERCENT: 60.4 % (ref 43–80)
NITRITE, URINE: NEGATIVE
PDW BLD-RTO: 12.1 FL (ref 11.5–15)
PH UA: 7 (ref 5–9)
PLATELET # BLD: 293 E9/L (ref 130–450)
PMV BLD AUTO: 10.1 FL (ref 7–12)
POSITIVE QC PASS/FAIL: NORMAL
POTASSIUM REFLEX MAGNESIUM: 3.9 MMOL/L (ref 3.5–5)
PROTEIN UA: NEGATIVE MG/DL
RBC # BLD: 4.97 E12/L (ref 3.5–5.5)
RBC UA: ABNORMAL /HPF (ref 0–2)
SODIUM BLD-SCNC: 138 MMOL/L (ref 132–146)
SPECIFIC GRAVITY UA: 1.02 (ref 1–1.03)
UROBILINOGEN, URINE: 0.2 E.U./DL
WBC # BLD: 9.5 E9/L (ref 4.5–11.5)
WBC UA: ABNORMAL /HPF (ref 0–5)

## 2022-08-05 PROCEDURE — 76830 TRANSVAGINAL US NON-OB: CPT

## 2022-08-05 PROCEDURE — 81001 URINALYSIS AUTO W/SCOPE: CPT

## 2022-08-05 PROCEDURE — 87088 URINE BACTERIA CULTURE: CPT

## 2022-08-05 PROCEDURE — 85025 COMPLETE CBC W/AUTO DIFF WBC: CPT

## 2022-08-05 PROCEDURE — 99284 EMERGENCY DEPT VISIT MOD MDM: CPT

## 2022-08-05 PROCEDURE — 80048 BASIC METABOLIC PNL TOTAL CA: CPT

## 2022-08-06 ENCOUNTER — HOSPITAL ENCOUNTER (EMERGENCY)
Age: 22
Discharge: HOME | End: 2022-08-06
Payer: COMMERCIAL

## 2022-08-06 VITALS
HEIGHT: 67 IN | DIASTOLIC BLOOD PRESSURE: 61 MMHG | WEIGHT: 293 LBS | BODY MASS INDEX: 45.99 KG/M2 | OXYGEN SATURATION: 97 % | RESPIRATION RATE: 18 BRPM | TEMPERATURE: 98.5 F | HEART RATE: 102 BPM | SYSTOLIC BLOOD PRESSURE: 125 MMHG

## 2022-08-06 DIAGNOSIS — N39.0 URINARY TRACT INFECTION WITHOUT HEMATURIA, SITE UNSPECIFIED: ICD-10-CM

## 2022-08-06 DIAGNOSIS — T83.9XXA COMPLICATION OF INTRAUTERINE DEVICE (IUD), UNSPECIFIED COMPLICATION, INITIAL ENCOUNTER (HCC): Primary | ICD-10-CM

## 2022-08-06 LAB
CLUE CELLS: NORMAL
SOURCE WET PREP: NORMAL
TRICHOMONAS PREP: NORMAL
YEAST WET PREP: NORMAL

## 2022-08-06 PROCEDURE — 87491 CHLMYD TRACH DNA AMP PROBE: CPT

## 2022-08-06 PROCEDURE — 87591 N.GONORRHOEAE DNA AMP PROB: CPT

## 2022-08-06 PROCEDURE — 6370000000 HC RX 637 (ALT 250 FOR IP): Performed by: PHYSICIAN ASSISTANT

## 2022-08-06 PROCEDURE — 87210 SMEAR WET MOUNT SALINE/INK: CPT

## 2022-08-06 RX ORDER — CEFDINIR 300 MG/1
300 CAPSULE ORAL 2 TIMES DAILY
Qty: 14 CAPSULE | Refills: 0 | Status: SHIPPED | OUTPATIENT
Start: 2022-08-06 | End: 2022-08-13

## 2022-08-06 RX ORDER — OXYCODONE HYDROCHLORIDE AND ACETAMINOPHEN 5; 325 MG/1; MG/1
1 TABLET ORAL EVERY 8 HOURS PRN
Qty: 9 TABLET | Refills: 0 | Status: SHIPPED | OUTPATIENT
Start: 2022-08-06 | End: 2022-08-09

## 2022-08-06 RX ORDER — OXYCODONE HYDROCHLORIDE AND ACETAMINOPHEN 5; 325 MG/1; MG/1
1 TABLET ORAL ONCE
Status: COMPLETED | OUTPATIENT
Start: 2022-08-06 | End: 2022-08-06

## 2022-08-06 RX ADMIN — OXYCODONE AND ACETAMINOPHEN 1 TABLET: 5; 325 TABLET ORAL at 00:18

## 2022-08-06 ASSESSMENT — ENCOUNTER SYMPTOMS
VOMITING: 0
CHEST TIGHTNESS: 0
SORE THROAT: 0
COLOR CHANGE: 0
FACIAL SWELLING: 0
SINUS PRESSURE: 0
TROUBLE SWALLOWING: 0
DIARRHEA: 0
SHORTNESS OF BREATH: 0
ABDOMINAL PAIN: 0
BACK PAIN: 0
CONSTIPATION: 0
COUGH: 0
SINUS PAIN: 0
NAUSEA: 0

## 2022-08-06 ASSESSMENT — PAIN DESCRIPTION - LOCATION: LOCATION: ABDOMEN;BACK

## 2022-08-06 ASSESSMENT — PAIN DESCRIPTION - ORIENTATION: ORIENTATION: LOWER

## 2022-08-06 ASSESSMENT — PAIN SCALES - GENERAL: PAINLEVEL_OUTOF10: 5

## 2022-08-06 NOTE — LETTER
[facility] Emergency Department  [address]  [place] [address]  Phone: [phone]               August 6, 2022    Patient: Valeriano Canas   YOB: 2000   Date of Visit: 8/5/2022       To Whom It May Concern:    Marjorie Watkins was seen and treated in our emergency department on 8/5/2022. She may return to work on 8/9/2022. The patient was seen on 8/5/2022 due to issues that began on 08/03/2022.        Sincerely,       Karen Davila PA-C         Signature:__________________________________

## 2022-08-06 NOTE — ED PROVIDER NOTES
color change, pallor and rash. Neurological:  Negative for dizziness, weakness, light-headedness and headaches. Psychiatric/Behavioral:  Negative for agitation, behavioral problems and confusion. Pertinent positives and negatives are stated within HPI, all other systems reviewed and are negative.      --------------------------------------------- PAST HISTORY ---------------------------------------------  Past Medical History:  has a past medical history of Asperger syndrome and Autism disorder. Past Surgical History:  has a past surgical history that includes REMOVAL FOREIGN BODY EAR; pr lap,cholecystectomy/graph (N/A, 3/15/2018); Cholecystectomy; and Tonsillectomy and adenoidectomy (N/A, 7/22/2021). Social History:  reports that she has been smoking cigarettes. She has a 6.00 pack-year smoking history. She has never used smokeless tobacco. She reports current alcohol use. She reports that she does not use drugs. Family History: family history includes No Known Problems in her father and mother. The patients home medications have been reviewed.     Allergies: Doxycycline    -------------------------------------------------- RESULTS -------------------------------------------------  All laboratory and radiology results have been personally reviewed by myself   LABS:  Results for orders placed or performed during the hospital encounter of 08/06/22   Wet prep, genital    Specimen: Vaginal   Result Value Ref Range    Trichomonas Prep None Seen     Yeast, Wet Prep None Seen     Clue Cells, Wet Prep None Seen     Source Wet Prep VAGINAL    Urinalysis with Microscopic   Result Value Ref Range    Color, UA Yellow Straw/Yellow    Clarity, UA Clear Clear    Glucose, Ur Negative Negative mg/dL    Bilirubin Urine Negative Negative    Ketones, Urine Negative Negative mg/dL    Specific Gravity, UA 1.025 1.005 - 1.030    Blood, Urine MODERATE (A) Negative    pH, UA 7.0 5.0 - 9.0    Protein, UA Negative Negative mg/dL    Urobilinogen, Urine 0.2 <2.0 E.U./dL    Nitrite, Urine Negative Negative    Leukocyte Esterase, Urine TRACE (A) Negative    WBC, UA 5-10 (A) 0 - 5 /HPF    RBC, UA 1-3 0 - 2 /HPF    Bacteria, UA FEW (A) None Seen /HPF   Basic Metabolic Panel w/ Reflex to MG   Result Value Ref Range    Sodium 138 132 - 146 mmol/L    Potassium reflex Magnesium 3.9 3.5 - 5.0 mmol/L    Chloride 104 98 - 107 mmol/L    CO2 20 (L) 22 - 29 mmol/L    Anion Gap 14 7 - 16 mmol/L    Glucose 122 (H) 74 - 99 mg/dL    BUN 5 (L) 6 - 20 mg/dL    Creatinine 0.5 0.5 - 1.0 mg/dL    GFR Non-African American >60 >=60 mL/min/1.73    GFR African American >60     Calcium 9.3 8.6 - 10.2 mg/dL   CBC with Auto Differential   Result Value Ref Range    WBC 9.5 4.5 - 11.5 E9/L    RBC 4.97 3.50 - 5.50 E12/L    Hemoglobin 15.0 11.5 - 15.5 g/dL    Hematocrit 42.8 34.0 - 48.0 %    MCV 86.1 80.0 - 99.9 fL    MCH 30.2 26.0 - 35.0 pg    MCHC 35.0 (H) 32.0 - 34.5 %    RDW 12.1 11.5 - 15.0 fL    Platelets 486 665 - 167 E9/L    MPV 10.1 7.0 - 12.0 fL    Neutrophils % 60.4 43.0 - 80.0 %    Immature Granulocytes % 0.3 0.0 - 5.0 %    Lymphocytes % 30.1 20.0 - 42.0 %    Monocytes % 5.6 2.0 - 12.0 %    Eosinophils % 2.9 0.0 - 6.0 %    Basophils % 0.7 0.0 - 2.0 %    Neutrophils Absolute 5.74 1.80 - 7.30 E9/L    Immature Granulocytes # 0.03 E9/L    Lymphocytes Absolute 2.87 1.50 - 4.00 E9/L    Monocytes Absolute 0.53 0.10 - 0.95 E9/L    Eosinophils Absolute 0.28 0.05 - 0.50 E9/L    Basophils Absolute 0.07 0.00 - 0.20 E9/L   POC Pregnancy Urine   Result Value Ref Range    HCG, Urine, POC Negative Negative    Lot Number [id number]     Positive QC Pass/Fail Pass     Negative QC Pass/Fail Pass        RADIOLOGY:  Interpreted by Radiologist.  US NON OB TRANSVAGINAL   Final Result   1. The IUD is low-lying, with the tip in the cervix.    2. 4.5 x 2.7 x 2.7 cm left ovarian simple cyst.  Per the recommendations of   the [employer] of Radiology, no imaging follow-up is necessary. 3. The right ovary could not be visualized. RECOMMENDATIONS:   Unavailable             ------------------------- NURSING NOTES AND VITALS REVIEWED ---------------------------   The nursing notes within the ED encounter and vital signs as below have been reviewed. /61   Pulse (!) 102   Temp 98.5 °F (36.9 °C)   Resp 18   Ht 5' 7\" (1.702 m)   Wt (!) 307 lb (139.3 kg)   SpO2 97%   BMI 48.08 kg/m²   Oxygen Saturation Interpretation: Normal      ---------------------------------------------------PHYSICAL EXAM--------------------------------------    Physical Exam  Vitals and nursing note reviewed. Constitutional:       General: She is not in acute distress. Appearance: Normal appearance. She is well-developed. She is not ill-appearing. HENT:      Head: Normocephalic and atraumatic. Mouth/Throat:      Mouth: Mucous membranes are moist.      Pharynx: Oropharynx is clear. Neck:      Vascular: No JVD. Trachea: No tracheal deviation. Cardiovascular:      Rate and Rhythm: Normal rate and regular rhythm. Heart sounds: Normal heart sounds. No murmur heard. Pulmonary:      Effort: Pulmonary effort is normal. No respiratory distress. Breath sounds: Normal breath sounds. Abdominal:      General: Bowel sounds are normal. There is no distension. Palpations: Abdomen is soft. Tenderness: There is no abdominal tenderness. Comments: Suprapubic tenderness to palpation. Genitourinary:     Comments: Small amount of yellow vaginal discharge. Cervical os is closed and unremarkable. No IUD visualized. Musculoskeletal:         General: No swelling or deformity. Normal range of motion. Cervical back: Normal range of motion and neck supple. No rigidity. Skin:     General: Skin is warm and dry. Capillary Refill: Capillary refill takes less than 2 seconds. Coloration: Skin is not pale. Findings: No erythema.    Neurological:      Mental counseling regarding the diagnosis and prognosis. Questions are answered at this time and they are agreeable with the plan.      --------------------------------- IMPRESSION AND DISPOSITION ---------------------------------    IMPRESSION  1. Complication of intrauterine device (IUD), unspecified complication[facility]    2. Urinary tract infection without hematuria, site unspecified        DISPOSITION  Disposition: Discharge to home  Patient condition is good      Electronically signed by Valeria Mahajan PA-C   DD: 8/6/22  **This report was transcribed using voice recognition software. Every effort was made to ensure accuracy; however, inadvertent computerized transcription errors may be present.   END OF ED PROVIDER NOTE         Valeria Mahajan PA-C  08/06/22 [id number]

## 2022-08-06 NOTE — ED NOTES
Department of Emergency Medicine  FIRST PROVIDER TRIAGE NOTE             Independent MLP           8/5/22  9:36 PM EDT    Date of Encounter: 8/5/22   MRN: [id number]      HPI: Silvio Roque is a 24 y.o. female who presents to the ED for No chief complaint on file. Pt states she was supposed to have her IUD removed 2 days ago but the string broke off and then returned removed and now she is having severe lower abdominal pain and vaginal bleeding. ROS: Negative for cp, sob, or urinary complaints. PE: Gen Appearance/Constitutional: alert  Musculoskeletal: moves all extremities x 4     Initial Plan of Care: All treatment areas with department are currently occupied. Plan to order/Initiate the following while awaiting opening in ED: labs and ultrasound.   Initiate Treatment-Testing, Proceed toTreatment Area When Bed Available for ED Attending/MLP to Continue Care    Electronically signed by Baldemar Escobar PA-C   DD: 8/5/22       Baldemar Escobar PA-C  08/05/22 [id number]

## 2022-08-08 ENCOUNTER — HOSPITAL ENCOUNTER (EMERGENCY)
Age: 22
Discharge: HOME | End: 2022-08-08
Attending: STUDENT IN AN ORGANIZED HEALTH CARE EDUCATION/TRAINING PROGRAM
Payer: COMMERCIAL

## 2022-08-08 ENCOUNTER — APPOINTMENT (OUTPATIENT)
Dept: ULTRASOUND IMAGING | Age: 22
End: 2022-08-08
Payer: COMMERCIAL

## 2022-08-08 VITALS
HEART RATE: 90 BPM | RESPIRATION RATE: 18 BRPM | DIASTOLIC BLOOD PRESSURE: 80 MMHG | WEIGHT: 293 LBS | HEIGHT: 67 IN | OXYGEN SATURATION: 100 % | TEMPERATURE: 97 F | BODY MASS INDEX: 45.99 KG/M2 | SYSTOLIC BLOOD PRESSURE: 130 MMHG

## 2022-08-08 DIAGNOSIS — T83.9XXA COMPLICATION OF INTRAUTERINE DEVICE (IUD), UNSPECIFIED COMPLICATION, INITIAL ENCOUNTER (HCC): Primary | ICD-10-CM

## 2022-08-08 DIAGNOSIS — N93.9 VAGINAL BLEEDING: ICD-10-CM

## 2022-08-08 DIAGNOSIS — R10.2 PELVIC PAIN: ICD-10-CM

## 2022-08-08 LAB
ALBUMIN SERPL-MCNC: 4 G/DL (ref 3.5–5.2)
ALP BLD-CCNC: 72 U/L (ref 35–104)
ALT SERPL-CCNC: 45 U/L (ref 0–32)
ANION GAP SERPL CALCULATED.3IONS-SCNC: 11 MMOL/L (ref 7–16)
AST SERPL-CCNC: 37 U/L (ref 0–31)
BACTERIA: ABNORMAL /HPF
BASOPHILS ABSOLUTE: 0.09 E9/L (ref 0–0.2)
BASOPHILS RELATIVE PERCENT: 1 % (ref 0–2)
BILIRUB SERPL-MCNC: 0.5 MG/DL (ref 0–1.2)
BILIRUBIN URINE: NEGATIVE
BLOOD, URINE: NEGATIVE
BUN BLDV-MCNC: 9 MG/DL (ref 6–20)
CALCIUM SERPL-MCNC: 9.3 MG/DL (ref 8.6–10.2)
CHLORIDE BLD-SCNC: 102 MMOL/L (ref 98–107)
CLARITY: ABNORMAL
CO2: 23 MMOL/L (ref 22–29)
COLOR: ABNORMAL
CREAT SERPL-MCNC: 0.5 MG/DL (ref 0.5–1)
EOSINOPHILS ABSOLUTE: 0.31 E9/L (ref 0.05–0.5)
EOSINOPHILS RELATIVE PERCENT: 3.5 % (ref 0–6)
GFR AFRICAN AMERICAN: >60
GFR NON-AFRICAN AMERICAN: >60 ML/MIN/1.73
GLUCOSE BLD-MCNC: 105 MG/DL (ref 74–99)
GLUCOSE URINE: NEGATIVE MG/DL
HCG, URINE, POC: NEGATIVE
HCT VFR BLD CALC: 41.7 % (ref 34–48)
HEMOGLOBIN: 14.8 G/DL (ref 11.5–15.5)
IMMATURE GRANULOCYTES #: 0.05 E9/L
IMMATURE GRANULOCYTES %: 0.6 % (ref 0–5)
KETONES, URINE: NEGATIVE MG/DL
LEUKOCYTE ESTERASE, URINE: NEGATIVE
LYMPHOCYTES ABSOLUTE: 2.75 E9/L (ref 1.5–4)
LYMPHOCYTES RELATIVE PERCENT: 30.9 % (ref 20–42)
Lab: NORMAL
MCH RBC QN AUTO: 30.7 PG (ref 26–35)
MCHC RBC AUTO-ENTMCNC: 35.5 % (ref 32–34.5)
MCV RBC AUTO: 86.5 FL (ref 80–99.9)
MONOCYTES ABSOLUTE: 0.54 E9/L (ref 0.1–0.95)
MONOCYTES RELATIVE PERCENT: 6.1 % (ref 2–12)
NEGATIVE QC PASS/FAIL: NORMAL
NEUTROPHILS ABSOLUTE: 5.17 E9/L (ref 1.8–7.3)
NEUTROPHILS RELATIVE PERCENT: 57.9 % (ref 43–80)
NITRITE, URINE: NEGATIVE
PDW BLD-RTO: 12 FL (ref 11.5–15)
PH UA: 7 (ref 5–9)
PLATELET # BLD: 296 E9/L (ref 130–450)
PMV BLD AUTO: 9.9 FL (ref 7–12)
POSITIVE QC PASS/FAIL: NORMAL
POTASSIUM REFLEX MAGNESIUM: 4 MMOL/L (ref 3.5–5)
PROTEIN UA: NEGATIVE MG/DL
RBC # BLD: 4.82 E12/L (ref 3.5–5.5)
RBC UA: ABNORMAL /HPF (ref 0–2)
SODIUM BLD-SCNC: 136 MMOL/L (ref 132–146)
SPECIFIC GRAVITY UA: 1.01 (ref 1–1.03)
TOTAL PROTEIN: 7.2 G/DL (ref 6.4–8.3)
URINE CULTURE, ROUTINE: NORMAL
UROBILINOGEN, URINE: 0.2 E.U./DL
WBC # BLD: 8.9 E9/L (ref 4.5–11.5)
WBC UA: ABNORMAL /HPF (ref 0–5)

## 2022-08-08 PROCEDURE — 81001 URINALYSIS AUTO W/SCOPE: CPT

## 2022-08-08 PROCEDURE — 96374 THER/PROPH/DIAG INJ IV PUSH: CPT

## 2022-08-08 PROCEDURE — 80053 COMPREHEN METABOLIC PANEL: CPT

## 2022-08-08 PROCEDURE — 76856 US EXAM PELVIC COMPLETE: CPT

## 2022-08-08 PROCEDURE — 6360000002 HC RX W HCPCS: Performed by: STUDENT IN AN ORGANIZED HEALTH CARE EDUCATION/TRAINING PROGRAM

## 2022-08-08 PROCEDURE — 87088 URINE BACTERIA CULTURE: CPT

## 2022-08-08 PROCEDURE — 99284 EMERGENCY DEPT VISIT MOD MDM: CPT

## 2022-08-08 PROCEDURE — 85025 COMPLETE CBC W/AUTO DIFF WBC: CPT

## 2022-08-08 RX ORDER — KETOROLAC TROMETHAMINE 30 MG/ML
15 INJECTION, SOLUTION INTRAMUSCULAR; INTRAVENOUS ONCE
Status: COMPLETED | OUTPATIENT
Start: 2022-08-08 | End: 2022-08-08

## 2022-08-08 RX ADMIN — KETOROLAC TROMETHAMINE 15 MG: 30 INJECTION, SOLUTION INTRAMUSCULAR at 15:39

## 2022-08-08 ASSESSMENT — ENCOUNTER SYMPTOMS
EYES NEGATIVE: 1
DIARRHEA: 0
NAUSEA: 0
VOMITING: 0
SHORTNESS OF BREATH: 0
ABDOMINAL PAIN: 0

## 2022-08-08 ASSESSMENT — PAIN - FUNCTIONAL ASSESSMENT: PAIN_FUNCTIONAL_ASSESSMENT: 0-10

## 2022-08-08 ASSESSMENT — PAIN SCALES - GENERAL: PAINLEVEL_OUTOF10: 8

## 2022-08-08 NOTE — Clinical Note
Lizz Poag was seen and treated in our emergency department on 8/8/2022. She may return to work on 08/10/2022. If you have any questions or concerns, please don't hesitate to call.       Vivi Temple, DO

## 2022-08-08 NOTE — ED PROVIDER NOTES
Elis Manrique is a 39-year-old female who presents to the emergency department for pelvic pain that started 5 days ago. Complaint is moderate in severity, constant, and worse when she bears down. She states that she was seen in the emergency department 2 days ago for the same complaint for dislodged IUD, was discharged with pain medication and was supposed to follow-up with OB yesterday. She states she called multiple times and was not able to get in, ran out of pain medication at home and since the pain is back she came back to the emergency department for evaluation. She also states that she is having some white discharge that is new for her over the past 5 days along with bleeding with nickel sized clots she notices when she urinates. She gets sporadic periods, last was 1 week ago, and is usually spotting. She denies fever, chills, chest pain, shortness of breath, nausea, vomiting, diarrhea, and urinary symptoms have improved since taking Omnicef outpatient. The history is provided by the patient. Review of Systems   Constitutional:  Negative for chills and fever. HENT: Negative. Eyes: Negative. Respiratory:  Negative for shortness of breath. Cardiovascular:  Negative for chest pain. Gastrointestinal:  Negative for abdominal pain, diarrhea, nausea and vomiting. Genitourinary:  Positive for pelvic pain (Radiates to lower quadrants in her back.), vaginal bleeding, vaginal discharge and vaginal pain. Musculoskeletal:  Negative for neck pain and neck stiffness. Skin: Negative. Neurological:  Negative for dizziness, light-headedness and headaches. Psychiatric/Behavioral: Negative. Physical Exam  Constitutional:       Appearance: Normal appearance. HENT:      Head: Normocephalic and atraumatic. Eyes:      Pupils: Pupils are equal, round, and reactive to light. Cardiovascular:      Rate and Rhythm: Normal rate and regular rhythm.    Pulmonary:      Effort: Pulmonary effort is normal.      Breath sounds: No wheezing, rhonchi or rales. Abdominal:      Palpations: Abdomen is soft. Tenderness: There is abdominal tenderness (Left lower and right lower quadrants. ). There is no right CVA tenderness, left CVA tenderness, guarding or rebound. Genitourinary:     General: Normal vulva. Vagina: Vaginal discharge (Milky white discharge) present. Comments: Pelvic exam-bright red oozing from cervix. No clots identified. No IUD identified. Musculoskeletal:      Cervical back: Normal range of motion. No rigidity. Skin:     General: Skin is warm and dry. Capillary Refill: Capillary refill takes less than 2 seconds. Neurological:      General: No focal deficit present. Mental Status: She is alert and oriented to person, place, and time. Procedures     MDM  Number of Diagnoses or Management Options  Complication of intrauterine device (IUD), unspecified complication, initial encounter [facility])  Pelvic pain  Vaginal bleeding  Diagnosis management comments: Patient presented the emergency department for pelvic pain after urgent care try to remove her IUD several days ago pulling the string out, was seen in the ED 2 days ago and was instructed to follow-up with OB/GYN outpatient. She reports she was not able to get in so she return to the emergency department for evaluation. Pelvic exam shows some mild oozing from the cervix opening with some thin white discharge to the vaginal vault. No clots. Labs are unremarkable and ultrasound of abdomen shows no changes to the location of the IUD. Discussed case with Dr. Favian Wooten, who will see the patient tomorrow morning in the office for evaluation. Patient is agreeable with plan for discharge to follow-up with him tomorrow outpatient at the office.   She is aware the signs and symptoms when to return the emergency department for evaluation.               --------------------------------------------- PAST HISTORY ---------------------------------------------  Past Medical History:  has a past medical history of Asperger syndrome and Autism disorder. Past Surgical History:  has a past surgical history that includes REMOVAL FOREIGN BODY EAR; pr lap,cholecystectomy/graph (N/A, 3/15/2018); Cholecystectomy; and Tonsillectomy and adenoidectomy (N/A, 7/22/2021). Social History:  reports that she has been smoking cigarettes. She has a 6.00 pack-year smoking history. She has never used smokeless tobacco. She reports current alcohol use. She reports that she does not use drugs. Family History: family history includes No Known Problems in her father and mother. The patients home medications have been reviewed.     Allergies: Doxycycline    -------------------------------------------------- RESULTS -------------------------------------------------    LABS:  Results for orders placed or performed during the hospital encounter of 08/08/22   Urinalysis with Microscopic   Result Value Ref Range    Color, UA DARK YELLOW (A) Straw/Yellow    Clarity, UA CLOUDY (A) Clear    Glucose, Ur Negative Negative mg/dL    Bilirubin Urine Negative Negative    Ketones, Urine Negative Negative mg/dL    Specific Gravity, UA 1.015 1.005 - 1.030    Blood, Urine Negative Negative    pH, UA 7.0 5.0 - 9.0    Protein, UA Negative Negative mg/dL    Urobilinogen, Urine 0.2 <2.0 E.U./dL    Nitrite, Urine Negative Negative    Leukocyte Esterase, Urine Negative Negative    WBC, UA NONE 0 - 5 /HPF    RBC, UA NONE 0 - 2 /HPF    Bacteria, UA RARE (A) None Seen /HPF   CBC with Auto Differential   Result Value Ref Range    WBC 8.9 4.5 - 11.5 E9/L    RBC 4.82 3.50 - 5.50 E12/L    Hemoglobin 14.8 11.5 - 15.5 g/dL    Hematocrit 41.7 34.0 - 48.0 %    MCV 86.5 80.0 - 99.9 fL    MCH 30.7 26.0 - 35.0 pg    MCHC 35.5 (H) 32.0 - 34.5 %    RDW 12.0 11.5 - 15.0 fL    Platelets 858 155 - 668 E9/L    MPV 9.9 7.0 - 12.0 fL    Neutrophils % 57.9 43.0 - 80.0 %    Immature Granulocytes % 0.6 0.0 - 5.0 %    Lymphocytes % 30.9 20.0 - 42.0 %    Monocytes % 6.1 2.0 - 12.0 %    Eosinophils % 3.5 0.0 - 6.0 %    Basophils % 1.0 0.0 - 2.0 %    Neutrophils Absolute 5.17 1.80 - 7.30 E9/L    Immature Granulocytes # 0.05 E9/L    Lymphocytes Absolute 2.75 1.50 - 4.00 E9/L    Monocytes Absolute 0.54 0.10 - 0.95 E9/L    Eosinophils Absolute 0.31 0.05 - 0.50 E9/L    Basophils Absolute 0.09 0.00 - 0.20 E9/L   Comprehensive Metabolic Panel w/ Reflex to MG   Result Value Ref Range    Sodium 136 132 - 146 mmol/L    Potassium reflex Magnesium 4.0 3.5 - 5.0 mmol/L    Chloride 102 98 - 107 mmol/L    CO2 23 22 - 29 mmol/L    Anion Gap 11 7 - 16 mmol/L    Glucose 105 (H) 74 - 99 mg/dL    BUN 9 6 - 20 mg/dL    Creatinine 0.5 0.5 - 1.0 mg/dL    GFR Non-African American >60 >=60 mL/min/1.73    GFR African American >60     Calcium 9.3 8.6 - 10.2 mg/dL    Total Protein 7.2 6.4 - 8.3 g/dL    Albumin 4.0 3.5 - 5.2 g/dL    Total Bilirubin 0.5 0.0 - 1.2 mg/dL    Alkaline Phosphatase 72 35 - 104 U/L    ALT 45 (H) 0 - 32 U/L    AST 37 (H) 0 - 31 U/L   POC Pregnancy Urine Qual   Result Value Ref Range    HCG, Urine, POC Negative Negative    Lot Number [id number]     Positive QC Pass/Fail Pass     Negative QC Pass/Fail Pass        RADIOLOGY:  Interpreted by Radiologist.  US PELVIS COMPLETE   Final Result   1. Low lying IUD again noted and extending into the cervix, when compared to   the previous transvaginal ultrasound performed 08/05/2022. The IUD is not as   well visualized as on the transvaginal approach. 2.  Simple left ovarian cyst again seen. ------------------------- NURSING NOTES AND VITALS REVIEWED ---------------------------   The nursing notes within the ED encounter and vital signs as below have been reviewed.    /80   Pulse 90   Temp 97 °F (36.1 °C)   Resp 18   Ht 5' 7\" (1.702 m)   Wt (!) 307 lb (139.3 kg)   SpO2 100%   BMI 48.08 kg/m²   Oxygen Saturation Interpretation: Normal      ------------------------------------------ PROGRESS NOTES ------------------------------------------   The emergency provider has spoken with the patient and discussed todays results, in addition to providing specific details for the plan of care and counseling regarding the diagnosis and prognosis. Questions are answered at this time and they are agreeable with the plan.    --------------------------------- ADDITIONAL PROVIDER NOTES ---------------------------------        This patient is stable for discharge. The emergency provider has shared the specific conditions for return, as well as the importance of follow-up.        --------------------------------- IMPRESSION AND DISPOSITION ---------------------------------    IMPRESSION  1. Complication of intrauterine device (IUD), unspecified complication[facility]    2. Pelvic pain    3.  Vaginal bleeding        DISPOSITION  Disposition: Discharge to home  Patient condition is good             DO Becca Snug  08/09/22 5195

## 2022-08-08 NOTE — Clinical Note
Jenaro Green was seen and treated in our emergency department on 8/8/2022. She may return to work on 08/10/2022. If you have any questions or concerns, please don't hesitate to call.       Manjinder Serrano, DO

## 2022-08-10 LAB
C TRACH DNA GENITAL QL NAA+PROBE: NEGATIVE
N. GONORRHOEAE DNA: NEGATIVE

## 2022-08-11 LAB — URINE CULTURE, ROUTINE: NORMAL

## 2022-09-22 ENCOUNTER — APPOINTMENT (OUTPATIENT)
Dept: GENERAL RADIOLOGY | Age: 22
End: 2022-09-22
Payer: COMMERCIAL

## 2022-09-22 ENCOUNTER — HOSPITAL ENCOUNTER (EMERGENCY)
Age: 22
Discharge: HOME | End: 2022-09-22
Attending: EMERGENCY MEDICINE
Payer: COMMERCIAL

## 2022-09-22 VITALS
HEART RATE: 97 BPM | OXYGEN SATURATION: 97 % | SYSTOLIC BLOOD PRESSURE: 100 MMHG | TEMPERATURE: 98.2 F | BODY MASS INDEX: 45.99 KG/M2 | RESPIRATION RATE: 16 BRPM | WEIGHT: 293 LBS | HEIGHT: 67 IN | DIASTOLIC BLOOD PRESSURE: 66 MMHG

## 2022-09-22 DIAGNOSIS — U07.1 COVID-19: Primary | ICD-10-CM

## 2022-09-22 LAB
ANION GAP SERPL CALCULATED.3IONS-SCNC: 10 MMOL/L (ref 7–16)
BASOPHILS ABSOLUTE: 0.07 E9/L (ref 0–0.2)
BASOPHILS RELATIVE PERCENT: 0.8 % (ref 0–2)
BUN BLDV-MCNC: 9 MG/DL (ref 6–20)
CALCIUM SERPL-MCNC: 9.2 MG/DL (ref 8.6–10.2)
CHLORIDE BLD-SCNC: 103 MMOL/L (ref 98–107)
CO2: 22 MMOL/L (ref 22–29)
CREAT SERPL-MCNC: 0.5 MG/DL (ref 0.5–1)
EOSINOPHILS ABSOLUTE: 0.23 E9/L (ref 0.05–0.5)
EOSINOPHILS RELATIVE PERCENT: 2.7 % (ref 0–6)
GFR AFRICAN AMERICAN: >60
GFR NON-AFRICAN AMERICAN: >60 ML/MIN/1.73
GLUCOSE BLD-MCNC: 124 MG/DL (ref 74–99)
HCT VFR BLD CALC: 41.6 % (ref 34–48)
HEMOGLOBIN: 14.7 G/DL (ref 11.5–15.5)
IMMATURE GRANULOCYTES #: 0.03 E9/L
IMMATURE GRANULOCYTES %: 0.4 % (ref 0–5)
LYMPHOCYTES ABSOLUTE: 2.96 E9/L (ref 1.5–4)
LYMPHOCYTES RELATIVE PERCENT: 35.1 % (ref 20–42)
MCH RBC QN AUTO: 30.1 PG (ref 26–35)
MCHC RBC AUTO-ENTMCNC: 35.3 % (ref 32–34.5)
MCV RBC AUTO: 85.1 FL (ref 80–99.9)
MONOCYTES ABSOLUTE: 0.72 E9/L (ref 0.1–0.95)
MONOCYTES RELATIVE PERCENT: 8.5 % (ref 2–12)
NEUTROPHILS ABSOLUTE: 4.43 E9/L (ref 1.8–7.3)
NEUTROPHILS RELATIVE PERCENT: 52.5 % (ref 43–80)
PDW BLD-RTO: 12 FL (ref 11.5–15)
PLATELET # BLD: 284 E9/L (ref 130–450)
PMV BLD AUTO: 10.1 FL (ref 7–12)
POTASSIUM REFLEX MAGNESIUM: 3.8 MMOL/L (ref 3.5–5)
RBC # BLD: 4.89 E12/L (ref 3.5–5.5)
SARS-COV-2, NAAT: DETECTED
SODIUM BLD-SCNC: 135 MMOL/L (ref 132–146)
WBC # BLD: 8.4 E9/L (ref 4.5–11.5)

## 2022-09-22 PROCEDURE — 85025 COMPLETE CBC W/AUTO DIFF WBC: CPT

## 2022-09-22 PROCEDURE — 96374 THER/PROPH/DIAG INJ IV PUSH: CPT

## 2022-09-22 PROCEDURE — 36415 COLL VENOUS BLD VENIPUNCTURE: CPT

## 2022-09-22 PROCEDURE — 80048 BASIC METABOLIC PNL TOTAL CA: CPT

## 2022-09-22 PROCEDURE — 99284 EMERGENCY DEPT VISIT MOD MDM: CPT

## 2022-09-22 PROCEDURE — 71045 X-RAY EXAM CHEST 1 VIEW: CPT

## 2022-09-22 PROCEDURE — 6360000002 HC RX W HCPCS: Performed by: EMERGENCY MEDICINE

## 2022-09-22 PROCEDURE — 2580000003 HC RX 258: Performed by: EMERGENCY MEDICINE

## 2022-09-22 PROCEDURE — 87635 SARS-COV-2 COVID-19 AMP PRB: CPT

## 2022-09-22 RX ORDER — BENZONATATE 100 MG/1
100 CAPSULE ORAL 2 TIMES DAILY PRN
Qty: 20 CAPSULE | Refills: 0 | Status: SHIPPED | OUTPATIENT
Start: 2022-09-22 | End: 2022-09-29

## 2022-09-22 RX ORDER — DEXTROMETHORPHAN HYDROBROMIDE, GUAIFENESIN 5; 100 MG/5ML; MG/5ML
20 LIQUID ORAL EVERY 4 HOURS PRN
Qty: 236 ML | Refills: 0 | Status: SHIPPED | OUTPATIENT
Start: 2022-09-22 | End: 2022-09-29

## 2022-09-22 RX ORDER — 0.9 % SODIUM CHLORIDE 0.9 %
1000 INTRAVENOUS SOLUTION INTRAVENOUS ONCE
Status: COMPLETED | OUTPATIENT
Start: 2022-09-22 | End: 2022-09-22

## 2022-09-22 RX ORDER — KETOROLAC TROMETHAMINE 30 MG/ML
30 INJECTION, SOLUTION INTRAMUSCULAR; INTRAVENOUS ONCE
Status: COMPLETED | OUTPATIENT
Start: 2022-09-22 | End: 2022-09-22

## 2022-09-22 RX ORDER — NAPROXEN 500 MG/1
500 TABLET ORAL 2 TIMES DAILY PRN
Qty: 60 TABLET | Refills: 0 | Status: SHIPPED | OUTPATIENT
Start: 2022-09-22 | End: 2022-10-25

## 2022-09-22 RX ORDER — ONDANSETRON 8 MG/1
8 TABLET, ORALLY DISINTEGRATING ORAL EVERY 8 HOURS PRN
Qty: 12 TABLET | Refills: 1 | Status: SHIPPED | OUTPATIENT
Start: 2022-09-22 | End: 2022-10-25

## 2022-09-22 RX ADMIN — SODIUM CHLORIDE 1000 ML: 9 INJECTION, SOLUTION INTRAVENOUS at 21:03

## 2022-09-22 RX ADMIN — KETOROLAC TROMETHAMINE 30 MG: 30 INJECTION, SOLUTION INTRAMUSCULAR; INTRAVENOUS at 21:03

## 2022-09-22 ASSESSMENT — PAIN DESCRIPTION - LOCATION: LOCATION: GENERALIZED

## 2022-09-22 ASSESSMENT — PAIN - FUNCTIONAL ASSESSMENT: PAIN_FUNCTIONAL_ASSESSMENT: 0-10

## 2022-09-22 NOTE — Clinical Note
Juice Sanchez was seen and treated in our emergency department on 9/22/2022. COVID19 virus is suspected. Per the CDC guidelines we recommend home isolation until the following conditions are all met:    1. At least five days have passed since symptoms first appeared and/or had a close exposure,   2. After home isolation for five days, wearing a mask around others for the next five days,  3. At least 24 have passed since last fever without the use of fever-reducing medications and  4. Symptoms (eg cough, shortness of breath) have improved    If you have any questions or concerns, please don't hesitate to call.     She may return to work/school on 09/29/2022        Monika Holguin MD

## 2022-09-23 ENCOUNTER — CARE COORDINATION (OUTPATIENT)
Dept: CARE COORDINATION | Age: 22
End: 2022-09-23

## 2022-09-23 NOTE — ED NOTES
Discharge instruction given to PT.  With follow up care information      Ginny Martinez, MIGUEL  09/22/22 4831

## 2022-09-23 NOTE — ED NOTES
The following labs labeled with pt sticker and tubed to lab:     [] Blue     [] Remonia Bouquet   [] on ice  [] Green/yellow  [] Green/black [] on ice  [] Yellow  [] Red  [] Pink      [x] COVID-19 swab    [x] Rapid  [] PCR  [] Flu Swab  [] Strep Swab  [] Peds Viral Panel     [] Urine Sample  [] Pelvic Cultures  [] Blood Cultures   [] Wound Cultures          Mariposa Isidro RN  09/22/22 1493

## 2022-09-23 NOTE — ED PROVIDER NOTES
HPI:  9/22/22,   Time: 9:19 PM EDT       Alaina Spencer is a 20 y.o. female presenting to the ED for covid sx, beginning 3 days ago. The complaint has been persistent, mild in severity, and worsened by nothing. Bib ems. Cough/congestion/achy muscles/fatigue. Home covid test pos yesrterday. Pos fever. Pos n/v/d. Non prod cough    Review of Systems:   Pertinent positives and negatives are stated within HPI, all other systems reviewed and are negative.          --------------------------------------------- PAST HISTORY ---------------------------------------------  Past Medical History:  has a past medical history of Asperger syndrome and Autism disorder. Past Surgical History:  has a past surgical history that includes REMOVAL FOREIGN BODY EAR; pr lap,cholecystectomy/graph (N/A, 3/15/2018); Cholecystectomy; and Tonsillectomy and adenoidectomy (N/A, 7/22/2021). Social History:  reports that she has been smoking cigarettes. She has a 6.00 pack-year smoking history. She has never used smokeless tobacco. She reports current alcohol use. She reports that she does not use drugs. Family History: family history includes No Known Problems in her father and mother. The patients home medications have been reviewed. Allergies: Doxycycline        ---------------------------------------------------PHYSICAL EXAM--------------------------------------    Constitutional/General: Alert and oriented x3, well appearing, non toxic in NAD  Head: Normocephalic and atraumatic  Eyes: PERRL, EOMI, conjunctive normal, sclera non icteric  Mouth: Oropharynx clear, handling secretions, no trismus, no asymmetry of the posterior oropharynx or uvular edema  Neck: Supple, full ROM, non tender to palpation in the midline, no stridor, no crepitus, no meningeal signs  Respiratory: Lungs clear to auscultation bilaterally, no wheezes, rales, or rhonchi. Not in respiratory distress  Cardiovascular:  Regular rate. Regular rhythm.  No murmurs, gallops, or rubs. 2+ distal pulses  Chest: No chest wall tenderness  GI:  Abdomen Soft, Non tender, Non distended. +BS. No organomegaly, no palpable masses,  No rebound, guarding, or rigidity. Musculoskeletal: Moves all extremities x 4. Warm and well perfused, no clubbing, cyanosis, or edema. Capillary refill <3 seconds  Integument: skin warm and dry. No rashes. Lymphatic: no lymphadenopathy noted  Neurologic: GCS 15, no focal deficits, symmetric strength 5/5 in the upper and lower extremities bilaterally  Psychiatric: Normal Affect    -------------------------------------------------- RESULTS -------------------------------------------------  I have personally reviewed all laboratory and imaging results for this patient. Results are listed below.      LABS:  Results for orders placed or performed during the hospital encounter of 09/22/22   COVID-19, Rapid    Specimen: Nasopharyngeal Swab   Result Value Ref Range    SARS-CoV-2, NAAT DETECTED (A) Not Detected   CBC with Auto Differential   Result Value Ref Range    WBC 8.4 4.5 - 11.5 E9/L    RBC 4.89 3.50 - 5.50 E12/L    Hemoglobin 14.7 11.5 - 15.5 g/dL    Hematocrit 41.6 34.0 - 48.0 %    MCV 85.1 80.0 - 99.9 fL    MCH 30.1 26.0 - 35.0 pg    MCHC 35.3 (H) 32.0 - 34.5 %    RDW 12.0 11.5 - 15.0 fL    Platelets 230 620 - 562 E9/L    MPV 10.1 7.0 - 12.0 fL    Neutrophils % 52.5 43.0 - 80.0 %    Immature Granulocytes % 0.4 0.0 - 5.0 %    Lymphocytes % 35.1 20.0 - 42.0 %    Monocytes % 8.5 2.0 - 12.0 %    Eosinophils % 2.7 0.0 - 6.0 %    Basophils % 0.8 0.0 - 2.0 %    Neutrophils Absolute 4.43 1.80 - 7.30 E9/L    Immature Granulocytes # 0.03 E9/L    Lymphocytes Absolute 2.96 1.50 - 4.00 E9/L    Monocytes Absolute 0.72 0.10 - 0.95 E9/L    Eosinophils Absolute 0.23 0.05 - 0.50 E9/L    Basophils Absolute 0.07 0.00 - 0.20 R2/J   Basic Metabolic Panel w/ Reflex to MG   Result Value Ref Range    Sodium 135 132 - 146 mmol/L    Potassium reflex Magnesium 3.8 3.5 - 5.0 mmol/L    Chloride 103 98 - 107 mmol/L    CO2 22 22 - 29 mmol/L    Anion Gap 10 7 - 16 mmol/L    Glucose 124 (H) 74 - 99 mg/dL    BUN 9 6 - 20 mg/dL    Creatinine 0.5 0.5 - 1.0 mg/dL    GFR Non-African American >60 >=60 mL/min/1.73    GFR African American >60     Calcium 9.2 8.6 - 10.2 mg/dL       RADIOLOGY:  Interpreted by Radiologist.  XR CHEST PORTABLE   Final Result   No acute process. EKG:  This EKG is signed and interpreted by the EP. Time:   Rate:   Rhythm:   Interpretation:   Comparison:       ------------------------- NURSING NOTES AND VITALS REVIEWED ---------------------------   The nursing notes within the ED encounter and vital signs as below have been reviewed by myself. /66   Pulse 97   Temp 98.2 °F (36.8 °C) (Oral)   Resp 16   Ht 5' 7\" (1.702 m)   Wt (!) 310 lb (140.6 kg)   SpO2 97%   BMI 48.55 kg/m²   Oxygen Saturation Interpretation: Normal    The patients available past medical records and past encounters were reviewed. ------------------------------ ED COURSE/MEDICAL DECISION MAKING----------------------  Medications   0.9 % sodium chloride bolus (0 mLs IntraVENous Stopped 9/22/22 2205)   ketorolac (TORADOL) injection 30 mg (30 mg IntraVENous Given 9/22/22 2103)         ED COURSE:       Medical Decision Making:    Non toxic, vss, low risk, dc with supp care      This patient's ED course included: a personal history and physicial examination    This patient has remained hemodynamically stable during their ED course. Re-Evaluations:             Re-evaluation. Patients symptoms are improving        Counseling: The emergency provider has spoken with the patient and discussed todays results, in addition to providing specific details for the plan of care and counseling regarding the diagnosis and prognosis.   Questions are answered at this time and they are agreeable with the plan.       --------------------------------- IMPRESSION AND DISPOSITION ---------------------------------    IMPRESSION  1. COVID-19        DISPOSITION  Disposition: Discharge to home  Patient condition is stable    NOTE: This report was transcribed using voice recognition software.  Every effort was made to ensure accuracy; however, inadvertent computerized transcription errors may be present        Wai Jensen MD  09/23/22 5898

## 2022-09-23 NOTE — ED NOTES
The following labs labeled with pt sticker and tubed to lab:     [] Blue     [x] Lavender   [] on ice  [x] Green/yellow  [] Green/black [] on ice  [] Yellow  [] Red  [] Pink      [] COVID-19 swab    [] Rapid  [] PCR  [] Flu Swab  [] Strep Swab  [] Peds Viral Panel     [] Urine Sample  [] Pelvic Cultures  [] Blood Cultures   [] Wound Cultures          Argentina Cassidy RN  09/22/22 2114

## 2022-09-23 NOTE — CARE COORDINATION
Date/Time:  9/23/2022 9:11 AM  Attempted to reach patient by telephone. Left HIPAA compliant message requesting a return call. Will attempt to reach patient again.

## 2022-09-26 NOTE — CARE COORDINATION
Date/Time:  9/26/2022 11:32 AM  Attempted to reach patient by telephone. Left HIPAA compliant message requesting a return call. Unable to contact X2 attempts, no further outreach.

## 2022-09-28 ENCOUNTER — TELEPHONE (OUTPATIENT)
Dept: SLEEP MEDICINE | Age: 22
End: 2022-09-28

## 2022-09-28 NOTE — TELEPHONE ENCOUNTER
Attempted to call pt to remind her of VV appt at 1100. MB full and could not leave message. Was able to leave SMS message. Link sent via text and email.

## 2022-10-25 ENCOUNTER — HOSPITAL ENCOUNTER (EMERGENCY)
Age: 22
Discharge: HOME | End: 2022-10-25
Attending: EMERGENCY MEDICINE
Payer: COMMERCIAL

## 2022-10-25 ENCOUNTER — HOSPITAL ENCOUNTER (OUTPATIENT)
Dept: ULTRASOUND IMAGING | Age: 22
Discharge: HOME | End: 2022-10-27
Payer: COMMERCIAL

## 2022-10-25 ENCOUNTER — HOSPITAL ENCOUNTER (OUTPATIENT)
Age: 22
Discharge: HOME | End: 2022-10-27
Payer: COMMERCIAL

## 2022-10-25 VITALS
RESPIRATION RATE: 16 BRPM | BODY MASS INDEX: 45.99 KG/M2 | HEART RATE: 92 BPM | WEIGHT: 293 LBS | OXYGEN SATURATION: 97 % | TEMPERATURE: 97.8 F | SYSTOLIC BLOOD PRESSURE: 118 MMHG | DIASTOLIC BLOOD PRESSURE: 63 MMHG | HEIGHT: 67 IN

## 2022-10-25 DIAGNOSIS — M79.604 RIGHT LEG PAIN: ICD-10-CM

## 2022-10-25 DIAGNOSIS — M79.604 RIGHT LEG PAIN: Primary | ICD-10-CM

## 2022-10-25 LAB
ANION GAP SERPL CALCULATED.3IONS-SCNC: 11 MMOL/L (ref 7–16)
APTT: 35.6 SEC (ref 24.5–35.1)
BASOPHILS ABSOLUTE: 0.08 E9/L (ref 0–0.2)
BASOPHILS RELATIVE PERCENT: 1 % (ref 0–2)
BUN BLDV-MCNC: 7 MG/DL (ref 6–20)
CALCIUM SERPL-MCNC: 9.3 MG/DL (ref 8.6–10.2)
CHLORIDE BLD-SCNC: 105 MMOL/L (ref 98–107)
CO2: 23 MMOL/L (ref 22–29)
CREAT SERPL-MCNC: 0.6 MG/DL (ref 0.5–1)
EOSINOPHILS ABSOLUTE: 0.26 E9/L (ref 0.05–0.5)
EOSINOPHILS RELATIVE PERCENT: 3.1 % (ref 0–6)
GFR SERPL CREATININE-BSD FRML MDRD: >60 ML/MIN/1.73
GLUCOSE BLD-MCNC: 96 MG/DL (ref 74–99)
HCG(URINE) PREGNANCY TEST: NEGATIVE
HCT VFR BLD CALC: 38.4 % (ref 34–48)
HEMOGLOBIN: 13.6 G/DL (ref 11.5–15.5)
IMMATURE GRANULOCYTES #: 0.03 E9/L
IMMATURE GRANULOCYTES %: 0.4 % (ref 0–5)
INR BLD: 1.2
LYMPHOCYTES ABSOLUTE: 3.36 E9/L (ref 1.5–4)
LYMPHOCYTES RELATIVE PERCENT: 40.7 % (ref 20–42)
MAGNESIUM: 1.9 MG/DL (ref 1.6–2.6)
MCH RBC QN AUTO: 29.5 PG (ref 26–35)
MCHC RBC AUTO-ENTMCNC: 35.4 % (ref 32–34.5)
MCV RBC AUTO: 83.3 FL (ref 80–99.9)
MONOCYTES ABSOLUTE: 0.66 E9/L (ref 0.1–0.95)
MONOCYTES RELATIVE PERCENT: 8 % (ref 2–12)
NEUTROPHILS ABSOLUTE: 3.87 E9/L (ref 1.8–7.3)
NEUTROPHILS RELATIVE PERCENT: 46.8 % (ref 43–80)
PDW BLD-RTO: 12.6 FL (ref 11.5–15)
PLATELET # BLD: 283 E9/L (ref 130–450)
PMV BLD AUTO: 10 FL (ref 7–12)
POTASSIUM REFLEX MAGNESIUM: 3.5 MMOL/L (ref 3.5–5)
PROTHROMBIN TIME: 13.7 SEC (ref 9.3–12.4)
RBC # BLD: 4.61 E12/L (ref 3.5–5.5)
SODIUM BLD-SCNC: 139 MMOL/L (ref 132–146)
WBC # BLD: 8.3 E9/L (ref 4.5–11.5)

## 2022-10-25 PROCEDURE — 80048 BASIC METABOLIC PNL TOTAL CA: CPT

## 2022-10-25 PROCEDURE — 81025 URINE PREGNANCY TEST: CPT

## 2022-10-25 PROCEDURE — 85730 THROMBOPLASTIN TIME PARTIAL: CPT

## 2022-10-25 PROCEDURE — 36415 COLL VENOUS BLD VENIPUNCTURE: CPT

## 2022-10-25 PROCEDURE — 83735 ASSAY OF MAGNESIUM: CPT

## 2022-10-25 PROCEDURE — 85610 PROTHROMBIN TIME: CPT

## 2022-10-25 PROCEDURE — 99283 EMERGENCY DEPT VISIT LOW MDM: CPT

## 2022-10-25 PROCEDURE — 85025 COMPLETE CBC W/AUTO DIFF WBC: CPT

## 2022-10-25 PROCEDURE — 93971 EXTREMITY STUDY: CPT

## 2022-10-25 ASSESSMENT — PAIN DESCRIPTION - PAIN TYPE: TYPE: ACUTE PAIN

## 2022-10-25 ASSESSMENT — PAIN - FUNCTIONAL ASSESSMENT
PAIN_FUNCTIONAL_ASSESSMENT: PREVENTS OR INTERFERES SOME ACTIVE ACTIVITIES AND ADLS
PAIN_FUNCTIONAL_ASSESSMENT: NONE - DENIES PAIN
PAIN_FUNCTIONAL_ASSESSMENT: 0-10

## 2022-10-25 ASSESSMENT — LIFESTYLE VARIABLES
HOW OFTEN DO YOU HAVE A DRINK CONTAINING ALCOHOL: MONTHLY OR LESS
HOW MANY STANDARD DRINKS CONTAINING ALCOHOL DO YOU HAVE ON A TYPICAL DAY: 1 OR 2

## 2022-10-25 ASSESSMENT — PAIN DESCRIPTION - FREQUENCY: FREQUENCY: CONTINUOUS

## 2022-10-25 ASSESSMENT — PAIN DESCRIPTION - DESCRIPTORS: DESCRIPTORS: ACHING

## 2022-10-25 ASSESSMENT — PAIN DESCRIPTION - LOCATION: LOCATION: LEG

## 2022-10-25 ASSESSMENT — PAIN SCALES - GENERAL: PAINLEVEL_OUTOF10: 3

## 2022-10-25 ASSESSMENT — PAIN DESCRIPTION - ONSET: ONSET: ON-GOING

## 2022-10-25 ASSESSMENT — PAIN DESCRIPTION - ORIENTATION: ORIENTATION: RIGHT

## 2022-10-27 ASSESSMENT — ENCOUNTER SYMPTOMS
ABDOMINAL PAIN: 0
SHORTNESS OF BREATH: 0
VOMITING: 0
EYE REDNESS: 0
NAUSEA: 0

## 2022-10-28 ENCOUNTER — APPOINTMENT (OUTPATIENT)
Dept: GENERAL RADIOLOGY | Age: 22
End: 2022-10-28
Payer: COMMERCIAL

## 2022-10-28 ENCOUNTER — HOSPITAL ENCOUNTER (EMERGENCY)
Age: 22
Discharge: HOME | End: 2022-10-28
Payer: COMMERCIAL

## 2022-10-28 VITALS
SYSTOLIC BLOOD PRESSURE: 121 MMHG | OXYGEN SATURATION: 99 % | HEART RATE: 95 BPM | RESPIRATION RATE: 16 BRPM | DIASTOLIC BLOOD PRESSURE: 80 MMHG | TEMPERATURE: 98.4 F
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
## 2022-10-28 PROCEDURE — 73130 X-RAY EXAM OF HAND: CPT

## 2022-10-28 PROCEDURE — 99283 EMERGENCY DEPT VISIT LOW MDM: CPT

## 2022-10-28 PROCEDURE — 73090 X-RAY EXAM OF FOREARM: CPT

## 2022-10-28 PROCEDURE — 6370000000 HC RX 637 (ALT 250 FOR IP): Performed by: NURSE PRACTITIONER

## 2022-10-28 RX ORDER — BACITRACIN ZINC 500 [USP'U]/G
OINTMENT TOPICAL ONCE
Status: DISCONTINUED | OUTPATIENT
Start: 2022-10-28 | End: 2022-10-28 | Stop reason: HOSPADM

## 2022-10-28 RX ORDER — IBUPROFEN 800 MG/1
800 TABLET ORAL EVERY 6 HOURS PRN
Qty: 21 TABLET | Refills: 0 | Status: SHIPPED | OUTPATIENT
Start: 2022-10-28

## 2022-10-28 RX ORDER — HYDROCODONE BITARTRATE AND ACETAMINOPHEN 5; 325 MG/1; MG/1
1 TABLET ORAL ONCE
Status: COMPLETED | OUTPATIENT
Start: 2022-10-28 | End: 2022-10-28

## 2022-10-28 RX ORDER — TETANUS AND DIPHTHERIA TOXOIDS ADSORBED 2; 2 [LF]/.5ML; [LF]/.5ML
0.5 INJECTION INTRAMUSCULAR ONCE
Status: DISCONTINUED | OUTPATIENT
Start: 2022-10-28 | End: 2022-10-28

## 2022-10-28 RX ORDER — AMOXICILLIN AND CLAVULANATE POTASSIUM 875; 125 MG/1; MG/1
1 TABLET, FILM COATED ORAL 2 TIMES DAILY
Qty: 14 TABLET | Refills: 0 | Status: SHIPPED | OUTPATIENT
Start: 2022-10-28 | End: 2022-11-04

## 2022-10-28 RX ADMIN — HYDROCODONE BITARTRATE AND ACETAMINOPHEN 1 TABLET: 5; 325 TABLET ORAL at 17:29

## 2022-10-28 ASSESSMENT — PAIN DESCRIPTION - LOCATION
LOCATION: HAND
LOCATION: HAND;ARM

## 2022-10-28 ASSESSMENT — PAIN - FUNCTIONAL ASSESSMENT: PAIN_FUNCTIONAL_ASSESSMENT: 0-10

## 2022-10-28 ASSESSMENT — PAIN DESCRIPTION - ORIENTATION
ORIENTATION: RIGHT
ORIENTATION: RIGHT;LEFT

## 2022-10-28 ASSESSMENT — PAIN SCALES - GENERAL
PAINLEVEL_OUTOF10: 4
PAINLEVEL_OUTOF10: 9

## 2022-10-28 NOTE — ED NOTES
Dog bite form faxed to [facility] Department with confirmation.       Donald Engle RN  10/28/22 3336

## 2022-10-28 NOTE — Clinical Note
Donna Hurtado was seen and treated in our emergency department on 10/28/2022. She may return to work on 10/30/2022. If you have any questions or concerns, please don't hesitate to call.       Lynn Gorman, APRN - CNP

## 2022-10-28 NOTE — ED PROVIDER NOTES
[facility]  HPI:  10/28/22,   Time: 7:15 PM EDT         Jorje Opitz is a 20 y.o. female presenting to the ED for dog bite, beginning prior to arrival ago. The complaint has been persistent, mild in severity, and worsened by nothing. Patient presents with a dog bite to the left fifth finger as well as to the right forearm from her own dog. She states the dog is up-to-date on shots. She is uncertain of her own tetanus status. She is right-hand dominant. All bleeding is currently controlled. She has visible puncture wound to the right forearm and a superficial puncture wound to the left fifth phalanx. ROS:   Pertinent positives and negatives are stated within HPI, all other systems reviewed and are negative.  --------------------------------------------- PAST HISTORY ---------------------------------------------  Past Medical History:  has a past medical history of Anxiety, Asperger syndrome, Autism disorder, Bipolar II disorder ([facility] and Depression. Past Surgical History:  has a past surgical history that includes REMOVAL FOREIGN BODY EAR; pr lap,cholecystectomy/graph (N/A, 3/15/2018); Cholecystectomy; and Tonsillectomy and adenoidectomy (N/A, 7/22/2021). Social History:  reports that she quit smoking about 5 weeks ago. Her smoking use included cigarettes. She has a 6.00 pack-year smoking history. She has never used smokeless tobacco. She reports current alcohol use. She reports that she does not use drugs. Family History: family history includes No Known Problems in her father and mother. The patients home medications have been reviewed. Allergies: Doxycycline    -------------------------------------------------- RESULTS -------------------------------------------------  All laboratory and radiology results have been personally reviewed by myself   LABS:  No results found for this visit on 10/28/22.     RADIOLOGY:  Interpreted by Radiologist.  XR RADIUS ULNA RIGHT (2 VIEWS) Final Result   No acute osseous abnormality. XR HAND LEFT (MIN 3 VIEWS)   Final Result   No acute osseous abnormality.             ------------------------- NURSING NOTES AND VITALS REVIEWED ---------------------------   The nursing notes within the ED encounter and vital signs as below have been reviewed. /80   Pulse 95   Temp 98.4 °F (36.9 °C)   Resp 16   LMP 10/10/2022 (Approximate)   SpO2 99%   Oxygen Saturation Interpretation: Normal      ---------------------------------------------------PHYSICAL EXAM--------------------------------------      Constitutional/General: Alert and oriented x3, well appearing, non toxic in NAD  Head: NC/AT  Eyes: PERRL, EOMI  Mouth: Oropharynx clear, handling secretions, no trismus  Neck: Supple, full ROM, no meningeal signs  Pulmonary: Lungs clear to auscultation bilaterally, no wheezes, rales, or rhonchi. Not in respiratory distress  Cardiovascular:  Regular rate and rhythm, no murmurs, gallops, or rubs. 2+ distal pulses  Abdomen: Soft, non tender, non distended,   Extremities: Moves all extremities x 4. Warm and well perfused, she has 2 superficial puncture wounds to the right forearm. Bleeding is currently controlled. She is full range of motion of the right elbow, right wrist.  Radial pulses palpable 2+ capillary fill less than 3 seconds. She is a superficial puncture wound to the volar surface of the distal left fifth phalanx. She has full range of motion with flexion extension. Capillary refill less than 3 seconds.   Skin: warm and dry without rash  Neurologic: GCS 15,  Psych: Normal Affect      ------------------------------ ED COURSE/MEDICAL DECISION MAKING----------------------  Medications   bacitracin zinc ointment (has no administration in time range)   HYDROcodone-acetaminophen (NORCO) 5-325 MG per tablet 1 tablet (1 tablet Oral Given 10/28/22 1729)         Medical Decision Making:    Tetanus was updated wound care instructions provided will be started on prophylactic antibiotics advised to follow-up with PCP. If any change or worsening symptoms he is advised to go directly to the emergency room. Counseling: The emergency provider has spoken with the patient and discussed todays results, in addition to providing specific details for the plan of care and counseling regarding the diagnosis and prognosis. Questions are answered at this time and they are agreeable with the plan.      --------------------------------- IMPRESSION AND DISPOSITION ---------------------------------    IMPRESSION  1.  Dog bite, initial encounter        DISPOSITION  Disposition: Discharge to home  Patient condition is good                  Shad Burgess, AZEEM - CNP  10/28/22 1916

## 2022-11-07 ENCOUNTER — OFFICE VISIT (OUTPATIENT)
Dept: SLEEP CENTER | Age: 22
End: 2022-11-07
Payer: COMMERCIAL

## 2022-11-07 VITALS
OXYGEN SATURATION: 98 % | WEIGHT: 293 LBS | SYSTOLIC BLOOD PRESSURE: 111 MMHG | HEART RATE: 93 BPM | RESPIRATION RATE: 16 BRPM | DIASTOLIC BLOOD PRESSURE: 77 MMHG | HEIGHT: 67 IN | BODY MASS INDEX: 45.99 KG/M2

## 2022-11-07 DIAGNOSIS — G47.00 INSOMNIA, UNSPECIFIED TYPE: ICD-10-CM

## 2022-11-07 DIAGNOSIS — E66.9 OBESITY, UNSPECIFIED CLASSIFICATION, UNSPECIFIED OBESITY TYPE, UNSPECIFIED WHETHER SERIOUS COMORBIDITY PRESENT: ICD-10-CM

## 2022-11-07 DIAGNOSIS — G47.33 OBSTRUCTIVE SLEEP APNEA: Primary | ICD-10-CM

## 2022-11-07 DIAGNOSIS — G47.19 EXCESSIVE DAYTIME SLEEPINESS: ICD-10-CM

## 2022-11-07 PROCEDURE — G8417 CALC BMI ABV UP PARAM F/U: HCPCS | Performed by: NURSE PRACTITIONER

## 2022-11-07 PROCEDURE — G8484 FLU IMMUNIZE NO ADMIN: HCPCS | Performed by: NURSE PRACTITIONER

## 2022-11-07 PROCEDURE — 99213 OFFICE O/P EST LOW 20 MIN: CPT | Performed by: NURSE PRACTITIONER

## 2022-11-07 PROCEDURE — G8427 DOCREV CUR MEDS BY ELIG CLIN: HCPCS | Performed by: NURSE PRACTITIONER

## 2022-11-07 RX ORDER — BUSPIRONE HYDROCHLORIDE 5 MG/1
5 TABLET ORAL DAILY
COMMUNITY

## 2022-11-07 ASSESSMENT — SLEEP AND FATIGUE QUESTIONNAIRES
HOW LIKELY ARE YOU TO NOD OFF OR FALL ASLEEP WHEN YOU ARE A PASSENGER IN A CAR FOR AN HOUR WITHOUT A BREAK: 3
HOW LIKELY ARE YOU TO NOD OFF OR FALL ASLEEP WHILE SITTING QUIETLY AFTER LUNCH WITHOUT ALCOHOL: 3
HOW LIKELY ARE YOU TO NOD OFF OR FALL ASLEEP WHILE SITTING AND TALKING TO SOMEONE: 0
HOW LIKELY ARE YOU TO NOD OFF OR FALL ASLEEP WHILE WATCHING TV: 3
ESS TOTAL SCORE: 15
HOW LIKELY ARE YOU TO NOD OFF OR FALL ASLEEP WHILE LYING DOWN TO REST IN THE AFTERNOON WHEN CIRCUMSTANCES PERMIT: 3
HOW LIKELY ARE YOU TO NOD OFF OR FALL ASLEEP WHILE SITTING AND READING: 2
HOW LIKELY ARE YOU TO NOD OFF OR FALL ASLEEP WHILE SITTING INACTIVE IN A PUBLIC PLACE: 1
HOW LIKELY ARE YOU TO NOD OFF OR FALL ASLEEP IN A CAR, WHILE STOPPED FOR A FEW MINUTES IN TRAFFIC: 0

## 2022-11-07 NOTE — PATIENT INSTRUCTIONS
Cognitive Behavioral Therapy for Insomnia Resources    Book: Say [place] to Insomnia by Dr. Peggy Choudhary ($12 on [facility])    [address] [facility] To Sleep Module ($40): [email or web address]

## 2022-11-07 NOTE — PROGRESS NOTES
the AM. Former smoker, quit in Sept of last year. Patient has been taking Vyvanse 70 mg daily for ADHD. She does not note an improvement with her energy levels with this medication. Reports a 20 pound weight gain this past year. Denies hypnogogic/hynopompic hallucinations, sleep paralysis, or cataplexy symptoms. Sleep Study History: No previous sleep study. Bed time: 9 :30 pm-- 11 pm     Wake time:  6 am, usually can't wake up until 11   Sleep Latency (min): An hour  Sleep Medications: None  Awakenings:  Rare / bathroom  / falls back asleep quickly   Estimated Sleep time (hours):  3-13  Daytime Naps: Here and there, one - two days a week. Sleep disturbances: None  Sleep Location: Bed  Sleep environment: Sleeps on side, and stomach  Occupation: [employer], working days and evenings (won't wake up until 11  if she is working afternoons)    [address] before bed: phone, up and down- listens to sleep sounds . Caffeine:   1 cup in the am  Coffee    0   Soda    0   Tea  Alcohol: rare  Tobacco: Vape, quit smoking in Sept.     Sleep ROS:     Snoring: Y - loud  Witnessed apneas: Unsure   AM Headache: Y, occasionally, feels more pressure behind her eyes  Dry Mouth: Y  Daytime Sleepiness: Y  Difficulty remembering things: Y  MVA  or near miss accident due to sleepiness in the past? N  Tonsillectomy? Y, removed last July   Have you lost or gained weight recently?  Gained about 20 pounds this year       PARASOMNIAS/ NARCOLEPSY:  Hypnogogic/Hynopompic Hallucinations: N   Sleep paralysis: N   Cataplexy: N   REM behavior symptoms: N  Nightmare: Y,   Sleep Walking: N  Sleep Talking: Y  RLS Symptoms: N    STOP-BANG score of 4/8 for  (+)snoring, (+)daytime fatigue, (-)observed apneas, (-)high blood pressure, (+)BMI>35, (-)age >47, (+)neck circumference >15in, (+)female gender      Sleep Medicine 11/7/2022   Sitting and reading 2   Watching TV 3   Sitting, inactive in a public place (e.g. a theatre or a meeting) 1   As a passenger in a car for an hour without a break 3   Lying down to rest in the afternoon when circumstances permit 3   Sitting and talking to someone 0   Sitting quietly after a lunch without alcohol 3   In a car, while stopped for a few minutes in traffic 0   D[place]ess Sleepiness Score 15   Neck circumference (Inches) 19         PMH:  Past Medical History:   Diagnosis Date    Anxiety     Asperger syndrome     Autism disorder     Bipolar II disorder ([facility] [address]     Depression         PSH:  Past Surgical History:   Procedure Laterality Date    CHOLECYSTECTOMY      WA LAP,CHOLECYSTECTOMY/GRAPH N/A 3/15/2018    CHOLECYSTECTOMY LAPAROSCOPIC WITH INTRAOP CHOLANGIOGRAM performed by Evelyn Zhu MD at [address] [place] [address] AND ADENOIDECTOMY N/A 2021    TONSILLECTOMY performed by Ashish Ruvalcaba DO at [place] [address] Hx:  Social History     Tobacco Use    Smoking status: Former     Packs/day: 1.00     Years: 6.00     Pack years: 6.00     Types: Cigarettes     Quit date: 2022     Years since quittin.1    Smokeless tobacco: Never    Tobacco comments:     vaping   Vaping Use    Vaping Use: Every day    Start date: 10/2/2015    Substances: Nicotine, Flavoring    Devices: Disposable, Pre-filled or refillable cartridge, Refillable tank, Pre-filled pod   Substance Use Topics    Alcohol use: Yes     Comment: twice monthly    Drug use: No        Fam Hx:  Family History   Problem Relation Age of Onset    No Known Problems Mother     No Known Problems Father         Current Outpatient Medications   Medication Sig Dispense Refill    VORTIoxetine (TRINTELLIX) 5 MG tablet Take 10 mg by mouth daily      busPIRone (BUSPAR) 5 MG tablet Take 5 mg by mouth daily      ibuprofen (ADVIL;MOTRIN) 800 MG tablet Take 1 tablet by mouth every 6 hours as needed for Pain 21 tablet 0    brexpiprazole (REXULTI) 1 MG TABS tablet Take 1 mg by mouth daily      VYVANSE 70 MG capsule TAKE ONE CAPSULE BY MOUTH DAILY FOR 30 DAYS      cyclobenzaprine (FLEXERIL) 10 MG tablet 3 times daily as needed      melatonin 5 MG TABS tablet 1 mg nightly      citalopram (CELEXA) 20 MG tablet TAKE ONE TABLET BY MOUTH DAILY WITH 10 MG TO EQUAL 30 MG DOSE. (Patient not taking: Reported on 11/7/2022)      fluticasone (FLONASE) 50 MCG/ACT nasal spray 2 sprays by Each Nostril route daily (Patient not taking: Reported on 11/7/2022) 16 g 0     No current facility-administered medications for this visit. Review of Systems  (Sleep ROS above)     Constitutional: no chills, no fever ,+ fatigue  Eyes: no blurred vision   Cardiovascular: no chest pain, no lower extremity edema. Respiratory: no cough, no shortness of breath   Gastrointestinal:  no nausea,  no vomiting, no diarrhea. Neurological:  no dizziness,  no headache, no memory changes  Endocrine: No feelings of weakness    Objective:   Physical Exam  /77 (Site: Left Upper Arm, Position: Sitting, Cuff Size: Large Adult)   Pulse 93   Resp 16   Ht 5' 7\" (1.702 m)   Wt (!) 323 lb 14.4 oz (146.9 kg)   LMP 10/10/2022 (Approximate)   SpO2 98%   BMI 50.73 kg/m²      Additional Measurements    11/07/22 1104   Neck circumference (Inches): 19       General: No acute distress. BMI of Body mass index is 50.73 kg/m². HEENT: Normocephalic, atraumatic. No oropharyngeal lesions. Neck circumference (Inches): 19  Mallampati class- 3/4  Tonsils- Surgically removed  Neck: Trachea midline  Lungs: Clear to auscultation bilaterally. No wheezes or crackles  Cardiac: Regular rate and rhythm. No murmurs appreciated. Neurologic: Normal gait. Balance intact. Psychiatric: Alert and oriented. Appropriate affect. PERTINENT LAB RESULTS  No results found for: TSH   No results found for: FERRITIN     Assessment:      There are no diagnoses linked to this encounter.    Plan:      Shady Mcdonald is a 20 y.o. female with  has a past medical history of Anxiety, Asperger syndrome, Autism disorder, Bipolar II disorder ([facility] [address] and Depression. She presents as a new patient to Sleep Clinic with symptoms suggestive of suspected obstructive sleep apnea (primarily \snoring, ). An in lab/home sleep study will be performed, followed by possible PAP therapy, if positive. 1. Obstructive Sleep Apnea     -Multiple risk factors with STOP-BANG 4/8. Will proceed with in-lab split-night polysomnography. Order placed today for [facility].   -Discussed pathophysiology of JACQUELINE and its impact on daily well-being, as well as cardiometabolic and neurocognitive health (particularly in moderate-severe cases). -Patient would like to follow up in office after sleep study is completed to further discuss options and to review sleep study.  -Counseled on risks of driving while drowsy.  -Provided handouts on JACQUELINE, and CPAP. -Informed patient to call office if she does not hear from sleep lab about scheduling within 1 to 2 weeks. 2. Excessive Daytime Sleepiness     -Elevated Page 15/24. Likely secondary to untreated JACQUELINE, as above, as well as underlying mental health disorders. Will assess for improvement with PAP therapy.  -Counseled on risks of driving while drowsy. 3. Chronic Sleep Initiation Insomnia     -Patient reports taking about an hour to fall asleep. May be multifactorial: poor sleep hygiene + untreated JACQUELINE + underlying anxiety+ psychophysiologic component.  -Sleep hygiene discussed.  -Introduced Cognitive Behavioral Therapy for Insomnia (first line therapy for psychophysiologic insomnia) as ideal way to manage insomnia symptoms.   -Provided self-guided resources for CBT-I, including Say Renetta to Insomnia by Dr. Jose L Soares, as well as the [facility] online module Go! To Sleep.   -Will assess for improvement with PAP therapy.   -Discussed sleep restriction, avoiding clock watching during the night.      4. Obesity (Body mass index is 50.73 kg/m².)     -Discussed impact of weight gain on JACQUELINE severity. Patient understands that JACQUELINE severity may improve with weight loss but no guarantee of cure can be made. Return in about 2 months (around 1/7/2023) for Sleep Study Results.     Tanvir Chase, APRN-CNP  [address] [place] [address]  P -[phone] option 2  - [phone]

## 2022-11-09 ENCOUNTER — TELEPHONE (OUTPATIENT)
Dept: SLEEP CENTER | Age: 22
End: 2022-11-09

## 2022-11-09 NOTE — TELEPHONE ENCOUNTER
Called patient to schedule sleep study, no answer, mailbox was full could not leave message. Sent SMS message.

## 2022-11-17 ENCOUNTER — HOSPITAL ENCOUNTER (OUTPATIENT)
Dept: SLEEP CENTER | Age: 22
Discharge: HOME | End: 2022-11-17
Payer: COMMERCIAL

## 2022-11-17 DIAGNOSIS — G47.33 OBSTRUCTIVE SLEEP APNEA: ICD-10-CM

## 2022-11-17 PROCEDURE — 95810 POLYSOM 6/> YRS 4/> PARAM: CPT

## 2022-11-25 PROCEDURE — 95810 POLYSOM 6/> YRS 4/> PARAM: CPT | Performed by: INTERNAL MEDICINE

## 2022-11-25 NOTE — PROCEDURES
[address] [place] [address], [address] [place] [address]              DIAGNOSTIC POLYSOMNOGRAPHY SLEEP STUDY REPORT       PATIENT NAME: Jessica Calix               : 2000        MED REC NO:  [id number]     ACCOUNT NO:   [de-identified]  PROVIDER:  Winston Dimas DO  DATE OF STUDY: 2022     SERVICE PROVIDER:  Valente Pham     REFERRING PROVIDER:   Christi HUTSON    AGE: 20 y.o.  yrs      SEX: female           HEIGHT: 77 in         WEIGHT: 320 lbs          BMI: 51.6 kg/m2    NECK CIRCUMFERENCE: 18.5 in    Symptoms: Loud snoring, morning headaches, restless/achy legs, difficulty initiating sleep, sleep paralysis, sleep inertia. The [place] Sleepiness Scale was 5 out of 24 (scores above or equal to 10 are suggestive of hypersomnolence). Indication: Evaluate for obstructive sleep apnea. Medical History: Weight gain, depression, claustrophobia. Medications: Vortioxetine, buspirone, brexpiprazole, lisdexamfetamine, cyclobenzaprine, melatonin. DESCRIPTION: This full night polysomnogram consisted of EEG, EOG, EMG and 2-lead ECG monitoring. Oronasal airflow (nasal pressure transducer and thermistor), chest and abdominal efforts by respiratory inductance plethysmography or polyvinylidene fluoride (PVDF) sensor, and pulse oximetry were monitored as well. Hypopneas were scored as at least a 30% reduction in amplitude of the semi-quantitative flow signal, associated with a 4% or greater oxygen desaturation. Respiratory effort related arousals (RERAs) were scored as at least a 30% reduction in amplitude of the semi-quantitative flow signal, associated with an EEG microarousal.     FINDINGS:  SLEEP CONTINUITY AND SLEEP ARCHITECTURE:  Lights out at 10:10:05 PM and lights were turned on at 5:00:17 AM, for a total recording time (TRT) of 410.2 minutes.  The sleep period lasted 375.1 minutes and the total sleep time (TST) was 346.5 minutes, which resulted in a sleep efficiency (TST÷TRT) of 84.5%. The sleep latency (SL) was 35.1 minutes, and the latency to the first occurrence of Stage R was 125.5 minutes. There were 2 Stage R periods observed on this study night, 31 awakenings (i.e. transitions to Stage W from any sleep stage), and 87 total stage transitions. Wake after sleep onset (WASO) time accounted for 28.6 minutes, while the time spent is each sleep stage was 14.5 minutes (Stage N1); 149.5 minutes (Stage N2); 133.0 minutes (Stage N3); and 49.5 minutes (Stage R). The percentage of Total Sleep Time in each stage was: 4.2% (Stage N1); 43.1% (Stage N2); 38.4% (Stage N3); and 14.3% (Stage R). Sleep architecture was abnormal, increased stage 3 and decreased REM sleep. The microarousal index was normal at 9.5. RESPIRATORY MONITORING:  The patient experienced 1 apnea in total. Of those, 0 were identified as obstructive apneas, 0 were mixed apneas, and 1 were central apneas. This resulted in an apnea index (AI) of 0.2 apneas/hour of sleep. The patient experienced 16 hypopneas in total, which resulted in a hypopnea index (HI) of 2.8 hypopneas/hr. The overall apnea-hypopnea index (AHI) was 2.9 events/hr, while the AHI during Stage R sleep was 17.0 /hr. AHI results by body-position showed: supine AHI = 4.4 /hr; right-side AHI = N/A /hr; left-side AHI = 1.2 /hr; and prone AHI = N/A /hr. For other respiratory disturbances, there were 0 occurrences of Cheyne Wilson breathing, and 0 respiratory effort-related arousals (RERAs). The RERA index was 0.0 events/hr, and the total respiratory disturbance index was 2.9 events/hr. OXIMETRY: Analysis of continuous oxygen saturations showed a mean SpO2 value of 94.5% throughout the study, with a minimum oxygen saturation during sleep of 84.0% and a mean value of 94.4% for the same period. Oxygen saturations were below ?  88% for 1.5 minutes of the time spent asleep. Moderate snoring was noted. EEG: No abnormal epileptiform activity was observed. ECG: The electrocardiogram documented normal sinus rhythm. The average heart rate during sleep was 84 beats per minute. PERIODIC LIMB MOVEMENTS: Few periodic limb movements were noted. EMG/VIDEO MONITORING: Loss of REM atonia, bruxism, and parasomnias were not observed. IMPRESSION:    1. The overall AHI is 2.9/hr therefore not meeting criteria for diagnosis of JACQUELINE. However, during the first REM period when the patient was supine, significantly increased respiratory events were noted. 2.  The patient had increased N3 sleep with prolonged amount of N3 at the beginning of the study, which could be due to chronic sleep deprivation or medication side effects. 3.  Decreased REM sleep noted, 14% of sleep was REM. 4.  A split night study was not performed due to not meeting AHI criteria. RECOMMENDATIONS:    1. N3 rebound and decreased REM sleep may have affected sleep study results. A repeat sleep study could be considered in the future. 2.  Recommend avoidance of supine sleep.

## 2022-12-05 ENCOUNTER — TELEPHONE (OUTPATIENT)
Dept: SLEEP CENTER | Age: 22
End: 2022-12-05

## 2022-12-05 NOTE — TELEPHONE ENCOUNTER
Reviewed sleep study results with patient. No evidence of sleep disordered breathing (overall AHI 2.9). Supine REM sleep showed significant increased respiratory events. REM sleep was slightly decreased. Verbalized these results with patient, advised to avoid supine sleep. Discussed potentially repeating a sleep study in the future given the decreased REM sleep seen, as well as potential night to night variability. At this time, patient would like to wait before proceeding with another sleep study. If weight gain, increased snoring, increased nocturnal awakenings, daytime fatigue, or drowsy driving occur, advised to repeat sleep study.

## 2022-12-14 ENCOUNTER — APPOINTMENT (OUTPATIENT)
Dept: CT IMAGING | Age: 22
End: 2022-12-14
Payer: COMMERCIAL

## 2022-12-14 ENCOUNTER — HOSPITAL ENCOUNTER (EMERGENCY)
Age: 22
Discharge: HOME | End: 2022-12-14
Attending: EMERGENCY MEDICINE
Payer: COMMERCIAL

## 2022-12-14 VITALS
OXYGEN SATURATION: 99 % | DIASTOLIC BLOOD PRESSURE: 88 MMHG | RESPIRATION RATE: 16 BRPM | WEIGHT: 293 LBS | BODY MASS INDEX: 50.59 KG/M2 | SYSTOLIC BLOOD PRESSURE: 132 MMHG | TEMPERATURE: 98 F | HEART RATE: 95 BPM

## 2022-12-14 DIAGNOSIS — R51.9 ACUTE NONINTRACTABLE HEADACHE, UNSPECIFIED HEADACHE TYPE: Primary | ICD-10-CM

## 2022-12-14 LAB
ALBUMIN SERPL-MCNC: 4.4 G/DL (ref 3.5–5.2)
ALP BLD-CCNC: 85 U/L (ref 35–104)
ALT SERPL-CCNC: 52 U/L (ref 0–32)
ANION GAP SERPL CALCULATED.3IONS-SCNC: 11 MMOL/L (ref 7–16)
AST SERPL-CCNC: 35 U/L (ref 0–31)
BASOPHILS ABSOLUTE: 0.09 E9/L (ref 0–0.2)
BASOPHILS RELATIVE PERCENT: 1 % (ref 0–2)
BILIRUB SERPL-MCNC: 0.4 MG/DL (ref 0–1.2)
BILIRUBIN URINE: NEGATIVE
BLOOD, URINE: NEGATIVE
BUN BLDV-MCNC: 9 MG/DL (ref 6–20)
CALCIUM SERPL-MCNC: 9.6 MG/DL (ref 8.6–10.2)
CHLORIDE BLD-SCNC: 103 MMOL/L (ref 98–107)
CLARITY: CLEAR
CO2: 24 MMOL/L (ref 22–29)
COLOR: YELLOW
CREAT SERPL-MCNC: 0.5 MG/DL (ref 0.5–1)
EOSINOPHILS ABSOLUTE: 0.24 E9/L (ref 0.05–0.5)
EOSINOPHILS RELATIVE PERCENT: 2.5 % (ref 0–6)
GFR SERPL CREATININE-BSD FRML MDRD: >60 ML/MIN/1.73
GLUCOSE BLD-MCNC: 115 MG/DL (ref 74–99)
GLUCOSE URINE: NEGATIVE MG/DL
HCG(URINE) PREGNANCY TEST: NEGATIVE
HCT VFR BLD CALC: 43.3 % (ref 34–48)
HEMOGLOBIN: 15.4 G/DL (ref 11.5–15.5)
IMMATURE GRANULOCYTES #: 0.05 E9/L
IMMATURE GRANULOCYTES %: 0.5 % (ref 0–5)
INFLUENZA A: NOT DETECTED
INFLUENZA B: NOT DETECTED
KETONES, URINE: NEGATIVE MG/DL
LEUKOCYTE ESTERASE, URINE: NEGATIVE
LYMPHOCYTES ABSOLUTE: 2.71 E9/L (ref 1.5–4)
LYMPHOCYTES RELATIVE PERCENT: 28.6 % (ref 20–42)
MCH RBC QN AUTO: 29.7 PG (ref 26–35)
MCHC RBC AUTO-ENTMCNC: 35.6 % (ref 32–34.5)
MCV RBC AUTO: 83.6 FL (ref 80–99.9)
MONOCYTES ABSOLUTE: 0.51 E9/L (ref 0.1–0.95)
MONOCYTES RELATIVE PERCENT: 5.4 % (ref 2–12)
NEUTROPHILS ABSOLUTE: 5.86 E9/L (ref 1.8–7.3)
NEUTROPHILS RELATIVE PERCENT: 62 % (ref 43–80)
NITRITE, URINE: NEGATIVE
PDW BLD-RTO: 12.3 FL (ref 11.5–15)
PH UA: 6.5 (ref 5–9)
PLATELET # BLD: 311 E9/L (ref 130–450)
PMV BLD AUTO: 10.1 FL (ref 7–12)
POTASSIUM SERPL-SCNC: 4.3 MMOL/L (ref 3.5–5)
PROTEIN UA: NEGATIVE MG/DL
RBC # BLD: 5.18 E12/L (ref 3.5–5.5)
SARS-COV-2 RNA, RT PCR: NOT DETECTED
SODIUM BLD-SCNC: 138 MMOL/L (ref 132–146)
SPECIFIC GRAVITY UA: 1.02 (ref 1–1.03)
TOTAL PROTEIN: 7.7 G/DL (ref 6.4–8.3)
UROBILINOGEN, URINE: 0.2 E.U./DL
WBC # BLD: 9.5 E9/L (ref 4.5–11.5)

## 2022-12-14 PROCEDURE — 36415 COLL VENOUS BLD VENIPUNCTURE: CPT

## 2022-12-14 PROCEDURE — 85025 COMPLETE CBC W/AUTO DIFF WBC: CPT

## 2022-12-14 PROCEDURE — 81025 URINE PREGNANCY TEST: CPT

## 2022-12-14 PROCEDURE — 6360000002 HC RX W HCPCS: Performed by: STUDENT IN AN ORGANIZED HEALTH CARE EDUCATION/TRAINING PROGRAM

## 2022-12-14 PROCEDURE — 87088 URINE BACTERIA CULTURE: CPT

## 2022-12-14 PROCEDURE — 81003 URINALYSIS AUTO W/O SCOPE: CPT

## 2022-12-14 PROCEDURE — 99284 EMERGENCY DEPT VISIT MOD MDM: CPT

## 2022-12-14 PROCEDURE — 96372 THER/PROPH/DIAG INJ SC/IM: CPT

## 2022-12-14 PROCEDURE — 80053 COMPREHEN METABOLIC PANEL: CPT

## 2022-12-14 PROCEDURE — 6370000000 HC RX 637 (ALT 250 FOR IP): Performed by: STUDENT IN AN ORGANIZED HEALTH CARE EDUCATION/TRAINING PROGRAM

## 2022-12-14 PROCEDURE — 87636 SARSCOV2 & INF A&B AMP PRB: CPT

## 2022-12-14 RX ORDER — ACETAMINOPHEN 325 MG/1
650 TABLET ORAL ONCE
Status: COMPLETED | OUTPATIENT
Start: 2022-12-14 | End: 2022-12-14

## 2022-12-14 RX ORDER — ONDANSETRON 4 MG/1
4 TABLET, ORALLY DISINTEGRATING ORAL EVERY 8 HOURS PRN
Qty: 21 TABLET | Refills: 0 | Status: SHIPPED | OUTPATIENT
Start: 2022-12-14

## 2022-12-14 RX ORDER — ONDANSETRON 4 MG/1
4 TABLET, ORALLY DISINTEGRATING ORAL ONCE
Status: COMPLETED | OUTPATIENT
Start: 2022-12-14 | End: 2022-12-14

## 2022-12-14 RX ORDER — KETOROLAC TROMETHAMINE 30 MG/ML
30 INJECTION, SOLUTION INTRAMUSCULAR; INTRAVENOUS ONCE
Status: COMPLETED | OUTPATIENT
Start: 2022-12-14 | End: 2022-12-14

## 2022-12-14 RX ADMIN — KETOROLAC TROMETHAMINE 30 MG: 30 INJECTION, SOLUTION INTRAMUSCULAR; INTRAVENOUS at 13:56

## 2022-12-14 RX ADMIN — ONDANSETRON 4 MG: 4 TABLET, ORALLY DISINTEGRATING ORAL at 13:57

## 2022-12-14 RX ADMIN — ACETAMINOPHEN 650 MG: 325 TABLET ORAL at 13:57

## 2022-12-14 ASSESSMENT — ENCOUNTER SYMPTOMS
ABDOMINAL PAIN: 0
BACK PAIN: 0
COUGH: 0
COLOR CHANGE: 0
BLOOD IN STOOL: 0
VOMITING: 0
RHINORRHEA: 0
NAUSEA: 1
DIARRHEA: 0
SHORTNESS OF BREATH: 0
CHEST TIGHTNESS: 0
SORE THROAT: 0
CONSTIPATION: 0
PHOTOPHOBIA: 1
ABDOMINAL DISTENTION: 0
EYE PAIN: 0

## 2022-12-14 ASSESSMENT — PAIN - FUNCTIONAL ASSESSMENT: PAIN_FUNCTIONAL_ASSESSMENT: 0-10

## 2022-12-14 ASSESSMENT — PAIN DESCRIPTION - ONSET: ONSET: ON-GOING

## 2022-12-14 ASSESSMENT — PAIN DESCRIPTION - LOCATION: LOCATION: HEAD

## 2022-12-14 ASSESSMENT — PAIN DESCRIPTION - FREQUENCY: FREQUENCY: CONTINUOUS

## 2022-12-14 ASSESSMENT — PAIN SCALES - GENERAL
PAINLEVEL_OUTOF10: 10
PAINLEVEL_OUTOF10: 5

## 2022-12-14 ASSESSMENT — LIFESTYLE VARIABLES
HOW OFTEN DO YOU HAVE A DRINK CONTAINING ALCOHOL: NEVER
HOW MANY STANDARD DRINKS CONTAINING ALCOHOL DO YOU HAVE ON A TYPICAL DAY: PATIENT DOES NOT DRINK

## 2022-12-14 ASSESSMENT — PAIN DESCRIPTION - DESCRIPTORS: DESCRIPTORS: POUNDING

## 2022-12-14 ASSESSMENT — PAIN DESCRIPTION - PAIN TYPE: TYPE: ACUTE PAIN

## 2022-12-14 NOTE — ED NOTES
Department of Emergency Medicine  FIRST PROVIDER TRIAGE NOTE             Independent MLP           12/14/22  11:40 AM EST    Date of Encounter: 12/14/22   MRN: [id number]      HPI: Lily Jones is a 20 y.o. female who presents to the ED for Headache (Headache, feels like she wants to throw up)       ROS: Negative for cp or sob. PE: Gen Appearance/Constitutional: alert  HEENT: NC/NT. PERRLA,  Airway patent. Initial Plan of Care: All treatment areas with department are currently occupied. Plan to order/Initiate the following while awaiting opening in ED: labs and imaging studies.   Initiate Treatment-Testing, Proceed toTreatment Area When Bed Available for ED Attending/MLP to Continue Care    Electronically signed by AZEEM Malin CNP   DD: 12/14/22       AZEEM Carter CNP  12/14/22 5925

## 2022-12-14 NOTE — ED PROVIDER NOTES
Name: Naif Hendrix    MRN: [id number]     Date / Time Roomed:  12/14/2022  1:19 PM  ED Bed Assignment:  09/09    ------------------ History of Present Illness --------------------  12/14/22, Time: 1:32 PM EST   Chief Complaint   Patient presents with    Headache     Headache, feels like she wants to throw up      HPI    Naif Hendrix is a 20 y.o. female, with hx of anxiety, previous headaches, depression, bipolar, who presents to the ED today for headache which started this morning about 9 AM.  Patient relates headache started suddenly has been constant, 5 out of 10, feels dull and achy, with light and sound sensitivity. She states her normal headaches which she gets 4-5 times weekly are usually sharp in nature. She states her headaches are usually sudden in onset previously. She does have some nausea with this however no vomiting. Denies any vision changes, weakness or paresthesias. Symptoms been constant, moderate in severity, no exacerbating relieving factors. She did try to take some ibuprofen earlier this morning which did not seem to help much. The pt denies other ROS at this time. PCP: Marita Ewing DO.    -------------------- PMH --------------------  Past Medical History:  has a past medical history of Anxiety, Asperger syndrome, Autism disorder, Bipolar II disorder ([facility] [address] and Depression. Past Surgical History:  has a past surgical history that includes REMOVAL FOREIGN BODY EAR; pr lap,cholecystectomy/graph (N/A, 3/15/2018); Cholecystectomy; and Tonsillectomy and adenoidectomy (N/A, 7/22/2021). Social History:  reports that she quit smoking about 2 months ago. Her smoking use included cigarettes. She has a 6.00 pack-year smoking history. She has never used smokeless tobacco. She reports current alcohol use. She reports that she does not use drugs. Family History: family history includes No Known Problems in her father and mother.      Allergies: Doxycycline    The patients past medical history has been reviewed. -------------------- Current Meds --------------------  Meds: No current facility-administered medications for this encounter. Current Outpatient Medications:     ondansetron (ZOFRAN-ODT) 4 MG disintegrating tablet, Take 1 tablet by mouth every 8 hours as needed for Nausea or Vomiting, Disp: 21 tablet, Rfl: 0    VORTIoxetine (TRINTELLIX) 5 MG tablet, Take 10 mg by mouth daily, Disp: , Rfl:     busPIRone (BUSPAR) 5 MG tablet, Take 5 mg by mouth daily, Disp: , Rfl:     ibuprofen (ADVIL;MOTRIN) 800 MG tablet, Take 1 tablet by mouth every 6 hours as needed for Pain, Disp: 21 tablet, Rfl: 0    brexpiprazole (REXULTI) 1 MG TABS tablet, Take 1 mg by mouth daily, Disp: , Rfl:     VYVANSE 70 MG capsule, TAKE ONE CAPSULE BY MOUTH DAILY FOR 30 DAYS, Disp: , Rfl:     citalopram (CELEXA) 20 MG tablet, TAKE ONE TABLET BY MOUTH DAILY WITH 10 MG TO EQUAL 30 MG DOSE. (Patient not taking: No sig reported), Disp: , Rfl:     cyclobenzaprine (FLEXERIL) 10 MG tablet, 3 times daily as needed, Disp: , Rfl:     melatonin 5 MG TABS tablet, 1 mg nightly, Disp: , Rfl:     fluticasone (FLONASE) 50 MCG/ACT nasal spray, 2 sprays by Each Nostril route daily (Patient not taking: No sig reported), Disp: 16 g, Rfl: 0     The patients home medications have been reviewed. -------------------- ROS --------------------  Review of Systems   Constitutional:  Negative for chills, fatigue and fever. HENT:  Negative for congestion, rhinorrhea and sore throat. Eyes:  Positive for photophobia. Negative for pain and visual disturbance. Respiratory:  Negative for cough, chest tightness and shortness of breath. Cardiovascular:  Negative for chest pain, palpitations and leg swelling. Gastrointestinal:  Positive for nausea. Negative for abdominal distention, abdominal pain, blood in stool, constipation, diarrhea and vomiting.    Genitourinary:  Negative for difficulty urinating, dysuria, hematuria, vaginal bleeding and vaginal discharge. Musculoskeletal:  Negative for back pain, neck pain and neck stiffness. Skin:  Negative for color change, rash and wound. Neurological:  Positive for headaches (5/10, achy dull). Negative for dizziness, syncope, weakness and light-headedness. Psychiatric/Behavioral:  Negative for agitation, behavioral problems and confusion.       -------------------- PE --------------------  Physical Exam  Constitutional:       General: She is not in acute distress. Appearance: Normal appearance. She is obese. She is not ill-appearing, toxic-appearing or diaphoretic. HENT:      Head: Normocephalic and atraumatic. Right Ear: External ear normal.      Left Ear: External ear normal.      Nose: Nose normal. No rhinorrhea. Mouth/Throat:      Pharynx: Oropharynx is clear. Eyes:      General: No scleral icterus. Right eye: No discharge. Left eye: No discharge. Extraocular Movements: Extraocular movements intact. Conjunctiva/sclera: Conjunctivae normal.      Pupils: Pupils are equal, round, and reactive to light. Cardiovascular:      Rate and Rhythm: Normal rate and regular rhythm. Pulses: Normal pulses. Pulmonary:      Effort: Pulmonary effort is normal. No respiratory distress. Breath sounds: Normal breath sounds. No stridor. Abdominal:      General: Abdomen is flat. There is no distension. Palpations: Abdomen is soft. Tenderness: There is no abdominal tenderness. There is no guarding. Musculoskeletal:         General: No swelling or tenderness. Normal range of motion. Cervical back: Normal range of motion. Right lower leg: No edema. Left lower leg: No edema. Skin:     General: Skin is warm and dry. Capillary Refill: Capillary refill takes less than 2 seconds. Coloration: Skin is not jaundiced. Findings: No erythema or rash. Neurological:      General: No focal deficit present.       Mental Status: She is alert and oriented to person, place, and time. Cranial Nerves: No cranial nerve deficit. Sensory: No sensory deficit. Motor: No weakness. Coordination: Coordination normal.      Gait: Gait normal.   Psychiatric:         Mood and Affect: Mood normal.         Behavior: Behavior normal.      NIH Stroke Scale/Score at time of initial evaluation:  1A: Level of Consciousness 0 - alert; keenly responsive   1B: Ask Month and Age 0 - answers both questions correctly   1C: Tell Patient To Open and Close Eyes, then Hand  Squeeze 0 - performs both tasks correctly   2: Test Horizontal Extraocular Movements 0 - normal   3: Test Visual Fields 0 - no visual loss   4: Test Facial Palsy 0 - normal symmetric movement   5A: Test Left Arm Motor Drift 0 - no drift, limb holds 90 (or 45) degrees for full 10 seconds   5B: Test Right Arm Motor Drift 0 - no drift, limb holds 90 (or 45) degrees for full 10 seconds   6A: Test Left Leg Motor Drift 0 - no drift; leg holds 30 degree position for full 5 seconds   6B: Test Right Leg Motor Drift 0 - no drift; leg holds 30 degree position for full 5 seconds   7: Test Limb Ataxia   (FNF/Heel-Shin) 0 - absent   8: Test Sensation 0 - normal; no sensory loss   9: Test Language/Aphasia 0 - no aphasia, normal   10: Test Dysarthria 0 - normal   11: Test Extinction/Inattention 0 - no abnormality   Total Score: 0   12/14/22 at 1:30 PM EST.      --------------- External Imaging -------------    -------------------- Procedures --------------------    -------------------- MDM --------------------    Pt presented to ED today for moderate headache. Pt alert, oriented, no distress. Vitals stable. NIH 0. No focal deficits. No dis-coordination. Pt ambulating w/o difficulty. Diagnoses considered, but not limited to, include migraine, dehydration, covid / influenza. Intracranial abnormality felt unlikely at this time, advanced imaging felt unnecessary.  Pt overall well appearing, describes HA as only moderate, was sudden in onset however this is typical of her previous headaches. BP wnl. No focal deficits. Labwork ordered, including, but not limited to, CBC, CMP, UA, Urine preg, and COVID & Influenza were were unremarkable and reassuring. Medications given included PO Tylenol (PO). IM toradol. Zofran (PO). Pt's symptoms were much improved after treatment. States HA nearly gone and is feeling well enough to go home. Vitals remained stable. Spoke with pt about results. Further workup felt not indicated at this time. Recommended f/u with PCP. Return precautions were given. Pt amenable to plan and was d/c'd home.    New Prescriptions: Zofran      Final diagnosis: Acute Headache, non-intractable      -------------------- ED COURSE & MEDS GIVEN --------------------  Vitals:    12/14/22 1140   BP:    Pulse: 95   Resp:    Temp:    SpO2:         /88   Pulse 95   Temp 98 °F (36.7 °C) (Oral)   Resp 16   Wt (!) 323 lb (146.5 kg)   SpO2 99%   BMI 50.59 kg/m²             Medications   acetaminophen (TYLENOL) tablet 650 mg (650 mg Oral Given 12/14/22 1357)   ketorolac (TORADOL) injection 30 mg (30 mg IntraMUSCular Given 12/14/22 1356)   ondansetron (ZOFRAN-ODT) disintegrating tablet 4 mg (4 mg Oral Given 12/14/22 1357)       -------------------- RESULTS --------------------  Labs:  Results for orders placed or performed during the hospital encounter of 12/14/22   COVID-19 & Influenza Combo    Specimen: Nasopharyngeal Swab   Result Value Ref Range    SARS-CoV-2 RNA, RT PCR NOT DETECTED NOT DETECTED    INFLUENZA A NOT DETECTED NOT DETECTED    INFLUENZA B NOT DETECTED NOT DETECTED   CBC with Auto Differential   Result Value Ref Range    WBC 9.5 4.5 - 11.5 E9/L    RBC 5.18 3.50 - 5.50 E12/L    Hemoglobin 15.4 11.5 - 15.5 g/dL    Hematocrit 43.3 34.0 - 48.0 %    MCV 83.6 80.0 - 99.9 fL    MCH 29.7 26.0 - 35.0 pg    MCHC 35.6 (H) 32.0 - 34.5 %    RDW 12.3 11.5 - 15.0 fL Platelets 726 037 - 695 E9/L    MPV 10.1 7.0 - 12.0 fL    Neutrophils % 62.0 43.0 - 80.0 %    Immature Granulocytes % 0.5 0.0 - 5.0 %    Lymphocytes % 28.6 20.0 - 42.0 %    Monocytes % 5.4 2.0 - 12.0 %    Eosinophils % 2.5 0.0 - 6.0 %    Basophils % 1.0 0.0 - 2.0 %    Neutrophils Absolute 5.86 1.80 - 7.30 E9/L    Immature Granulocytes # 0.05 E9/L    Lymphocytes Absolute 2.71 1.50 - 4.00 E9/L    Monocytes Absolute 0.51 0.10 - 0.95 E9/L    Eosinophils Absolute 0.24 0.05 - 0.50 E9/L    Basophils Absolute 0.09 0.00 - 0.20 E9/L   Comprehensive Metabolic Panel   Result Value Ref Range    Sodium 138 132 - 146 mmol/L    Potassium 4.3 3.5 - 5.0 mmol/L    Chloride 103 98 - 107 mmol/L    CO2 24 22 - 29 mmol/L    Anion Gap 11 7 - 16 mmol/L    Glucose 115 (H) 74 - 99 mg/dL    BUN 9 6 - 20 mg/dL    Creatinine 0.5 0.5 - 1.0 mg/dL    Est, Glom Filt Rate >60 >=60 mL/min/1.73    Calcium 9.6 8.6 - 10.2 mg/dL    Total Protein 7.7 6.4 - 8.3 g/dL    Albumin 4.4 3.5 - 5.2 g/dL    Total Bilirubin 0.4 0.0 - 1.2 mg/dL    Alkaline Phosphatase 85 35 - 104 U/L    ALT 52 (H) 0 - 32 U/L    AST 35 (H) 0 - 31 U/L   Urinalysis   Result Value Ref Range    Color, UA Yellow Straw/Yellow    Clarity, UA Clear Clear    Glucose, Ur Negative Negative mg/dL    Bilirubin Urine Negative Negative    Ketones, Urine Negative Negative mg/dL    Specific Gravity, UA 1.020 1.005 - 1.030    Blood, Urine Negative Negative    pH, UA 6.5 5.0 - 9.0    Protein, UA Negative Negative mg/dL    Urobilinogen, Urine 0.2 <2.0 E.U./dL    Nitrite, Urine Negative Negative    Leukocyte Esterase, Urine Negative Negative   Pregnancy, Urine   Result Value Ref Range    HCG(Urine) Pregnancy Test NEGATIVE NEGATIVE       Radiology:  No orders to display       -------------------- NURSING NOTES & VITALS --------------------    The nursing notes within the ED encounter and vital signs were reviewed.      Patient Vitals for the past 8 hrs:   BP Temp Temp src Pulse Resp SpO2 Weight   12/14/22 1140 -- -- -- 95 -- -- --   12/14/22 1104 -- -- -- -- -- -- (!) 323 lb (146.5 kg)   12/14/22 1024 132/88 98 °F (36.7 °C) Oral (!) 118 16 99 % --       Oxygen Saturation Interpretation: Normal    -------------------- PROGRESS NOTES --------------------  4:23 PM EST  At this time, no further workup was felt necessary. I have spoken with the patient and discussed todays results, in addition to providing specific details for the plan of care and counseling regarding the diagnosis and prognosis. Their questions are answered at this time. I discussed at length with them reasons for immediate return here for re evaluation. They will followup with their PCP by calling their office tomorrow and were instructed to return to the ED for any new or worsening symptoms. They are amenable to this plan.    -------------------- ADDITIONAL PROVIDER NOTES --------------------  At this time the patient is without objective evidence of an acute process requiring hospitalization or inpatient management. They have remained hemodynamically stable throughout their entire ED visit and are stable for discharge with outpatient follow-up. The plan has been discussed in detail and they are aware of the specific conditions for emergent return, as well as the importance of follow-up. Discharge Medication List as of 12/14/2022  2:24 PM        START taking these medications    Details   ondansetron (ZOFRAN-ODT) 4 MG disintegrating tablet Take 1 tablet by mouth every 8 hours as needed for Nausea or Vomiting, Disp-21 tablet, R-0Normal             Diagnosis:  1. Acute nonintractable headache, unspecified headache type        Disposition:  Patient's disposition: Discharge to home  Patient's condition is stable. Linda Goodrich,        *NOTE: This report was transcribed using voice recognition software. Every effort was made to ensure accuracy; however, inadvertent computerized transcription errors may be present.          Tia Safer Antonio Velazquez,   Resident  12/14/22 [id number]

## 2022-12-14 NOTE — Clinical Note
Solo Leon was seen and treated in our emergency department on 12/14/2022. She may return to work on 12/16/2022. If you have any questions or concerns, please don't hesitate to call.       Jamal Arthur MD

## 2022-12-16 LAB — URINE CULTURE, ROUTINE: NORMAL

## 2023-01-04 DIAGNOSIS — G93.2 BENIGN INTRACRANIAL HYPERTENSION: Primary | ICD-10-CM

## 2023-01-10 ENCOUNTER — HOSPITAL ENCOUNTER (OUTPATIENT)
Dept: GENERAL RADIOLOGY | Age: 23
Discharge: HOME | End: 2023-01-12
Payer: COMMERCIAL

## 2023-01-10 ENCOUNTER — HOSPITAL ENCOUNTER (OUTPATIENT)
Dept: INTERVENTIONAL RADIOLOGY/VASCULAR | Age: 23
Discharge: HOME | End: 2023-01-12
Payer: COMMERCIAL

## 2023-01-10 VITALS
DIASTOLIC BLOOD PRESSURE: 62 MMHG | HEIGHT: 67 IN | RESPIRATION RATE: 17 BRPM | OXYGEN SATURATION: 98 % | HEART RATE: 72 BPM | TEMPERATURE: 98.1 F | SYSTOLIC BLOOD PRESSURE: 105 MMHG | WEIGHT: 293 LBS | BODY MASS INDEX: 45.99 KG/M2

## 2023-01-10 DIAGNOSIS — G93.2 BENIGN INTRACRANIAL HYPERTENSION: ICD-10-CM

## 2023-01-10 LAB
APPEARANCE CSF: CLEAR
COLOR CSF: COLORLESS
CRYPTOCOCCUS NEOFORMANS/GATTII CSF BY PCR: NOT DETECTED
CYTOMEGALOVIRUS CSF BY PCR: NOT DETECTED
ENTEROVIRUS CSF BY PCR: NOT DETECTED
ESCHERICHIA COLI K1 CSF BY PCR: NOT DETECTED
GLUCOSE, CSF: 65 MG/DL (ref 40–70)
GRAM STAIN ORDERABLE: NORMAL
HAEMOPHILUS INFLUENZAE CSF BY PCR: NOT DETECTED
HCG(URINE) PREGNANCY TEST: NEGATIVE
HERPES SIMPLEX VIRUS 1 CSF BY PCR: NOT DETECTED
HERPES SIMPLEX VIRUS 2 CSF BY PCR: NOT DETECTED
HUMAN HERPESVIRUS 6 CSF BY PCR: NOT DETECTED
HUMAN PARECHOVIRUS CSF BY PCR: NOT DETECTED
INR BLD: 1.1
LISTERIA MONOCYTOGENES CSF BY PCR: NOT DETECTED
MONOCYTE, CSF: 100 % (ref 10–70)
NEISSERIA MENINGITIDIS CSF BY PCR: NOT DETECTED
NEUTROPHILS, CSF: 0 % (ref 0–10)
PROTEIN CSF: 25 MG/DL (ref 15–40)
PROTHROMBIN TIME: 11.7 SEC (ref 9.3–12.4)
RBC CSF: <2000 /UL
STREPTOCOCCUS AGALACTIAE CSF BY PCR: NOT DETECTED
STREPTOCOCCUS PNEUMONIAE CSF BY PCR: NOT DETECTED
TUBE NUMBER CSF: ABNORMAL
VARICELLA ZOSTER VIRUS CSF BY PCR: NOT DETECTED
WBC CSF: 3 /UL (ref 0–2)

## 2023-01-10 PROCEDURE — 87483 CNS DNA AMP PROBE TYPE 12-25: CPT

## 2023-01-10 PROCEDURE — 84157 ASSAY OF PROTEIN OTHER: CPT

## 2023-01-10 PROCEDURE — 83916 OLIGOCLONAL BANDS: CPT

## 2023-01-10 PROCEDURE — 36415 COLL VENOUS BLD VENIPUNCTURE: CPT

## 2023-01-10 PROCEDURE — 87529 HSV DNA AMP PROBE: CPT

## 2023-01-10 PROCEDURE — 82040 ASSAY OF SERUM ALBUMIN: CPT

## 2023-01-10 PROCEDURE — 86592 SYPHILIS TEST NON-TREP QUAL: CPT

## 2023-01-10 PROCEDURE — 7100000011 HC PHASE II RECOVERY - ADDTL 15 MIN

## 2023-01-10 PROCEDURE — 87205 SMEAR GRAM STAIN: CPT

## 2023-01-10 PROCEDURE — 82784 ASSAY IGA/IGD/IGG/IGM EACH: CPT

## 2023-01-10 PROCEDURE — 87070 CULTURE OTHR SPECIMN AEROBIC: CPT

## 2023-01-10 PROCEDURE — 81025 URINE PREGNANCY TEST: CPT

## 2023-01-10 PROCEDURE — 82945 GLUCOSE OTHER FLUID: CPT

## 2023-01-10 PROCEDURE — 7100000010 HC PHASE II RECOVERY - FIRST 15 MIN

## 2023-01-10 PROCEDURE — 85610 PROTHROMBIN TIME: CPT

## 2023-01-10 PROCEDURE — 82042 OTHER SOURCE ALBUMIN QUAN EA: CPT

## 2023-01-10 PROCEDURE — 62328 DX LMBR SPI PNXR W/FLUOR/CT: CPT

## 2023-01-10 PROCEDURE — 89051 BODY FLUID CELL COUNT: CPT

## 2023-01-10 PROCEDURE — 86593 SYPHILIS TEST NON-TREP QUANT: CPT

## 2023-01-10 PROCEDURE — 88108 CYTOPATH CONCENTRATE TECH: CPT

## 2023-01-10 RX ORDER — ACETAMINOPHEN 325 MG/1
650 TABLET ORAL EVERY 4 HOURS PRN
Status: DISCONTINUED | OUTPATIENT
Start: 2023-01-10 | End: 2023-01-13 | Stop reason: HOSPADM

## 2023-01-10 NOTE — PROGRESS NOTES
Patient arrived via ambulation with significant other to Radiology department for lumbar puncture. Allergies, home medications, H&P and fasting instructions reviewed with patient. Vital signs taken. 22 IV placed, blood obtained, IV flushed and prn adapter attached. Blood sample sent to lab for ordered tests. Procedural instructions given, questions answered, understanding expressed and consent signed. Patient given fluoroscopy education, no questions at this time.

## 2023-01-10 NOTE — PROCEDURES
1330 Patient returned from procedure. Dressing checked, clean, dry, and intact. Patient stable. No s/s of complications noted or reported. Vitals will be checked q 15min, see flow sheets. 1345 Dressing checked, clean, dry, and intact. Patient stable. No s/s of complications noted or reported. 1400 Dressing checked, clean, dry, and intact. Patient stable. No s/s of complications noted or reported. 1415 Dressing checked, clean, dry, and intact. Patient stable. No s/s of complications noted or reported. 1430 Dressing checked, clean, dry, and intact. Patient stable. No s/s of complications noted or reported. 1500 Dressing checked, clean, dry, and intact. Patient stable. No s/s of complications noted or reported. Patient eating and drinking well with no s/s of complications noted or reported. 0 Patient discharged, site was checked with every set of vitals. Site clean dry and intact. Discharge papers reviewed with patient, questions answered, discharge paper signed. Patient taken to door via wheelchair. Patient in stable condition, no s/s of complications noted or reported.

## 2023-01-11 VITALS
HEART RATE: 103 BPM | SYSTOLIC BLOOD PRESSURE: 151 MMHG | RESPIRATION RATE: 20 BRPM | OXYGEN SATURATION: 99 % | DIASTOLIC BLOOD PRESSURE: 118 MMHG

## 2023-01-11 LAB
ALBUMIN SERPL-MCNC: 3.8 G/DL (ref 3.5–5.2)
ALP BLD-CCNC: 96 U/L (ref 35–104)
ALT SERPL-CCNC: 41 U/L (ref 0–32)
ANION GAP SERPL CALCULATED.3IONS-SCNC: 10 MMOL/L (ref 7–16)
APTT: 40.3 SEC (ref 24.5–35.1)
AST SERPL-CCNC: 26 U/L (ref 0–31)
BASOPHILS ABSOLUTE: 0.07 E9/L (ref 0–0.2)
BASOPHILS RELATIVE PERCENT: 1 % (ref 0–2)
BILIRUB SERPL-MCNC: 0.2 MG/DL (ref 0–1.2)
BUN BLDV-MCNC: 6 MG/DL (ref 6–20)
CALCIUM SERPL-MCNC: 8.8 MG/DL (ref 8.6–10.2)
CHLORIDE BLD-SCNC: 107 MMOL/L (ref 98–107)
CO2: 19 MMOL/L (ref 22–29)
CREAT SERPL-MCNC: 0.4 MG/DL (ref 0.5–1)
EOSINOPHILS ABSOLUTE: 0.25 E9/L (ref 0.05–0.5)
EOSINOPHILS RELATIVE PERCENT: 3.4 % (ref 0–6)
GFR SERPL CREATININE-BSD FRML MDRD: >60 ML/MIN/1.73
GLUCOSE BLD-MCNC: 208 MG/DL (ref 74–99)
HCT VFR BLD CALC: 39.3 % (ref 34–48)
HEMOGLOBIN: 14 G/DL (ref 11.5–15.5)
IMMATURE GRANULOCYTES #: 0.02 E9/L
IMMATURE GRANULOCYTES %: 0.3 % (ref 0–5)
INR BLD: 1.1
LYMPHOCYTES ABSOLUTE: 2.97 E9/L (ref 1.5–4)
LYMPHOCYTES RELATIVE PERCENT: 41 % (ref 20–42)
MCH RBC QN AUTO: 30.2 PG (ref 26–35)
MCHC RBC AUTO-ENTMCNC: 35.6 % (ref 32–34.5)
MCV RBC AUTO: 84.7 FL (ref 80–99.9)
MONOCYTES ABSOLUTE: 0.38 E9/L (ref 0.1–0.95)
MONOCYTES RELATIVE PERCENT: 5.2 % (ref 2–12)
NEUTROPHILS ABSOLUTE: 3.56 E9/L (ref 1.8–7.3)
NEUTROPHILS RELATIVE PERCENT: 49.1 % (ref 43–80)
PDW BLD-RTO: 12.2 FL (ref 11.5–15)
PLATELET # BLD: 297 E9/L (ref 130–450)
PMV BLD AUTO: 10.2 FL (ref 7–12)
POTASSIUM SERPL-SCNC: 4.3 MMOL/L (ref 3.5–5)
PROTHROMBIN TIME: 11.7 SEC (ref 9.3–12.4)
RBC # BLD: 4.64 E12/L (ref 3.5–5.5)
SODIUM BLD-SCNC: 136 MMOL/L (ref 132–146)
TOTAL PROTEIN: 7.2 G/DL (ref 6.4–8.3)
WBC # BLD: 7.3 E9/L (ref 4.5–11.5)

## 2023-01-11 PROCEDURE — 85025 COMPLETE CBC W/AUTO DIFF WBC: CPT

## 2023-01-11 PROCEDURE — 85730 THROMBOPLASTIN TIME PARTIAL: CPT

## 2023-01-11 PROCEDURE — 80053 COMPREHEN METABOLIC PANEL: CPT

## 2023-01-11 PROCEDURE — 99283 EMERGENCY DEPT VISIT LOW MDM: CPT

## 2023-01-11 PROCEDURE — 85610 PROTHROMBIN TIME: CPT

## 2023-01-11 ASSESSMENT — PAIN - FUNCTIONAL ASSESSMENT: PAIN_FUNCTIONAL_ASSESSMENT: 0-10

## 2023-01-11 ASSESSMENT — PAIN SCALES - GENERAL: PAINLEVEL_OUTOF10: 10

## 2023-01-12 ENCOUNTER — HOSPITAL ENCOUNTER (EMERGENCY)
Age: 23
Discharge: LEFT BEFORE DISCHARGE | DRG: 054 | End: 2023-01-12
Payer: COMMERCIAL

## 2023-01-12 ENCOUNTER — HOSPITAL ENCOUNTER (EMERGENCY)
Age: 23
Discharge: HOME | DRG: 054 | End: 2023-01-12
Attending: EMERGENCY MEDICINE
Payer: COMMERCIAL

## 2023-01-12 VITALS
DIASTOLIC BLOOD PRESSURE: 91 MMHG | RESPIRATION RATE: 16 BRPM | TEMPERATURE: 98 F | HEART RATE: 81 BPM | SYSTOLIC BLOOD PRESSURE: 131 MMHG | OXYGEN SATURATION: 96 %

## 2023-01-12 DIAGNOSIS — G97.1 POST LUMBAR PUNCTURE HEADACHE: Primary | ICD-10-CM

## 2023-01-12 LAB
ANION GAP SERPL CALCULATED.3IONS-SCNC: 9 MMOL/L (ref 7–16)
BASOPHILS ABSOLUTE: 0.07 E9/L (ref 0–0.2)
BASOPHILS RELATIVE PERCENT: 1 % (ref 0–2)
BUN BLDV-MCNC: 6 MG/DL (ref 6–20)
CALCIUM SERPL-MCNC: 9 MG/DL (ref 8.6–10.2)
CHLORIDE BLD-SCNC: 107 MMOL/L (ref 98–107)
CO2: 23 MMOL/L (ref 22–29)
CREAT SERPL-MCNC: 0.5 MG/DL (ref 0.5–1)
EOSINOPHILS ABSOLUTE: 0.2 E9/L (ref 0.05–0.5)
EOSINOPHILS RELATIVE PERCENT: 2.8 % (ref 0–6)
GFR SERPL CREATININE-BSD FRML MDRD: >60 ML/MIN/1.73
GLUCOSE BLD-MCNC: 116 MG/DL (ref 74–99)
HCG, URINE, POC: NEGATIVE
HCT VFR BLD CALC: 38.3 % (ref 34–48)
HEMOGLOBIN: 13.5 G/DL (ref 11.5–15.5)
IMMATURE GRANULOCYTES #: 0.02 E9/L
IMMATURE GRANULOCYTES %: 0.3 % (ref 0–5)
LYMPHOCYTES ABSOLUTE: 2.42 E9/L (ref 1.5–4)
LYMPHOCYTES RELATIVE PERCENT: 34.1 % (ref 20–42)
Lab: NORMAL
MAGNESIUM: 1.9 MG/DL (ref 1.6–2.6)
MCH RBC QN AUTO: 29.1 PG (ref 26–35)
MCHC RBC AUTO-ENTMCNC: 35.2 % (ref 32–34.5)
MCV RBC AUTO: 82.5 FL (ref 80–99.9)
MONOCYTES ABSOLUTE: 0.42 E9/L (ref 0.1–0.95)
MONOCYTES RELATIVE PERCENT: 5.9 % (ref 2–12)
NEGATIVE QC PASS/FAIL: NORMAL
NEUTROPHILS ABSOLUTE: 3.96 E9/L (ref 1.8–7.3)
NEUTROPHILS RELATIVE PERCENT: 55.9 % (ref 43–80)
PDW BLD-RTO: 12.5 FL (ref 11.5–15)
PLATELET # BLD: 290 E9/L (ref 130–450)
PMV BLD AUTO: 10 FL (ref 7–12)
POSITIVE QC PASS/FAIL: NORMAL
POTASSIUM SERPL-SCNC: 4.2 MMOL/L (ref 3.5–5)
RBC # BLD: 4.64 E12/L (ref 3.5–5.5)
SODIUM BLD-SCNC: 139 MMOL/L (ref 132–146)
WBC # BLD: 7.1 E9/L (ref 4.5–11.5)

## 2023-01-12 PROCEDURE — 96375 TX/PRO/DX INJ NEW DRUG ADDON: CPT

## 2023-01-12 PROCEDURE — 6360000002 HC RX W HCPCS

## 2023-01-12 PROCEDURE — 99284 EMERGENCY DEPT VISIT MOD MDM: CPT

## 2023-01-12 PROCEDURE — 6360000002 HC RX W HCPCS: Performed by: EMERGENCY MEDICINE

## 2023-01-12 PROCEDURE — 83735 ASSAY OF MAGNESIUM: CPT

## 2023-01-12 PROCEDURE — 96374 THER/PROPH/DIAG INJ IV PUSH: CPT

## 2023-01-12 PROCEDURE — 85025 COMPLETE CBC W/AUTO DIFF WBC: CPT

## 2023-01-12 PROCEDURE — 80048 BASIC METABOLIC PNL TOTAL CA: CPT

## 2023-01-12 PROCEDURE — 2580000003 HC RX 258

## 2023-01-12 RX ORDER — CAFFEINE 200 MG
200 TABLET ORAL ONCE
Status: DISCONTINUED | OUTPATIENT
Start: 2023-01-12 | End: 2023-01-12 | Stop reason: HOSPADM

## 2023-01-12 RX ORDER — BUTALBITAL, ACETAMINOPHEN AND CAFFEINE 300; 40; 50 MG/1; MG/1; MG/1
1 CAPSULE ORAL EVERY 4 HOURS PRN
Qty: 12 CAPSULE | Refills: 0 | Status: SHIPPED | OUTPATIENT
Start: 2023-01-12

## 2023-01-12 RX ORDER — METOCLOPRAMIDE HYDROCHLORIDE 5 MG/ML
10 INJECTION INTRAMUSCULAR; INTRAVENOUS ONCE
Status: COMPLETED | OUTPATIENT
Start: 2023-01-12 | End: 2023-01-12

## 2023-01-12 RX ORDER — 0.9 % SODIUM CHLORIDE 0.9 %
1000 INTRAVENOUS SOLUTION INTRAVENOUS ONCE
Status: COMPLETED | OUTPATIENT
Start: 2023-01-12 | End: 2023-01-12

## 2023-01-12 RX ORDER — KETOROLAC TROMETHAMINE 30 MG/ML
15 INJECTION, SOLUTION INTRAMUSCULAR; INTRAVENOUS ONCE
Status: COMPLETED | OUTPATIENT
Start: 2023-01-12 | End: 2023-01-12

## 2023-01-12 RX ORDER — DIPHENHYDRAMINE HYDROCHLORIDE 50 MG/ML
50 INJECTION INTRAMUSCULAR; INTRAVENOUS ONCE
Status: COMPLETED | OUTPATIENT
Start: 2023-01-12 | End: 2023-01-12

## 2023-01-12 RX ADMIN — KETOROLAC TROMETHAMINE 15 MG: 30 INJECTION, SOLUTION INTRAMUSCULAR; INTRAVENOUS at 12:34

## 2023-01-12 RX ADMIN — SODIUM CHLORIDE 1000 ML: 9 INJECTION, SOLUTION INTRAVENOUS at 11:40

## 2023-01-12 RX ADMIN — DIPHENHYDRAMINE HYDROCHLORIDE 50 MG: 50 INJECTION, SOLUTION INTRAMUSCULAR; INTRAVENOUS at 11:43

## 2023-01-12 RX ADMIN — METOCLOPRAMIDE 10 MG: 5 INJECTION, SOLUTION INTRAMUSCULAR; INTRAVENOUS at 11:43

## 2023-01-12 ASSESSMENT — PAIN DESCRIPTION - LOCATION: LOCATION: HEAD

## 2023-01-12 ASSESSMENT — PAIN - FUNCTIONAL ASSESSMENT: PAIN_FUNCTIONAL_ASSESSMENT: 0-10

## 2023-01-12 ASSESSMENT — PAIN SCALES - GENERAL: PAINLEVEL_OUTOF10: 3

## 2023-01-12 ASSESSMENT — PAIN DESCRIPTION - DESCRIPTORS: DESCRIPTORS: ACHING

## 2023-01-12 NOTE — ED NOTES
Department of Emergency Medicine  FIRST PROVIDER TRIAGE NOTE             Independent MLP           1/11/23  10:09 PM EST    Date of Encounter: 1/11/23   MRN: [id number]      HPI: Sammi Crew is a 20 y.o. female who presents to the ED for Headache and Back Pain (Headache and back pain after an LP )  Patient presents to the emergency department after having an LP yesterday for benign intracranial hypertension today just hours prior to arrival she got a severe headache to the top of her head. Patient denies any unusual nausea or vomiting or fevers or neck pain or stiffening has been following LP guidelines as instructed    ROS: Negative for cp or sob. PE: Gen Appearance/Constitutional: alert  HEENT: NC/NT. PERRLA,  Airway patent. Initial Plan of Care: All treatment areas with department are currently occupied. Plan to order/Initiate the following while awaiting opening in ED: labs, EKG, and imaging studies.   Initiate Treatment-Testing, Proceed toTreatment Area When Bed Available for ED Attending/MLP to Continue Care    Electronically signed by AZEEM Inman CNP   DD: 1/11/23       AZEEM Inman CNP  01/11/23 [id number]

## 2023-01-12 NOTE — ED PROVIDER NOTES
Name: Cecile Mistry    MRN: [id number]     Date / Time Roomed:  1/12/2023 10:28 AM  ED Bed Assignment:  Roger Fregoso C/    ------------------ History of Present Illness --------------------  1/12/23, Time: 10:45 AM EST   Chief Complaint   Patient presents with    Headache     HA x 2 days worse when sitting or standing. Started after getting spinal tap      HPI    Cecile Mistry is a 20 y.o. female, with hx of anxiety depression, intracranial hypertension, who presents to the ED today for headache and neck pain, which began 2 days ago following LP. Patient had lumbar puncture on 1/10 after which patient began to develop neck pain and headache. Patient states that the pain has progressively gotten worse has not improved and has worsened. States that she is unable to sit up or walk or stand due to the pain. She denies any neurological symptoms including limb weakness loss of bladder or bowel function or numbness in extremities. She does endorse dizziness with standing. States she can only lay flat. Patient denies any chest pain shortness of breath or trouble breathing. PCP: Roberto Lebron DO.    -------------------- PMH --------------------  Past Medical History:  has a past medical history of Anxiety, Asperger syndrome, Autism disorder, Bipolar II disorder ([facility] [address] and Depression. Past Surgical History:  has a past surgical history that includes REMOVAL FOREIGN BODY EAR; pr laps surg cholecystectomy w/cholangiography (N/A, 3/15/2018); Cholecystectomy; and Tonsillectomy and adenoidectomy (N/A, 7/22/2021). Social History:  reports that she quit smoking about 3 months ago. Her smoking use included cigarettes. She has a 6.00 pack-year smoking history. She has never used smokeless tobacco. She reports current alcohol use of about 1.0 standard drink per week. She reports that she does not use drugs. Family History: family history includes No Known Problems in her father and mother.      Allergies: Doxycycline    The patients past medical history has been reviewed. -------------------- Current Meds --------------------  Meds: No current facility-administered medications for this encounter. Current Outpatient Medications:     ondansetron (ZOFRAN-ODT) 4 MG disintegrating tablet, Take 1 tablet by mouth every 8 hours as needed for Nausea or Vomiting, Disp: 21 tablet, Rfl: 0    VORTIoxetine (TRINTELLIX) 5 MG tablet, Take 10 mg by mouth daily, Disp: , Rfl:     busPIRone (BUSPAR) 5 MG tablet, Take 5 mg by mouth daily, Disp: , Rfl:     ibuprofen (ADVIL;MOTRIN) 800 MG tablet, Take 1 tablet by mouth every 6 hours as needed for Pain, Disp: 21 tablet, Rfl: 0    brexpiprazole (REXULTI) 1 MG TABS tablet, Take 2 mg by mouth daily, Disp: , Rfl:     VYVANSE 70 MG capsule, TAKE ONE CAPSULE BY MOUTH DAILY FOR 30 DAYS, Disp: , Rfl:     citalopram (CELEXA) 20 MG tablet, TAKE ONE TABLET BY MOUTH DAILY WITH 10 MG TO EQUAL 30 MG DOSE. (Patient not taking: No sig reported), Disp: , Rfl:     cyclobenzaprine (FLEXERIL) 10 MG tablet, 3 times daily as needed, Disp: , Rfl:     melatonin 5 MG TABS tablet, 1 mg nightly, Disp: , Rfl:     fluticasone (FLONASE) 50 MCG/ACT nasal spray, 2 sprays by Each Nostril route daily (Patient not taking: No sig reported), Disp: 16 g, Rfl: 0    Facility-Administered Medications Ordered in Other Encounters:     acetaminophen (TYLENOL) tablet 650 mg, 650 mg, Oral, Q4H PRN, Stephanie Fierro MD     The patients home medications have been reviewed. -------------------- PE --------------------  Physical Exam  Constitutional:       Appearance: Normal appearance. Eyes:      Pupils: Pupils are equal, round, and reactive to light. Cardiovascular:      Rate and Rhythm: Normal rate and regular rhythm. Pulses: Normal pulses. Heart sounds: Normal heart sounds. No murmur heard. Pulmonary:      Effort: Pulmonary effort is normal.      Breath sounds: Normal breath sounds. No wheezing or rhonchi. Abdominal:      General: Abdomen is flat. There is no distension. Palpations: Abdomen is soft. Tenderness: There is no abdominal tenderness. Musculoskeletal:         General: Normal range of motion. Cervical back: Normal range of motion. Skin:     General: Skin is warm and dry. Capillary Refill: Capillary refill takes less than 2 seconds. Neurological:      General: No focal deficit present. Mental Status: She is alert and oriented to person, place, and time. Psychiatric:         Mood and Affect: Mood normal.         Behavior: Behavior normal.      -------------------- MDM --------------------    Patient was seen today following a lumbar puncture 2 days ago after which patient developed headache, she states that her headache has gotten progressively worse since having the lumbar puncture done. She denies any neurological symptoms including but not limited to neck stiffness, neck tenderness, weakness in her extremities, numbness and tingling in extremities. Patient states that her headache is worse when she sits up or moves. Patient was given headache cocktail of Toradol, caffeine, Benadryl, patient states that these initially alleviated her symptoms slightly however she notes that she still is having a headache. Decision was made to consult anesthesiology for potential blood patch placement. Anesthesiology met with patient and discussed treatment options, patient declines blood patch at this time, instead she opted for prescription for Fioricet and to follow-up with her primary care physician. Patient was agreeable to this plan. /87   Pulse 77   Temp 97.9 °F (36.6 °C)   Resp 18   SpO2 98%     Exam remarkable for headache    Diagnoses considered, but not limited to, include migraine, post lumbar puncture headache,. Labwork ordered and interpretation by myself. Details below. Imaging ordered and interpretations by myself and radiologist. Details below.     ED Course as of 01/12/23 2025   Thu Jan 12, 2023   1402 Spoke to Anesthesia for blood patch [ZZ]   1403 Anesthesiology was consulted for blood patch. Resident physician spoke with the anesthesiologist.  They will come evaluate the patient.  [JA]      ED Course User Index  [JA] Jazmin Johns MD  [ZZ] Alfie Nolasco MD            -------------------- RESULTS --------------------    Labs:  Results for orders placed or performed during the hospital encounter of 92/15/92   Basic Metabolic Panel   Result Value Ref Range    Sodium 139 132 - 146 mmol/L    Potassium 4.2 3.5 - 5.0 mmol/L    Chloride 107 98 - 107 mmol/L    CO2 23 22 - 29 mmol/L    Anion Gap 9 7 - 16 mmol/L    Glucose 116 (H) 74 - 99 mg/dL    BUN 6 6 - 20 mg/dL    Creatinine 0.5 0.5 - 1.0 mg/dL    Est, Glom Filt Rate >60 >=60 mL/min/1.73    Calcium 9.0 8.6 - 10.2 mg/dL   CBC with Auto Differential   Result Value Ref Range    WBC 7.1 4.5 - 11.5 E9/L    RBC 4.64 3.50 - 5.50 E12/L    Hemoglobin 13.5 11.5 - 15.5 g/dL    Hematocrit 38.3 34.0 - 48.0 %    MCV 82.5 80.0 - 99.9 fL    MCH 29.1 26.0 - 35.0 pg    MCHC 35.2 (H) 32.0 - 34.5 %    RDW 12.5 11.5 - 15.0 fL    Platelets 360 019 - 001 E9/L    MPV 10.0 7.0 - 12.0 fL    Neutrophils % 55.9 43.0 - 80.0 %    Immature Granulocytes % 0.3 0.0 - 5.0 %    Lymphocytes % 34.1 20.0 - 42.0 %    Monocytes % 5.9 2.0 - 12.0 %    Eosinophils % 2.8 0.0 - 6.0 %    Basophils % 1.0 0.0 - 2.0 %    Neutrophils Absolute 3.96 1.80 - 7.30 E9/L    Immature Granulocytes # 0.02 E9/L    Lymphocytes Absolute 2.42 1.50 - 4.00 E9/L    Monocytes Absolute 0.42 0.10 - 0.95 E9/L    Eosinophils Absolute 0.20 0.05 - 0.50 E9/L    Basophils Absolute 0.07 0.00 - 0.20 E9/L   Magnesium   Result Value Ref Range    Magnesium 1.9 1.6 - 2.6 mg/dL   POC Pregnancy Urine Qual   Result Value Ref Range    HCG, Urine, POC Negative Negative    Lot Number [id number]     Positive QC Pass/Fail Acceptable     Negative QC Pass/Fail Acceptable        Radiology:  No orders to display       -------------------- NURSING NOTES & VITALS --------------------    The nursing notes within the ED encounter and vital signs were reviewed. Vitals:    01/12/23 1623   BP: (!) 131/91   Pulse: 81   Resp: 16   Temp: 98 °F (36.7 °C)   SpO2: 96%        Patient Vitals for the past 8 hrs:   BP Temp Temp src Pulse Resp SpO2   01/12/23 1623 (!) 131/91 98 °F (36.7 °C) Oral 81 16 96 %   01/12/23 1230 134/88 -- -- 78 16 97 %       Oxygen Saturation Interpretation: Normal    -------------------- PROGRESS NOTES --------------------  8:28 PM EST  At this time, no further workup was felt necessary. I have spoken with the patient and discussed todays results, in addition to providing specific details for the plan of care and counseling regarding the diagnosis and prognosis. Their questions are answered at this time. I discussed at length with them reasons for immediate return here for re evaluation. They will followup with their PCP by calling their office tomorrow and were instructed to return to the ED for any new or worsening symptoms. They are amenable to this plan.    -------------------- ADDITIONAL PROVIDER NOTES --------------------  At this time the patient is without objective evidence of an acute process requiring hospitalization or inpatient management. They have remained hemodynamically stable throughout their entire ED visit and are stable for discharge with outpatient follow-up. The plan has been discussed in detail and they are aware of the specific conditions for emergent return, as well as the importance of follow-up. Discharge Medication List as of 1/12/2023  4:13 PM        START taking these medications    Details   butalbital-APAP-caffeine (FIORICET) -40 MG CAPS per capsule Take 1 capsule by mouth every 4 hours as needed for Headaches, Disp-12 capsule, R-0Normal             Diagnosis:  1.  Post lumbar puncture headache        Disposition:  Patient's disposition: Discharge to home  Patient's condition is stable. Usha Gandhi MD       *NOTE: This report was transcribed using voice recognition software. Every effort was made to ensure accuracy; however, inadvertent computerized transcription errors may be present.          Usha Gandhi MD  Resident  01/12/23 9012

## 2023-01-12 NOTE — ED NOTES
After checking main waiting area including the restrooms, patient unable to be located for update on room status or repeat labs and vitals at this time      Berta Navarro LPN  57/30/36 [id number]

## 2023-01-12 NOTE — ED PROVIDER NOTES
HPI:  1/12/23, Time: 2:02 PM TRISTON Pedraza is a 20 y.o. female presenting to the ED for headache beginning 2 days ago after patient had a LP for intracranial hypertension. I reviewed the patient's chart including her op report. She underwent the LP in IR here on 1/10/2023. States that shortly after she developed a diffuse throbbing headache which she only has when she sits up or stands. She states that when she is laying totally flat she does not have a headache. Denies any falls or trauma to her head. She is not on any anticoagulation. She denies any fevers or focal neurologic deficits.      --------------------------------------------- PAST HISTORY ---------------------------------------------  Past Medical History:  has a past medical history of Anxiety, Asperger syndrome, Autism disorder, Bipolar II disorder ([facility] [address] and Depression. Past Surgical History:  has a past surgical history that includes REMOVAL FOREIGN BODY EAR; pr laps surg cholecystectomy w/cholangiography (N/A, 3/15/2018); Cholecystectomy; and Tonsillectomy and adenoidectomy (N/A, 7/22/2021). Social History:  reports that she quit smoking about 3 months ago. Her smoking use included cigarettes. She has a 6.00 pack-year smoking history. She has never used smokeless tobacco. She reports current alcohol use of about 1.0 standard drink per week. She reports that she does not use drugs. Family History: family history includes No Known Problems in her father and mother. The patients home medications have been reviewed.     Allergies: Doxycycline    -------------------------------------------------- RESULTS -------------------------------------------------  All laboratory and radiology results have been personally reviewed by myself   LABS:  Results for orders placed or performed during the hospital encounter of 90/55/24   Basic Metabolic Panel   Result Value Ref Range    Sodium 139 132 - 146 mmol/L    Potassium 4.2 3.5 - 5.0 mmol/L    Chloride 107 98 - 107 mmol/L    CO2 23 22 - 29 mmol/L    Anion Gap 9 7 - 16 mmol/L    Glucose 116 (H) 74 - 99 mg/dL    BUN 6 6 - 20 mg/dL    Creatinine 0.5 0.5 - 1.0 mg/dL    Est, Glom Filt Rate >60 >=60 mL/min/1.73    Calcium 9.0 8.6 - 10.2 mg/dL   CBC with Auto Differential   Result Value Ref Range    WBC 7.1 4.5 - 11.5 E9/L    RBC 4.64 3.50 - 5.50 E12/L    Hemoglobin 13.5 11.5 - 15.5 g/dL    Hematocrit 38.3 34.0 - 48.0 %    MCV 82.5 80.0 - 99.9 fL    MCH 29.1 26.0 - 35.0 pg    MCHC 35.2 (H) 32.0 - 34.5 %    RDW 12.5 11.5 - 15.0 fL    Platelets 449 896 - 504 E9/L    MPV 10.0 7.0 - 12.0 fL    Neutrophils % 55.9 43.0 - 80.0 %    Immature Granulocytes % 0.3 0.0 - 5.0 %    Lymphocytes % 34.1 20.0 - 42.0 %    Monocytes % 5.9 2.0 - 12.0 %    Eosinophils % 2.8 0.0 - 6.0 %    Basophils % 1.0 0.0 - 2.0 %    Neutrophils Absolute 3.96 1.80 - 7.30 E9/L    Immature Granulocytes # 0.02 E9/L    Lymphocytes Absolute 2.42 1.50 - 4.00 E9/L    Monocytes Absolute 0.42 0.10 - 0.95 E9/L    Eosinophils Absolute 0.20 0.05 - 0.50 E9/L    Basophils Absolute 0.07 0.00 - 0.20 E9/L   Magnesium   Result Value Ref Range    Magnesium 1.9 1.6 - 2.6 mg/dL   POC Pregnancy Urine Qual   Result Value Ref Range    HCG, Urine, POC Negative Negative    Lot Number [id number]     Positive QC Pass/Fail Acceptable     Negative QC Pass/Fail Acceptable        RADIOLOGY:  Interpreted by Radiologist.  No orders to display       ------------------------- NURSING NOTES AND VITALS REVIEWED ---------------------------   The nursing notes within the ED encounter and vital signs as below have been reviewed.    BP (!) 131/91   Pulse 81   Temp 98 °F (36.7 °C) (Oral)   Resp 16   SpO2 96%   Oxygen Saturation Interpretation: Normal      ---------------------------------------------------PHYSICAL EXAM--------------------------------------      Constitutional/General: Alert and oriented x3, well appearing, non toxic in NAD  Head: Normocephalic and atraumatic  Eyes: EOMI  Mouth: Oropharynx clear, handling secretions, no trismus  Neck: Supple, full ROM, no nuchal rigidity, no meningeal signs  Pulmonary: Lungs clear to auscultation bilaterally, no wheezes, rales, or rhonchi. Not in respiratory distress  Cardiovascular:  Regular rate and rhythm. Abdomen: Soft, non tender, non distended,   Extremities: Moves all extremities x 4. Warm and well perfused  Skin: warm and dry without rash  Neurologic: GCS 15, Facial symmetry. Speech clear. Cranial nerves intact. No focal motor or sensory deficits. No ataxia with finger-to-nose or heel-to-shin. No drift in upper or lower extremities. No nystagmus. Psych: Normal Affect. Behavior normal.      ------------------------------ ED COURSE/MEDICAL DECISION MAKING----------------------  Medications   Caffeine (VIVARIN) TABS 200 mg (200 mg Oral Not Given 1/12/23 1625)   0.9 % sodium chloride bolus (0 mLs IntraVENous Stopped 1/12/23 1324)   diphenhydrAMINE (BENADRYL) injection 50 mg (50 mg IntraVENous Given 1/12/23 1143)   metoclopramide (REGLAN) injection 10 mg (10 mg IntraVENous Given 1/12/23 1143)   ketorolac (TORADOL) injection 15 mg (15 mg IntraVENous Given 1/12/23 1234)       Medical Decision Making/ED COURSE:    History From: Patient     Patient is a 20 y.o. female presenting to the ED for acute onset headache after LP, severe in severity. In the ED, patient was hemodynamically stable and afebrile. On exam, patient appeared uncomfortable but was nontoxic with no focal deficits. Labs obtained to evaluate for electrolyte abnormalities/infection. Patient administered migraine cocktail with IV fluids, IV reglan, IV benadryl, IV toradol, and caffeine. I reviewed and interpreted labs. CBC and CMP showed no acute findings. I considered meningitis. Patient has no fevers or leukocytosis. No meningeal signs. I do not suspect meningitis. I considered intracranial mass. Patient has had prior imaging. No focal deficits.  I do not suspect mass. I considered intracranial hemorrhage. Patient has had no trauma. Headache occurred after LP. Acute ICH not suspected. Emergent imaging not indicated. Considered blood patch for post LP headache. Anesthesiology was consulted for possible blood patch. Patient opted for discharge home on fioricet. Advised PCP follow up. Supportive care measures and ED return precautions discussed. CONSULTS: (Who and What was discussed)    Anesthesiology was consulted for possible blood patch. Chronic Conditions affecting care:    has a past medical history of Anxiety, Asperger syndrome, Autism disorder, Bipolar II disorder ([address] and Depression. Records Reviewed( Source)   I reviewed chart including recent LP procedure note. See details in HPI. CC/HPI Summary, DDx, ED Course, and Reassessment: Patient is feeling improved on re-evaluation and wants to be discharged home. Patient remained hemodynamically stable throughout ED course. ED Course as of 01/12/23 1652   u Jan 12, 2023   1402 Spoke to Anesthesia for blood patch [ZZ]   1403 Anesthesiology was consulted for blood patch. Resident physician spoke with the anesthesiologist.  They will come evaluate the patient. [JA]      ED Course User Index  [JA] Demetria Vides MD  [ZZ] Ann Marie Pulliam MD       Discharge Medication List as of 1/12/2023  4:13 PM        START taking these medications    Details   butalbital-APAP-caffeine (FIORICET) -40 MG CAPS per capsule Take 1 capsule by mouth every 4 hours as needed for Headaches, Disp-12 capsule, R-0Normal             Counseling: The emergency provider has spoken with the patient and discussed todays results, in addition to providing specific details for the plan of care and counseling regarding the diagnosis and prognosis.   Questions are answered at this time and they are agreeable with the plan.      --------------------------------- IMPRESSION AND DISPOSITION ---------------------------------    IMPRESSION  1. Post lumbar puncture headache        DISPOSITION  Disposition: Discharge to home  Patient condition is stable      NOTE: This report was transcribed using voice recognition software. Every effort was made to ensure accuracy; however, inadvertent computerized transcription errors may be present    I, Janeth Montgomery MD, am the primary provider of this record     I have reviewed my findings and recommendations with the assigned Medical Resident, Baudilio Naylor and members of family present at the time of disposition. My findings/plan: The encounter diagnosis was Post lumbar puncture headache.     MD Janeth Dumont MD  01/12/23 8230

## 2023-01-13 ENCOUNTER — APPOINTMENT (OUTPATIENT)
Dept: CT IMAGING | Age: 23
DRG: 054 | End: 2023-01-13
Payer: COMMERCIAL

## 2023-01-13 ENCOUNTER — ANESTHESIA (OUTPATIENT)
Dept: EMERGENCY DEPT | Age: 23
End: 2023-01-13
Payer: COMMERCIAL

## 2023-01-13 ENCOUNTER — ANESTHESIA EVENT (OUTPATIENT)
Dept: EMERGENCY DEPT | Age: 23
End: 2023-01-13
Payer: COMMERCIAL

## 2023-01-13 ENCOUNTER — HOSPITAL ENCOUNTER (EMERGENCY)
Age: 23
Discharge: HOME | DRG: 054 | End: 2023-01-13
Attending: STUDENT IN AN ORGANIZED HEALTH CARE EDUCATION/TRAINING PROGRAM
Payer: COMMERCIAL

## 2023-01-13 VITALS
OXYGEN SATURATION: 100 % | TEMPERATURE: 97.3 F | WEIGHT: 293 LBS | DIASTOLIC BLOOD PRESSURE: 85 MMHG | HEART RATE: 68 BPM | BODY MASS INDEX: 50.9 KG/M2 | RESPIRATION RATE: 16 BRPM | SYSTOLIC BLOOD PRESSURE: 123 MMHG

## 2023-01-13 DIAGNOSIS — H60.391 INFECTIVE OTITIS EXTERNA OF RIGHT EAR: ICD-10-CM

## 2023-01-13 DIAGNOSIS — J01.90 ACUTE SINUSITIS, RECURRENCE NOT SPECIFIED, UNSPECIFIED LOCATION: ICD-10-CM

## 2023-01-13 DIAGNOSIS — G97.1 POST-LUMBAR PUNCTURE HEADACHE: Primary | ICD-10-CM

## 2023-01-13 LAB
ANION GAP SERPL CALCULATED.3IONS-SCNC: 8 MMOL/L (ref 7–16)
BASOPHILS ABSOLUTE: 0.09 E9/L (ref 0–0.2)
BASOPHILS RELATIVE PERCENT: 1.2 % (ref 0–2)
BUN BLDV-MCNC: 6 MG/DL (ref 6–20)
CALCIUM SERPL-MCNC: 8.8 MG/DL (ref 8.6–10.2)
CHLORIDE BLD-SCNC: 108 MMOL/L (ref 98–107)
CO2: 23 MMOL/L (ref 22–29)
CREAT SERPL-MCNC: 0.5 MG/DL (ref 0.5–1)
EOSINOPHILS ABSOLUTE: 0.18 E9/L (ref 0.05–0.5)
EOSINOPHILS RELATIVE PERCENT: 2.5 % (ref 0–6)
GFR SERPL CREATININE-BSD FRML MDRD: >60 ML/MIN/1.73
GLUCOSE BLD-MCNC: 146 MG/DL (ref 74–99)
HCT VFR BLD CALC: 36.6 % (ref 34–48)
HEMOGLOBIN: 13.1 G/DL (ref 11.5–15.5)
IMMATURE GRANULOCYTES #: 0.03 E9/L
IMMATURE GRANULOCYTES %: 0.4 % (ref 0–5)
LYMPHOCYTES ABSOLUTE: 2.34 E9/L (ref 1.5–4)
LYMPHOCYTES RELATIVE PERCENT: 32.5 % (ref 20–42)
MCH RBC QN AUTO: 29.4 PG (ref 26–35)
MCHC RBC AUTO-ENTMCNC: 35.8 % (ref 32–34.5)
MCV RBC AUTO: 82.2 FL (ref 80–99.9)
MONOCYTES ABSOLUTE: 0.5 E9/L (ref 0.1–0.95)
MONOCYTES RELATIVE PERCENT: 6.9 % (ref 2–12)
NEUTROPHILS ABSOLUTE: 4.07 E9/L (ref 1.8–7.3)
NEUTROPHILS RELATIVE PERCENT: 56.5 % (ref 43–80)
PDW BLD-RTO: 12.5 FL (ref 11.5–15)
PLATELET # BLD: 253 E9/L (ref 130–450)
PMV BLD AUTO: 10 FL (ref 7–12)
POTASSIUM SERPL-SCNC: 4.2 MMOL/L (ref 3.5–5)
RBC # BLD: 4.45 E12/L (ref 3.5–5.5)
SODIUM BLD-SCNC: 139 MMOL/L (ref 132–146)
WBC # BLD: 7.2 E9/L (ref 4.5–11.5)

## 2023-01-13 PROCEDURE — 6360000002 HC RX W HCPCS: Performed by: STUDENT IN AN ORGANIZED HEALTH CARE EDUCATION/TRAINING PROGRAM

## 2023-01-13 PROCEDURE — 85025 COMPLETE CBC W/AUTO DIFF WBC: CPT

## 2023-01-13 PROCEDURE — 96375 TX/PRO/DX INJ NEW DRUG ADDON: CPT

## 2023-01-13 PROCEDURE — 36415 COLL VENOUS BLD VENIPUNCTURE: CPT

## 2023-01-13 PROCEDURE — 96374 THER/PROPH/DIAG INJ IV PUSH: CPT

## 2023-01-13 PROCEDURE — 80048 BASIC METABOLIC PNL TOTAL CA: CPT

## 2023-01-13 PROCEDURE — 99284 EMERGENCY DEPT VISIT MOD MDM: CPT

## 2023-01-13 PROCEDURE — 70450 CT HEAD/BRAIN W/O DYE: CPT

## 2023-01-13 PROCEDURE — 2580000003 HC RX 258: Performed by: STUDENT IN AN ORGANIZED HEALTH CARE EDUCATION/TRAINING PROGRAM

## 2023-01-13 PROCEDURE — 62273 INJECT EPIDURAL PATCH: CPT | Performed by: ANESTHESIOLOGY

## 2023-01-13 RX ORDER — 0.9 % SODIUM CHLORIDE 0.9 %
1000 INTRAVENOUS SOLUTION INTRAVENOUS ONCE
Status: COMPLETED | OUTPATIENT
Start: 2023-01-13 | End: 2023-01-13

## 2023-01-13 RX ORDER — DIPHENHYDRAMINE HYDROCHLORIDE 50 MG/ML
25 INJECTION INTRAMUSCULAR; INTRAVENOUS ONCE
Status: COMPLETED | OUTPATIENT
Start: 2023-01-13 | End: 2023-01-13

## 2023-01-13 RX ORDER — PROCHLORPERAZINE EDISYLATE 5 MG/ML
10 INJECTION INTRAMUSCULAR; INTRAVENOUS ONCE
Status: COMPLETED | OUTPATIENT
Start: 2023-01-13 | End: 2023-01-13

## 2023-01-13 RX ORDER — CIPROFLOXACIN AND DEXAMETHASONE 3; 1 MG/ML; MG/ML
4 SUSPENSION/ DROPS AURICULAR (OTIC) 2 TIMES DAILY
Qty: 1 EACH | Refills: 0 | Status: SHIPPED | OUTPATIENT
Start: 2023-01-13 | End: 2023-01-20

## 2023-01-13 RX ADMIN — PROCHLORPERAZINE EDISYLATE 10 MG: 5 INJECTION INTRAMUSCULAR; INTRAVENOUS at 10:56

## 2023-01-13 RX ADMIN — DIPHENHYDRAMINE HYDROCHLORIDE 25 MG: 50 INJECTION, SOLUTION INTRAMUSCULAR; INTRAVENOUS at 10:55

## 2023-01-13 RX ADMIN — SODIUM CHLORIDE 1000 ML: 9 INJECTION, SOLUTION INTRAVENOUS at 10:55

## 2023-01-13 NOTE — DISCHARGE INSTRUCTIONS
Please follow-up with your primary care doctor as well as your neurologist.  Please return ED for new or worsening symptoms.

## 2023-01-13 NOTE — ED NOTES
This RN was not notified that This Rn was suppose to facilitate transport of patient to PACU. This RN spoke with  PACU who stated that they know nothing about this procedure and do not have the space in the PACU to do this procedure right now. PACU Rn stated that Dr. Ginny Rust did not say anything about doing a blood patch today. This RN then spoke with surgery control room, [facility], who is letting Dr. Ginny Rust know about the situation.       Mylene Crowe RN  01/13/23 2727

## 2023-01-13 NOTE — ED NOTES
Radiology Procedure Waiver   Name: Jessica Calix  : 2000  MRN: [id number]    Date:  23    Time: 12:30 PM EST    Benefits of immediately proceeding with Radiology exam(s) without pre-testing outweigh the risks or are not indicated as specified below and therefore the following is/are being waived:    [x] Pregnancy test   [] Patients LMP on-time and regular.   [] Patient had Tubal Ligation or has other Contraception Device. [] Patient  is Menopausal or Premenarcheal.    [] Patient had Full or Partial Hysterectomy. [] Protocol for Iodine allergy    [] MRI Questionnaire     [] BUN/Creatinine   [] Patient age w/no hx of renal dysfunction. [] Patient on Dialysis. [] Recent Normal Labs. Electronically signed by Kathy Tinajero DO on 23 at 12:30 PM EST          Pt had negative pregnancy test yesterday.      Cuco Enriquez DO  Resident  23 [phone]

## 2023-01-13 NOTE — ANESTHESIA PROCEDURE NOTES
Epidural Blood Patch    Patient location during procedure: procedural area  Start time: 1/13/2023 6:00 PM  Staffing  Anesthesiologist: Alice Navarrete MD  Epidural  Patient position: sitting  Prep: Betadine  Patient monitoring: frequent blood pressure checks  Approach: midline  Injection technique: CAROLYNE air  Provider prep: mask and sterile gloves  Needle  Needle type: Tuohy   Needle gauge: 18 G  Assessment  Attempts: 1  Preanesthetic Checklist  Completed: patient identified, IV checked, site marked, risks and benefits discussed, surgical/procedural consents, equipment checked, pre-op evaluation, timeout performed, anesthesia consent given, oxygen available, monitors applied/VS acknowledged, fire risk safety assessment completed and verbalized and blood product R/B/A discussed and consented

## 2023-01-13 NOTE — ED PROVIDER NOTES
Name: Rosie Huynh    MRN: [id number]     Date / Time Roomed:  1/13/2023  9:46 AM  ED Bed Assignment:  01/01    ------------------ History of Present Illness --------------------  1/13/23, Time: 9:55 AM EST   Chief Complaint   Patient presents with    Headache     Had LP tues, headache and right facial numbness, seen here yesterday for same, now reports right ear painful with clear drainage        HPI    Rosie Huynh is a 20 y.o. female, with hx of migraine headaches, anxiety, bipolar, depression, who presents to the ED today for headache over the past 3 days. Patient relates she had a LP procedure done on Tuesday to evaluate for possible pseudotumor cerebri and the procedure went well however she developed headache later that evening which did not go away. She relates headache worse with standing, better with lying flat. She relates when she is lying flat headache is only 1 out of 10. She was seen in the ED yesterday for same symptoms, anesthesia was consulted for blood patch treatment however patient denied that particular treatment, patient was given Toradol Compazine and Benadryl and was feeling a bit better at that time and was discharged with prescription for Fioricet however she states this is not helping her headache. Patient denies any fevers, cough, congestion, imbalance, weakness, GI or  complaints. The pt denies other ROS at this time. PCP: Racheal Alexandre DO.    -------------------- PMH --------------------  Past Medical History:  has a past medical history of Anxiety, Asperger syndrome, Autism disorder, Bipolar II disorder ([facility] [address] and Depression. Past Surgical History:  has a past surgical history that includes REMOVAL FOREIGN BODY EAR; pr laps surg cholecystectomy w/cholangiography (N/A, 3/15/2018); Cholecystectomy; and Tonsillectomy and adenoidectomy (N/A, 7/22/2021). Social History:  reports that she quit smoking about 3 months ago. Her smoking use included cigarettes. She has a 6.00 pack-year smoking history. She has never used smokeless tobacco. She reports current alcohol use of about 1.0 standard drink per week. She reports that she does not use drugs. Family History: family history includes No Known Problems in her father and mother. Allergies: Doxycycline    The patients past medical history has been reviewed. -------------------- Current Meds --------------------  Meds: No current facility-administered medications for this encounter. Current Outpatient Medications:     ciprofloxacin-dexamethasone (CIPRODEX) 0.3-0.1 % otic suspension, Place 4 drops into the right ear 2 times daily for 7 days, Disp: 1 each, Rfl: 0    butalbital-APAP-caffeine (FIORICET) -40 MG CAPS per capsule, Take 1 capsule by mouth every 4 hours as needed for Headaches, Disp: 12 capsule, Rfl: 0    VORTIoxetine (TRINTELLIX) 5 MG tablet, Take 10 mg by mouth daily, Disp: , Rfl:     busPIRone (BUSPAR) 5 MG tablet, Take 5 mg by mouth daily, Disp: , Rfl:     ibuprofen (ADVIL;MOTRIN) 800 MG tablet, Take 1 tablet by mouth every 6 hours as needed for Pain, Disp: 21 tablet, Rfl: 0    brexpiprazole (REXULTI) 1 MG TABS tablet, Take 2 mg by mouth daily, Disp: , Rfl:     VYVANSE 70 MG capsule, TAKE ONE CAPSULE BY MOUTH DAILY FOR 30 DAYS, Disp: , Rfl:     cyclobenzaprine (FLEXERIL) 10 MG tablet, 3 times daily as needed, Disp: , Rfl:     melatonin 5 MG TABS tablet, 1 mg nightly, Disp: , Rfl:      The patients home medications have been reviewed. -------------------- PE --------------------  Physical Exam  Constitutional:       General: She is not in acute distress. Appearance: She is obese. She is not ill-appearing, toxic-appearing or diaphoretic. HENT:      Head: Normocephalic and atraumatic. Right Ear: Ear canal and external ear normal.      Left Ear: Ear canal and external ear normal.      Ears:      Comments: TMs bit injected, dull bilaterally.      Nose: Nose normal. No congestion or rhinorrhea. Mouth/Throat:      Mouth: Mucous membranes are moist.      Pharynx: Oropharynx is clear. No oropharyngeal exudate or posterior oropharyngeal erythema. Eyes:      Extraocular Movements: Extraocular movements intact. Conjunctiva/sclera: Conjunctivae normal.      Pupils: Pupils are equal, round, and reactive to light. Cardiovascular:      Rate and Rhythm: Normal rate and regular rhythm. Pulses: Normal pulses. Heart sounds: Normal heart sounds. Pulmonary:      Effort: Pulmonary effort is normal. No respiratory distress. Breath sounds: Normal breath sounds. No wheezing. Abdominal:      General: Abdomen is flat. There is no distension. Palpations: Abdomen is soft. Tenderness: There is no abdominal tenderness. Musculoskeletal:         General: Normal range of motion. Cervical back: Normal range of motion. No rigidity. Skin:     General: Skin is warm and dry. Capillary Refill: Capillary refill takes less than 2 seconds. Coloration: Skin is not jaundiced or pale. Neurological:      General: No focal deficit present. Mental Status: She is alert and oriented to person, place, and time. Motor: No weakness. Coordination: Coordination normal.   Psychiatric:         Mood and Affect: Mood normal.         Behavior: Behavior normal.        --------------- External Imaging -------------    -------------------- Procedures --------------------    -------------------- MDM --------------------    Pt presented to ED today for headache x 3 days after having LP procedure on Tuesday 1/10/23. HA worse sitting upright, better flat. Was seen in ED yesterday for same symptoms. . Pt alert, oriented, no distress. Vitals stable. /85   Pulse 68   Temp 97.3 °F (36.3 °C)   Resp 16   Wt (!) 325 lb (147.4 kg)   SpO2 100%   BMI 50.90 kg/m²     No focal deficits on exam.  No neck stiffness. TMs a bit injected, dull bilaterally.   Canals normal, patent. EOMs intact. Oropharynx clear, nonerythematous. Lungs clinic bilaterally. Abdomen soft nontender. Patient moving his extremities without any difficulty    Diagnoses considered, but not limited to, include post LP headache, migraine, tension headache. Intracranial abnormality felt less likely however since this does not feel similar to her previous migraines, will obtain head CT for further eval.     Review of previous labwork from yesterday showed negative pregnancy test. Felt unnecessary to repeat at this time. Labwork ordered and interpretation by myself. Details below. Imaging ordered and interpretations by myself and radiologist. Details below. ED Course as of 01/14/23 2044   Angie Jose Jan 13, 2023   1142 CT head was ordered for further evaluation as patient was here just yesterday for same headache. She states this does feel different than previous migraines. [PW]   0 Spoke with Dr. Joshua Beverly, anesthesia, asked to have pt sent to PACU so he could do epidural blood patch. [PW]   5 CT HEAD WO CONTRAST  IMPRESSION:  No acute intracranial abnormality. Bilateral maxillary sinusitis [PW]   9978 Patient still has 2 out of 10 headache, she is waiting to go to PACU to meet with anesthesia for epidural blood patch. [PW]   9992 Pt signed out to Dr. David Munroe, awaiting epidural blood patch and re-eval.  [PW]      ED Course User Index  [PW] Diana Camilo,           Medications given include:    Medications   diphenhydrAMINE (BENADRYL) injection 25 mg (25 mg IntraVENous Given 1/13/23 1055)   prochlorperazine (COMPAZINE) injection 10 mg (10 mg IntraVENous Given 1/13/23 1056)   0.9 % sodium chloride bolus (0 mLs IntraVENous Stopped 1/13/23 1409)     As pts HA started soon after her LP procedure 3 days ago, is positional, does not feel like her normal migraine HA and head CT negative for acute intracranial abnormality, HA was felt to be likely side affect of her LP.      Spoke with Dr. Anna Cruz, anesthesia, pt was taken to PACU for an epidural blood patch for treatment of her post LP headache. Pt's symptoms were somewhat improved after treatment. Vitals remained stable. Results were discussed with pt, as well as f/u with pcp and her neurologist. Return precautions were given. Pt was d/c'd home. Final diagnosis: post LP HA, bilateral maxillary sinusitis      -------------------- RESULTS --------------------    Labs:  Results for orders placed or performed during the hospital encounter of 01/13/23   CBC with Auto Differential   Result Value Ref Range    WBC 7.2 4.5 - 11.5 E9/L    RBC 4.45 3.50 - 5.50 E12/L    Hemoglobin 13.1 11.5 - 15.5 g/dL    Hematocrit 36.6 34.0 - 48.0 %    MCV 82.2 80.0 - 99.9 fL    MCH 29.4 26.0 - 35.0 pg    MCHC 35.8 (H) 32.0 - 34.5 %    RDW 12.5 11.5 - 15.0 fL    Platelets 706 317 - 670 E9/L    MPV 10.0 7.0 - 12.0 fL    Neutrophils % 56.5 43.0 - 80.0 %    Immature Granulocytes % 0.4 0.0 - 5.0 %    Lymphocytes % 32.5 20.0 - 42.0 %    Monocytes % 6.9 2.0 - 12.0 %    Eosinophils % 2.5 0.0 - 6.0 %    Basophils % 1.2 0.0 - 2.0 %    Neutrophils Absolute 4.07 1.80 - 7.30 E9/L    Immature Granulocytes # 0.03 E9/L    Lymphocytes Absolute 2.34 1.50 - 4.00 E9/L    Monocytes Absolute 0.50 0.10 - 0.95 E9/L    Eosinophils Absolute 0.18 0.05 - 0.50 E9/L    Basophils Absolute 0.09 0.00 - 0.20 E9/L   BMP   Result Value Ref Range    Sodium 139 132 - 146 mmol/L    Potassium 4.2 3.5 - 5.0 mmol/L    Chloride 108 (H) 98 - 107 mmol/L    CO2 23 22 - 29 mmol/L    Anion Gap 8 7 - 16 mmol/L    Glucose 146 (H) 74 - 99 mg/dL    BUN 6 6 - 20 mg/dL    Creatinine 0.5 0.5 - 1.0 mg/dL    Est, Glom Filt Rate >60 >=60 mL/min/1.73    Calcium 8.8 8.6 - 10.2 mg/dL       Radiology:  CT HEAD WO CONTRAST   Final Result   No acute intracranial abnormality.       Bilateral maxillary sinusitis             -------------------- NURSING NOTES & VITALS --------------------    The nursing notes within the ED encounter and vital signs were reviewed. Vitals:    01/13/23 1918   BP: 123/85   Pulse: 68   Resp: 16   Temp:    SpO2:         No data found. Oxygen Saturation Interpretation: Normal    -------------------- PROGRESS NOTES --------------------  8:37 PM EST  At this time, no further workup was felt necessary. I have spoken with the patient and discussed todays results, in addition to providing specific details for the plan of care and counseling regarding the diagnosis and prognosis. Their questions are answered at this time. I discussed at length with them reasons for immediate return here for re evaluation. They will followup with their PCP by calling their office tomorrow and were instructed to return to the ED for any new or worsening symptoms. They are amenable to this plan.    -------------------- ADDITIONAL PROVIDER NOTES --------------------  At this time the patient is without objective evidence of an acute process requiring hospitalization or inpatient management. They have remained hemodynamically stable throughout their entire ED visit and are stable for discharge with outpatient follow-up. The plan has been discussed in detail and they are aware of the specific conditions for emergent return, as well as the importance of follow-up. Discharge Medication List as of 1/13/2023  7:08 PM        START taking these medications    Details   ciprofloxacin-dexamethasone (CIPRODEX) 0.3-0.1 % otic suspension Place 4 drops into the right ear 2 times daily for 7 days, Disp-1 each, R-0Normal             Diagnosis:  1. Post-lumbar puncture headache    2. Infective otitis externa of right ear    3. Acute sinusitis, recurrence not specified, unspecified location        Disposition:  Patient's disposition: Discharge to home  Patient's condition is stable. Florina Marshall DO       *NOTE: This report was transcribed using voice recognition software.  Every effort was made to ensure accuracy; however, inadvertent computerized transcription errors may be present.          Joel Steele DO  Resident  01/14/23 [id number]

## 2023-01-14 ENCOUNTER — HOSPITAL ENCOUNTER (INPATIENT)
Age: 23
LOS: 2 days | Discharge: HOME | DRG: 054 | End: 2023-01-17
Attending: EMERGENCY MEDICINE | Admitting: INTERNAL MEDICINE
Payer: COMMERCIAL

## 2023-01-14 DIAGNOSIS — G97.1 POST LUMBAR PUNCTURE HEADACHE: ICD-10-CM

## 2023-01-14 DIAGNOSIS — R52 INTRACTABLE PAIN: ICD-10-CM

## 2023-01-14 DIAGNOSIS — M54.50 ACUTE EXACERBATION OF CHRONIC LOW BACK PAIN: Primary | ICD-10-CM

## 2023-01-14 DIAGNOSIS — G89.29 ACUTE EXACERBATION OF CHRONIC LOW BACK PAIN: Primary | ICD-10-CM

## 2023-01-14 LAB
ALBUMIN CSF: 14 MG/DL (ref 0–35)
ALBUMIN INDEX: 3.8 RATIO (ref 0–9)
ALBUMIN, SERUM BY NEPHELOMETRY: 3654 MG/DL (ref 3500–5200)
ANION GAP SERPL CALCULATED.3IONS-SCNC: 13 MMOL/L (ref 7–16)
BASOPHILS ABSOLUTE: 0.1 E9/L (ref 0–0.2)
BASOPHILS RELATIVE PERCENT: 0.9 % (ref 0–2)
BUN BLDV-MCNC: 9 MG/DL (ref 6–20)
CALCIUM SERPL-MCNC: 9.7 MG/DL (ref 8.6–10.2)
CHLORIDE BLD-SCNC: 103 MMOL/L (ref 98–107)
CO2: 23 MMOL/L (ref 22–29)
CREAT SERPL-MCNC: 0.5 MG/DL (ref 0.5–1)
CSF CULTURE: NORMAL
EOSINOPHILS ABSOLUTE: 0.31 E9/L (ref 0.05–0.5)
EOSINOPHILS RELATIVE PERCENT: 2.7 % (ref 0–6)
GFR SERPL CREATININE-BSD FRML MDRD: >60 ML/MIN/1.73
GLUCOSE BLD-MCNC: 116 MG/DL (ref 74–99)
GRAM STAIN RESULT: NORMAL
HCT VFR BLD CALC: 41.7 % (ref 34–48)
HEMOGLOBIN: 14.7 G/DL (ref 11.5–15.5)
IGG CSF: 2.6 MG/DL (ref 0–6)
IGG INDEX: 0.63 RATIO (ref 0.28–0.66)
IGG SYNTHESIS RATE CSF: <0 MG/D
IGG/ALBUMIN CSF: 0.19 RATIO (ref 0.09–0.25)
IGG: 1075 MG/DL (ref 768–1632)
IMMATURE GRANULOCYTES #: 0.06 E9/L
IMMATURE GRANULOCYTES %: 0.5 % (ref 0–5)
LACTIC ACID: 1.2 MMOL/L (ref 0.5–2.2)
LYMPHOCYTES ABSOLUTE: 3.74 E9/L (ref 1.5–4)
LYMPHOCYTES RELATIVE PERCENT: 32.5 % (ref 20–42)
MCH RBC QN AUTO: 29.3 PG (ref 26–35)
MCHC RBC AUTO-ENTMCNC: 35.3 % (ref 32–34.5)
MCV RBC AUTO: 83.2 FL (ref 80–99.9)
MONOCYTES ABSOLUTE: 0.73 E9/L (ref 0.1–0.95)
MONOCYTES RELATIVE PERCENT: 6.3 % (ref 2–12)
MULTIPLE SCLEROSIS PANEL: NORMAL
NEUTROPHILS ABSOLUTE: 6.58 E9/L (ref 1.8–7.3)
NEUTROPHILS RELATIVE PERCENT: 57.1 % (ref 43–80)
OLIGOCLONAL BANDS CSF: NEGATIVE
OLIGOCLONAL BANDS NUMBER: 0 BANDS (ref 0–1)
PDW BLD-RTO: 12.6 FL (ref 11.5–15)
PLATELET # BLD: 303 E9/L (ref 130–450)
PMV BLD AUTO: 10 FL (ref 7–12)
POTASSIUM SERPL-SCNC: 4.1 MMOL/L (ref 3.5–5)
RBC # BLD: 5.01 E12/L (ref 3.5–5.5)
SODIUM BLD-SCNC: 139 MMOL/L (ref 132–146)
VDRL CSF SCREEN: NON REACTIVE
WBC # BLD: 11.5 E9/L (ref 4.5–11.5)

## 2023-01-14 PROCEDURE — 99285 EMERGENCY DEPT VISIT HI MDM: CPT

## 2023-01-14 PROCEDURE — 96375 TX/PRO/DX INJ NEW DRUG ADDON: CPT

## 2023-01-14 PROCEDURE — 96374 THER/PROPH/DIAG INJ IV PUSH: CPT

## 2023-01-14 PROCEDURE — 36415 COLL VENOUS BLD VENIPUNCTURE: CPT

## 2023-01-14 PROCEDURE — 85025 COMPLETE CBC W/AUTO DIFF WBC: CPT

## 2023-01-14 PROCEDURE — 80048 BASIC METABOLIC PNL TOTAL CA: CPT

## 2023-01-14 PROCEDURE — 85651 RBC SED RATE NONAUTOMATED: CPT

## 2023-01-14 PROCEDURE — 83605 ASSAY OF LACTIC ACID: CPT

## 2023-01-14 RX ORDER — 0.9 % SODIUM CHLORIDE 0.9 %
2000 INTRAVENOUS SOLUTION INTRAVENOUS ONCE
Status: DISCONTINUED | OUTPATIENT
Start: 2023-01-14 | End: 2023-01-15

## 2023-01-14 RX ORDER — LORAZEPAM 2 MG/ML
2 INJECTION INTRAMUSCULAR ONCE
Status: DISCONTINUED | OUTPATIENT
Start: 2023-01-14 | End: 2023-01-14

## 2023-01-14 RX ORDER — DIPHENHYDRAMINE HYDROCHLORIDE 50 MG/ML
50 INJECTION INTRAMUSCULAR; INTRAVENOUS ONCE
Status: DISCONTINUED | OUTPATIENT
Start: 2023-01-14 | End: 2023-01-15

## 2023-01-14 RX ORDER — METOCLOPRAMIDE HYDROCHLORIDE 5 MG/ML
10 INJECTION INTRAMUSCULAR; INTRAVENOUS ONCE
Status: DISCONTINUED | OUTPATIENT
Start: 2023-01-14 | End: 2023-01-15

## 2023-01-14 ASSESSMENT — PAIN SCALES - GENERAL: PAINLEVEL_OUTOF10: 4

## 2023-01-14 ASSESSMENT — PAIN - FUNCTIONAL ASSESSMENT: PAIN_FUNCTIONAL_ASSESSMENT: 0-10

## 2023-01-14 NOTE — LETTER
[address] Medicaid  CERTIFICATION OF NECESSITY  FOR TRANSPORTATION   BY WHEELCHAIR VAN     Individual Information   1. Name: Ramiro Arredondo 2. [place] Medicaid Billing Number:    3. Address: [address] [place] [address]      Transportation Provider Information   4. Provider Name:    5. [place] Medicaid Provider Number:  National Provider Identifier (NPI):      Certification  7. Criteria:  By signing this document, the practitioner certifies that two statements are true:  A. This individual must be accompanied by a mobility-related assistive device from the point of pick-up to the point of drop-off. B. Transport of this individual by standard passenger vehicle or common carrier is precluded or contraindicated. 8. Period Beginning Date: 1/16/23   9. Length  [x] Not more than 1 day(s)  [] One Year     Additional Information Relevant to Certification   10. Comments or Explanations, If Necessary or Appropriate   FALL RISK,POST LUMBAR PUNCTURE HEADACHE     Certifying Practitioner Information   11. Name of Practitioner: DR. DANISH Pagan MD   12. [place] Medicaid Provider Number, If Applicable:  [address] Provider Identifier (NPI):      Signature Information   14. Date of Signature: 1/16/23 15. Name of Person Signing: Marcello Gan   16. Signature and Professional Designation: Electronically signed by KYLER Franklin on 1/16/2023 at 1:23 PM       [facility] [id number]  Rev. 7/2015       [address] [place] [address] Encounter Date/Time: 1/14/2023 2107    Hospital Account: [de-identified]    MRN: [id number]    Patient: Ramiro Arredondo    Contact Serial #: [id number]      ENCOUNTER          Patient Class: I Private Enc?   No Unit  BD: [address] [facility] Service: KATHE   Encounter DX: Post lumbar puncture hea*   ADM Provider: Una Hernandez MD   Procedure:     ATT Provider: Una Hernandez MD   REF Provider:        Admission DX: Post lumbar puncture headache, BMI 50.0-59.9, adult ([facility] [address] Intractable pain, Acute exacerbation of chronic low back pain, Acute nonintractable headache, unspecified headache type, Intractable headache, unspecified chronicity pattern, unspecified headache type and DX codes: G97.1, Z68.43, R52, M54.50, G89.29, R51.9, R51.9      PATIENT                 Name: Beatrice López : 2000 (22 yrs)   Address: [place] [address] Sex: Female   City: [place] [address]         Marital Status: Single   Employer: UNEMPLOYED         Voodoo: Non-Bahai   Primary Care Provider: Claude Degroot DO         Primary Phone: [phone]   EMERGENCY CONTACT   Contact Name Legal Guardian? Relationship to Patient Home Phone Work Phone   1. Mariano PETTIT No    Parent  Parent [phone]              GUARANTOR            Guarantor: Beatrice López     : 2000   Address: [address] [place] [address] Sex: Female     [place] [address]     Relation to Patient: Self       Home Phone: [phone]   Guarantor ID: [id number]       Work Phone:     Guarantor Employer: [employer]         Status: FULL TIME      COVERAGE        PRIMARY INSURANCE   Payor: Rema Duvall Plan: [facility] MEDICAID   Payor Address: CLAIMS DEPARTMENT; [address] [facility],  [address] [place] [address], [address] [facility]       Group Number: [id number] Insurance Type: INDEMNITY   Subscriber Name: Nichole Dorman : 2000   Subscriber ID: [id number] Pat. Rel. to Sub: Self   SECONDARY INSURANCE   Payor:   Plan:     Payor Address:  ,           Group Number:   Insurance Type:     Subscriber Name:   Subscriber :     Subscriber ID:   Pat.  Rel. to Sub:           CSN: [id number]

## 2023-01-15 ENCOUNTER — APPOINTMENT (OUTPATIENT)
Dept: MRI IMAGING | Age: 23
DRG: 054 | End: 2023-01-15
Payer: COMMERCIAL

## 2023-01-15 PROBLEM — R51.9 ACUTE NONINTRACTABLE HEADACHE, UNSPECIFIED HEADACHE TYPE: Status: ACTIVE | Noted: 2023-01-15

## 2023-01-15 PROBLEM — R51.9 INTRACTABLE HEADACHE: Status: ACTIVE | Noted: 2023-01-15

## 2023-01-15 PROBLEM — R51.9 INTRACTABLE HEADACHE, UNSPECIFIED CHRONICITY PATTERN, UNSPECIFIED HEADACHE TYPE: Status: ACTIVE | Noted: 2023-01-15

## 2023-01-15 LAB — SEDIMENTATION RATE, ERYTHROCYTE: 19 MM/HR (ref 0–20)

## 2023-01-15 PROCEDURE — 1200000000 HC SEMI PRIVATE

## 2023-01-15 PROCEDURE — 6360000002 HC RX W HCPCS: Performed by: NURSE PRACTITIONER

## 2023-01-15 PROCEDURE — 6370000000 HC RX 637 (ALT 250 FOR IP): Performed by: PSYCHIATRY & NEUROLOGY

## 2023-01-15 PROCEDURE — 2580000003 HC RX 258: Performed by: NURSE PRACTITIONER

## 2023-01-15 PROCEDURE — 6370000000 HC RX 637 (ALT 250 FOR IP): Performed by: NURSE PRACTITIONER

## 2023-01-15 PROCEDURE — 99222 1ST HOSP IP/OBS MODERATE 55: CPT | Performed by: PSYCHIATRY & NEUROLOGY

## 2023-01-15 PROCEDURE — 93005 ELECTROCARDIOGRAM TRACING: CPT | Performed by: PSYCHIATRY & NEUROLOGY

## 2023-01-15 PROCEDURE — 2580000003 HC RX 258: Performed by: EMERGENCY MEDICINE

## 2023-01-15 PROCEDURE — 2500000003 HC RX 250 WO HCPCS: Performed by: PSYCHIATRY & NEUROLOGY

## 2023-01-15 PROCEDURE — 6360000002 HC RX W HCPCS: Performed by: PSYCHIATRY & NEUROLOGY

## 2023-01-15 PROCEDURE — 2580000003 HC RX 258: Performed by: PSYCHIATRY & NEUROLOGY

## 2023-01-15 PROCEDURE — 96375 TX/PRO/DX INJ NEW DRUG ADDON: CPT

## 2023-01-15 PROCEDURE — 6360000002 HC RX W HCPCS: Performed by: EMERGENCY MEDICINE

## 2023-01-15 PROCEDURE — 6370000000 HC RX 637 (ALT 250 FOR IP): Performed by: INTERNAL MEDICINE

## 2023-01-15 RX ORDER — ONDANSETRON 4 MG/1
4 TABLET, ORALLY DISINTEGRATING ORAL EVERY 8 HOURS PRN
Status: DISCONTINUED | OUTPATIENT
Start: 2023-01-15 | End: 2023-01-17 | Stop reason: HOSPADM

## 2023-01-15 RX ORDER — DEXAMETHASONE SODIUM PHOSPHATE 4 MG/ML
4 INJECTION, SOLUTION INTRA-ARTICULAR; INTRALESIONAL; INTRAMUSCULAR; INTRAVENOUS; SOFT TISSUE EVERY 8 HOURS
Status: COMPLETED | OUTPATIENT
Start: 2023-01-15 | End: 2023-01-16

## 2023-01-15 RX ORDER — ENOXAPARIN SODIUM 100 MG/ML
30 INJECTION SUBCUTANEOUS 2 TIMES DAILY
Status: DISCONTINUED | OUTPATIENT
Start: 2023-01-15 | End: 2023-01-17 | Stop reason: HOSPADM

## 2023-01-15 RX ORDER — SODIUM CHLORIDE 0.9 % (FLUSH) 0.9 %
5-40 SYRINGE (ML) INJECTION PRN
Status: DISCONTINUED | OUTPATIENT
Start: 2023-01-15 | End: 2023-01-17 | Stop reason: HOSPADM

## 2023-01-15 RX ORDER — KETOROLAC TROMETHAMINE 30 MG/ML
30 INJECTION, SOLUTION INTRAMUSCULAR; INTRAVENOUS EVERY 8 HOURS
Status: DISCONTINUED | OUTPATIENT
Start: 2023-01-15 | End: 2023-01-15

## 2023-01-15 RX ORDER — SENNA PLUS 8.6 MG/1
1 TABLET ORAL DAILY PRN
Status: DISCONTINUED | OUTPATIENT
Start: 2023-01-15 | End: 2023-01-17 | Stop reason: HOSPADM

## 2023-01-15 RX ORDER — BUTALBITAL, ACETAMINOPHEN AND CAFFEINE 50; 325; 40 MG/1; MG/1; MG/1
1 TABLET ORAL EVERY 4 HOURS PRN
Status: DISCONTINUED | OUTPATIENT
Start: 2023-01-15 | End: 2023-01-15

## 2023-01-15 RX ORDER — CAFFEINE 200 MG
200 TABLET ORAL ONCE
Status: COMPLETED | OUTPATIENT
Start: 2023-01-15 | End: 2023-01-15

## 2023-01-15 RX ORDER — 0.9 % SODIUM CHLORIDE 0.9 %
1000 INTRAVENOUS SOLUTION INTRAVENOUS ONCE
Status: COMPLETED | OUTPATIENT
Start: 2023-01-15 | End: 2023-01-15

## 2023-01-15 RX ORDER — METOCLOPRAMIDE HYDROCHLORIDE 5 MG/ML
5 INJECTION INTRAMUSCULAR; INTRAVENOUS ONCE
Status: COMPLETED | OUTPATIENT
Start: 2023-01-15 | End: 2023-01-15

## 2023-01-15 RX ORDER — SODIUM CHLORIDE 9 MG/ML
INJECTION, SOLUTION INTRAVENOUS PRN
Status: DISCONTINUED | OUTPATIENT
Start: 2023-01-15 | End: 2023-01-17 | Stop reason: HOSPADM

## 2023-01-15 RX ORDER — 0.9 % SODIUM CHLORIDE 0.9 %
1000 INTRAVENOUS SOLUTION INTRAVENOUS ONCE
Status: COMPLETED | OUTPATIENT
Start: 2023-01-15 | End: 2023-01-16

## 2023-01-15 RX ORDER — DIPHENHYDRAMINE HYDROCHLORIDE 50 MG/ML
50 INJECTION INTRAMUSCULAR; INTRAVENOUS ONCE
Status: COMPLETED | OUTPATIENT
Start: 2023-01-15 | End: 2023-01-15

## 2023-01-15 RX ORDER — SODIUM CHLORIDE 0.9 % (FLUSH) 0.9 %
5-40 SYRINGE (ML) INJECTION EVERY 12 HOURS SCHEDULED
Status: DISCONTINUED | OUTPATIENT
Start: 2023-01-15 | End: 2023-01-17 | Stop reason: HOSPADM

## 2023-01-15 RX ORDER — ACETAMINOPHEN 325 MG/1
650 TABLET ORAL EVERY 6 HOURS PRN
Status: DISCONTINUED | OUTPATIENT
Start: 2023-01-15 | End: 2023-01-17 | Stop reason: HOSPADM

## 2023-01-15 RX ORDER — ONDANSETRON 2 MG/ML
4 INJECTION INTRAMUSCULAR; INTRAVENOUS EVERY 6 HOURS PRN
Status: DISCONTINUED | OUTPATIENT
Start: 2023-01-15 | End: 2023-01-17 | Stop reason: HOSPADM

## 2023-01-15 RX ORDER — CIPROFLOXACIN AND DEXAMETHASONE 3; 1 MG/ML; MG/ML
4 SUSPENSION/ DROPS AURICULAR (OTIC) 2 TIMES DAILY
Status: DISCONTINUED | OUTPATIENT
Start: 2023-01-15 | End: 2023-01-17 | Stop reason: HOSPADM

## 2023-01-15 RX ORDER — BUSPIRONE HYDROCHLORIDE 5 MG/1
5 TABLET ORAL 2 TIMES DAILY
Status: DISCONTINUED | OUTPATIENT
Start: 2023-01-15 | End: 2023-01-17 | Stop reason: HOSPADM

## 2023-01-15 RX ORDER — ACETAMINOPHEN 650 MG/1
650 SUPPOSITORY RECTAL EVERY 6 HOURS PRN
Status: DISCONTINUED | OUTPATIENT
Start: 2023-01-15 | End: 2023-01-17 | Stop reason: HOSPADM

## 2023-01-15 RX ORDER — LORAZEPAM 2 MG/ML
1 INJECTION INTRAMUSCULAR PRN
Status: DISCONTINUED | OUTPATIENT
Start: 2023-01-15 | End: 2023-01-17 | Stop reason: HOSPADM

## 2023-01-15 RX ORDER — PROCHLORPERAZINE EDISYLATE 5 MG/ML
10 INJECTION INTRAMUSCULAR; INTRAVENOUS EVERY 8 HOURS
Status: DISCONTINUED | OUTPATIENT
Start: 2023-01-15 | End: 2023-01-15

## 2023-01-15 RX ADMIN — BUSPIRONE HYDROCHLORIDE 5 MG: 5 TABLET ORAL at 08:45

## 2023-01-15 RX ADMIN — VALPROATE SODIUM 500 MG: 100 INJECTION, SOLUTION INTRAVENOUS at 15:16

## 2023-01-15 RX ADMIN — VALPROATE SODIUM 500 MG: 100 INJECTION, SOLUTION INTRAVENOUS at 21:12

## 2023-01-15 RX ADMIN — VORTIOXETINE 10 MG: 10 TABLET, FILM COATED ORAL at 21:08

## 2023-01-15 RX ADMIN — HYDROMORPHONE HYDROCHLORIDE 1 MG: 1 INJECTION, SOLUTION INTRAMUSCULAR; INTRAVENOUS; SUBCUTANEOUS at 00:35

## 2023-01-15 RX ADMIN — SODIUM CHLORIDE 1000 ML: 9 INJECTION, SOLUTION INTRAVENOUS at 00:31

## 2023-01-15 RX ADMIN — CIPROFLOXACIN AND DEXAMETHASONE 4 DROP: 3; 1 SUSPENSION/ DROPS AURICULAR (OTIC) at 21:09

## 2023-01-15 RX ADMIN — PROCHLORPERAZINE EDISYLATE 10 MG: 5 INJECTION INTRAMUSCULAR; INTRAVENOUS at 15:06

## 2023-01-15 RX ADMIN — ENOXAPARIN SODIUM 30 MG: 100 INJECTION SUBCUTANEOUS at 21:08

## 2023-01-15 RX ADMIN — CAFFEINE 200 MG: 200 TABLET ORAL at 15:22

## 2023-01-15 RX ADMIN — ENOXAPARIN SODIUM 30 MG: 100 INJECTION SUBCUTANEOUS at 08:45

## 2023-01-15 RX ADMIN — DEXAMETHASONE SODIUM PHOSPHATE 4 MG: 4 INJECTION, SOLUTION INTRAMUSCULAR; INTRAVENOUS at 21:09

## 2023-01-15 RX ADMIN — METOCLOPRAMIDE 5 MG: 5 INJECTION, SOLUTION INTRAMUSCULAR; INTRAVENOUS at 00:35

## 2023-01-15 RX ADMIN — DIPHENHYDRAMINE HYDROCHLORIDE 50 MG: 50 INJECTION, SOLUTION INTRAMUSCULAR; INTRAVENOUS at 00:35

## 2023-01-15 RX ADMIN — SODIUM CHLORIDE 1000 ML: 9 INJECTION, SOLUTION INTRAVENOUS at 15:05

## 2023-01-15 RX ADMIN — SODIUM CHLORIDE, PRESERVATIVE FREE 10 ML: 5 INJECTION INTRAVENOUS at 08:47

## 2023-01-15 RX ADMIN — CIPROFLOXACIN AND DEXAMETHASONE 4 DROP: 3; 1 SUSPENSION/ DROPS AURICULAR (OTIC) at 12:01

## 2023-01-15 RX ADMIN — BUSPIRONE HYDROCHLORIDE 5 MG: 5 TABLET ORAL at 21:08

## 2023-01-15 RX ADMIN — DEXAMETHASONE SODIUM PHOSPHATE 4 MG: 4 INJECTION, SOLUTION INTRAMUSCULAR; INTRAVENOUS at 15:05

## 2023-01-15 RX ADMIN — BUTALBITAL, ACETAMINOPHEN, AND CAFFEINE 1 TABLET: 50; 325; 40 TABLET ORAL at 12:02

## 2023-01-15 RX ADMIN — SODIUM CHLORIDE, PRESERVATIVE FREE 10 ML: 5 INJECTION INTRAVENOUS at 21:07

## 2023-01-15 RX ADMIN — BREXPIPRAZOLE 2 MG: 2 TABLET ORAL at 08:45

## 2023-01-15 ASSESSMENT — PAIN DESCRIPTION - PAIN TYPE
TYPE: ACUTE PAIN

## 2023-01-15 ASSESSMENT — PAIN DESCRIPTION - DESCRIPTORS
DESCRIPTORS: ACHING;DISCOMFORT;THROBBING
DESCRIPTORS: ACHING;DISCOMFORT;THROBBING
DESCRIPTORS: THROBBING;STABBING;SHOOTING
DESCRIPTORS: ACHING;DISCOMFORT;THROBBING

## 2023-01-15 ASSESSMENT — PAIN DESCRIPTION - ONSET
ONSET: ON-GOING
ONSET: ON-GOING

## 2023-01-15 ASSESSMENT — PAIN DESCRIPTION - FREQUENCY
FREQUENCY: CONTINUOUS

## 2023-01-15 ASSESSMENT — PAIN DESCRIPTION - LOCATION
LOCATION: HEAD

## 2023-01-15 ASSESSMENT — PAIN SCALES - GENERAL
PAINLEVEL_OUTOF10: 5
PAINLEVEL_OUTOF10: 8
PAINLEVEL_OUTOF10: 5
PAINLEVEL_OUTOF10: 6
PAINLEVEL_OUTOF10: 4
PAINLEVEL_OUTOF10: 8

## 2023-01-15 ASSESSMENT — PAIN DESCRIPTION - ORIENTATION: ORIENTATION: POSTERIOR;ANTERIOR

## 2023-01-15 ASSESSMENT — PAIN - FUNCTIONAL ASSESSMENT: PAIN_FUNCTIONAL_ASSESSMENT: ACTIVITIES ARE NOT PREVENTED

## 2023-01-15 NOTE — CONSULTS
[facility] [address]  Neurology Consult    Date:  1/15/2023  Patient Name:  Gonzales Pratt  YOB: 2000  MRN: [id number]     PCP:  Reena Brandon DO   Referring:  No ref. provider found      Chief Complaint: headache    History obtained from: patient    Kayla Garvin is a 20 y.o. female admitted with post-LP positional headache which has been refractory to multiple medications including Benadryl, Reglan, Toradol, and Fioricet. She also has already had one blood patch done. Plan  Phenergan, valproate, caffeine, IV fluids, dexamethasone given for headache  Will hold Toradol in case repeat blood patch needed  If headache persists in severity tomorrow consider consult for repeat blood patch  Unable to obtain MRI brain given size limitations of scanner        History of Present Illness:  Gonzales Pratt is a 20 y.o. right handed female presenting for evaluation of post LP headache. She had LP done on 1/10/23 for evaluation of possible IIH given headaches. Since then she has had a positional headache which can go from a 0 while lying down to an 8 while sitting/standing up. She had a blood patch done two days prior with minimal improvement.            Medical History:   Past Medical History:   Diagnosis Date    Anxiety     Asperger syndrome     Autism disorder     Bipolar II disorder ([address]     Depression         Surgical History:   Past Surgical History:   Procedure Laterality Date    CHOLECYSTECTOMY      MA LAPS SURG CHOLECYSTECTOMY W/CHOLANGIOGRAPHY N/A 3/15/2018    CHOLECYSTECTOMY LAPAROSCOPIC WITH INTRAOP CHOLANGIOGRAM performed by Lesia Mejia MD at [address] [place] [address] AND ADENOIDECTOMY N/A 7/22/2021    TONSILLECTOMY performed by Erin Alfonso DO at [facility]        Family History:   Family History   Problem Relation Age of Onset    No Known Problems Mother     No Known Problems Father        Social History:  Social History     Tobacco Use    Smoking status: Former     Packs/day: 1.00     Years: 6.00     Pack years: 6.00     Types: Cigarettes     Quit date: 2022     Years since quittin.3    Smokeless tobacco: Never    Tobacco comments:     vaping   Vaping Use    Vaping Use: Every day    Start date: 10/2/2015    Substances: Nicotine, Flavoring    Devices: Disposable, Pre-filled or refillable cartridge, Refillable tank, Pre-filled pod   Substance Use Topics    Alcohol use:  Yes     Alcohol/week: 1.0 standard drink     Types: 1 Glasses of wine per week     Comment: monthly    Drug use: No        Current Medications:      Current Facility-Administered Medications   Medication Dose Route Frequency Provider Last Rate Last Admin    brexpiprazole (REXULTI) tablet 2 mg  2 mg Oral Daily Lorena Downer, APRN - CNP   2 mg at 01/15/23 0845    busPIRone (BUSPAR) tablet 5 mg  5 mg Oral BID Lorena Downer, APRN - CNP   5 mg at 01/15/23 0845    VORTIoxetine (TRINTELLIX) tablet 10 mg  10 mg Oral Daily Latrice Igor, APRN - CNP        sodium chloride flush 0.9 % injection 5-40 mL  5-40 mL IntraVENous 2 times per day Lorena Downer, APRN - CNP   10 mL at 01/15/23 0847    sodium chloride flush 0.9 % injection 5-40 mL  5-40 mL IntraVENous PRN Latrice Igor, APRN - CNP        0.9 % sodium chloride infusion   IntraVENous PRN Lorena Downer, APRN - CNP        enoxaparin Sodium (LOVENOX) injection 30 mg  30 mg SubCUTAneous BID Lorena Downer, APRN - CNP   30 mg at 01/15/23 0845    ondansetron (ZOFRAN-ODT) disintegrating tablet 4 mg  4 mg Oral Q8H PRN Lorena Downer, APRN - CNP        Or    ondansetron (ZOFRAN) injection 4 mg  4 mg IntraVENous Q6H PRN Lorena Downer, APRN - CNP        acetaminophen (TYLENOL) tablet 650 mg  650 mg Oral Q6H PRN Lorena Downer, APRN - CNP        Or    acetaminophen (TYLENOL) suppository 650 mg  650 mg Rectal Q6H PRN Lorena Downer, APRN - CNP        senna (SENOKOT) tablet 8.6 mg  1 tablet Oral Daily PRN Sandra Aldridge, APRN - CNP        lisdexamfetamine (VYVANSE) capsule 70 mg (Patient Supplied)  70 mg Oral Daily Ying Xavier MD        ciprofloxacin-dexamethasone [facility]) otic suspension 4 drop (Patient Supplied)  4 drop Right Ear BID Ying Xavier MD   4 drop at 01/15/23 1201    0.9 % sodium chloride bolus  1,000 mL IntraVENous Once Eugena Maik, DO        valproate (DEPACON) 500 mg in dextrose 5 % 100 mL IVPB  500 mg IntraVENous Q8H Eugena Maik, DO        Caffeine (VIVARIN) TABS 200 mg  200 mg Oral Once Eugena Maik, DO        prochlorperazine (COMPAZINE) injection 10 mg  10 mg IntraVENous q8h Eugena Maik, DO        dexamethasone (DECADRON) injection 4 mg  4 mg IntraVENous Q8H Eugena Maik, DO        LORazepam (ATIVAN) injection 1 mg  1 mg IntraVENous PRN Eugena Maik, DO            Allergies: Allergies   Allergen Reactions    Doxycycline         Physical Examination  Vitals   Vitals:    01/15/23 0100 01/15/23 0358 01/15/23 0615 01/15/23 0900   BP: 138/88 132/80 (!) 137/97 131/81   Pulse: 85 80 71 73   Resp: 16 14 16 16   Temp:   98.3 °F (36.8 °C) (!) 96.7 °F (35.9 °C)   TempSrc:   Temporal Temporal   SpO2: 99% 99% 98% 100%   Weight:       Height:            General: Patient appears in no acute distress. HEENT: Normocephalic, atraumatic  Chest: Clear to auscultation bilaterally  Heart: No murmurs appreciated  Extremities/Peripheral vascular: No edema/swelling noted. No cold limbs noted. Neurologic Examination    Mental Status  Alert, and oriented to person, place and time. Speech is fluent with intact comprehension. No evidence of memory impairment. Attention and concentration appeared normal.     Cranial Nerves  II. Visual fields full to confrontation bilaterally. Fundoscopic exam: no papilledema or optic disc pallor noted  III, IV, VI: Pupils equally round and reactive to light, 3 to 2 mm bilaterally. EOMs: full, no nystagmus.    V. Facial sensation intact to light touch bilaterally  VII: Facial movements symmetric and strong  VIII: Hearing intact to voice  IX,X: Palate elevates symmetrically.  No dysarthria  XI: Sternocleidomastoid and trapezius 5/5 bilaterally   XII: Tongue is midline    Motor     Right Left   Right Left   Deltoid 5 5  Hip Flexion 5 5   Biceps 5 5  Knee Extension 5 5   Triceps 5 5  Knee Flexion 5 5   Handgrip 5 5  Ankle Dorsiflexion 5 5       Ankle Plantarflexion 5 5       Sensation  Light Touch: Intact distally in all four limbs      Reflexes     Right Left   Biceps 2 2   Brachioradialis 2 2   Patellar 2 2   Achilles 2 2   ankle clonus none none   Babinski absent absent     Coordination  Rapid alternating movements normal in bilateral upper extremities  Finger to nose testing normal bilaterally  Heel to shin testing normal bilaterally    Gait  Deferred for safety/fall consideration      Labs  Recent Labs     01/14/23  2325      K 4.1      CO2 23   BUN 9   CREATININE 0.5   GLUCOSE 116*   CALCIUM 9.7   WBC 11.5   RBC 5.01   HGB 14.7   HCT 41.7   MCV 83.2   MCH 29.3   MCHC 35.3*   RDW 12.6      MPV 10.0   LACTA 1.2   SEDRATE 19         Imaging  MRI BRAIN W WO CONTRAST    (Results Pending)             Electronically signed by Keyur Simpson DO on 1/15/2023 at 12:23 PM

## 2023-01-15 NOTE — H&P
[facility]  History and Physical      CHIEF COMPLAINT:    Chief Complaint   Patient presents with    Back Pain     Pt states spinal tap on 1/10/23, has had back pain and H/A since, worse today        Patient of Junior Graham DO presents with:  Acute nonintractable headache, unspecified headache type    History of Present Illness:   Patient is a 71-year-old female with a past medical history of anxiety, depression, Asperger's, and autism. Patient presented to the ED for persistent headache and back pain after lumbar puncture that was done 3 days ago. Patient was initially seen at [facility] on 1/10/2023 and had a lumbar puncture under IR due to recurrent headaches secondary to intracranial hypertension. Patient had followed up in the ED on 1/12/2023 and 1/13/2023 for spinal headache. Patient had a blood patch that was done but she states that she has done little to maría her headache. She only rates her headache as a 4/10 when she is lying supine but states that her headache and back pain are worsening when standing. Patient has not had any focal extremity weakness slurred speech difficulty speaking or neck stiffness. Patient's lab work and imaging was negative in the ED. Decision was made to admit patient to [facility] unit for neurology consult. On evaluation she continues to complain of ongoing headache however on my exam appears to be relatively comfortable with adequate pain control. REVIEW OF SYSTEMS:  Pertinent negatives are above in HPI. 10 point ROS otherwise negative.       Past Medical History:   Diagnosis Date    Anxiety     Asperger syndrome     Autism disorder     Bipolar II disorder ([facility] [address]     Depression          Past Surgical History:   Procedure Laterality Date    CHOLECYSTECTOMY      MO LAPS SURG CHOLECYSTECTOMY W/CHOLANGIOGRAPHY N/A 3/15/2018    CHOLECYSTECTOMY LAPAROSCOPIC WITH INTRAOP CHOLANGIOGRAM performed by Cornelius Arce MD at [address] REMOVAL FOREIGN BODY EAR      TONSILLECTOMY AND ADENOIDECTOMY N/A 7/22/2021    TONSILLECTOMY performed by Shasta Chance DO at [facility] OR       Medications Prior to Admission:    Medications Prior to Admission: ciprofloxacin-dexamethasone (CIPRODEX) 0.3-0.1 % otic suspension, Place 4 drops into the right ear 2 times daily for 7 days  butalbital-APAP-caffeine (FIORICET) -40 MG CAPS per capsule, Take 1 capsule by mouth every 4 hours as needed for Headaches  VORTIoxetine (TRINTELLIX) 5 MG tablet, Take 10 mg by mouth daily  busPIRone (BUSPAR) 5 MG tablet, Take 5 mg by mouth daily  ibuprofen (ADVIL;MOTRIN) 800 MG tablet, Take 1 tablet by mouth every 6 hours as needed for Pain  brexpiprazole (REXULTI) 1 MG TABS tablet, Take 2 mg by mouth daily  VYVANSE 70 MG capsule, TAKE ONE CAPSULE BY MOUTH DAILY FOR 30 DAYS  cyclobenzaprine (FLEXERIL) 10 MG tablet, 3 times daily as needed  melatonin 5 MG TABS tablet, 1 mg nightly    Note that the patient's home medications were reviewed and the above list is accurate to the best of my knowledge at the time of the exam.    Allergies:    Doxycycline    Social History:    reports that she quit smoking about 3 months ago. Her smoking use included cigarettes. She has a 6.00 pack-year smoking history. She has never used smokeless tobacco. She reports current alcohol use of about 1.0 standard drink per week. She reports that she does not use drugs. Family History:   family history includes No Known Problems in her father and mother.       PHYSICAL EXAM:    Vitals:  /81   Pulse 73   Temp (!) 96.7 °F (35.9 °C) (Temporal)   Resp 16   Ht 5' 7\" (1.702 m)   Wt (!) 325 lb (147.4 kg)   SpO2 100%   BMI 50.90 kg/m²       General appearance: NAD, conversant  Eyes: Sclerae anicteric, PERRLA  HEENT: AT/NC, MMM  Neck: FROM, supple, no thyromegaly  Lymph: No cervical / supraclavicular lymphadenopathy  Lungs: Clear to auscultation, WOB normal  CV: RRR, no MRGs, no lower extremity edema  Abdomen: Soft, non-tender; no masses or HSM, +BS  Extremities: FROM without synovitis. No clubbing or cyanosis of the hands. Skin: no rash, induration, lesions, or ulcers  Psych: Calm and cooperative. Normal judgement and insight. Normal mood and affect. Neuro: Alert and interactive no neurological deficits are noted    LABS:  All labs reviewed. Of note:  CBC with Differential:    Lab Results   Component Value Date/Time    WBC 11.5 01/14/2023 11:25 PM    RBC 5.01 01/14/2023 11:25 PM    HGB 14.7 01/14/2023 11:25 PM    HCT 41.7 01/14/2023 11:25 PM     01/14/2023 11:25 PM    MCV 83.2 01/14/2023 11:25 PM    MCH 29.3 01/14/2023 11:25 PM    MCHC 35.3 01/14/2023 11:25 PM    RDW 12.6 01/14/2023 11:25 PM    LYMPHOPCT 32.5 01/14/2023 11:25 PM    MONOPCT 6.3 01/14/2023 11:25 PM    BASOPCT 0.9 01/14/2023 11:25 PM    MONOSABS 0.73 01/14/2023 11:25 PM    LYMPHSABS 3.74 01/14/2023 11:25 PM    EOSABS 0.31 01/14/2023 11:25 PM    BASOSABS 0.10 01/14/2023 11:25 PM     CMP:    Lab Results   Component Value Date/Time     01/14/2023 11:25 PM    K 4.1 01/14/2023 11:25 PM    K 3.5 10/25/2022 04:11 AM     01/14/2023 11:25 PM    CO2 23 01/14/2023 11:25 PM    BUN 9 01/14/2023 11:25 PM    CREATININE 0.5 01/14/2023 11:25 PM    GFRAA >60 09/22/2022 08:52 PM    LABGLOM >60 01/14/2023 11:25 PM    GLUCOSE 116 01/14/2023 11:25 PM    PROT 7.2 01/11/2023 10:25 PM    LABALBU 3.8 01/11/2023 10:25 PM    CALCIUM 9.7 01/14/2023 11:25 PM    BILITOT 0.2 01/11/2023 10:25 PM    ALKPHOS 96 01/11/2023 10:25 PM    AST 26 01/11/2023 10:25 PM    ALT 41 01/11/2023 10:25 PM       Imaging:  CT head: No acute intracranial abnormality.   Bilateral maxillary sinusitis    EKG:  I've personally reviewed the patient's EKG:  NSR      ASSESSMENT/PLAN:  Principal Problem:    Acute nonintractable headache, unspecified headache type  Active Problems:    Intractable headache    Intractable headache, unspecified chronicity pattern, unspecified headache type  Resolved Problems:    * No resolved hospital problems. *    23-year-old female with a history of autism, Asperger's and depression presents to the ED with complaints of persistent headache and back pain after lumbar puncture done 3 days ago and is admitted to [place] [address] unit with    Acute intractable headache post LP/blood patch, and morbidly obese woman    Should self resolve with pain control  Neurology consulted  MRI brain pending  Continue pain control with IV Toradol  Continue home medications  Blood work and vital signs completely normal  Okay for discharge from medicine standpoint once pain is better controlled    Medication for other comorbidities continue as appropriate dose adjustment as necessary.     DVT prophylaxis  PT OT  Discharge planning           Code status: Full  Requires inpatient level of care    Yulia Elmore MD  12:29 PM  1/15/2023

## 2023-01-15 NOTE — PLAN OF CARE
Problem: Discharge Planning  Goal: Discharge to home or other facility with appropriate resources  1/15/2023 1542 by Kenya Bernal RN  Outcome: Progressing  Flowsheets (Taken 1/15/2023 1540)  Discharge to home or other facility with appropriate resources: Identify barriers to discharge with patient and caregiver  1/15/2023 0909 by Rima Nelson RN  Outcome: Progressing     Problem: Pain  Goal: Verbalizes/displays adequate comfort level or baseline comfort level  1/15/2023 1542 by Kenya Bernal RN  Outcome: Progressing  Flowsheets (Taken 1/15/2023 1530)  Verbalizes/displays adequate comfort level or baseline comfort level: Encourage patient to monitor pain and request assistance  1/15/2023 0909 by Rima Nelson RN  Outcome: Progressing  1/15/2023 0622 by Andrey Reilly RN  Outcome: Progressing     Problem: Safety - Adult  Goal: Free from fall injury  Outcome: Progressing

## 2023-01-15 NOTE — ED PROVIDER NOTES
HPI:  1/14/23, Time: 11:00 PM TRISTON Fischer is a 20 y.o. female presenting to the ED for persistent headache and back pain after lumbar puncture done 3 days ago. The complaint has been persistent, moderate in severity, and worsened by standing, walking, bending. Patient was initially seen at [facility] on 1/10/2023 and had LP under IR due to recurrent headaches secondary to intracranial hypertension. Patient had follow-up in the ED on 1/12/2023 and 1/13/2023 for pulm spinal headache. She had a blood patch that was done but she states that that has done little to maría her headache. She only rates her headache at 4/10 severity when lying supine but states that her headache and back pain are worse when standing. She is not had any focal extremity weakness, no slurred speech or difficulty speaking, no rashes, no neck stiffness associated. Review of Systems:   A complete review of systems was performed and pertinent positives and negatives are stated within HPI, all other systems reviewed and are negative.    --------------------------------------------- PAST HISTORY ---------------------------------------------  Past Medical History:  has a past medical history of Anxiety, Asperger syndrome, Autism disorder, Bipolar II disorder ([facility] [address] and Depression. Past Surgical History:  has a past surgical history that includes REMOVAL FOREIGN BODY EAR; pr laps surg cholecystectomy w/cholangiography (N/A, 3/15/2018); Cholecystectomy; and Tonsillectomy and adenoidectomy (N/A, 7/22/2021). Social History:  reports that she quit smoking about 3 months ago. Her smoking use included cigarettes. She has a 6.00 pack-year smoking history. She has never used smokeless tobacco. She reports current alcohol use of about 1.0 standard drink per week. She reports that she does not use drugs. Family History: family history includes No Known Problems in her father and mother.      The patients home medications have been reviewed. Allergies: Doxycycline    -------------------------------------------------- RESULTS -------------------------------------------------  All laboratory and radiology results have been personally reviewed by myself   LABS:  Results for orders placed or performed during the hospital encounter of 01/14/23   CBC with Auto Differential   Result Value Ref Range    WBC 11.5 4.5 - 11.5 E9/L    RBC 5.01 3.50 - 5.50 E12/L    Hemoglobin 14.7 11.5 - 15.5 g/dL    Hematocrit 41.7 34.0 - 48.0 %    MCV 83.2 80.0 - 99.9 fL    MCH 29.3 26.0 - 35.0 pg    MCHC 35.3 (H) 32.0 - 34.5 %    RDW 12.6 11.5 - 15.0 fL    Platelets 585 693 - 239 E9/L    MPV 10.0 7.0 - 12.0 fL    Neutrophils % 57.1 43.0 - 80.0 %    Immature Granulocytes % 0.5 0.0 - 5.0 %    Lymphocytes % 32.5 20.0 - 42.0 %    Monocytes % 6.3 2.0 - 12.0 %    Eosinophils % 2.7 0.0 - 6.0 %    Basophils % 0.9 0.0 - 2.0 %    Neutrophils Absolute 6.58 1.80 - 7.30 E9/L    Immature Granulocytes # 0.06 E9/L    Lymphocytes Absolute 3.74 1.50 - 4.00 E9/L    Monocytes Absolute 0.73 0.10 - 0.95 E9/L    Eosinophils Absolute 0.31 0.05 - 0.50 E9/L    Basophils Absolute 0.10 0.00 - 0.20 T5/G   Basic Metabolic Panel   Result Value Ref Range    Sodium 139 132 - 146 mmol/L    Potassium 4.1 3.5 - 5.0 mmol/L    Chloride 103 98 - 107 mmol/L    CO2 23 22 - 29 mmol/L    Anion Gap 13 7 - 16 mmol/L    Glucose 116 (H) 74 - 99 mg/dL    BUN 9 6 - 20 mg/dL    Creatinine 0.5 0.5 - 1.0 mg/dL    Est, Glom Filt Rate >60 >=60 mL/min/1.73    Calcium 9.7 8.6 - 10.2 mg/dL   Lactic Acid   Result Value Ref Range    Lactic Acid 1.2 0.5 - 2.2 mmol/L   Sedimentation Rate   Result Value Ref Range    Sed Rate 19 0 - 20 mm/Hr       RADIOLOGY:  Interpreted by Radiologist.  No orders to display     ------------------------- NURSING NOTES AND VITALS REVIEWED ---------------------------    The nursing notes within the ED encounter and vital signs as below have been reviewed.    /80   Pulse 80   Temp 97.9 °F (36.6 °C)   Resp 14   Ht 5' 7\" (1.702 m)   Wt (!) 325 lb (147.4 kg)   SpO2 99%   BMI 50.90 kg/m²  Oxygen Saturation Interpretation: Normal    ---------------------------------------------------PHYSICAL EXAM--------------------------------------    Constitutional/General: Alert and oriented x3, well appearing, non toxic in moderate distress  Head: Normocephalic and atraumatic  Eyes: PERRL, EOMI, no scleral icterus, no scleral injection  Mouth: Oropharynx clear, handling secretions, no trismus  Neck: Supple, full ROM, no nuchal rigidity, trachea midline  Pulmonary: Lungs clear to auscultation bilaterally, no wheezes, rales, or rhonchi. Not in respiratory distress  Cardiovascular:  Regular rate and rhythm, no murmurs, gallops, or rubs. 2+ distal pulses  Abdomen: Soft, non tender, non distended, no organomegaly no masses no guarding or rigidity normal bowel sounds  Extremities: Moves all extremities x 4. Warm and well perfused  Skin: warm and dry without rash; no petechia no purpura no target lesions no bullae  Neurologic: GCS 15, cranial nerves II through XII grossly intact with no focal neurologic deficits. No meningeal signs. Psych: Normal Affect      ------------------------------ ED COURSE/MEDICAL DECISION MAKING----------------------  Medications   0.9 % sodium chloride bolus (0 mLs IntraVENous Stopped 1/15/23 0046)   diphenhydrAMINE (BENADRYL) injection 50 mg (50 mg IntraVENous Given 1/15/23 0035)   metoclopramide (REGLAN) injection 5 mg (5 mg IntraVENous Given 1/15/23 0035)   HYDROmorphone (DILAUDID) injection 1 mg (1 mg IntraVENous Given 1/15/23 0035)       ED COURSE:     Medical Decision Making:     ED COURSE/CC/HPI:  18-year-old female with history of chronic intracranial hypertension underwent lumbar puncture on 1/10/2023 and had 3 Emergency Department visits for recurrent headache and low back pain resulting from the procedure and not improved by blood patch done on 1/12/2023.   Patient's not had any fever/chills associated, no vision loss, no focal extremity weakness associated. Patient was given the following medications during their ED Course  Benadryl 50 mg IV Reglan 5 mg IV plus Dilaudid 1 mg IV  Chronic Conditions Affecting Care:  History of chronic intracranial hypertension, history of Asperger syndrome, history of anxiety  Social Determinants Influencing Care:  Patient here with her significant other. No history of any illicit drug use, patient reportedly quit smoking recently. Outside Records Reviewed:  N/A  Differential Diagnoses Considered:  Chronic intracranial hypertension, common migraine headache, cluster headache, muscular tension headache, SAH, meningitis, exam review. Patient has not had recent CT 1/13/2023 and lumbar puncture 1/10/2023 which showed no evidence of any reported xanthochromia nor intracranial hemorrhage. Patient had recent blood patch done by anesthesia but which was unsuccessful in controlling the patient's symptoms per her report. Patient is felt to warrant admission with neurology consultation and possibly repeat consultation with anesthesia regarding this. Patient screening CBC, BMP, lactic acid and sed rate screening tests were within normal limits. I spoke with Arley Benton CNP on-call for [facility] agrees accept the patient for admission to her service. ADMIT vs D/C/ Shared Decision Making:  Shared decision-making was employed and the patient agrees with plan for being admitted and I feel this is prudent given the number of recent recurrent ED visits. Counseling: The emergency provider has spoken with the patient and discussed todays results, in addition to providing specific details for the plan of care and counseling regarding the diagnosis and prognosis. Questions are answered at this time and they are agreeable with the plan.    --------------------------------- IMPRESSION AND DISPOSITION ---------------------------------    IMPRESSION  1.  Acute exacerbation of chronic low back pain    2. Post lumbar puncture headache    3. Intractable pain    4. [facility] 50.0-59.9, [facility]        DISPOSITION  Disposition: Admit to med/surg floor  Patient condition is stable      NOTE: This report was transcribed using voice recognition software.  Every effort was made to ensure accuracy; however, inadvertent computerized transcription errors may be present        Andressa Appiah MD  01/15/23 7902

## 2023-01-15 NOTE — PLAN OF CARE
MESSAGED  DR Ace Nixon THAT PT COULD NOT TOLERATE THE COMPAZINE IV DOSE FELT A BURNING AND PAINFUL SENSATION.

## 2023-01-15 NOTE — PROGRESS NOTES
Dr Laurent Melgar notified that MRI unable to be completed d/t Tl Arrieta not fitting machine. [facility] MRI portable/open machine not available until 1/23/2023.

## 2023-01-16 ENCOUNTER — APPOINTMENT (OUTPATIENT)
Dept: MRI IMAGING | Age: 23
DRG: 054 | End: 2023-01-16
Payer: COMMERCIAL

## 2023-01-16 LAB
ANION GAP SERPL CALCULATED.3IONS-SCNC: 16 MMOL/L (ref 7–16)
BASOPHILS ABSOLUTE: 0.04 E9/L (ref 0–0.2)
BASOPHILS RELATIVE PERCENT: 0.3 % (ref 0–2)
BUN BLDV-MCNC: 9 MG/DL (ref 6–20)
CALCIUM SERPL-MCNC: 9.7 MG/DL (ref 8.6–10.2)
CHLORIDE BLD-SCNC: 100 MMOL/L (ref 98–107)
CO2: 18 MMOL/L (ref 22–29)
CREAT SERPL-MCNC: 0.5 MG/DL (ref 0.5–1)
EKG ATRIAL RATE: 72 BPM
EKG P-R INTERVAL: 150 MS
EKG Q-T INTERVAL: 426 MS
EKG QRS DURATION: 112 MS
EKG QTC CALCULATION (BAZETT): 466 MS
EKG R AXIS: 40 DEGREES
EKG T AXIS: 36 DEGREES
EKG VENTRICULAR RATE: 72 BPM
EOSINOPHILS ABSOLUTE: 0.01 E9/L (ref 0.05–0.5)
EOSINOPHILS RELATIVE PERCENT: 0.1 % (ref 0–6)
GFR SERPL CREATININE-BSD FRML MDRD: >60 ML/MIN/1.73
GLUCOSE BLD-MCNC: 233 MG/DL (ref 74–99)
HCT VFR BLD CALC: 40.3 % (ref 34–48)
HEMOGLOBIN: 14.2 G/DL (ref 11.5–15.5)
IMMATURE GRANULOCYTES #: 0.14 E9/L
IMMATURE GRANULOCYTES %: 1.1 % (ref 0–5)
LYMPHOCYTES ABSOLUTE: 1.78 E9/L (ref 1.5–4)
LYMPHOCYTES RELATIVE PERCENT: 14.6 % (ref 20–42)
MCH RBC QN AUTO: 29.8 PG (ref 26–35)
MCHC RBC AUTO-ENTMCNC: 35.2 % (ref 32–34.5)
MCV RBC AUTO: 84.5 FL (ref 80–99.9)
MONOCYTES ABSOLUTE: 0.33 E9/L (ref 0.1–0.95)
MONOCYTES RELATIVE PERCENT: 2.7 % (ref 2–12)
NEUTROPHILS ABSOLUTE: 9.9 E9/L (ref 1.8–7.3)
NEUTROPHILS RELATIVE PERCENT: 81.2 % (ref 43–80)
PDW BLD-RTO: 12.2 FL (ref 11.5–15)
PLATELET # BLD: 332 E9/L (ref 130–450)
PMV BLD AUTO: 10.4 FL (ref 7–12)
POTASSIUM REFLEX MAGNESIUM: 4.2 MMOL/L (ref 3.5–5)
RBC # BLD: 4.77 E12/L (ref 3.5–5.5)
SODIUM BLD-SCNC: 134 MMOL/L (ref 132–146)
WBC # BLD: 12.2 E9/L (ref 4.5–11.5)

## 2023-01-16 PROCEDURE — 6360000002 HC RX W HCPCS: Performed by: PSYCHIATRY & NEUROLOGY

## 2023-01-16 PROCEDURE — 85025 COMPLETE CBC W/AUTO DIFF WBC: CPT

## 2023-01-16 PROCEDURE — 1200000000 HC SEMI PRIVATE

## 2023-01-16 PROCEDURE — 6370000000 HC RX 637 (ALT 250 FOR IP): Performed by: NURSE PRACTITIONER

## 2023-01-16 PROCEDURE — 99232 SBSQ HOSP IP/OBS MODERATE 35: CPT | Performed by: PHYSICIAN ASSISTANT

## 2023-01-16 PROCEDURE — 80048 BASIC METABOLIC PNL TOTAL CA: CPT

## 2023-01-16 PROCEDURE — A9579 GAD-BASE MR CONTRAST NOS,1ML: HCPCS | Performed by: RADIOLOGY

## 2023-01-16 PROCEDURE — 36415 COLL VENOUS BLD VENIPUNCTURE: CPT

## 2023-01-16 PROCEDURE — 2580000003 HC RX 258: Performed by: NURSE PRACTITIONER

## 2023-01-16 PROCEDURE — 6370000000 HC RX 637 (ALT 250 FOR IP): Performed by: PHYSICIAN ASSISTANT

## 2023-01-16 PROCEDURE — 2580000003 HC RX 258: Performed by: PSYCHIATRY & NEUROLOGY

## 2023-01-16 PROCEDURE — 6360000002 HC RX W HCPCS: Performed by: PHYSICIAN ASSISTANT

## 2023-01-16 PROCEDURE — 70553 MRI BRAIN STEM W/O & W/DYE: CPT

## 2023-01-16 PROCEDURE — 2580000003 HC RX 258: Performed by: PHYSICIAN ASSISTANT

## 2023-01-16 PROCEDURE — 2500000003 HC RX 250 WO HCPCS: Performed by: PSYCHIATRY & NEUROLOGY

## 2023-01-16 PROCEDURE — 2500000003 HC RX 250 WO HCPCS: Performed by: PHYSICIAN ASSISTANT

## 2023-01-16 PROCEDURE — 6360000004 HC RX CONTRAST MEDICATION: Performed by: RADIOLOGY

## 2023-01-16 RX ORDER — SUMATRIPTAN 50 MG/1
100 TABLET, FILM COATED ORAL
Status: COMPLETED | OUTPATIENT
Start: 2023-01-16 | End: 2023-01-16

## 2023-01-16 RX ORDER — DEXAMETHASONE SODIUM PHOSPHATE 4 MG/ML
4 INJECTION, SOLUTION INTRA-ARTICULAR; INTRALESIONAL; INTRAMUSCULAR; INTRAVENOUS; SOFT TISSUE EVERY 8 HOURS
Status: COMPLETED | OUTPATIENT
Start: 2023-01-16 | End: 2023-01-17

## 2023-01-16 RX ORDER — LANOLIN ALCOHOL/MO/W.PET/CERES
3 CREAM (GRAM) TOPICAL ONCE
Status: COMPLETED | OUTPATIENT
Start: 2023-01-16 | End: 2023-01-16

## 2023-01-16 RX ORDER — TOPIRAMATE 25 MG/1
25 TABLET ORAL 2 TIMES DAILY
Status: DISCONTINUED | OUTPATIENT
Start: 2023-01-16 | End: 2023-01-17 | Stop reason: HOSPADM

## 2023-01-16 RX ADMIN — TOPIRAMATE 25 MG: 25 TABLET, FILM COATED ORAL at 11:18

## 2023-01-16 RX ADMIN — ACETAMINOPHEN 650 MG: 325 TABLET ORAL at 08:53

## 2023-01-16 RX ADMIN — DEXAMETHASONE SODIUM PHOSPHATE 4 MG: 4 INJECTION, SOLUTION INTRAMUSCULAR; INTRAVENOUS at 19:42

## 2023-01-16 RX ADMIN — BUSPIRONE HYDROCHLORIDE 5 MG: 5 TABLET ORAL at 08:49

## 2023-01-16 RX ADMIN — CIPROFLOXACIN AND DEXAMETHASONE 4 DROP: 3; 1 SUSPENSION/ DROPS AURICULAR (OTIC) at 08:49

## 2023-01-16 RX ADMIN — SODIUM CHLORIDE, PRESERVATIVE FREE 10 ML: 5 INJECTION INTRAVENOUS at 08:50

## 2023-01-16 RX ADMIN — GADOTERIDOL 20 ML: 279.3 INJECTION, SOLUTION INTRAVENOUS at 19:43

## 2023-01-16 RX ADMIN — DEXAMETHASONE SODIUM PHOSPHATE 4 MG: 4 INJECTION, SOLUTION INTRAMUSCULAR; INTRAVENOUS at 04:47

## 2023-01-16 RX ADMIN — MELATONIN 3 MG ORAL TABLET 3 MG: 3 TABLET ORAL at 00:14

## 2023-01-16 RX ADMIN — SUMATRIPTAN SUCCINATE 100 MG: 50 TABLET ORAL at 13:43

## 2023-01-16 RX ADMIN — ACETAMINOPHEN 650 MG: 325 TABLET ORAL at 17:36

## 2023-01-16 RX ADMIN — VALPROATE SODIUM 500 MG: 100 INJECTION, SOLUTION INTRAVENOUS at 19:45

## 2023-01-16 RX ADMIN — BUSPIRONE HYDROCHLORIDE 5 MG: 5 TABLET ORAL at 21:19

## 2023-01-16 RX ADMIN — CIPROFLOXACIN AND DEXAMETHASONE 4 DROP: 3; 1 SUSPENSION/ DROPS AURICULAR (OTIC) at 21:19

## 2023-01-16 RX ADMIN — VORTIOXETINE 10 MG: 10 TABLET, FILM COATED ORAL at 21:19

## 2023-01-16 RX ADMIN — SODIUM CHLORIDE, PRESERVATIVE FREE 10 ML: 5 INJECTION INTRAVENOUS at 19:42

## 2023-01-16 RX ADMIN — BREXPIPRAZOLE 2 MG: 2 TABLET ORAL at 08:49

## 2023-01-16 RX ADMIN — TOPIRAMATE 25 MG: 25 TABLET, FILM COATED ORAL at 21:19

## 2023-01-16 RX ADMIN — VALPROATE SODIUM 500 MG: 100 INJECTION, SOLUTION INTRAVENOUS at 04:50

## 2023-01-16 RX ADMIN — LORAZEPAM 1 MG: 2 INJECTION INTRAMUSCULAR; INTRAVENOUS at 15:25

## 2023-01-16 RX ADMIN — SUMATRIPTAN SUCCINATE 100 MG: 50 TABLET ORAL at 11:18

## 2023-01-16 ASSESSMENT — PAIN SCALES - GENERAL
PAINLEVEL_OUTOF10: 7
PAINLEVEL_OUTOF10: 4
PAINLEVEL_OUTOF10: 2
PAINLEVEL_OUTOF10: 3

## 2023-01-16 ASSESSMENT — PAIN DESCRIPTION - ONSET
ONSET: ON-GOING
ONSET: ON-GOING

## 2023-01-16 ASSESSMENT — PAIN DESCRIPTION - FREQUENCY: FREQUENCY: CONTINUOUS

## 2023-01-16 ASSESSMENT — PAIN DESCRIPTION - PAIN TYPE
TYPE: ACUTE PAIN
TYPE: ACUTE PAIN

## 2023-01-16 ASSESSMENT — PAIN DESCRIPTION - ORIENTATION
ORIENTATION: POSTERIOR;ANTERIOR
ORIENTATION: ANTERIOR;POSTERIOR

## 2023-01-16 ASSESSMENT — PAIN DESCRIPTION - LOCATION: LOCATION: HEAD

## 2023-01-16 ASSESSMENT — PAIN DESCRIPTION - DESCRIPTORS
DESCRIPTORS: ACHING;DULL;DISCOMFORT
DESCRIPTORS: ACHING;DULL;DISCOMFORT

## 2023-01-16 NOTE — PROGRESS NOTES
[facility]                                Progress note    Subjective: The patient is awake and alert. Sitting up in bed screaming her head is hurting  No acute events overnight. Denies chest pain, angina, SOB     Objective:    /65   Pulse 66   Temp 97.9 °F (36.6 °C) (Temporal)   Resp 18   Ht 5' 7\" (1.702 m)   Wt (!) 325 lb (147.4 kg)   SpO2 97%   BMI 50.90 kg/m²     In: 480 [P.O.:480]  Out: -   In: 480   Out: -     General appearance: NAD, conversant, obese  HEENT: AT/NC, MMM  Neck: FROM, supple  Lungs: Clear to auscultation  CV: RRR, no MRGs  Vasc: Radial pulses 2+  Abdomen: Soft, non-tender; no masses or HSM  Extremities: No peripheral edema or digital cyanosis  Skin: no rash, lesions or ulcers  Psych: Alert and oriented to person, place and time  Neuro: Alert and interactive     Recent Labs     01/14/23 2325 01/16/23  0520   WBC 11.5 12.2*   HGB 14.7 14.2   HCT 41.7 40.3    332       Recent Labs     01/14/23 2325 01/16/23  0520    134   K 4.1 4.2    100   CO2 23 18*   BUN 9 9   CREATININE 0.5 0.5   CALCIUM 9.7 9.7       Assessment:    Principal Problem:    Acute nonintractable headache, unspecified headache type  Active Problems:    Intractable headache    Intractable headache, unspecified chronicity pattern, unspecified headache type  Resolved Problems:    * No resolved hospital problems.  *      Plan:  70-year-old female with a history of autism, Asperger's and depression presents to the ED with complaints of persistent headache and back pain after lumbar puncture done 3 days ago and is admitted to [place] [address] unit with     Acute intractable headache post LP/blood patch, and morbidly obese woman     Should self resolve with pain control  Neurology consulted  MRI brain pending  Continue pain control with IV Toradol  Continue home medications  Blood work and vital signs completely normal  Okay for discharge from medicine standpoint once pain is better controlled    1/16/2023  Patient continues to complain of pain rates 10/10  MRI scheduled for [facility]-await results  Imitrex 100 mg not to exceed 2 doses  Initiate Topamax 25 mg daily         Code status: Full  Consultants: Neurology    DVT Prophylaxis   PT/OT  Discharge planning       AZEEM Torres CNP  12:52 PM  1/16/2023     Above note edited to reflect my thoughts   Continues to have intermittent headaches/migraines  Trial migraine cocktail  Please plan discharge tomorrow  Pain seems out of proportion to clinical issues    I personally saw, examined and provided care for the patient. Radiographs, labs and medication list were reviewed by me independently. The case was discussed in detail and plans for care were established. Review of Duyen SALAS CNP, documentation was conducted and revisions were made as appropriate directly by me. I agree with the above documented exam, problem list, and plan of care.      Panda Wong MD  6:42 PM  1/16/2023

## 2023-01-16 NOTE — CARE COORDINATION
1/16/2023social work transition of care planning  Pt is from home with family and had been independent. Pt pcp is Dr. Chester Clay and pharmacy is [facility]. Explained sw role in transition of care planning. Pt plan is home,pt will call for a ride at discharge. Sw set up [facility] for 3 pm transport to [employer] [facility](appt 4pm). Sw updated nursing.   Electronically signed by KYLER Mike on 1/16/2023 at 1:34 PM

## 2023-01-16 NOTE — PROGRESS NOTES
Open MRI scheduled with [facility] at [address] [facility]  [phone] notified to arrange transport. Awaiting fax with paperwork that needs to be filled out for MRI.

## 2023-01-16 NOTE — PROGRESS NOTES
[facility]  Neurology Consult    Date:  1/16/2023  Patient Name:  Josephine Chavez  YOB: 2000  MRN: [id number]     PCP:  Paula Zhou DO   Referring:  No ref. provider found      Chief Complaint: headache    History obtained from: patient    Merlinda Rung is a 20 y.o. female admitted with post-LP positional headache which has been refractory to multiple medications including Benadryl, Reglan, Toradol, and Fioricet. She also has already had one blood patch done. -- improving today following additional cocktail. Does not feel she needs a repeat blood patch at this time     Was being worked up for [address] [facility],[address]. Per patient LP opening pressure was 7- I cannot find these results. Fundoscopic exam by Dr. Cortez Gonzales yesterday did not show any papilledema or optic disc pallor. She was unable to fit in MRI scanner. Suspected Migraine without aura- will start Topamax 25 mg daily for prevention as well as give Imitrex 100 mg for abortive therapy. Plan  Imitrex 100 mg prn - not to exceed 2 doses in 24 hours   Start Topamax 25 mg daily   Unable to obtain MRI brain given size limitations of scanner  Follow up with Ina Loya as previously planned   If headache continues to improve- okay to d/c from neuro POV tomorrow         History of Present Illness:  Josephine Chavez is a 20 y.o. right handed female presenting for evaluation of post LP headache. She had LP done on 1/10/23 for evaluation of possible IIH given headaches. Since then she has had a positional headache which can go from a 0 while lying down to an 8 while sitting/standing up. She had a blood patch done two days prior with minimal improvement. Subjective:   Positional headache has improved some with cocktail. Able to sit up for longer period of time and was able to get up and walk around.      Still reporting her \"migraine\" headaches described as retro-orbital, throbbing with associated nausea, photophobia and phonophobia that occur 3x per week. Has never been on preventative therapy. Prior to LP she had been treating her headaches with Tylenol and Ibuprofen and was taking Ibuprofen daily. She saw Trevon Justice at the office and f/u is on 1/30. Per patient opening pressure was 7 for LP. I cannot find these results in the computer. Allergies: Allergies   Allergen Reactions    Doxycycline         Physical Examination  Vitals   Vitals:    01/15/23 0900 01/15/23 1530 01/15/23 2358 01/16/23 0854   BP: 131/81 125/66 (!) 151/53 113/65   Pulse: 73 69 79 66   Resp: 16 18 16 18   Temp: (!) 96.7 °F (35.9 °C) 98.4 °F (36.9 °C) 97.1 °F (36.2 °C) 97.9 °F (36.6 °C)   TempSrc: Temporal Temporal Temporal Temporal   SpO2: 100%  98% 97%   Weight:       Height:            General: Patient appears in no acute distress. HEENT: Normocephalic, atraumatic  Chest: effort normal   Extremities/Peripheral vascular: No edema/swelling noted. No cold limbs noted. Neurologic Examination    Mental Status  Alert, and oriented to person, place and time. Speech is fluent with intact comprehension. No evidence of memory impairment. Attention and concentration appeared normal.     Cranial Nerves  II. Visual fields full to confrontation bilaterally. III, IV, VI: Pupils equally round and reactive to light, 3 to 2 mm bilaterally. EOMs: full, no nystagmus. V. Facial sensation intact to light touch bilaterally  VII: Facial movements symmetric and strong  VIII: Hearing intact to voice  IX,X: Palate elevates symmetrically.  No dysarthria  XI: Sternocleidomastoid and trapezius 5/5 bilaterally   XII: Tongue is midline    Motor     Right Left   Right Left   Deltoid 5 5  Hip Flexion 5 5   Biceps 5 5  Knee Extension 5 5   Triceps 5 5  Knee Flexion 5 5   Handgrip 5 5  Ankle Dorsiflexion 5 5       Ankle Plantarflexion 5 5       Sensation  Light Touch: Intact distally in all four limbs    Reflexes    No babinski     Coordination  Rapid alternating movements normal in bilateral upper extremities  Finger to nose testing normal bilaterally    Gait  Deferred for safety/fall consideration    Labs  Recent Labs     01/14/23  2325 01/16/23  0520    134   K 4.1 4.2    100   CO2 23 18*   BUN 9 9   CREATININE 0.5 0.5   GLUCOSE 116* 233*   CALCIUM 9.7 9.7   WBC 11.5 12.2*   RBC 5.01 4.77   HGB 14.7 14.2   HCT 41.7 40.3   MCV 83.2 84.5   MCH 29.3 29.8   MCHC 35.3* 35.2*   RDW 12.6 12.2    332   MPV 10.0 10.4   LACTA 1.2  --    SEDRATE 19  --          I personally reviewed all labs and images today       Electronically signed by MELANIE Ayon on 1/16/2023 at 11:40 AM

## 2023-01-16 NOTE — PROGRESS NOTES
Esteban NAVARRO notified pt yelling out in pain d/t headache. Has received one dose of imitrex and will be due for second dose around 1:15. Notified to give imitrex and to let Neuro know if it does not help with the pain. RN notified.

## 2023-01-16 NOTE — PLAN OF CARE
Problem: Discharge Planning  Goal: Discharge to home or other facility with appropriate resources  1/16/2023 0002 by Tammi Roberts RN  Outcome: Progressing  1/15/2023 1542 by Victor M Novak RN  Outcome: Progressing  Flowsheets (Taken 1/15/2023 1540)  Discharge to home or other facility with appropriate resources: Identify barriers to discharge with patient and caregiver     Problem: Pain  Goal: Verbalizes/displays adequate comfort level or baseline comfort level  1/16/2023 0002 by Tammi Roberts RN  Outcome: Progressing  1/15/2023 1542 by Victor M Novak RN  Outcome: Progressing  Flowsheets (Taken 1/15/2023 1530)  Verbalizes/displays adequate comfort level or baseline comfort level: Encourage patient to monitor pain and request assistance     Problem: Safety - Adult  Goal: Free from fall injury  1/16/2023 0002 by Tammi Roberts RN  Outcome: Progressing  1/15/2023 1542 by Victor M Novak RN  Outcome: Progressing

## 2023-01-16 NOTE — PROGRESS NOTES
Leila Leblanc NP for Dr. Harvey Osorio called for patient requesting melatonin.  3 Mg. One time dose ordered.

## 2023-01-17 VITALS
OXYGEN SATURATION: 99 % | HEIGHT: 67 IN | RESPIRATION RATE: 18 BRPM | TEMPERATURE: 98 F | HEART RATE: 64 BPM | DIASTOLIC BLOOD PRESSURE: 56 MMHG | BODY MASS INDEX: 45.99 KG/M2 | SYSTOLIC BLOOD PRESSURE: 105 MMHG | WEIGHT: 293 LBS

## 2023-01-17 PROBLEM — G89.29 ACUTE EXACERBATION OF CHRONIC LOW BACK PAIN: Status: ACTIVE | Noted: 2020-07-22

## 2023-01-17 PROCEDURE — 6360000002 HC RX W HCPCS: Performed by: PHYSICIAN ASSISTANT

## 2023-01-17 PROCEDURE — 99232 SBSQ HOSP IP/OBS MODERATE 35: CPT | Performed by: NURSE PRACTITIONER

## 2023-01-17 PROCEDURE — 6370000000 HC RX 637 (ALT 250 FOR IP): Performed by: PHYSICIAN ASSISTANT

## 2023-01-17 PROCEDURE — 6370000000 HC RX 637 (ALT 250 FOR IP): Performed by: NURSE PRACTITIONER

## 2023-01-17 PROCEDURE — 2580000003 HC RX 258: Performed by: PHYSICIAN ASSISTANT

## 2023-01-17 PROCEDURE — 2580000003 HC RX 258: Performed by: NURSE PRACTITIONER

## 2023-01-17 PROCEDURE — 2500000003 HC RX 250 WO HCPCS: Performed by: PHYSICIAN ASSISTANT

## 2023-01-17 RX ORDER — TOPIRAMATE 25 MG/1
25 TABLET ORAL 2 TIMES DAILY
Qty: 60 TABLET | Refills: 3 | Status: SHIPPED | OUTPATIENT
Start: 2023-01-17

## 2023-01-17 RX ADMIN — ACETAMINOPHEN 650 MG: 325 TABLET ORAL at 07:52

## 2023-01-17 RX ADMIN — SODIUM CHLORIDE, PRESERVATIVE FREE 10 ML: 5 INJECTION INTRAVENOUS at 07:48

## 2023-01-17 RX ADMIN — DEXAMETHASONE SODIUM PHOSPHATE 4 MG: 4 INJECTION, SOLUTION INTRAMUSCULAR; INTRAVENOUS at 10:05

## 2023-01-17 RX ADMIN — VALPROATE SODIUM 500 MG: 100 INJECTION, SOLUTION INTRAVENOUS at 10:06

## 2023-01-17 RX ADMIN — BUSPIRONE HYDROCHLORIDE 5 MG: 5 TABLET ORAL at 07:47

## 2023-01-17 RX ADMIN — CIPROFLOXACIN AND DEXAMETHASONE 4 DROP: 3; 1 SUSPENSION/ DROPS AURICULAR (OTIC) at 07:47

## 2023-01-17 RX ADMIN — TOPIRAMATE 25 MG: 25 TABLET, FILM COATED ORAL at 07:47

## 2023-01-17 RX ADMIN — ACETAMINOPHEN 650 MG: 325 TABLET ORAL at 14:39

## 2023-01-17 RX ADMIN — VALPROATE SODIUM 500 MG: 100 INJECTION, SOLUTION INTRAVENOUS at 02:22

## 2023-01-17 RX ADMIN — BREXPIPRAZOLE 2 MG: 2 TABLET ORAL at 07:47

## 2023-01-17 RX ADMIN — DEXAMETHASONE SODIUM PHOSPHATE 4 MG: 4 INJECTION, SOLUTION INTRAMUSCULAR; INTRAVENOUS at 02:20

## 2023-01-17 ASSESSMENT — PAIN DESCRIPTION - LOCATION: LOCATION: HEAD

## 2023-01-17 ASSESSMENT — PAIN SCALES - GENERAL
PAINLEVEL_OUTOF10: 3
PAINLEVEL_OUTOF10: 2

## 2023-01-17 ASSESSMENT — PAIN DESCRIPTION - ONSET: ONSET: ON-GOING

## 2023-01-17 ASSESSMENT — PAIN DESCRIPTION - FREQUENCY: FREQUENCY: CONTINUOUS

## 2023-01-17 ASSESSMENT — PAIN - FUNCTIONAL ASSESSMENT: PAIN_FUNCTIONAL_ASSESSMENT: ACTIVITIES ARE NOT PREVENTED

## 2023-01-17 ASSESSMENT — PAIN DESCRIPTION - ORIENTATION: ORIENTATION: ANTERIOR;POSTERIOR

## 2023-01-17 ASSESSMENT — PAIN DESCRIPTION - DESCRIPTORS: DESCRIPTORS: ACHING;DISCOMFORT;DULL

## 2023-01-17 ASSESSMENT — PAIN DESCRIPTION - PAIN TYPE: TYPE: ACUTE PAIN;INTRACTABLE PAIN

## 2023-01-17 NOTE — PROGRESS NOTES
[facility]  Neurology follow-up note    Date:  1/17/2023  Patient Name:  Yulisa Whitlock  YOB: 2000  MRN: [id number]     PMH: Asperger syndrome, autism, anxiety, bipolar 2, depression    Neurology is following for headache    Vital signs at present time are stable on room air    There is no family present at bedside currently      Assessment  Yulisa Whitlock is a 20 y.o. female admitted with post-LP positional headache which has been refractory to multiple medications including Benadryl, Reglan, Toradol, and Fioricet. She also has already had one blood patch done. Reports of improving headache on Monday following an additional cocktail--reports of 2-3/10. Today patient reports 0-1/10 with Tylenol given this morning. No need for repeat blood patch at this time. Was being worked up for [address] [facility],[address]. Per patient LP opening pressure was 7- cannot find these results in epic. Fundoscopic exam by Dr. Karla Peraza on Sunday did not show any papilledema or optic disc pallor. Open MRI shows T2 hyperintensity of unspecified significance. Suspected Migraine without aura--- continue Topamax 25 mg daily for prevention; Imitrex 100 mg for abortive therapy proved adverse reactions x2--patient does not want this given ever again. Plan  Continue supportive care  Continue Topamax 25 mg daily  Nurtec or Ubrelvy as outpatient; discontinue use of Imitrex due to adverse reactions    Patient has a appointment with Dayna Waldron on 1/31; plan to keep that appointment    Okay to discharge from neuro standpoint once medically cleared and okay with others. History of Present Illness:  Yulisa Whitlock is a 20 y.o. right handed female presenting for evaluation of post LP headache. She had LP done on 1/10/23 for evaluation of possible IIH given headaches. Since then she has had a positional headache which can go from a 0 while lying down to an 8 while sitting/standing up.  She had a blood patch done which helped minimally. Subjective: Today headache 0-1/10 after Tylenol this morning. Patient just waking up upon my arrival    As of yesterday, her \"migraine\" headaches described as retro-orbital, throbbing with associated nausea, photophobia and phonophobia that occur 3x per week. Has never been on preventative therapy. Prior to LP she had been treating her headaches with Tylenol and Ibuprofen and was taking Ibuprofen daily. Allergies: Allergies   Allergen Reactions    Doxycycline     Imitrex [Sumatriptan] Other (See Comments)     Extreme headache         Physical Examination  Vitals   Vitals:    01/16/23 0854 01/16/23 1915 01/16/23 2019 01/17/23 0814   BP: 113/65 (!) 158/98 (!) 140/81 (!) 105/56   Pulse: 66 85 70 64   Resp: 18 17 18 18   Temp: 97.9 °F (36.6 °C) 97.7 °F (36.5 °C) 97.8 °F (36.6 °C) 98 °F (36.7 °C)   TempSrc: Temporal Temporal Temporal Temporal   SpO2: 97% 96% 97% 99%   Weight:       Height:            General: Patient appears in no acute distress. HEENT: Normocephalic, atraumatic  Chest: effort normal   Extremities/Peripheral vascular: No edema/swelling noted. No cold limbs noted. Neurologic Examination    Mental Status  Awake, alert and oriented x4. Speech is fluent with intact comprehension. No evidence of memory impairment. Attention and concentration appeared normal.  Follows commands well    Cranial Nerves  II. Visual fields full to confrontation bilaterally. III, IV, VI: Pupils equally round and reactive to light, 3 to 2 mm bilaterally. EOMI without nystagmus  V. Facial sensation intact to light touch bilaterally  VII: Facial movements symmetric and strong  VIII: Hearing intact to voice  IX,X: Palate elevates symmetrically.  No dysarthria  XI: Sternocleidomastoid and trapezius 5/5 bilaterally   XII: Tongue is midline    Motor     Right Left   Right Left   Deltoid 5 5  Hip Flexion 5 5   Biceps 5 5  Knee Extension 5 5   Triceps 5 5  Knee Flexion 5 5   Handgrip 5 5  Ankle Dorsiflexion 5 5       Ankle Plantarflexion 5 5       Sensation  LT intact throughout    Coordination  FN, FFM and HOPE intact  HKS intact    Reflexes  Equal throughout  No babinski     Gait  Deferred today    Labs  Lab Results   Component Value Date    WBC 12.2 (H) 01/16/2023    HGB 14.2 01/16/2023    HCT 40.3 01/16/2023    MCV 84.5 01/16/2023     01/16/2023    LYMPHOPCT 14.6 (L) 01/16/2023    RBC 4.77 01/16/2023    MCH 29.8 01/16/2023    MCHC 35.2 (H) 01/16/2023    RDW 12.2 01/16/2023     Lab Results   Component Value Date     01/16/2023    K 4.2 01/16/2023     01/16/2023    CO2 18 (L) 01/16/2023    BUN 9 01/16/2023    CREATININE 0.5 01/16/2023    GLUCOSE 233 (H) 01/16/2023    CALCIUM 9.7 01/16/2023    PROT 7.2 01/11/2023    LABALBU 3.8 01/11/2023    BILITOT 0.2 01/11/2023    ALKPHOS 96 01/11/2023    AST 26 01/11/2023    ALT 41 (H) 01/11/2023    LABGLOM >60 01/16/2023    GFRAA >60 09/22/2022         Latest Reference Range & Units 1/10/23 13:20   Albumin Index 0.0 - 9.0 ratio 3.8   Albumin, CSF 0 - 35 mg/dL 14   Appearance, CSF Clear  Clear   CSF Culture  Growth not present   Glucose, CSF 40 - 70 mg/dL 65   IgG index 0.28 - 0.66 ratio 0.63   IgG Synthesis Rate, CSF <=8.0 mg/d <0.0   IgG, CSF 0.0 - 6.0 mg/dL 2.6   IgG/Albumin CSF 0.09 - 0.25 ratio 0.19   OLIGOCLONAL BANDING  Rpt   Oligoclonal Bands Number 0 - 1 Bands 0   Oligoclonal Bands, CSF Negative  Negative   Protein, CSF 15 - 40 mg/dL 25   VDRL, CSF Screen Non Reactive  Non Reactive   RBC, CSF /uL <2000   WBC, CSF 0 - 2 /uL 3 (H)   Neutrophils, CSF 0 - 10 % 0   Monocytes, CSF 10 - 70 % 100 (H)   Color, CSF  Colorless   Tube Number + CELL CT + DIFF-CSF  Tube 4   (H): Data is abnormally high  Rpt: View report in Results Review for more information    CT head without contrast 1/13/2022:  No acute intracranial abnormality. Bilateral maxillary sinusitis.     MRI brain with and without contrast 1/16/2023:  1.  3 tiny foci of T2 hyperintensity in the cerebral white matter of doubtful clinical significance. They may represent sequela of chronic microvascular ischemic changes, postinfectious/inflammatory insults and/or the result of migraine headaches. 2.  Otherwise, unremarkable pre and postcontrast enhanced MRI of the brain. 3.  Moderate mucosal thickening with small air-fluid levels in the maxillary sinuses. Clinical correlation for possible acute sinusitis recommended.     I have personally reviewed all labs and images today       Electronically signed by AZEEM Rodríguez NP on 1/17/2023 at 10:09 AM

## 2023-01-17 NOTE — DISCHARGE SUMMARY
[facility]   Discharge summary   Patient ID:  Randie Dubin  [id number]  8 y.o.  2000    Admit date: 1/14/2023    Discharge date and time: 1/17/2023    Admission Diagnoses:   Patient Active Problem List   Diagnosis    Right-sided abdominal pain of unknown cause    36 weeks gestation of pregnancy    Elevated blood pressure affecting pregnancy in third trimester, antepartum    37 weeks gestation of pregnancy    Abdominal pain, right upper quadrant    Symptomatic cholelithiasis    Severe major depression ([facility] [address]    Major depressive disorder, severe (HCC)    Chronic tonsillitis    Mild mixed bipolar II disorder (HCC)    Cluster B personality disorder ([facility] [address]    Acne    Acute infection of pinna    Acute pharyngitis    Acute suppurative otitis media    Acute upper respiratory infection    Adjustment reaction    Anal fissure    Asperger's disorder    Atopic dermatitis    Attention deficit hyperactivity disorder    Contact dermatitis    Dysthymia    Diarrhea    Diverticulitis of colon    Dizziness    Dysuria    Elevation of level of transaminase and lactic acid dehydrogenase (LDH)    External hemorrhoids    Fever    Headache    Impaired fasting glucose    Increased frequency of urination    Inguinal pain    Acute exacerbation of chronic low back pain    Morbid obesity ([facility] [address]    Multiple open wounds without complication    Obesity with body mass index 30 or greater    Open wound of knee, leg, and ankle    Pain in left foot    Painless rectal bleeding    Posterior rhinorrhea    Psoriasis    Referred otalgia    Steatosis of liver    Streptococcal sore throat    Tinea cruris    Toxic erythema    Urinary tract infectious disease    Vitamin D deficiency    Intractable headache    Acute nonintractable headache, unspecified headache type    Intractable headache, unspecified chronicity pattern, unspecified headache type       Discharge Diagnoses: Intractable headache    Consults: neurology    Procedures: None    Hospital Course: The patient is a 20 y.o. female of Lilly Villareal DO     67-year-old female with a history of autism, Asperger's and depression presents to the ED with complaints of persistent headache and back pain after lumbar puncture done 3 days ago and is admitted to [place] [address] unit with     Acute intractable headache post LP/blood patch, and morbidly obese woman     Should self resolve with pain control  Neurology consulted  MRI brain pending  Continue pain control with IV Toradol  Continue home medications  Blood work and vital signs completely normal  Okay for discharge from medicine standpoint once pain is better controlled     1/16/2023  Patient continues to complain of pain rates 10/10  MRI scheduled for [place] [facility]-await results  Imitrex 100 mg not to exceed 2 doses  Initiate Topamax 25 mg daily    1/17/2023  Patient is medically stable for discharge. Patient is to continue Topamax 25 mg daily. Patient has an appointment with neurology services on 1/31 educated patient that portance of keeping outpatient appointment. Patient educated to continue to be medication compliant. Recent Labs     01/14/23 2325 01/16/23  0520   WBC 11.5 12.2*   HGB 14.7 14.2   HCT 41.7 40.3    332       Recent Labs     01/14/23  2325 01/16/23  0520    134   K 4.1 4.2    100   CO2 23 18*   BUN 9 9   CREATININE 0.5 0.5   CALCIUM 9.7 9.7       No results found. Discharge Exam:    HEENT: NCAT,  PERRLA, No JVD  Heart:  RRR, no murmurs, gallops, or rubs.   Lungs:  CTA bilaterally, no wheeze, rales or rhonchi  Abd: bowel sounds present, nontender, nondistended, no masses  Extrem:  No clubbing, cyanosis, or edema    Disposition: home     Patient Condition at Discharge: Stable    Patient Instructions:      Medication List        START taking these medications      topiramate 25 MG tablet  Commonly known as: TOPAMAX  Take 1 tablet by mouth 2 times daily            CONTINUE taking these medications brexpiprazole 1 MG Tabs tablet  Commonly known as: REXULTI     busPIRone 5 MG tablet  Commonly known as: BUSPAR     butalbital-APAP-caffeine -40 MG Caps per capsule  Commonly known as: Fioricet  Take 1 capsule by mouth every 4 hours as needed for Headaches     ciprofloxacin-dexamethasone 0.3-0.1 % otic suspension  Commonly known as: Ciprodex  Place 4 drops into the right ear 2 times daily for 7 days     cyclobenzaprine 10 MG tablet  Commonly known as: FLEXERIL     ibuprofen 800 MG tablet  Commonly known as: ADVIL;MOTRIN  Take 1 tablet by mouth every 6 hours as needed for Pain     melatonin 5 MG Tabs tablet     VORTIoxetine 5 MG tablet  Commonly known as: TRINTELLIX     Vyvanse 70 MG capsule  Generic drug: lisdexamfetamine               Where to Get Your Medications        These medications were sent to [facility] [address] [address]      Phone: [phone]   topiramate 25 MG tablet       Activity: activity as tolerated  Diet: cardiac diet    Pt has been advised to: Follow-up with Mookie Carpenter DO in 1 week. Follow-up with consultants as recommended by them    Note that over 30 minutes was spent in preparing discharge papers, discussing discharge with patient, medication review, etc.    Signed:  AZEEM Mercedes CNP  1/17/2023  1:26 PM   Above note edited to reflect my thoughts     I personally saw, examined and provided care for the patient. Radiographs, labs and medication list were reviewed by me independently. The case was discussed in detail and plans for care were established. Review of Duyen SALAS CNP, documentation was conducted and revisions were made as appropriate directly by me. I agree with the above documented exam, problem list, and plan of care.      Ema Cortes MD  1/17/2023

## 2023-01-17 NOTE — CARE COORDINATION
1/17/2023 social work transition of care planning  Pt plan is to return home with family. Pt will call for a ride at discharge.   Electronically signed by KYLER Ramirez on 1/17/2023 at 10:15 AM

## 2023-01-18 NOTE — PROGRESS NOTES
CLINICAL PHARMACY NOTE: MEDS TO BEDS    Total # of Prescriptions Filled: 1   The following medications were delivered to the patient:  Topirimate 25mg  melatonin    Additional Documentation:

## 2023-01-31 ENCOUNTER — TELEPHONE (OUTPATIENT)
Dept: BARIATRICS/WEIGHT MGMT | Age: 23
End: 2023-01-31

## 2023-01-31 ENCOUNTER — OFFICE VISIT (OUTPATIENT)
Dept: NEUROLOGY | Age: 23
End: 2023-01-31
Payer: COMMERCIAL

## 2023-01-31 VITALS
DIASTOLIC BLOOD PRESSURE: 87 MMHG | HEART RATE: 90 BPM | SYSTOLIC BLOOD PRESSURE: 142 MMHG | WEIGHT: 293 LBS | OXYGEN SATURATION: 100 % | TEMPERATURE: 98.2 F | BODY MASS INDEX: 50.9 KG/M2

## 2023-01-31 DIAGNOSIS — G43.909 MIGRAINE WITHOUT STATUS MIGRAINOSUS, NOT INTRACTABLE, UNSPECIFIED MIGRAINE TYPE: Primary | ICD-10-CM

## 2023-01-31 PROCEDURE — G8417 CALC BMI ABV UP PARAM F/U: HCPCS | Performed by: CLINICAL NURSE SPECIALIST

## 2023-01-31 PROCEDURE — 99215 OFFICE O/P EST HI 40 MIN: CPT | Performed by: CLINICAL NURSE SPECIALIST

## 2023-01-31 PROCEDURE — G8427 DOCREV CUR MEDS BY ELIG CLIN: HCPCS | Performed by: CLINICAL NURSE SPECIALIST

## 2023-01-31 PROCEDURE — G8484 FLU IMMUNIZE NO ADMIN: HCPCS | Performed by: CLINICAL NURSE SPECIALIST

## 2023-01-31 PROCEDURE — 1036F TOBACCO NON-USER: CPT | Performed by: CLINICAL NURSE SPECIALIST

## 2023-01-31 PROCEDURE — 1111F DSCHRG MED/CURRENT MED MERGE: CPT | Performed by: CLINICAL NURSE SPECIALIST

## 2023-01-31 RX ORDER — ATOGEPANT 60 MG/1
60 TABLET ORAL DAILY
Qty: 30 TABLET | Refills: 2 | Status: SHIPPED | OUTPATIENT
Start: 2023-01-31

## 2023-01-31 NOTE — PROGRESS NOTES
Aliza Herrera is a 20 y.o. right handed woman    She was referred to our office for headaches. She is here alone and appears to be an excellent historian. Patient states that she began having her \"normal\" headaches when she was about 14years of age. At that time she would describe maybe 1 headache a week however they seem to be tolerable encompassing both temporal regions and at times making her jaw quite painful. Unfortunately in October 2021 she started developing a headache almost every day she describes some days to be excruciating and sees no relief with any over-the-counter medications. Became concerned with increasing blurred vision however never went to an ophthalmologist.  We obtained a spinal fluid analysis looking for opening pressure which was normal at 7. Reportedly, her providers were hesitant to initiate any migraine medicine because of interactions with much of her other medications. She has tried over-the-counter medications without any success. She has noticed minimal success with topiramate which she received at the hospital and unfortunately developed an allergy to Imitrex therefore all triptan therapy would be not allowed    Her headaches are severe she does describe a pulsating sensations. She is unable to fall asleep and states that she has a hard time sleeping at baseline. She denies ever having a sleep study but does report previous EEGs and epilepsy monitoring unit evaluations though no seizures were ever diagnosed. This evaluation occurred when she was younger-she reports 10years of age.     She denies any loss of vision but does describe some blurred vision  She describes neck discomfort as well as shoulder pains    No prior stroke reported    Objective:   BP (!) 142/87 (Site: Left Upper Arm, Position: Sitting)   Pulse 90   Temp 98.2 °F (36.8 °C)   Wt (!) 325 lb (147.4 kg)   SpO2 100%   BMI 50.90 kg/m²      General appearance: alert, appears stated age and cooperative  Head: Normocephalic, without obvious abnormality, atraumatic  Extremities: no cyanosis or edema  Pulses: 2+ and symmetric  Skin: no rashes or lesions      Mental Status: Alert, oriented to person place and year     Speech: clear  Language: appropriate     Cranial Nerves:  I: smell    II: visual acuity     II: visual fields Full    II: pupils CRISTINA   III,VII: ptosis None   III,IV,VI: extraocular muscles  EOMI without nystagmus    V: mastication Normal   V: facial light touch sensation  Normal   V,VII: corneal reflex  Present   VII: facial muscle function - upper     VII: facial muscle function - lower Normal   VIII: hearing Normal   IX: soft palate elevation  Normal   IX,X: gag reflex    XI: trapezius strength  5/5   XI: sternocleidomastoid strength 5/5   XI: neck extension strength  5/5   XII: tongue strength  Normal     Motor:  5/5 throughout  Normal bulk and tone   No drift    Sensory:  LT normal  Vibration normal     Coordination:   FN, FFM and HOPE normal    Gait:  Normal     DTR:   No reflexes    No Babinski  No Candelario's     Laboratory/Radiology:     CBC with Differential:    Lab Results   Component Value Date/Time    WBC 12.2 01/16/2023 05:20 AM    RBC 4.77 01/16/2023 05:20 AM    HGB 14.2 01/16/2023 05:20 AM    HCT 40.3 01/16/2023 05:20 AM     01/16/2023 05:20 AM    MCV 84.5 01/16/2023 05:20 AM    MCH 29.8 01/16/2023 05:20 AM    MCHC 35.2 01/16/2023 05:20 AM    RDW 12.2 01/16/2023 05:20 AM    LYMPHOPCT 14.6 01/16/2023 05:20 AM    MONOPCT 2.7 01/16/2023 05:20 AM    BASOPCT 0.3 01/16/2023 05:20 AM    MONOSABS 0.33 01/16/2023 05:20 AM    LYMPHSABS 1.78 01/16/2023 05:20 AM    EOSABS 0.01 01/16/2023 05:20 AM    BASOSABS 0.04 01/16/2023 05:20 AM     CMP:    Lab Results   Component Value Date/Time     01/16/2023 05:20 AM    K 4.2 01/16/2023 05:20 AM     01/16/2023 05:20 AM    CO2 18 01/16/2023 05:20 AM    BUN 9 01/16/2023 05:20 AM    CREATININE 0.5 01/16/2023 05:20 AM    GFRAA >60 09/22/2022 08:52 PM    LABGLOM >60 01/16/2023 05:20 AM    GLUCOSE 233 01/16/2023 05:20 AM    PROT 7.2 01/11/2023 10:25 PM    LABALBU 3.8 01/11/2023 10:25 PM    CALCIUM 9.7 01/16/2023 05:20 AM    BILITOT 0.2 01/11/2023 10:25 PM    ALKPHOS 96 01/11/2023 10:25 PM    AST 26 01/11/2023 10:25 PM    ALT 41 01/11/2023 10:25 PM     IR - CSF opening pressure 7    CT Head: 7/5/2022  No acute intracranial abnormalities    I independently reviewed the labs and imaging studies today. Assessment:     LP opening pressure normal at 7 therefore suspect episodic migraines   Unable to utilize triptans and/or topiramate therefore we will begin CGRP blocking medications    Marked sleep deprivation which also could be contributing to her chronic daily headaches    Plan:      Will start Qulipta 60 mg daily    Ubrelvy 100 mg as needed for abortive treatment since she is allergic to triptan therapy    Return to office in 8 to 10 weeks    Call should issues arise    Meghan Curtis, AZEEM - CNS  11:48 AM  1/31/2023

## 2023-02-17 ENCOUNTER — TELEPHONE (OUTPATIENT)
Dept: BARIATRICS/WEIGHT MGMT | Age: 23
End: 2023-02-17

## 2023-02-17 ENCOUNTER — PREP FOR PROCEDURE (OUTPATIENT)
Dept: BARIATRICS/WEIGHT MGMT | Age: 23
End: 2023-02-17

## 2023-02-17 ENCOUNTER — INITIAL CONSULT (OUTPATIENT)
Dept: BARIATRICS/WEIGHT MGMT | Age: 23
End: 2023-02-17
Payer: COMMERCIAL

## 2023-02-17 VITALS
HEART RATE: 83 BPM | RESPIRATION RATE: 20 BRPM | TEMPERATURE: 97.7 F | SYSTOLIC BLOOD PRESSURE: 123 MMHG | DIASTOLIC BLOOD PRESSURE: 71 MMHG | BODY MASS INDEX: 45.99 KG/M2 | HEIGHT: 67 IN | WEIGHT: 293 LBS

## 2023-02-17 DIAGNOSIS — E66.01 MORBID OBESITY DUE TO EXCESS CALORIES (HCC): Primary | ICD-10-CM

## 2023-02-17 DIAGNOSIS — K21.9 GASTROESOPHAGEAL REFLUX DISEASE WITHOUT ESOPHAGITIS: ICD-10-CM

## 2023-02-17 PROCEDURE — G8417 CALC BMI ABV UP PARAM F/U: HCPCS | Performed by: SURGERY

## 2023-02-17 PROCEDURE — 1036F TOBACCO NON-USER: CPT | Performed by: SURGERY

## 2023-02-17 PROCEDURE — G8427 DOCREV CUR MEDS BY ELIG CLIN: HCPCS | Performed by: SURGERY

## 2023-02-17 PROCEDURE — 99204 OFFICE O/P NEW MOD 45 MIN: CPT | Performed by: SURGERY

## 2023-02-17 PROCEDURE — G8484 FLU IMMUNIZE NO ADMIN: HCPCS | Performed by: SURGERY

## 2023-02-17 NOTE — PROGRESS NOTES
Hank Knox  2/17/2023  [facility]    Initial Evaluation  Bariatric Surgery and EGD       Subjective:  CHIEF COMPLAINT: Morbid obesity, malnutrition, Depression, Anxiety, Binge Eating Disorder, History of Cholelithiasis, and Non-alcoholic Steatohepatitis (SAEZ)    HISTORY OF PRESENT ILLNESS: Hank Knox is a morbidly-obese 20 y.o.  female, who weighs (!) 329 lb (149.2 kg). She is 165 pounds over her ideal body weight. The severity is severe with Body mass index is 51.53 kg/m². She has multiple medical problems aggravated by her obesity. They have been overweight since age 14. She wishes to have bariatric surgery so that she can lose a large amount of weight to a goal of their ideal body weight based on height, build and sex, and keep the weight off. I have met with [facility] in [facility] where we discussed the surgery in great detail and went over the risks and benefits. She has watched our informational video so she understands all of the extensive risks involved. She states that she understands all of the risks and wishes to proceed with the evaluation. We have discussed the different surgical options in detail with use of diagrams and drawings to detail the procedures, risks and alternatives. We discussed the postoperative diet and proposed life long diet for weight management after surgery.      They are a nonsmoker  They have no significant exposure to second hand smoke    Past Medical History  Patient Active Problem List   Diagnosis    Right-sided abdominal pain of unknown cause    36 weeks gestation of pregnancy    Elevated blood pressure affecting pregnancy in third trimester, antepartum    37 weeks gestation of pregnancy    Abdominal pain, right upper quadrant    Symptomatic cholelithiasis    Severe major depression ([address]    Major depressive disorder, severe (HCC)    Chronic tonsillitis    Mild mixed bipolar II disorder ([address]    Cluster B personality disorder ([facility]    Acne    Acute infection of pinna    Acute pharyngitis    Acute suppurative otitis media    Acute upper respiratory infection    Adjustment reaction    Anal fissure    Asperger's disorder    Atopic dermatitis    Attention deficit hyperactivity disorder    Contact dermatitis    Dysthymia    Diarrhea    Diverticulitis of colon    Dizziness    Dysuria    Elevation of level of transaminase and lactic acid dehydrogenase (LDH)    External hemorrhoids    Fever    Headache    Impaired fasting glucose    Increased frequency of urination    Inguinal pain    Acute exacerbation of chronic low back pain    Morbid obesity ([facility]    Multiple open wounds without complication    Obesity with body mass index 30 or greater    Open wound of knee, leg, and ankle    Pain in left foot    Painless rectal bleeding    Posterior rhinorrhea    Psoriasis    Referred otalgia    Steatosis of liver    Streptococcal sore throat    Tinea cruris    Toxic erythema    Urinary tract infectious disease    Vitamin D deficiency    Intractable headache    Acute nonintractable headache, unspecified headache type    Intractable headache, unspecified chronicity pattern, unspecified headache type     Past Surgical History  Past Surgical History:   Procedure Laterality Date    CHOLECYSTECTOMY      [place] LAPS SURG CHOLECYSTECTOMY W/CHOLANGIOGRAPHY N/A 3/15/2018    CHOLECYSTECTOMY LAPAROSCOPIC WITH INTRAOP CHOLANGIOGRAM performed by Isabel Olivarez MD at [address] [place] [address] N/A 7/22/2021    TONSILLECTOMY performed by Emily Alexis DO at [facility] OR      Allergies: Doxycycline and Imitrex [sumatriptan]     Home Medications  Current Outpatient Medications   Medication Sig Dispense Refill    Atogepant (QULIPTA) 60 MG TABS Take 60 mg by mouth daily 30 tablet 2    VORTIoxetine (TRINTELLIX) 5 MG tablet Take 10 mg by mouth daily      busPIRone (BUSPAR) 5 MG tablet Take 5 mg by mouth daily ibuprofen (ADVIL;MOTRIN) 800 MG tablet Take 1 tablet by mouth every 6 hours as needed for Pain 21 tablet 0    brexpiprazole (REXULTI) 1 MG TABS tablet Take 2 mg by mouth daily      VYVANSE 70 MG capsule TAKE ONE CAPSULE BY MOUTH DAILY FOR 30 DAYS      cyclobenzaprine (FLEXERIL) 10 MG tablet 3 times daily as needed      melatonin 5 MG TABS tablet 1 mg nightly       No current facility-administered medications for this visit. Social History:   TOBACCO:   reports that she quit smoking about 5 months ago. Her smoking use included cigarettes. She has a 6.00 pack-year smoking history. She has never used smokeless tobacco.  All smokers must join the free smoking cessation program and stop smoking for 3 months before having any Bariatric surgery. ETOH:    reports current alcohol use of about 1.0 standard drink per week. The patient has a family history that is negative for severe cardiovascular or respiratory issues, negative for reaction to anesthesia. Review of Systems    A complete 10 system review was performed and are otherwise negative unless mentioned in the above HPI. Specific negatives are listed below but may not include all those reviewed.     General ROS: negative obtundation, AMS  ENT ROS: negative rhinorrhea, epistaxis  Allergy and Immunology ROS: negative itchy/watery eyes or nasal congestion  Hematological and Lymphatic ROS: negative spontaneous bleeding or bruising  Endocrine ROS: negative  lethargy, mood swings, palpitations or polydipsia/polyuria  Respiratory ROS: negative sputum changes, stridor, tachypnea or wheezing  Cardiovascular ROS: negative for - loss of consciousness, murmur or orthopnea  Gastrointestinal ROS: negative for - hematochezia or hematemesis  Genito-Urinary ROS: negative for -  genital discharge or hematuria  Musculoskeletal ROS: negative for - focal weakness, gangrene  Psych/Neuro ROS: negative for - visual or auditory hallucinations, suicidal ideation      Physical Exam:   VITALS: /71 (Site: Left Upper Arm, Position: Sitting, Cuff Size: Large Adult)   Pulse 83   Temp 97.7 °F (36.5 °C) (Temporal)   Resp 20   Ht 5' 7\" (1.702 m)   Wt (!) 329 lb (149.2 kg)   BMI 51.53 kg/m²   General appearance: AAO, NAD  Eyes: PERRL, EOMI, red conjunctiva  Lungs: equal chest rise bilateral, no wheeze, no rhonchi  Chest wall: atraumatic, no tenderness, no echymosis or abrasions  Heart: reg rate, no murmur  Abdomen: soft, nondistended, nontender, obese  Extremities: full ROM all 4 ext, no gross motor or sensory deficits  Pulses: 2+ distal  Skin: warm and dry  Neurologic: spontanous eye opening, purposeful, follows complex commands    Assessment/Plan:  Michael Needle is a morbidly-obese 20 y.o. female with failure of medical management, inability to keep the weight off with diet and exercise. She is interested in Surgical weight loss and specifically we discussed at length the options of Laparoscopic Jules-en- Y Gastric Bypass or Sleeve Gastrectomy. We discussed that our goal is to ameliorate the medical problems and not to obtain a specific body mass index. She understands the risks and benefits, and wishes to proceed with the evaluation. 1. Pre-op evaluation  EGD with biopsy- Scheduled   - US GALLBLADDER RUQ; Scheduled     2. Morbid obesity due to excess calories ([address]    - medical weight loss management- dietary and nutritional counseling provided for 15min during our conversation with emphasis on protein intake, low calorie, limit late night eating and eating away from the table with other distractions. - Comprehensive Metabolic Panel; Future  - CBC; Future  - Hemoglobin A1C; Future  - Ferritin; Future  - Triglyceride; Future  - Cholesterol, Total; Future  - Zinc; Future  - Vitamin B12; Future  - Folate; Future  - Vitamin B1; Future  - Vitamin D 25 Hydroxy; Future  - Prealbumin; Future  - US GALLBLADDER RUQ; Scheduled     3.  Gastroesophageal reflux disease without esophagitis  RUQ US eval gallbladder disease  EGD eval H pylori/gastritis  Continue tums prn      I recommend Sleeve    Psych evaluation, medical clearance, right upper quadrant ultrasound to evaluate for fatty liver disease and gallbladder abnormalities. These patients often have vitamin deficiencies so I will do screening labs for malnutrition. I will do an upper endoscopy to check for hiatal hernias, ulcers and H. Pylori while we wait for insurance approval for the surgery. I have spent over 45min in evaluation of the patient including greater than 50% of that time face to face discussing surgical options, medical and surgical history, plans for medical weight loss management and preoperative clearance for surgery. They did not have family present for the discussion and visual aides and drawings were used to explain anatomy and surgical procedures.      Follow up with surgical weight loss office after completed the above to begin dietary and nutritional counseling to achieve weight loss at or near 10% their excess body weight prior to surgery    Physician Signature: Electronically signed by Dr. Que Lundy MD    Send copy of H&P to PCP, Alia Martinez DO

## 2023-02-17 NOTE — PATIENT INSTRUCTIONS
What is the next step to proceed with weight loss surgery? Please be aware that any co-pays or deductibles may be requested prior to testing and / or procedures. You will need to schedule a psychological evaluation for weight loss surgery. Patients will be required to complete all psychological testing as required by the mental health provider. Patients must also follow all of the provider's recommendations before weight loss surgery can be scheduled. The evaluation must be done a standard way for weight loss surgery. We strongly recommend that you contact one of our preferred providers listed below to arrange this:      Chandler Jacinto, [facility], [place], operated by [facility]  [address] [address], [place]   [phone]    [facility] and [address]   [phone]    Dr. Isadora Roldan, PhD    [facility]. [place], [place]    [phone]      You will also need to plan on attending a 2 hour nutrition class at the [facility] prior to your surgery. We will schedule this for you when we schedule your surgery. Please remember to have your labs drawn 10 days prior to your first scheduled dietary appointment. Please remember, that while we will submit your case to insurance for surgery authorization, it is your responsibility to know if your plan covers weight loss surgery and keep up-to-date with changes to your insurance coverage. We will do everything possible to help you get approved for weight loss surgery, but cannot guarantee an approval.     Please note that you will not be submitted to your insurance company until all pre-operative testing requirements are met.

## 2023-02-17 NOTE — TELEPHONE ENCOUNTER
Prior Authorization Form  DEMOGRAPHICS:    Patient Name:  Alaina Spencer  Patient :  2000            Insurance:  Payor: Isis Morales / Plan: Kati Diamond / Product Type: *No Product type* /   Insurance ID Number:    Payer/Plan Subscr  Sex Relation Sub.  Ins. ID Effective Group Num   1. SHARONATERI - * DRISS WILLS N 2000 Female Self [id number] [address] [facility] [address] [address]         DIAGNOSIS & PROCEDURE:    Procedure/Operation: egd           CPT Code: 23196    Diagnosis:  gerd    ICD10 Code: k21.9    Location:  [facility]    Surgeon:  Tamara Hobbs INFORMATION:    Date: 3/6/23    Time:               Anesthesia: MAC                                                       Status:  Outpatient        Special Comments:       Electronically signed by Jocelyne Arrieta MA on 2023 at 1:39 PM

## 2023-02-28 ENCOUNTER — INITIAL CONSULT (OUTPATIENT)
Dept: BARIATRICS/WEIGHT MGMT | Age: 23
End: 2023-02-28

## 2023-02-28 DIAGNOSIS — Z71.3 NUTRITIONAL COUNSELING: ICD-10-CM

## 2023-02-28 DIAGNOSIS — Z00.8 NUTRITIONAL ASSESSMENT: Primary | ICD-10-CM

## 2023-03-03 NOTE — PROGRESS NOTES
[address] [facility]                                                                                                                    PRE OP INSTRUCTIONS FOR  Scott Boss        Date: 3/3/2023    Date of surgery: 3/6/23   Arrival Time: Hospital will call you between 5pm and 7pm with your final arrival time for surgery    Nothing by mouth (NPO) as instructed. __nothing to eat or drink__________________________________________________________________    Take the following medications with a small sip of water on the morning of Surgery: AM meds not vyvanse. Diabetics may take evening dose of insulin but none after midnight. If you feel symptomatic or low blood sugar morning of surgery drink 1-2 ounces of apple juice only. Aspirin, Ibuprofen, Advil, Naproxen, Vitamin E and other Anti-inflammatory products should be stopped  before surgery  as directed by your physician. Take Tylenol only unless instructed otherwise by your surgeon. Check with your Doctor regarding stopping Plavix, Coumadin, Lovenox, Eliquis, Effient, or other blood thinners. Do not smoke,use illicit drugs and do not drink any alcoholic beverages 24 hours prior to surgery. You may brush your teeth the morning of surgery. DO NOT SWALLOW WATER    You MUST make arrangements for a responsible adult to take you home after your surgery. You will not be allowed to leave alone or drive yourself home. It is strongly suggested someone stay with you the first 24 hrs. Your surgery will be cancelled if you do not have a ride home. PEDIATRIC PATIENTS ONLY:  A parent/legal guardian must accompany a child scheduled for surgery and plan to stay at the hospital until the child is discharged. Please do not bring other children with you.     Please wear simple, loose fitting clothing to the hospital.  Enrique Solo not bring valuables (money, credit cards, checkbooks, etc.) Do not wear any makeup (including no eye makeup) or nail polish on your fingers or toes. DO NOT wear any jewelry or piercings on day of surgery. All body piercing jewelry must be removed. Shower the night before surgery with _x__Antibacterial soap /YOLI WIPES________    TOTAL JOINT REPLACEMENT/HYSTERECTOMY PATIENTS ONLY---Remember to bring Blood Bank bracelet to the hospital on the day of surgery. If you have a Living Will and Durable Power of  for Healthcare, please bring in a copy. If appropriate bring crutches, inspirex, WALKER, CANE etc... Notify your Surgeon if you develop any illness between now and surgery time, cough, cold, fever, sore throat, nausea, vomiting, etc.  Please notify your surgeon if you experience dizziness, shortness of breath or blurred vision between now & the time of your surgery. If you have ___dentures, they will be removed before going to the OR; we will provide you a container. If you wear ___contact lenses or ___glasses, they will be removed; please bring a case for them. To provide excellent care visitors will be limited to 2 in the room at any given time. Please bring picture ID and insurance card. During flu season no children under the age of [address] are permitted in the hospital for the safety of all patients. Other please check in at the information desk/main lobby. Please call AMBULATORY CARE if you have any further questions.    [facility]     [address]

## 2023-03-06 ENCOUNTER — ANESTHESIA (OUTPATIENT)
Dept: ENDOSCOPY | Age: 23
End: 2023-03-06
Payer: COMMERCIAL

## 2023-03-06 ENCOUNTER — HOSPITAL ENCOUNTER (OUTPATIENT)
Age: 23
Setting detail: OUTPATIENT SURGERY
Discharge: HOME | End: 2023-03-06
Attending: SURGERY | Admitting: SURGERY
Payer: COMMERCIAL

## 2023-03-06 ENCOUNTER — ANESTHESIA EVENT (OUTPATIENT)
Dept: ENDOSCOPY | Age: 23
End: 2023-03-06
Payer: COMMERCIAL

## 2023-03-06 VITALS
HEART RATE: 70 BPM | SYSTOLIC BLOOD PRESSURE: 114 MMHG | TEMPERATURE: 97.6 F | RESPIRATION RATE: 18 BRPM | HEIGHT: 67 IN | BODY MASS INDEX: 45.99 KG/M2 | DIASTOLIC BLOOD PRESSURE: 66 MMHG | WEIGHT: 293 LBS | OXYGEN SATURATION: 96 %

## 2023-03-06 DIAGNOSIS — K21.9 ESOPHAGEAL REFLUX: ICD-10-CM

## 2023-03-06 PROCEDURE — 2709999900 HC NON-CHARGEABLE SUPPLY: Performed by: SURGERY

## 2023-03-06 PROCEDURE — 3700000001 HC ADD 15 MINUTES (ANESTHESIA): Performed by: SURGERY

## 2023-03-06 PROCEDURE — 2580000003 HC RX 258: Performed by: ANESTHESIOLOGY

## 2023-03-06 PROCEDURE — 3700000000 HC ANESTHESIA ATTENDED CARE: Performed by: SURGERY

## 2023-03-06 PROCEDURE — 6360000002 HC RX W HCPCS: Performed by: NURSE ANESTHETIST, CERTIFIED REGISTERED

## 2023-03-06 PROCEDURE — 3609012400 HC EGD TRANSORAL BIOPSY SINGLE/MULTIPLE: Performed by: SURGERY

## 2023-03-06 PROCEDURE — 88342 IMHCHEM/IMCYTCHM 1ST ANTB: CPT

## 2023-03-06 PROCEDURE — 88305 TISSUE EXAM BY PATHOLOGIST: CPT

## 2023-03-06 PROCEDURE — 7100000010 HC PHASE II RECOVERY - FIRST 15 MIN: Performed by: SURGERY

## 2023-03-06 PROCEDURE — 7100000011 HC PHASE II RECOVERY - ADDTL 15 MIN: Performed by: SURGERY

## 2023-03-06 RX ORDER — SODIUM CHLORIDE 9 MG/ML
INJECTION, SOLUTION INTRAVENOUS CONTINUOUS
Status: DISCONTINUED | OUTPATIENT
Start: 2023-03-06 | End: 2023-03-06 | Stop reason: HOSPADM

## 2023-03-06 RX ORDER — SODIUM CHLORIDE 9 MG/ML
INJECTION, SOLUTION INTRAVENOUS PRN
Status: DISCONTINUED | OUTPATIENT
Start: 2023-03-06 | End: 2023-03-06 | Stop reason: HOSPADM

## 2023-03-06 RX ORDER — PROPOFOL 10 MG/ML
INJECTION, EMULSION INTRAVENOUS PRN
Status: DISCONTINUED | OUTPATIENT
Start: 2023-03-06 | End: 2023-03-06 | Stop reason: SDUPTHER

## 2023-03-06 RX ORDER — SODIUM CHLORIDE, SODIUM LACTATE, POTASSIUM CHLORIDE, CALCIUM CHLORIDE 600; 310; 30; 20 MG/100ML; MG/100ML; MG/100ML; MG/100ML
INJECTION, SOLUTION INTRAVENOUS CONTINUOUS
Status: DISCONTINUED | OUTPATIENT
Start: 2023-03-06 | End: 2023-03-06 | Stop reason: HOSPADM

## 2023-03-06 RX ORDER — SODIUM CHLORIDE 0.9 % (FLUSH) 0.9 %
5-40 SYRINGE (ML) INJECTION PRN
Status: DISCONTINUED | OUTPATIENT
Start: 2023-03-06 | End: 2023-03-06 | Stop reason: HOSPADM

## 2023-03-06 RX ORDER — SODIUM CHLORIDE 0.9 % (FLUSH) 0.9 %
5-40 SYRINGE (ML) INJECTION EVERY 12 HOURS SCHEDULED
Status: DISCONTINUED | OUTPATIENT
Start: 2023-03-06 | End: 2023-03-06 | Stop reason: HOSPADM

## 2023-03-06 RX ADMIN — PROPOFOL 200 MG: 10 INJECTION, EMULSION INTRAVENOUS at 12:58

## 2023-03-06 RX ADMIN — SODIUM CHLORIDE, POTASSIUM CHLORIDE, SODIUM LACTATE AND CALCIUM CHLORIDE: 600; 310; 30; 20 INJECTION, SOLUTION INTRAVENOUS at 11:52

## 2023-03-06 ASSESSMENT — LIFESTYLE VARIABLES: SMOKING_STATUS: 1

## 2023-03-06 ASSESSMENT — PAIN - FUNCTIONAL ASSESSMENT: PAIN_FUNCTIONAL_ASSESSMENT: NONE - DENIES PAIN

## 2023-03-06 NOTE — ANESTHESIA PRE PROCEDURE
Department of Anesthesiology  Preprocedure Note       Name:  Liat Chiang   Age:  20 y.o.  :  2000                                          MRN:  [id number]         Date:  3/6/2023      Surgeon: Cayetano Gann):  Jamie Jones MD    Procedure: EGD    Medications prior to admission:   Prior to Admission medications    Medication Sig Start Date End Date Taking? Authorizing Provider   Atogepant (QULIPTA) 60 MG TABS Take 60 mg by mouth daily 23   AZEEM Ramos - CNS   VORTIoxetine (TRINTELLIX) 5 MG tablet Take 10 mg by mouth daily    Historical Provider, MD   busPIRone (BUSPAR) 5 MG tablet Take 10 mg by mouth 2 times daily    Historical Provider, MD   ibuprofen (ADVIL;MOTRIN) 800 MG tablet Take 1 tablet by mouth every 6 hours as needed for Pain 10/28/22   Lynn Jarvis APRN - CNP   brexpiprazole (REXULTI) 1 MG TABS tablet Take 2 mg by mouth daily    Historical Provider, MD   VYVANSE 70 MG capsule TAKE ONE CAPSULE BY MOUTH DAILY FOR 30 DAYS 6/10/22   Historical Provider, MD   cyclobenzaprine (FLEXERIL) 10 MG tablet 3 times daily as needed 9/15/21   Historical Provider, MD   melatonin 5 MG TABS tablet 1 mg nightly 22   Historical Provider, MD       Current medications:    No current facility-administered medications for this visit. No current outpatient medications on file.      Facility-Administered Medications Ordered in Other Visits   Medication Dose Route Frequency Provider Last Rate Last Admin    lactated ringers IV soln infusion   IntraVENous Continuous Frieda Smiley MD 50 mL/hr at 23 1252 NoRateChange at 23 1252    0.9 % sodium chloride infusion   IntraVENous Continuous Jamie Jones MD        sodium chloride flush 0.9 % injection 5-40 mL  5-40 mL IntraVENous 2 times per day Jamie Jones MD        sodium chloride flush 0.9 % injection 5-40 mL  5-40 mL IntraVENous PRN Jamie Jones MD        0.9 % sodium chloride infusion   IntraVENous PRN Irvin Felix Demetri Killian MD           Allergies: Allergies   Allergen Reactions    Imitrex [Sumatriptan] Other (See Comments)     Extreme headache     Doxycycline Rash       Problem List:    Patient Active Problem List   Diagnosis Code    Right-sided abdominal pain of unknown cause R10.9    36 weeks gestation of pregnancy Z3A.36    Elevated blood pressure affecting pregnancy in third trimester, antepartum O16.3    37 weeks gestation of pregnancy Z3A.37    Abdominal pain, right upper quadrant R10.11    Symptomatic cholelithiasis K80.20    Severe major depression ([facility]) F32.2    Major depressive disorder, severe ([facility]) F32.2    Chronic tonsillitis J35.01    Mild mixed bipolar II disorder ([facility]) F31.81    Cluster B personality disorder ([facility]) F60.89    Acne L70.9    Acute infection of pinna H60.399    Acute pharyngitis J02.9    Acute suppurative otitis media H66.009    Acute upper respiratory infection J06.9    Adjustment reaction F43.20    Anal fissure K60.2    Asperger's disorder F84.5    Atopic dermatitis L20.9    Attention deficit hyperactivity disorder F90.9    Contact dermatitis L25.9    Dysthymia F34.1    Diarrhea R19.7    Diverticulitis of colon K57.32    Dizziness R42    Dysuria R30.0    Elevation of level of transaminase and lactic acid dehydrogenase (LDH) R74.01, R74.02    External hemorrhoids K64.4    Fever R50.9    Headache R51.9    Impaired fasting glucose R73.01    Increased frequency of urination R35.0    Inguinal pain R10.30    Acute exacerbation of chronic low back pain M54.50, G89.29    Morbid obesity ([facility]) E66.01    Multiple open wounds without complication X19. 8XXA    Obesity with body mass index 30 or greater E66.9    Open wound of knee, leg, and ankle S81.009A, S81.809A, S91.009A    Pain in left foot M79.672    Painless rectal bleeding K62.5    Posterior rhinorrhea J34.89    Psoriasis L40.9    Referred otalgia H92.09    Steatosis of liver K76.0    Streptococcal sore throat J02.0    Tinea cruris B35.6    Toxic erythema L53.0    Urinary tract infectious disease N39.0    Vitamin D deficiency E55.9    Intractable headache R51.9    Acute nonintractable headache, unspecified headache type R51.9    Intractable headache, unspecified chronicity pattern, unspecified headache type R51.9    Esophageal reflux K21.9       Past Medical History:        Diagnosis Date    Anxiety     Asperger syndrome     Autism disorder     Bipolar II disorder ([facility]     Depression     Morbid obesity due to excess calories ([facility]        Past Surgical History:        Procedure Laterality Date    CHOLECYSTECTOMY      [place] LAPS SURG CHOLECYSTECTOMY W/CHOLANGIOGRAPHY N/A 03/15/2018    CHOLECYSTECTOMY LAPAROSCOPIC WITH INTRAOP CHOLANGIOGRAM performed by Dennis Haas MD at [address] N/A 2021    TONSILLECTOMY performed by Maryann Crawford DO at [facility] OR    WISDOM TOOTH EXTRACTION         Social History:    Social History     Tobacco Use    Smoking status: Former     Packs/day: 1.00     Years: 6.00     Pack years: 6.00     Types: Cigarettes     Quit date: 2022     Years since quittin.4    Smokeless tobacco: Never    Tobacco comments:     vaping   Substance Use Topics    Alcohol use: Yes     Alcohol/week: 1.0 standard drink     Types: 1 Glasses of wine per week     Comment: monthly                                Counseling given: Not Answered  Tobacco comments: vaping      Vital Signs (Current): There were no vitals filed for this visit.                                            BP Readings from Last 3 Encounters:   23 109/65   23 123/71   23 (!) 142/87       NPO Status:  >8 hrs                                                                               BMI:   Wt Readings from Last 3 Encounters:   23 (!) 326 lb 1.6 oz (147.9 kg)   23 (!) 329 lb (149.2 kg)   23 (!) 325 lb (147.4 kg)     There is no height or weight on file to calculate BMI.    CBC:   Lab Results   Component Value Date/Time    WBC 12.2 01/16/2023 05:20 AM    RBC 4.77 01/16/2023 05:20 AM    HGB 14.2 01/16/2023 05:20 AM    HCT 40.3 01/16/2023 05:20 AM    MCV 84.5 01/16/2023 05:20 AM    RDW 12.2 01/16/2023 05:20 AM     01/16/2023 05:20 AM       CMP:   Lab Results   Component Value Date/Time     01/16/2023 05:20 AM    K 4.2 01/16/2023 05:20 AM     01/16/2023 05:20 AM    CO2 18 01/16/2023 05:20 AM    BUN 9 01/16/2023 05:20 AM    CREATININE 0.5 01/16/2023 05:20 AM    GFRAA >60 09/22/2022 08:52 PM    LABGLOM >60 01/16/2023 05:20 AM    GLUCOSE 233 01/16/2023 05:20 AM    PROT 7.2 01/11/2023 10:25 PM    CALCIUM 9.7 01/16/2023 05:20 AM    BILITOT 0.2 01/11/2023 10:25 PM    ALKPHOS 96 01/11/2023 10:25 PM    AST 26 01/11/2023 10:25 PM    ALT 41 01/11/2023 10:25 PM       POC Tests: No results for input(s): POCGLU, POCNA, POCK, POCCL, POCBUN, POCHEMO, POCHCT in the last 72 hours. Coags:   Lab Results   Component Value Date/Time    PROTIME 11.7 01/11/2023 10:25 PM    INR 1.1 01/11/2023 10:25 PM    APTT 40.3 01/11/2023 10:25 PM       HCG (If Applicable):   Lab Results   Component Value Date    PREGTESTUR NEGATIVE 01/10/2023        ABGs: No results found for: PHART, PO2ART, JIZ6JPC, TCK2MTL, BEART, U1DHNNGO     Type & Screen (If Applicable):  No results found for: LABABO, [address]    Anesthesia Evaluation  Patient summary reviewed no history of anesthetic complications:   Airway: Mallampati: II  TM distance: >3 FB   Neck ROM: full  Mouth opening: > = 3 FB   Dental:          Pulmonary: breath sounds clear to auscultation  (+) current smoker (Vape)          Patient did not smoke on day of surgery.                  Cardiovascular:        (-) hypertension and past MI    ECG reviewed  Rhythm: regular  Rate: normal                    Neuro/Psych:   (+) psychiatric history (autism, Asperger;  appears normal functioning;  answered all questions appropriately ): stable with treatmentdepression/anxiety             GI/Hepatic/Renal:   (+) GERD:, liver disease:, morbid obesity          Endo/Other: Negative Endo/Other ROS                    Abdominal:   (+) obese,           Vascular: negative vascular ROS. Other Findings:             Anesthesia Plan      MAC     ASA 3       Induction: intravenous. Anesthetic plan and risks discussed with patient. Plan discussed with attending.                     AZEEM Avery - KANIKA   3/6/2023

## 2023-03-06 NOTE — PROGRESS NOTES
Patient refused to have her iv in arms, she only wanted it in the [facility] area. She removed her tongue piercing and ear buds and gave them to belén.

## 2023-03-06 NOTE — DISCHARGE INSTRUCTIONS
Upper GI Endoscopy: What to Expect at [address] [place] [address]  After you have an endoscopy, you will stay at the hospital or clinic for 1 to 2 hours. This will allow the medicine to wear off. You will be able to go home after your doctor or nurse checks to make sure you are not having any problems. You may have to stay overnight if you had treatment during the test. You may have a sore throat for a day or two after the test.  This care sheet gives you a general idea about what to expect after the test.  How can you care for yourself at home? Activity  Rest as much as you need to after you go home. You should be able to go back to your usual activities the day after the test.  Diet  Follow your doctor's directions for eating after the test.  Drink plenty of fluids (unless your doctor has told you not to). Medications  If you have a sore throat the day after the test, use an over-the-counter spray to numb your throat. I   Learning About How to Prepare for Weight-Loss Surgery  How can you prepare for weight-loss surgery? Having weight-loss surgery (also called bariatric surgery) is a big step. You can prepare for surgery by having a plan. Your plan may include your goals for losing weight and how to makes changes in your diet, activity, and lifestyle to help raise your chances of success. One way to prepare for surgery is to think about your goal or reason why you want to reach a healthy weight. Do you want to lower your blood pressure, cholesterol, or blood sugar? Do you want to be able to sleep better, play with your kids, or walk around the block? Having a reason can help you stay with your plan and meet your goals. Your weight-loss surgery team can help you meet your goals and get ready for surgery. Traci Blackmon work with a team that's trained to help you lose weight and make healthy changes in your life.  This team may include:  A medical doctor or nurse to help manage your care and schedule tests before surgery. A surgeon who specializes in weight-loss surgery. A registered dietitian to help you plan meals and make changes in the way you eat. An exercise specialist to help you be more active and get stronger. A therapist or counselor to help you learn why you eat and teach you ways to deal with stress and your emotions. Your team will also be there to help you prepare for life after surgery. They will help you adjust to new ways of eating and changes to your body. How will weight-loss surgery affect your life? You have likely thought a lot about how surgery may affect your life-how you will eat, how your body will look, or how you will feel. Some people feel overwhelmed with these changes. But planning can help you prepare for the changes and meet your weight-loss goals. One important step in your plan is to learn about the ways surgery will affect your life. These may include:  A slimmer you. You probably will lose weight very quickly in the first few months after surgery. As time goes on, your weight loss will slow down. How much weight you lose depends on what type of surgery you had and how well your new eating and activity plans are working for you. A new way of eating. Success in reaching and keeping a healthy weight depends on making lifelong changes in how you eat. After surgery, you raise your chances of success if you:  Eat just a few ounces of food at a time. Eat very slowly and chew your food to mush. Don't drink for 30 minutes before you eat, during your meal, and for 30 minutes after you eat. Are careful about drinking alcohol. Avoid foods that are high in fat or sugar. Take vitamin and mineral supplements. A healthier you. Weight-loss surgery can have some real health benefits. Problems like diabetes, high blood pressure, and sleep apnea may go away-or at least become easier to manage. A more active you.  After surgery, being active on most days of the week will help you reach your weight goal and avoid gaining back the weight you lose. A lot of extra skin. When you lose weight quickly, you may have a lot of extra skin. That's normal. You can have surgery to remove the extra skin if it bothers you. There are going to be some ups and downs while you get used to these changes. So another way to adjust is to identify who can help support you. Getting support from friends and family can help. And joining a support group for people who have had the surgery can be a big help too, because they know what you're going through. As you know, it's a big decision to have weight-loss surgery. But when you have a plan, you can focus on losing weight and living a healthier life. So what steps can you take to prepare for weight-loss surgery? Will you set some goals? Will you learn about how surgery can affect your life? How about asking family or friends for help? Write out your plan. Then get ready. not applicable

## 2023-03-06 NOTE — OP NOTE
[address] [place] [address] [facility]           Operative Report    DATE OF PROCEDURE: 3/6/2023    Michael Bernard    SURGEON: Lambert Gracia MD.     ASSISTANT: None    PREOPERATIVE DIAGNOSES:  GERD, morbid obesity, bloating    POSTOPERATIVE DIAGNOSES:   (1) no Hiatal Hernia, Effacement Hill Grade I  (2) no Esophagitis  (3) Mild Gastritis  (4) No Duodenitis    OPERATION: Pluksowf-dfryar-[id number] with antral biopsies    ANESTHESIA: LMAC    EBL: None    COMPLICATIONS: None. History and consent: This is a 20y.o. year old female who is having GERD with plans to pursue surgical weight loss. I have discussed with the patient the indication, alternatives, and the possible risks and/or complications of upper endoscopy and the conscious sedation anesthesia. The patient and/or family understands and agrees to proceed. Monitoring and Safety:    The patient was placed on a cardiac monitor and vital signs, pulse oximetry and level of consciousness were continuously evaluated throughout the procedure. The patient was closely monitored until recovery from the medications was complete and the patient had returned to baseline status. Anesthesia was present at all times during the procedure. OPERATIONS: The patient was placed on the table and sedated while blood pressure, pulse and pulse oximetry were continuously monitored by the anesthesia team. A bite block was placed prior to sedation and the patient was placed in the left lateral decubitus position. A lubricated scope was easily passed into the upper esophagus which looked normal. The distal esophagus looked normal. The scope was passed into the stomach and retroflexed. Hill Grade I effacement. The gastroesophageal junction was at 39cm. There was no hiatal hernia. The scope was passed down toward the pylorus. The antral mucosa showed mild gastritis without ulcerations. Biopsy was taken to check for H. pylori. The scope was then passed through the pylorus into the duodenal bulb which looked normal, then around to the distal duodenum which looked normal, and the scope was then withdrawn. The patient tolerated the procedure well.        Diet: Low fat, low calorie, high protein    PLAN:  (1) Follow up in office to discuss pathology, imaging, labs      Further Procedures:  Surgical weight loss pending approval    Physician Signature: Electronically signed by Dr. Chester Cool

## 2023-03-06 NOTE — ANESTHESIA POSTPROCEDURE EVALUATION
Department of Anesthesiology  Postprocedure Note    Patient: Michael Bernard  MRN: [id number]  YOB: 2000  Date of evaluation: 3/6/2023      Procedure Summary     Date: 03/06/23 Room / Location: [address] [place] [address] 01 / [address] [facility]    Anesthesia Start: 1254 Anesthesia Stop: 2129    Procedure: EGD BIOPSY Diagnosis:       Esophageal reflux      (Esophageal reflux [K21.9])    Surgeons: Lambert Gracia MD Responsible Provider: Mirela Jennings MD    Anesthesia Type: MAC ASA Status: 3          Anesthesia Type: No value filed.     Maria Luisa Phase I:      Maria Luisa Phase II:        Anesthesia Post Evaluation    Patient location during evaluation: PACU  Patient participation: complete - patient participated  Level of consciousness: awake  Pain score: 3  Airway patency: patent  Nausea & Vomiting: no nausea  Complications: no  Cardiovascular status: blood pressure returned to baseline  Respiratory status: acceptable  Hydration status: euvolemic

## 2023-03-06 NOTE — H&P
Raul Bello  3/6/2023  [facility]    Initial Evaluation  Bariatric Surgery and EGD       Subjective:  CHIEF COMPLAINT: Morbid obesity, malnutrition, Depression, Anxiety, Binge Eating Disorder, History of Cholelithiasis, and Non-alcoholic Steatohepatitis (SAEZ)    HISTORY OF PRESENT ILLNESS: Raul Bello is a morbidly-obese 20 y.o.  female, who weighs (!) 329 lb (149.2 kg). She is 165 pounds over her ideal body weight. The severity is severe with Body mass index is 51.07 kg/m². She has multiple medical problems aggravated by her obesity. They have been overweight since age 14. She wishes to have bariatric surgery so that she can lose a large amount of weight to a goal of their ideal body weight based on height, build and sex, and keep the weight off. I have met with her in the [facility] where we discussed the surgery in great detail and went over the risks and benefits. She has watched our informational video so she understands all of the extensive risks involved. She states that she understands all of the risks and wishes to proceed with the evaluation. We have discussed the different surgical options in detail with use of diagrams and drawings to detail the procedures, risks and alternatives. We discussed the postoperative diet and proposed life long diet for weight management after surgery.      They are a nonsmoker  They have no significant exposure to second hand smoke    Past Medical History  Patient Active Problem List   Diagnosis    Right-sided abdominal pain of unknown cause    36 weeks gestation of pregnancy    Elevated blood pressure affecting pregnancy in third trimester, antepartum    37 weeks gestation of pregnancy    Abdominal pain, right upper quadrant    Symptomatic cholelithiasis    Severe major depression ([address]    Major depressive disorder, severe (HCC)    Chronic tonsillitis    Mild mixed bipolar II disorder ([address]    Cluster B personality disorder ([facility]    Acne    Acute infection of pinna    Acute pharyngitis    Acute suppurative otitis media    Acute upper respiratory infection    Adjustment reaction    Anal fissure    Asperger's disorder    Atopic dermatitis    Attention deficit hyperactivity disorder    Contact dermatitis    Dysthymia    Diarrhea    Diverticulitis of colon    Dizziness    Dysuria    Elevation of level of transaminase and lactic acid dehydrogenase (LDH)    External hemorrhoids    Fever    Headache    Impaired fasting glucose    Increased frequency of urination    Inguinal pain    Acute exacerbation of chronic low back pain    Morbid obesity ([facility]    Multiple open wounds without complication    Obesity with body mass index 30 or greater    Open wound of knee, leg, and ankle    Pain in left foot    Painless rectal bleeding    Posterior rhinorrhea    Psoriasis    Referred otalgia    Steatosis of liver    Streptococcal sore throat    Tinea cruris    Toxic erythema    Urinary tract infectious disease    Vitamin D deficiency    Intractable headache    Acute nonintractable headache, unspecified headache type    Intractable headache, unspecified chronicity pattern, unspecified headache type    Esophageal reflux     Past Surgical History  Past Surgical History:   Procedure Laterality Date    CHOLECYSTECTOMY      ND LAPS SURG CHOLECYSTECTOMY W/CHOLANGIOGRAPHY N/A 03/15/2018    CHOLECYSTECTOMY LAPAROSCOPIC WITH INTRAOP CHOLANGIOGRAM performed by Melanie Knowles MD at [facility] N/A 07/22/2021    TONSILLECTOMY performed by Harinder Prince DO at [facility] 130 EXTRACTION        Allergies: Imitrex [sumatriptan] and Doxycycline     Home Medications  Current Facility-Administered Medications   Medication Dose Route Frequency Provider Last Rate Last Admin    lactated ringers IV soln infusion   IntraVENous Continuous Christine Garcia MD 50 mL/hr at 03/06/23 1252 NoRateChange at 03/06/23 1252    0.9 % sodium chloride infusion   IntraVENous Continuous Alicia Willson MD        sodium chloride flush 0.9 % injection 5-40 mL  5-40 mL IntraVENous 2 times per day Alicia Willson MD        sodium chloride flush 0.9 % injection 5-40 mL  5-40 mL IntraVENous PRN Alicia Willson MD        0.9 % sodium chloride infusion   IntraVENous PRN Alicia Willson MD           Social History:   TOBACCO:   reports that she quit smoking about 5 months ago. Her smoking use included cigarettes. She has a 6.00 pack-year smoking history. She has never used smokeless tobacco.  All smokers must join the free smoking cessation program and stop smoking for 3 months before having any Bariatric surgery. ETOH:    reports current alcohol use of about 1.0 standard drink per week. The patient has a family history that is negative for severe cardiovascular or respiratory issues, negative for reaction to anesthesia. Review of Systems    A complete 10 system review was performed and are otherwise negative unless mentioned in the above HPI. Specific negatives are listed below but may not include all those reviewed.     General ROS: negative obtundation, AMS  ENT ROS: negative rhinorrhea, epistaxis  Allergy and Immunology ROS: negative itchy/watery eyes or nasal congestion  Hematological and Lymphatic ROS: negative spontaneous bleeding or bruising  Endocrine ROS: negative  lethargy, mood swings, palpitations or polydipsia/polyuria  Respiratory ROS: negative sputum changes, stridor, tachypnea or wheezing  Cardiovascular ROS: negative for - loss of consciousness, murmur or orthopnea  Gastrointestinal ROS: negative for - hematochezia or hematemesis  Genito-Urinary ROS: negative for -  genital discharge or hematuria  Musculoskeletal ROS: negative for - focal weakness, gangrene  Psych/Neuro ROS: negative for - visual or auditory hallucinations, suicidal ideation      Physical Exam:   VITALS: /65 Pulse 76   Temp 97.8 °F (36.6 °C) (Temporal)   Resp 18   Ht 5' 7\" (1.702 m)   Wt (!) 326 lb 1.6 oz (147.9 kg)   LMP 02/01/2023 (Approximate)   SpO2 96%   BMI 51.07 kg/m²   General appearance: AAO, NAD  Eyes: PERRL, EOMI, red conjunctiva  Lungs: equal chest rise bilateral, no wheeze, no rhonchi  Chest wall: atraumatic, no tenderness, no echymosis or abrasions  Heart: reg rate, no murmur  Abdomen: soft, nondistended, nontender, obese  Extremities: full ROM all 4 ext, no gross motor or sensory deficits  Pulses: 2+ distal  Skin: warm and dry  Neurologic: spontanous eye opening, purposeful, follows complex commands    Assessment/Plan:  Junior Kruse is a morbidly-obese 20 y.o. female with failure of medical management, inability to keep the weight off with diet and exercise. She is interested in Surgical weight loss and specifically we discussed at length the options of Laparoscopic Jules-en- Y Gastric Bypass or Sleeve Gastrectomy. We discussed that our goal is to ameliorate the medical problems and not to obtain a specific body mass index. She understands the risks and benefits, and wishes to proceed with the evaluation. 1. Pre-op evaluation  EGD with biopsy- Scheduled   - US GALLBLADDER RUQ; Scheduled     2. Morbid obesity due to excess calories ([address]    - medical weight loss management- dietary and nutritional counseling provided for 15min during our conversation with emphasis on protein intake, low calorie, limit late night eating and eating away from the table with other distractions. - Comprehensive Metabolic Panel; Future  - CBC; Future  - Hemoglobin A1C; Future  - Ferritin; Future  - Triglyceride; Future  - Cholesterol, Total; Future  - Zinc; Future  - Vitamin B12; Future  - Folate; Future  - Vitamin B1; Future  - Vitamin D 25 Hydroxy; Future  - Prealbumin; Future  - US GALLBLADDER RUQ; Scheduled     3.  Gastroesophageal reflux disease without esophagitis  RUQ US eval gallbladder disease  EGD eval H pylori/gastritis  Continue tums prn      I recommend Sleeve    Psych evaluation, medical clearance, right upper quadrant ultrasound to evaluate for fatty liver disease and gallbladder abnormalities. These patients often have vitamin deficiencies so I will do screening labs for malnutrition. I will do an upper endoscopy to check for hiatal hernias, ulcers and H. Pylori while we wait for insurance approval for the surgery. I have spent over 45min in evaluation of the patient including greater than 50% of that time face to face discussing surgical options, medical and surgical history, plans for medical weight loss management and preoperative clearance for surgery. They did not have family present for the discussion and visual aides and drawings were used to explain anatomy and surgical procedures.      Follow up with surgical weight loss office after completed the above to begin dietary and nutritional counseling to achieve weight loss at or near 10% their excess body weight prior to surgery    Physician Signature: Electronically signed by Kinza Andres MD      Send copy of H&P to PCP, Rl Pacheco DO

## 2023-03-14 ENCOUNTER — TELEPHONE (OUTPATIENT)
Dept: BARIATRICS/WEIGHT MGMT | Age: 23
End: 2023-03-14

## 2023-03-14 NOTE — TELEPHONE ENCOUNTER
Spoke to pt to have labs done asap for her results appt on 3/21/23. Pt stated she can have them done tomorrow 3/15/23  .

## 2023-03-24 ENCOUNTER — HOSPITAL ENCOUNTER (OUTPATIENT)
Age: 23
Discharge: HOME | End: 2023-03-24
Payer: COMMERCIAL

## 2023-03-24 VITALS — BODY MASS INDEX: 45.99 KG/M2 | HEIGHT: 67 IN | WEIGHT: 293 LBS

## 2023-03-24 DIAGNOSIS — E66.01 MORBID OBESITY DUE TO EXCESS CALORIES (HCC): ICD-10-CM

## 2023-03-24 LAB
ALBUMIN SERPL-MCNC: 4.2 G/DL (ref 3.5–5.2)
ALP SERPL-CCNC: 83 U/L (ref 35–104)
ALT SERPL-CCNC: 44 U/L (ref 0–32)
ANION GAP SERPL CALCULATED.3IONS-SCNC: 10 MMOL/L (ref 7–16)
AST SERPL-CCNC: 26 U/L (ref 0–31)
BILIRUB SERPL-MCNC: 0.4 MG/DL (ref 0–1.2)
BUN SERPL-MCNC: 9 MG/DL (ref 6–20)
CALCIUM SERPL-MCNC: 9.6 MG/DL (ref 8.6–10.2)
CHLORIDE SERPL-SCNC: 104 MMOL/L (ref 98–107)
CHOLESTEROL, TOTAL: 157 MG/DL (ref 0–199)
CO2 SERPL-SCNC: 24 MMOL/L (ref 22–29)
CREAT SERPL-MCNC: 0.6 MG/DL (ref 0.5–1)
ERYTHROCYTE [DISTWIDTH] IN BLOOD BY AUTOMATED COUNT: 12.4 FL (ref 11.5–15)
FERRITIN SERPL-MCNC: 167 NG/ML
FOLATE SERPL-MCNC: 9.5 NG/ML (ref 4.8–24.2)
GLUCOSE SERPL-MCNC: 104 MG/DL (ref 74–99)
HBA1C MFR BLD: 5 % (ref 4–5.6)
HCT VFR BLD AUTO: 40.8 % (ref 34–48)
HDLC SERPL-MCNC: 39 MG/DL
HGB BLD-MCNC: 14.5 G/DL (ref 11.5–15.5)
LDLC SERPL CALC-MCNC: 99 MG/DL (ref 0–99)
MCH RBC QN AUTO: 29.7 PG (ref 26–35)
MCHC RBC AUTO-ENTMCNC: 35.5 % (ref 32–34.5)
MCV RBC AUTO: 83.6 FL (ref 80–99.9)
PLATELET # BLD AUTO: 319 E9/L (ref 130–450)
PMV BLD AUTO: 9.8 FL (ref 7–12)
POTASSIUM SERPL-SCNC: 4.3 MMOL/L (ref 3.5–5)
PREALB SERPL-MCNC: 19 MG/DL (ref 20–40)
PROT SERPL-MCNC: 7.4 G/DL (ref 6.4–8.3)
RBC # BLD AUTO: 4.88 E12/L (ref 3.5–5.5)
SODIUM SERPL-SCNC: 138 MMOL/L (ref 132–146)
TRIGL SERPL-MCNC: 94 MG/DL (ref 0–149)
VIT B12 SERPL-MCNC: 485 PG/ML (ref 211–946)
VITAMIN D 25-HYDROXY: 15 NG/ML (ref 30–100)
VLDLC SERPL CALC-MCNC: 19 MG/DL
WBC # BLD: 7 E9/L (ref 4.5–11.5)

## 2023-03-24 PROCEDURE — 82728 ASSAY OF FERRITIN: CPT

## 2023-03-24 PROCEDURE — 84425 ASSAY OF VITAMIN B-1: CPT

## 2023-03-24 PROCEDURE — 82746 ASSAY OF FOLIC ACID SERUM: CPT

## 2023-03-24 PROCEDURE — 84590 ASSAY OF VITAMIN A: CPT

## 2023-03-24 PROCEDURE — 82306 VITAMIN D 25 HYDROXY: CPT

## 2023-03-24 PROCEDURE — 80061 LIPID PANEL: CPT

## 2023-03-24 PROCEDURE — 83036 HEMOGLOBIN GLYCOSYLATED A1C: CPT

## 2023-03-24 PROCEDURE — 84134 ASSAY OF PREALBUMIN: CPT

## 2023-03-24 PROCEDURE — 36415 COLL VENOUS BLD VENIPUNCTURE: CPT

## 2023-03-24 PROCEDURE — 82607 VITAMIN B-12: CPT

## 2023-03-24 PROCEDURE — 80053 COMPREHEN METABOLIC PANEL: CPT

## 2023-03-24 PROCEDURE — 82525 ASSAY OF COPPER: CPT

## 2023-03-24 PROCEDURE — 85027 COMPLETE CBC AUTOMATED: CPT

## 2023-03-24 PROCEDURE — 84630 ASSAY OF ZINC: CPT

## 2023-03-24 NOTE — PATIENT INSTRUCTIONS
received your psychological evaluation. Please be aware that any co-pays or deductibles may be requested prior to testing and / or procedures. You will need to schedule a psychological evaluation for weight loss surgery. Patients will be required to complete all psychological testing as required by the psychologist. Patients must follow all of the psychologist's recommendations before weight loss surgery can be scheduled. The evaluation must be done a standard way for weight loss surgery. We strongly recommend that you contact one of our preferred providers listed below to arrange this:    Pam Avalos, [address] [place] [address] [facility]                                                              [address] [place] [address]                                                                                      [phone]     [facility]                                                                                                [phone]        Dr. Philipp Rodriguez, PhD                         Manisha Unger. Nohelia [facility]                                                                                              [phone]     You will also need to plan on attending a 2 hour nutrition class at the [facility] prior to your surgery. We will schedule this for you when we schedule your surgery. Please remember to have your labs drawn prior to your scheduled appointment to review the results of your testing. Please remember, that while we will submit your case to insurance for surgery authorization, it is your responsibility to know if your plan covers weight loss surgery and keep up-to-date with changes to your insurance coverage.   We will do everything possible to help you get approved for weight loss surgery, but cannot guarantee an approval.      Please note that you will not be

## 2023-03-24 NOTE — PROGRESS NOTES
reinforced at monthly pre-op weight loss counseling appointments. Pt will also attend a 3-hour nutrition education class once scheduled for surgery . RD / LD will monitor progress.

## 2023-03-27 ENCOUNTER — OFFICE VISIT (OUTPATIENT)
Dept: BARIATRICS/WEIGHT MGMT | Age: 23
End: 2023-03-27
Payer: COMMERCIAL

## 2023-03-27 VITALS
BODY MASS INDEX: 45.99 KG/M2 | WEIGHT: 293 LBS | DIASTOLIC BLOOD PRESSURE: 98 MMHG | HEART RATE: 90 BPM | HEIGHT: 67 IN | RESPIRATION RATE: 20 BRPM | TEMPERATURE: 97.7 F | SYSTOLIC BLOOD PRESSURE: 149 MMHG

## 2023-03-27 DIAGNOSIS — K29.30 CHRONIC SUPERFICIAL GASTRITIS WITHOUT BLEEDING: Primary | ICD-10-CM

## 2023-03-27 DIAGNOSIS — Z71.3 NUTRITIONAL COUNSELING: ICD-10-CM

## 2023-03-27 DIAGNOSIS — E55.9 VITAMIN D DEFICIENCY: ICD-10-CM

## 2023-03-27 DIAGNOSIS — E66.01 MORBID OBESITY DUE TO EXCESS CALORIES (HCC): ICD-10-CM

## 2023-03-27 PROCEDURE — G8484 FLU IMMUNIZE NO ADMIN: HCPCS | Performed by: NURSE PRACTITIONER

## 2023-03-27 PROCEDURE — G8417 CALC BMI ABV UP PARAM F/U: HCPCS | Performed by: NURSE PRACTITIONER

## 2023-03-27 PROCEDURE — 1036F TOBACCO NON-USER: CPT | Performed by: NURSE PRACTITIONER

## 2023-03-27 PROCEDURE — 99211 OFF/OP EST MAY X REQ PHY/QHP: CPT

## 2023-03-27 PROCEDURE — 99214 OFFICE O/P EST MOD 30 MIN: CPT | Performed by: NURSE PRACTITIONER

## 2023-03-27 PROCEDURE — G8427 DOCREV CUR MEDS BY ELIG CLIN: HCPCS | Performed by: NURSE PRACTITIONER

## 2023-03-27 RX ORDER — ERGOCALCIFEROL 1.25 MG/1
50000 CAPSULE ORAL WEEKLY
Qty: 12 CAPSULE | Refills: 1 | Status: SHIPPED | OUTPATIENT
Start: 2023-03-27

## 2023-03-27 RX ORDER — OMEPRAZOLE 20 MG/1
20 CAPSULE, DELAYED RELEASE ORAL
Qty: 30 CAPSULE | Refills: 1 | Status: SHIPPED | OUTPATIENT
Start: 2023-03-27

## 2023-03-27 NOTE — PATIENT INSTRUCTIONS
Goals  Coffee - just caramel swirl, not the extra cream and sugar  Increase regular intake - goal is to increase to 64 ounces - work on 32 ounces of plain water  Try to incorporate protein into meal times. Pre-made protein drinks are ok at this time. Patient was instructed on types of protein supplements and usage of protein supplements before and after surgery. The goal of protein intake after bariatric surgery is 60-80 grams daily. Patient was able to verbalize the instruction of how to take the supplements and the importance of their use before and after surgery. What is the next step to proceed with weight loss surgery? Please be aware that any co-pays or deductibles may be requested prior to testing and / or procedures. You will need to schedule a psychological evaluation for weight loss surgery. Patients will be required to complete all psychological testing as required by the mental health provider. Patients must also follow all of the provider's recommendations before weight loss surgery can be scheduled. The evaluation must be done a standard way for weight loss surgery. We strongly recommend that you contact one of our preferred providers listed below to arrange this:      Chandler French, [facility]  [address] [place], [place]   [phone]    [facility] and [address]   [phone]    Dr. Christiane Parrish, PhD    [address]ierrez. [place], [place]    [phone]      You will also need to plan on attending a 2 hour nutrition class at the [facility] prior to your surgery. We will schedule this for you when we schedule your surgery. Please remember to have your labs drawn 10 days prior to your first scheduled dietary appointment.     Please remember, that while we will submit your case to insurance for surgery authorization, it is your responsibility to know if your plan covers weight loss surgery and keep up-to-date with changes

## 2023-03-27 NOTE — PROGRESS NOTES
brexpiprazole (REXULTI) 1 MG TABS tablet Take 2 mg by mouth daily   Yes Historical Provider, MD   VYVANSE 70 MG capsule TAKE ONE CAPSULE BY MOUTH DAILY FOR 30 DAYS 6/10/22  Yes Historical Provider, MD   cyclobenzaprine (FLEXERIL) 10 MG tablet 3 times daily as needed 9/15/21  Yes Historical Provider, MD   melatonin 5 MG TABS tablet 1 mg nightly 2/25/22  Yes Historical Provider, MD         Allergies   Allergen Reactions    Imitrex [Sumatriptan] Other (See Comments)     Extreme headache     Red Dye Other (See Comments)      Food Allergy - Red Dye - Hyperactivity -     Doxycycline Rash     Past Medical History:   Diagnosis Date    Anxiety     Asperger syndrome     Autism disorder     Bipolar II disorder (HCC)     Depression     Morbid obesity due to excess calories (HCC)        Past Surgical History:   Procedure Laterality Date    CHOLECYSTECTOMY      GA LAPS SURG CHOLECYSTECTOMY W/CHOLANGIOGRAPHY N/A 03/15/2018    CHOLECYSTECTOMY LAPAROSCOPIC WITH INTRAOP CHOLANGIOGRAM performed by Cisco Vasquez MD at [facility] OR    REMOVAL FOREIGN BODY EAR      TONSILLECTOMY AND ADENOIDECTOMY N/A 07/22/2021    TONSILLECTOMY performed by Ellis Lackey DO at [facility] OR    UPPER GASTROINTESTINAL ENDOSCOPY N/A 3/6/2023    EGD BIOPSY performed by Derick Gabriel MD at [facility] ENDOSCOPY    WISDOM TOOTH EXTRACTION         Current Outpatient Medications   Medication Sig Dispense Refill    omeprazole (PRILOSEC) 20 MG delayed release capsule Take 1 capsule by mouth every morning (before breakfast) 30 capsule 1    vitamin D (ERGOCALCIFEROL) 1.25 MG (85020 UT) CAPS capsule Take 1 capsule by mouth once a week 12 capsule 1    Atogepant (QULIPTA) 60 MG TABS Take 60 mg by mouth daily 30 tablet 2    VORTIoxetine (TRINTELLIX) 5 MG tablet Take 10 mg by mouth daily      busPIRone (BUSPAR) 5 MG tablet Take 10 mg by mouth 2 times daily      ibuprofen (ADVIL;MOTRIN) 800 MG tablet Take 1 tablet by mouth every 6 hours as needed for Pain 21 tablet 0

## 2023-03-28 LAB
ANNOTATION COMMENT IMP: NORMAL
COPPER SERPL-MCNC: 121.8 UG/DL (ref 80–155)
RETINYL PALMITATE SERPL-MCNC: <0.02 MG/L (ref 0–0.1)
VIT A SERPL-MCNC: 0.42 MG/L (ref 0.3–1.2)
ZINC SERPL-MCNC: 74.9 UG/DL (ref 60–120)

## 2023-03-29 LAB — VIT B1 BLD-MCNC: 117 NMOL/L (ref 70–180)

## 2023-04-28 ENCOUNTER — OFFICE VISIT (OUTPATIENT)
Dept: BARIATRICS/WEIGHT MGMT | Age: 23
End: 2023-04-28
Payer: COMMERCIAL

## 2023-04-28 VITALS
RESPIRATION RATE: 20 BRPM | DIASTOLIC BLOOD PRESSURE: 72 MMHG | HEART RATE: 88 BPM | BODY MASS INDEX: 45.99 KG/M2 | WEIGHT: 293 LBS | TEMPERATURE: 98.6 F | HEIGHT: 67 IN | SYSTOLIC BLOOD PRESSURE: 110 MMHG

## 2023-04-28 DIAGNOSIS — K21.9 GASTROESOPHAGEAL REFLUX DISEASE WITHOUT ESOPHAGITIS: ICD-10-CM

## 2023-04-28 DIAGNOSIS — E66.01 MORBID OBESITY DUE TO EXCESS CALORIES (HCC): ICD-10-CM

## 2023-04-28 DIAGNOSIS — Z71.3 NUTRITIONAL COUNSELING: Primary | ICD-10-CM

## 2023-04-28 PROCEDURE — G8427 DOCREV CUR MEDS BY ELIG CLIN: HCPCS | Performed by: NURSE PRACTITIONER

## 2023-04-28 PROCEDURE — G8417 CALC BMI ABV UP PARAM F/U: HCPCS | Performed by: NURSE PRACTITIONER

## 2023-04-28 PROCEDURE — 99213 OFFICE O/P EST LOW 20 MIN: CPT | Performed by: NURSE PRACTITIONER

## 2023-04-28 PROCEDURE — 1036F TOBACCO NON-USER: CPT | Performed by: NURSE PRACTITIONER

## 2023-04-28 NOTE — PATIENT INSTRUCTIONS
Continue with dietary changes  Consider a premade protein drink if you are going to be skipping a meal  Increase physical activity. What is the next step to proceed with weight loss surgery? Please be aware that any co-pays or deductibles may be requested prior to testing and / or procedures. You will need to schedule a psychological evaluation for weight loss surgery. Patients will be required to complete all psychological testing as required by the mental health provider. Patients must also follow all of the provider's recommendations before weight loss surgery can be scheduled. The evaluation must be done a standard way for weight loss surgery. We strongly recommend that you contact one of our preferred providers listed below to arrange this:      Chandler Khan, [facility]-  [address] [place], [place]   [phone]    Mary Roblero and [address]   [phone]    Dr. Raul Castro, PhD    Kirabessy Saavedra. [place], [place]    [phone]      You will also need to plan on attending a 2 hour nutrition class at the [facility] prior to your surgery. We will schedule this for you when we schedule your surgery. Please remember to have your labs drawn 10 days prior to your first scheduled dietary appointment. Please remember, that while we will submit your case to insurance for surgery authorization, it is your responsibility to know if your plan covers weight loss surgery and keep up-to-date with changes to your insurance coverage. We will do everything possible to help you get approved for weight loss surgery, but cannot guarantee an approval.     Please note that you will not be submitted to your insurance company until all pre-operative testing requirements are met.

## 2023-04-28 NOTE — PROGRESS NOTES
daily      VYVANSE 70 MG capsule TAKE ONE CAPSULE BY MOUTH DAILY FOR 30 DAYS      cyclobenzaprine (FLEXERIL) 10 MG tablet 3 times daily as needed      melatonin 5 MG TABS tablet 1 mg nightly       No current facility-administered medications for this visit. Allergies   Allergen Reactions    Imitrex [Sumatriptan] Other (See Comments)     Extreme headache     Red Dye Other (See Comments)      Food Allergy - Red Dye - Hyperactivity -     Doxycycline Rash       Family History   Problem Relation Age of Onset    No Known Problems Mother     No Known Problems Father        Social History     Socioeconomic History    Marital status: Single     Spouse name: Not on file    Number of children: Not on file    Years of education: Not on file    Highest education level: Not on file   Occupational History    Not on file   Tobacco Use    Smoking status: Former     Packs/day: 1.00     Years: 6.00     Pack years: 6.00     Types: Cigarettes     Quit date: 2022     Years since quittin.6    Smokeless tobacco: Never    Tobacco comments:     vaping   Vaping Use    Vaping Use: Every day    Start date: 10/2/2015    Substances: Nicotine, Flavoring    Devices: Disposable, Pre-filled or refillable cartridge, Refillable tank, Pre-filled pod   Substance and Sexual Activity    Alcohol use:  Yes     Alcohol/week: 1.0 standard drink     Types: 1 Glasses of wine per week     Comment: monthly    Drug use: No    Sexual activity: Yes     Partners: Male   Other Topics Concern    Not on file   Social History Narrative    Not on file     Social Determinants of Health     Financial Resource Strain: Not on file   Food Insecurity: Not on file   Transportation Needs: Not on file   Physical Activity: Not on file   Stress: Not on file   Social Connections: Not on file   Intimate Partner Violence: Not on file   Housing Stability: Not on file           A complete 10 system review was performed and are otherwise negative unless mentioned in the above

## 2023-05-22 RX ORDER — ATOGEPANT 60 MG/1
60 TABLET ORAL DAILY
Qty: 30 TABLET | Refills: 2 | Status: SHIPPED | OUTPATIENT
Start: 2023-05-22

## 2023-05-26 ENCOUNTER — OFFICE VISIT (OUTPATIENT)
Dept: BARIATRICS/WEIGHT MGMT | Age: 23
End: 2023-05-26
Payer: COMMERCIAL

## 2023-05-26 VITALS
HEIGHT: 67 IN | RESPIRATION RATE: 20 BRPM | TEMPERATURE: 97.5 F | WEIGHT: 293 LBS | DIASTOLIC BLOOD PRESSURE: 72 MMHG | SYSTOLIC BLOOD PRESSURE: 110 MMHG | BODY MASS INDEX: 45.99 KG/M2 | HEART RATE: 80 BPM

## 2023-05-26 DIAGNOSIS — K21.9 GASTROESOPHAGEAL REFLUX DISEASE WITHOUT ESOPHAGITIS: ICD-10-CM

## 2023-05-26 DIAGNOSIS — E66.01 MORBID OBESITY DUE TO EXCESS CALORIES (HCC): ICD-10-CM

## 2023-05-26 DIAGNOSIS — Z71.3 NUTRITIONAL COUNSELING: Primary | ICD-10-CM

## 2023-05-26 PROCEDURE — 1036F TOBACCO NON-USER: CPT | Performed by: NURSE PRACTITIONER

## 2023-05-26 PROCEDURE — G8427 DOCREV CUR MEDS BY ELIG CLIN: HCPCS | Performed by: NURSE PRACTITIONER

## 2023-05-26 PROCEDURE — 99213 OFFICE O/P EST LOW 20 MIN: CPT | Performed by: NURSE PRACTITIONER

## 2023-05-26 PROCEDURE — G8417 CALC BMI ABV UP PARAM F/U: HCPCS | Performed by: NURSE PRACTITIONER

## 2023-05-26 NOTE — PATIENT INSTRUCTIONS
Cutting out fried foods  Increase water intake  Cutting out coffee  Increase physical activity outside of work - 10-15 minutes 3-4 times week    What is the next step to proceed with weight loss surgery? Please be aware that any co-pays or deductibles may be requested prior to testing and / or procedures. You will need to schedule a psychological evaluation for weight loss surgery. Patients will be required to complete all psychological testing as required by the mental health provider. Patients must also follow all of the provider's recommendations before weight loss surgery can be scheduled. The evaluation must be done a standard way for weight loss surgery. We strongly recommend that you contact one of our preferred providers listed below to arrange this:      Chandler Morris, [facility]  [address] [place], [place]   [phone]    Anh Benavidezorn and [address]   [phone]    Dr. Laure Abad, PhD    Ra Ralph. [place], [place]    [phone]      You will also need to plan on attending a 2 hour nutrition class at the [facility] prior to your surgery. We will schedule this for you when we schedule your surgery. Please remember to have your labs drawn 10 days prior to your first scheduled dietary appointment. Please remember, that while we will submit your case to insurance for surgery authorization, it is your responsibility to know if your plan covers weight loss surgery and keep up-to-date with changes to your insurance coverage. We will do everything possible to help you get approved for weight loss surgery, but cannot guarantee an approval.     Please note that you will not be submitted to your insurance company until all pre-operative testing requirements are met.

## 2023-05-26 NOTE — PROGRESS NOTES
4th dietary -- [employer]
Marital status: Single     Spouse name: Not on file    Number of children: Not on file    Years of education: Not on file    Highest education level: Not on file   Occupational History    Not on file   Tobacco Use    Smoking status: Former     Packs/day: 1.00     Years: 6.00     Pack years: 6.00     Types: Cigarettes     Quit date: 2022     Years since quittin.6    Smokeless tobacco: Never    Tobacco comments:     vaping   Vaping Use    Vaping Use: Every day    Start date: 10/2/2015    Substances: Nicotine, Flavoring    Devices: Disposable, Pre-filled or refillable cartridge, Refillable tank, Pre-filled pod   Substance and Sexual Activity    Alcohol use: Yes     Alcohol/week: 1.0 standard drink     Types: 1 Glasses of wine per week     Comment: monthly    Drug use: No    Sexual activity: Yes     Partners: Male   Other Topics Concern    Not on file   Social History Narrative    Not on file     Social Determinants of Health     Financial Resource Strain: Not on file   Food Insecurity: Not on file   Transportation Needs: Not on file   Physical Activity: Not on file   Stress: Not on file   Social Connections: Not on file   Intimate Partner Violence: Not on file   Housing Stability: Not on file           A complete 10 system review was performed and are otherwise negative unless mentioned in the above HPI. Specific negatives are listed below but may not include all those reviewed.     General ROS: negative obtundation, AMS  ENT ROS: negative rhinorrhea, epistaxis  Allergy and Immunology ROS: negative itchy/watery eyes or nasal congestion  Hematological and Lymphatic ROS: negative spontaneous bleeding or bruising  Endocrine ROS: negative  lethargy, mood swings, palpitations or polydipsia/polyuria  Respiratory ROS: negative sputum changes, stridor, tachypnea or wheezing  Cardiovascular ROS: negative for - loss of consciousness, murmur or orthopnea  Gastrointestinal ROS: negative for - hematochezia or

## 2023-06-29 ENCOUNTER — TELEPHONE (OUTPATIENT)
Dept: BARIATRICS/WEIGHT MGMT | Age: 23
End: 2023-06-29

## 2023-07-16 ENCOUNTER — APPOINTMENT (OUTPATIENT)
Dept: GENERAL RADIOLOGY | Age: 23
End: 2023-07-16
Payer: COMMERCIAL

## 2023-07-16 ENCOUNTER — HOSPITAL ENCOUNTER (EMERGENCY)
Age: 23
Discharge: HOME | End: 2023-07-16
Payer: COMMERCIAL

## 2023-07-16 VITALS
DIASTOLIC BLOOD PRESSURE: 82 MMHG | SYSTOLIC BLOOD PRESSURE: 124 MMHG | OXYGEN SATURATION: 95 % | RESPIRATION RATE: 16 BRPM | HEART RATE: 94 BPM | TEMPERATURE: 98.5 F

## 2023-07-16 DIAGNOSIS — S93.402A MODERATE LEFT ANKLE SPRAIN, INITIAL ENCOUNTER: Primary | ICD-10-CM

## 2023-07-16 LAB — HCG UR QL: POSITIVE

## 2023-07-16 PROCEDURE — 73610 X-RAY EXAM OF ANKLE: CPT

## 2023-07-16 PROCEDURE — 99284 EMERGENCY DEPT VISIT MOD MDM: CPT

## 2023-07-16 PROCEDURE — 84703 CHORIONIC GONADOTROPIN ASSAY: CPT

## 2023-07-16 ASSESSMENT — PAIN SCALES - GENERAL
PAINLEVEL_OUTOF10: 3
PAINLEVEL_OUTOF10: 6

## 2023-07-16 ASSESSMENT — PAIN - FUNCTIONAL ASSESSMENT
PAIN_FUNCTIONAL_ASSESSMENT: PREVENTS OR INTERFERES SOME ACTIVE ACTIVITIES AND ADLS
PAIN_FUNCTIONAL_ASSESSMENT: 0-10

## 2023-07-16 ASSESSMENT — PAIN DESCRIPTION - LOCATION
LOCATION: ANKLE
LOCATION: ANKLE

## 2023-07-16 ASSESSMENT — PAIN DESCRIPTION - ORIENTATION
ORIENTATION: LEFT
ORIENTATION: LEFT

## 2023-07-16 ASSESSMENT — PAIN DESCRIPTION - DESCRIPTORS
DESCRIPTORS: STABBING
DESCRIPTORS: ACHING;THROBBING

## 2023-07-16 ASSESSMENT — PAIN DESCRIPTION - ONSET: ONSET: SUDDEN

## 2023-07-16 ASSESSMENT — PAIN DESCRIPTION - FREQUENCY: FREQUENCY: INTERMITTENT

## 2023-07-16 ASSESSMENT — PAIN DESCRIPTION - PAIN TYPE: TYPE: ACUTE PAIN

## 2023-07-27 ENCOUNTER — OFFICE VISIT (OUTPATIENT)
Dept: NEUROLOGY | Age: 23
End: 2023-07-27
Payer: COMMERCIAL

## 2023-07-27 ENCOUNTER — HOSPITAL ENCOUNTER (OUTPATIENT)
Dept: ULTRASOUND IMAGING | Age: 23
Discharge: HOME | End: 2023-07-29
Attending: OBSTETRICS & GYNECOLOGY
Payer: COMMERCIAL

## 2023-07-27 VITALS — BODY MASS INDEX: 50.9 KG/M2 | HEART RATE: 97 BPM | WEIGHT: 293 LBS | OXYGEN SATURATION: 93 % | TEMPERATURE: 98.2 F

## 2023-07-27 DIAGNOSIS — G43.909 MIGRAINE WITHOUT STATUS MIGRAINOSUS, NOT INTRACTABLE, UNSPECIFIED MIGRAINE TYPE: Primary | ICD-10-CM

## 2023-07-27 DIAGNOSIS — O20.0 THREATENED ABORTION: ICD-10-CM

## 2023-07-27 PROCEDURE — 99214 OFFICE O/P EST MOD 30 MIN: CPT | Performed by: CLINICAL NURSE SPECIALIST

## 2023-07-27 PROCEDURE — 76817 TRANSVAGINAL US OBSTETRIC: CPT

## 2023-07-27 PROCEDURE — G8427 DOCREV CUR MEDS BY ELIG CLIN: HCPCS | Performed by: CLINICAL NURSE SPECIALIST

## 2023-07-27 PROCEDURE — 1036F TOBACCO NON-USER: CPT | Performed by: CLINICAL NURSE SPECIALIST

## 2023-07-27 PROCEDURE — G8417 CALC BMI ABV UP PARAM F/U: HCPCS | Performed by: CLINICAL NURSE SPECIALIST

## 2023-07-27 RX ORDER — UREA 10 %
LOTION (ML) TOPICAL
COMMUNITY
Start: 2023-06-01

## 2023-07-27 RX ORDER — PNV NO.95/FERROUS FUM/FOLIC AC 28MG-0.8MG
1 TABLET ORAL DAILY
COMMUNITY
Start: 2023-07-14

## 2023-07-27 NOTE — PROGRESS NOTES
03/24/2023 10:51 AM    K 4.2 01/16/2023 05:20 AM     03/24/2023 10:51 AM    CO2 24 03/24/2023 10:51 AM    BUN 9 03/24/2023 10:51 AM    CREATININE 0.6 03/24/2023 10:51 AM    GFRAA >60 09/22/2022 08:52 PM    LABGLOM >60 03/24/2023 10:51 AM    GLUCOSE 104 03/24/2023 10:51 AM    PROT 7.4 03/24/2023 10:51 AM    LABALBU 4.2 03/24/2023 10:51 AM    CALCIUM 9.6 03/24/2023 10:51 AM    BILITOT 0.4 03/24/2023 10:51 AM    ALKPHOS 83 03/24/2023 10:51 AM    AST 26 03/24/2023 10:51 AM    ALT 44 03/24/2023 10:51 AM     IR - CSF opening pressure 7    CT Head: 7/5/2022  No acute intracranial abnormalities    I independently reviewed the labs and imaging studies today. Assessment:     LP opening pressure normal at 7 therefore suspect episodic migraines   Unable to utilize triptans and/or topiramate therefore we started [place] with tremendous improvement    Now pregnant and off all medications-she is due March 8, 2024    Marked sleep deprivation which also could be contributing to her chronic daily headaches    Plan:      We will hold Qulipta and Lemus Harmon at this time    Plan on reeval once delivery and middle to end of March    She does not plan on nursing at this time however will inform me should this change in her appointment can be stretched out further    AZEEM Waterman - CNS  1:56 PM  7/27/2023

## 2023-09-03 ENCOUNTER — HOSPITAL ENCOUNTER (EMERGENCY)
Age: 23
Discharge: HOME | End: 2023-09-03
Attending: EMERGENCY MEDICINE
Payer: COMMERCIAL

## 2023-09-03 VITALS
WEIGHT: 293 LBS | HEART RATE: 95 BPM | TEMPERATURE: 98.6 F | DIASTOLIC BLOOD PRESSURE: 71 MMHG | OXYGEN SATURATION: 95 % | SYSTOLIC BLOOD PRESSURE: 126 MMHG | BODY MASS INDEX: 54.35 KG/M2 | RESPIRATION RATE: 16 BRPM

## 2023-09-03 DIAGNOSIS — K02.9 DENTAL CARIES: Primary | ICD-10-CM

## 2023-09-03 DIAGNOSIS — K04.7 DENTAL INFECTION: ICD-10-CM

## 2023-09-03 DIAGNOSIS — K08.89 ODONTALGIA: ICD-10-CM

## 2023-09-03 DIAGNOSIS — Z34.91 FIRST TRIMESTER PREGNANCY: ICD-10-CM

## 2023-09-03 PROCEDURE — 99283 EMERGENCY DEPT VISIT LOW MDM: CPT

## 2023-09-03 PROCEDURE — 6370000000 HC RX 637 (ALT 250 FOR IP): Performed by: EMERGENCY MEDICINE

## 2023-09-03 RX ORDER — CEFDINIR 300 MG/1
300 CAPSULE ORAL 2 TIMES DAILY
Qty: 14 CAPSULE | Refills: 0 | Status: SHIPPED | OUTPATIENT
Start: 2023-09-03 | End: 2023-09-10

## 2023-09-03 RX ORDER — CEFDINIR 300 MG/1
300 CAPSULE ORAL EVERY 12 HOURS SCHEDULED
Status: DISCONTINUED | OUTPATIENT
Start: 2023-09-03 | End: 2023-09-03 | Stop reason: HOSPADM

## 2023-09-03 RX ORDER — ACETAMINOPHEN 500 MG
1000 TABLET ORAL ONCE
Status: COMPLETED | OUTPATIENT
Start: 2023-09-03 | End: 2023-09-03

## 2023-09-03 RX ORDER — CEFDINIR 300 MG/1
CAPSULE ORAL
Status: DISCONTINUED
Start: 2023-09-03 | End: 2023-09-03 | Stop reason: HOSPADM

## 2023-09-03 RX ADMIN — CEFDINIR 300 MG: 300 CAPSULE ORAL at 02:47

## 2023-09-03 RX ADMIN — ACETAMINOPHEN 1000 MG: 500 TABLET ORAL at 02:48

## 2023-09-03 ASSESSMENT — PAIN DESCRIPTION - DESCRIPTORS: DESCRIPTORS: SORE;SHOOTING

## 2023-09-03 ASSESSMENT — PAIN DESCRIPTION - LOCATION: LOCATION: MOUTH

## 2023-09-03 NOTE — DISCHARGE INSTRUCTIONS
NOTE:  Get immediate medical attention if any new/worsening symptoms occur. Call the [facility] listed below (or your own private dentist) for a follow-up evaluation next week.

## 2023-09-03 NOTE — ED PROVIDER NOTES
HPI:  9/3/23, Time: 2:24 AM EDT        Roseann Flores is a 24 y.o. female presenting to the ED for left tooth ache, beginning 1 day ago. The complaint has been persistent, moderate in severity, and worsened by nothing. Patient does not recall eating any hard objects or any precipitating factors. She states she has had a dental extraction and root canal done by a local dentist previously but she does not recall the name of the dentist.  Patient has not had any fever/chills or any lip or tongue or jaw swelling associated. Patient is currently pregnant 13 weeks estimated gestational age and has not had any reported abdominal pain or vaginal spotting or bleeding issues. No other complaints are reported. No relieving factors. Review of Systems:   A complete review of systems was performed and pertinent positives and negatives are stated within HPI, all other systems reviewed and are negative.    --------------------------------------------- PAST HISTORY ---------------------------------------------  Past Medical History:  has a past medical history of Anxiety, Asperger syndrome, Autism disorder, Bipolar II disorder ([address]), Depression, Morbid obesity due to excess calories ([address]), and Pre-eclampsia. Past Surgical History:  has a past surgical history that includes REMOVAL FOREIGN BODY EAR; pr laps surg cholecystectomy w/cholangiography (N/A, 03/15/2018); Cholecystectomy; Tonsillectomy and adenoidectomy (N/A, 07/22/2021); [place] tooth extraction; and Upper gastrointestinal endoscopy (N/A, 3/6/2023). Social History:  reports that she quit smoking about a year ago. Her smoking use included cigarettes. She has a 6.00 pack-year smoking history. She has never used smokeless tobacco. She reports that she does not currently use alcohol after a past usage of about 1.0 standard drink per week. She reports that she does not use drugs.     Family History: family history includes Diabetes in her maternal grandmother;

## 2023-09-07 ENCOUNTER — ANCILLARY PROCEDURE (OUTPATIENT)
Dept: OBGYN CLINIC | Age: 23
End: 2023-09-07
Payer: COMMERCIAL

## 2023-09-07 ENCOUNTER — INITIAL PRENATAL (OUTPATIENT)
Dept: OBGYN CLINIC | Age: 23
End: 2023-09-07
Payer: COMMERCIAL

## 2023-09-07 VITALS
SYSTOLIC BLOOD PRESSURE: 122 MMHG | BODY MASS INDEX: 55.13 KG/M2 | DIASTOLIC BLOOD PRESSURE: 85 MMHG | WEIGHT: 293 LBS | HEART RATE: 89 BPM

## 2023-09-07 DIAGNOSIS — E66.01 MORBID OBESITY WITH BODY MASS INDEX (BMI) OF 50.0 TO 59.9 IN ADULT (HCC): ICD-10-CM

## 2023-09-07 DIAGNOSIS — F84.5 ASPERGER SYNDROME: ICD-10-CM

## 2023-09-07 DIAGNOSIS — O99.210 MATERNAL MORBID OBESITY, ANTEPARTUM (HCC): ICD-10-CM

## 2023-09-07 DIAGNOSIS — K76.0 STEATOSIS OF LIVER: ICD-10-CM

## 2023-09-07 DIAGNOSIS — R73.01 IMPAIRED FASTING GLUCOSE: ICD-10-CM

## 2023-09-07 DIAGNOSIS — Z82.79 FAMILY HISTORY OF CONGENITAL HEART DEFECT: ICD-10-CM

## 2023-09-07 DIAGNOSIS — Z3A.13 13 WEEKS GESTATION OF PREGNANCY: Primary | ICD-10-CM

## 2023-09-07 DIAGNOSIS — Z34.90 SECOND PREGNANCY: ICD-10-CM

## 2023-09-07 DIAGNOSIS — O09.299 HX OF PREECLAMPSIA, PRIOR PREGNANCY, CURRENTLY PREGNANT: ICD-10-CM

## 2023-09-07 DIAGNOSIS — E55.9 VITAMIN D DEFICIENCY: ICD-10-CM

## 2023-09-07 DIAGNOSIS — E66.01 MATERNAL MORBID OBESITY, ANTEPARTUM (HCC): ICD-10-CM

## 2023-09-07 DIAGNOSIS — F84.5 ASPERGER'S DISORDER: ICD-10-CM

## 2023-09-07 PROCEDURE — G8417 CALC BMI ABV UP PARAM F/U: HCPCS | Performed by: OBSTETRICS & GYNECOLOGY

## 2023-09-07 PROCEDURE — 76805 OB US >/= 14 WKS SNGL FETUS: CPT | Performed by: OBSTETRICS & GYNECOLOGY

## 2023-09-07 PROCEDURE — 99203 OFFICE O/P NEW LOW 30 MIN: CPT | Performed by: OBSTETRICS & GYNECOLOGY

## 2023-09-07 PROCEDURE — 99205 OFFICE O/P NEW HI 60 MIN: CPT | Performed by: OBSTETRICS & GYNECOLOGY

## 2023-09-07 PROCEDURE — 36415 COLL VENOUS BLD VENIPUNCTURE: CPT | Performed by: OBSTETRICS & GYNECOLOGY

## 2023-09-07 PROCEDURE — H1000 PRENATAL CARE ATRISK ASSESSM: HCPCS | Performed by: OBSTETRICS & GYNECOLOGY

## 2023-09-07 PROCEDURE — G8427 DOCREV CUR MEDS BY ELIG CLIN: HCPCS | Performed by: OBSTETRICS & GYNECOLOGY

## 2023-09-07 PROCEDURE — 1036F TOBACCO NON-USER: CPT | Performed by: OBSTETRICS & GYNECOLOGY

## 2023-09-07 RX ORDER — BLOOD-GLUCOSE METER
1 KIT MISCELLANEOUS 4 TIMES DAILY
Qty: 1 KIT | Refills: 0 | Status: SHIPPED | OUTPATIENT
Start: 2023-09-07

## 2023-09-07 RX ORDER — CLINDAMYCIN HYDROCHLORIDE 150 MG/1
CAPSULE ORAL
COMMUNITY
Start: 2023-09-05 | End: 2023-09-07

## 2023-09-07 RX ORDER — ACETAMINOPHEN AND CODEINE PHOSPHATE 300; 15 MG/1; MG/1
1 TABLET ORAL EVERY 4 HOURS PRN
COMMUNITY

## 2023-09-07 RX ORDER — LANCETS 28 GAUGE
1 EACH MISCELLANEOUS 4 TIMES DAILY
Qty: 120 EACH | Refills: 8 | Status: SHIPPED | OUTPATIENT
Start: 2023-09-07

## 2023-09-07 NOTE — PROGRESS NOTES
23    [facility], [address] [facility] [facility],  [address] [facility]     RE:  Boogie Willingham  : 2000   AGE: 24 y.o. This report has been created using voice recognition software. It may contain errors which are inherent in voice recognition technology. Dear Dr. Aiarm Garcia:    Boogie Willingham had an appointment today for the following indications:    Patient Active Problem List   Diagnosis    History of severe major depression    History of mild mixed bipolar II disorder     History of cluster B personality disorder    History of Asperger's disorder    History of attention deficit hyperactivity disorder    History of elevation of level of transaminase and lactic acid dehydrogenase    Impaired fasting glucose    History of steatosis of liver    History of Vitamin D deficiency    Maternal morbid obesity, antepartum    Hx of preeclampsia, prior pregnancy, currently pregnant     Boogie Willingham is a 24 y.o. female, who is G2(1,0,0,1). She has an Estimated Date of Delivery: 3/8/24 based on her established dates. She is currently 13 weeks 6 days gestation based on that assessment. The patient is morbidly obese. This increases her risk for  morbidity and mortality, including, but not limited to; GDM, hypertension, fetal growth abnormalities and pregnancy loss. She has a history of glucose intolerance. Based on this I recommended that she do home glucose monitoring rather than doing glucose tolerance testing. Her hemoglobin A1c from 3/24/2023 was 5.0% which is within normal limits. I advised the patient to do home glucose monitoring. She is to check her blood sugars fasting and 2 hours after each meal. I advised the patient to continue to do home glucose monitoring. The goal is  to maintain her post prandial blood sugars 80<120. Her fasting blood sugars should be maintained 80<92. She is to call if her blood sugars are outside of these parameters.  Her testing frequency may be

## 2023-09-07 NOTE — PROGRESS NOTES
Patient is here today for ob new visit. Denies any vaginal bleeding, or leakage of fluids. Cramping, more on the left side that happens every couple of days.

## 2023-09-15 ENCOUNTER — TELEPHONE (OUTPATIENT)
Dept: OBGYN CLINIC | Age: 23
End: 2023-09-15

## 2023-09-15 NOTE — TELEPHONE ENCOUNTER
Rima Carrington called in and wanted to know results of MaterniT 21 genetic testing.    verified and pt given testing results and has follow up appt on 10/5/23 with Dr. Alexy Trevizo

## 2023-10-05 ENCOUNTER — ROUTINE PRENATAL (OUTPATIENT)
Dept: OBGYN CLINIC | Age: 23
End: 2023-10-05
Payer: COMMERCIAL

## 2023-10-05 ENCOUNTER — ANCILLARY PROCEDURE (OUTPATIENT)
Dept: OBGYN CLINIC | Age: 23
End: 2023-10-05
Payer: COMMERCIAL

## 2023-10-05 VITALS
DIASTOLIC BLOOD PRESSURE: 82 MMHG | SYSTOLIC BLOOD PRESSURE: 123 MMHG | WEIGHT: 293 LBS | BODY MASS INDEX: 54.82 KG/M2 | HEART RATE: 100 BPM

## 2023-10-05 DIAGNOSIS — Z3A.17 17 WEEKS GESTATION OF PREGNANCY: ICD-10-CM

## 2023-10-05 DIAGNOSIS — O09.299 HX OF PREECLAMPSIA, PRIOR PREGNANCY, CURRENTLY PREGNANT: Primary | ICD-10-CM

## 2023-10-05 LAB
GLUCOSE URINE, POC: NEGATIVE
PROTEIN UA: NEGATIVE

## 2023-10-05 PROCEDURE — 1036F TOBACCO NON-USER: CPT | Performed by: OBSTETRICS & GYNECOLOGY

## 2023-10-05 PROCEDURE — G8484 FLU IMMUNIZE NO ADMIN: HCPCS | Performed by: OBSTETRICS & GYNECOLOGY

## 2023-10-05 PROCEDURE — G8427 DOCREV CUR MEDS BY ELIG CLIN: HCPCS | Performed by: OBSTETRICS & GYNECOLOGY

## 2023-10-05 PROCEDURE — 81002 URINALYSIS NONAUTO W/O SCOPE: CPT | Performed by: OBSTETRICS & GYNECOLOGY

## 2023-10-05 PROCEDURE — G8417 CALC BMI ABV UP PARAM F/U: HCPCS | Performed by: OBSTETRICS & GYNECOLOGY

## 2023-10-05 PROCEDURE — 76805 OB US >/= 14 WKS SNGL FETUS: CPT | Performed by: OBSTETRICS & GYNECOLOGY

## 2023-10-05 PROCEDURE — 99213 OFFICE O/P EST LOW 20 MIN: CPT | Performed by: OBSTETRICS & GYNECOLOGY

## 2023-10-05 PROCEDURE — 76817 TRANSVAGINAL US OBSTETRIC: CPT | Performed by: OBSTETRICS & GYNECOLOGY

## 2023-10-05 PROCEDURE — 99214 OFFICE O/P EST MOD 30 MIN: CPT | Performed by: OBSTETRICS & GYNECOLOGY

## 2023-10-05 RX ORDER — ACETAMINOPHEN 500 MG
1000 TABLET ORAL EVERY 6 HOURS PRN
COMMUNITY

## 2023-10-05 NOTE — PATIENT INSTRUCTIONS
Please arrive for your scheduled appointment at least 15 minutes early with your actual insurance card+ a photo ID. Also if you need any refills ordered or have questions, it may take up 48 hours to reply. Please allow ample time for your refills. Call me when you use last refill. Thank you for your cooperation. Call your primary obstetrician with bleeding, leaking of fluid, abdominal tenderness, headache, blurry vision, epigastric pain and increased urinary frequency. If you are experiencing an emergency and need immediate help, call 911 or go to go emergency room or labor and delivery. if you are sick, not feeling well or have an infectious process going on please reschedule your appointment by calling [phone]. Also if any family members are not feeling well, please do not bring them to your appointment. We appreciate your cooperation. We are doing this in order to protect our pregnant mothers+ their babies. if you are sick, not feeling well or have an infectious process going on please reschedule your appointment by calling [phone]. Also if any family members are not feeling well, please do not bring them to your appointment. We appreciate your cooperation. We are doing this in order to protect our pregnant mothers+ their babies. Surgical Intensive Care Unit   Daily Progress Note     Date of admission: 6/16/2018    Reason for ICU: fall from standing, SAH, temporal bone fx    Pertinent Hospital Course Events:   6/16 admitted to SICU for [facility], became extremely agitated throughout the day, intubated for airway protection, RIJ CL placed, thiamine, folate started  6/17, febrile, tolerated PS trial  6/18 remains febrile, pancx  6/19 resp cx + staph aureus, vancomycin started  6/20 MSSA, vanc changed to ancef  6/21 propofol dc'd, precedex, ativan added, increased lantus  6/24 following commands, no cuff leak, decadron 40 mg given  6/25 no cuff leak, on scheduled decadron 10 mg   6/26 extubated, swallow study not performed due to cough, hoarseness  6/27 corepak placed  6/29 d/c Vanco, likely not MRSA, previous MSSA, NT suction specimen    Overnight Events:   Patient cont to be febrile, intermittent agitation requiring po ativan    Subjective:   Continues to cough although no sputum production    Physical Exam:   /77   Pulse 95   Temp 98.9 °F (37.2 °C) (Axillary)   Resp 27   Ht 6' 2\" (1.88 m)   Wt 159 lb (72.1 kg)   SpO2 95%   BMI 20.41 kg/m²      CONSTITUTIONAL: NAD  NEURO:  A&O to self and place, says it's \"5267\"  HEENT: PERRL, 4 mm, hoarseness noted, improving  NECK aspen collar in place  LUNGS: coarse BS b/l  CARDIOVASCULAR: RRR, no mumur  ABDOMEN: soft, non distended, no gaurding  MUSCULOSKELETAL:  Moves all extremities equally, no edema or cyanosis    ASSESSMENT / PLAN:     · Neuro:      s/p fall while standing, SAH, L displaced mastoid fx, temporal bone fx, completed keppra  new R inf frontal lobe contusion on 2nd CT, repeat decrease SAH/frontal lobe, small subdural, pansinusitis  Hx ETOH abuse/use  Monitor neuro status, improving    Neurosurgery following, no interventions    Keppra for seizure prophylaxis completed  Off precedex  Po ativan prn  oxycodone to prn  Repeat CT head today        · CV:   Tachycardia, on

## 2023-10-05 NOTE — PROGRESS NOTES
10/5/23    Phoebe Forrest, DO  [address] [facility],  [address] [place] [address]     RE:  Guido Jiang  : 2000   AGE: 24 y.o. This report has been created using voice recognition software. It may contain errors which are inherent in voice recognition technology. Dear Dr. Fidelia Sheikh:    Guido Jiang had an appointment today for the following indications:    Patient Active Problem List   Diagnosis    History of severe major depression    History of mild mixed bipolar II disorder     History of cluster B personality disorder    History of Asperger's disorder    History of attention deficit hyperactivity disorder    History of elevation of level of transaminase and lactic acid dehydrogenase    Impaired fasting glucose    History of steatosis of liver    History of Vitamin D deficiency    Maternal morbid obesity, antepartum    Hx of preeclampsia, prior pregnancy, currently pregnant     Guido Jiang is a 24 y.o. female, who is G2(1,0,0,1). She has an Estimated Date of Delivery: 3/8/24 based on her established dates. She is currently 17 weeks 6 days gestation based on that assessment. The patient is morbidly obese. This increases her risk for  morbidity and mortality, including, but not limited to; GDM, hypertension, fetal growth abnormalities and pregnancy loss. She has a history of glucose intolerance. Based on this I recommended that she do home glucose monitoring rather than doing glucose tolerance testing. Her hemoglobin A1c from 3/24/2023 was 5.0% which is within normal limits. I advised the patient to continue to do home glucose monitoring. The goal is  to maintain her post prandial blood sugars 80<120. Her fasting blood sugars should be maintained 80<92. She is to call if her blood sugars are outside of these parameters. Her testing frequency may be decreased if her blood sugars remain within the range has been given follow.     The patient had a Panorama NIPT which

## 2023-10-05 NOTE — PROGRESS NOTES
Pt here for 1 month follow up  Pt denies any bleeding/cramping/lof  Pt c/o bilateral groin pain  Pt not feeling any fluttering   Pt checking blood sugars weekly and states they are good

## 2023-10-26 ENCOUNTER — ROUTINE PRENATAL (OUTPATIENT)
Dept: OBGYN CLINIC | Age: 23
End: 2023-10-26
Payer: COMMERCIAL

## 2023-10-26 ENCOUNTER — ANCILLARY PROCEDURE (OUTPATIENT)
Dept: OBGYN CLINIC | Age: 23
End: 2023-10-26
Payer: COMMERCIAL

## 2023-10-26 VITALS
WEIGHT: 293 LBS | DIASTOLIC BLOOD PRESSURE: 88 MMHG | HEART RATE: 107 BPM | SYSTOLIC BLOOD PRESSURE: 129 MMHG | BODY MASS INDEX: 55.78 KG/M2

## 2023-10-26 DIAGNOSIS — O09.299 HX OF PREECLAMPSIA, PRIOR PREGNANCY, CURRENTLY PREGNANT: ICD-10-CM

## 2023-10-26 DIAGNOSIS — Z36.3 ENCOUNTER FOR ANTENATAL SCREENING FOR MALFORMATION USING ULTRASOUND: ICD-10-CM

## 2023-10-26 DIAGNOSIS — E66.01 MATERNAL MORBID OBESITY, ANTEPARTUM (HCC): Primary | ICD-10-CM

## 2023-10-26 DIAGNOSIS — O99.210 MATERNAL MORBID OBESITY, ANTEPARTUM (HCC): Primary | ICD-10-CM

## 2023-10-26 DIAGNOSIS — Z3A.20 20 WEEKS GESTATION OF PREGNANCY: ICD-10-CM

## 2023-10-26 LAB
GLUCOSE URINE, POC: NEGATIVE
PROTEIN UA: NEGATIVE

## 2023-10-26 PROCEDURE — 99214 OFFICE O/P EST MOD 30 MIN: CPT | Performed by: OBSTETRICS & GYNECOLOGY

## 2023-10-26 PROCEDURE — 81002 URINALYSIS NONAUTO W/O SCOPE: CPT | Performed by: OBSTETRICS & GYNECOLOGY

## 2023-10-26 PROCEDURE — G8427 DOCREV CUR MEDS BY ELIG CLIN: HCPCS | Performed by: OBSTETRICS & GYNECOLOGY

## 2023-10-26 PROCEDURE — G8417 CALC BMI ABV UP PARAM F/U: HCPCS | Performed by: OBSTETRICS & GYNECOLOGY

## 2023-10-26 PROCEDURE — G8484 FLU IMMUNIZE NO ADMIN: HCPCS | Performed by: OBSTETRICS & GYNECOLOGY

## 2023-10-26 PROCEDURE — 76811 OB US DETAILED SNGL FETUS: CPT | Performed by: OBSTETRICS & GYNECOLOGY

## 2023-10-26 PROCEDURE — 99213 OFFICE O/P EST LOW 20 MIN: CPT | Performed by: OBSTETRICS & GYNECOLOGY

## 2023-10-26 PROCEDURE — 1036F TOBACCO NON-USER: CPT | Performed by: OBSTETRICS & GYNECOLOGY

## 2023-10-26 RX ORDER — BUSPIRONE HYDROCHLORIDE 5 MG/1
5 TABLET ORAL DAILY
COMMUNITY

## 2023-11-03 PROBLEM — O43.199 MARGINAL INSERTION OF UMBILICAL CORD AFFECTING MANAGEMENT OF MOTHER: Status: ACTIVE | Noted: 2023-11-03

## 2023-11-13 ENCOUNTER — HOSPITAL ENCOUNTER (OUTPATIENT)
Age: 23
Setting detail: OBSERVATION
Discharge: HOME | End: 2023-11-13
Attending: OBSTETRICS & GYNECOLOGY | Admitting: OBSTETRICS & GYNECOLOGY
Payer: COMMERCIAL

## 2023-11-13 VITALS
HEART RATE: 88 BPM | TEMPERATURE: 97.7 F | RESPIRATION RATE: 18 BRPM | SYSTOLIC BLOOD PRESSURE: 118 MMHG | DIASTOLIC BLOOD PRESSURE: 63 MMHG

## 2023-11-13 PROBLEM — O16.2 ELEVATED BLOOD PRESSURE AFFECTING PREGNANCY IN SECOND TRIMESTER, ANTEPARTUM: Status: ACTIVE | Noted: 2023-11-13

## 2023-11-13 LAB
ALBUMIN SERPL-MCNC: 3.6 G/DL (ref 3.5–5.2)
ALP SERPL-CCNC: 76 U/L (ref 35–104)
ALT SERPL-CCNC: 11 U/L (ref 0–32)
ANION GAP SERPL CALCULATED.3IONS-SCNC: 12 MMOL/L (ref 7–16)
AST SERPL-CCNC: 16 U/L (ref 0–31)
BASOPHILS # BLD: 0.05 K/UL (ref 0–0.2)
BASOPHILS NFR BLD: 1 % (ref 0–2)
BILIRUB SERPL-MCNC: 0.2 MG/DL (ref 0–1.2)
BUN SERPL-MCNC: 4 MG/DL (ref 6–20)
CALCIUM SERPL-MCNC: 9.3 MG/DL (ref 8.6–10.2)
CHLORIDE SERPL-SCNC: 104 MMOL/L (ref 98–107)
CO2 SERPL-SCNC: 22 MMOL/L (ref 22–29)
CREAT SERPL-MCNC: 0.4 MG/DL (ref 0.5–1)
CREAT UR-MCNC: 59.8 MG/DL (ref 29–226)
EOSINOPHIL # BLD: 0.2 K/UL (ref 0.05–0.5)
EOSINOPHILS RELATIVE PERCENT: 2 % (ref 0–6)
ERYTHROCYTE [DISTWIDTH] IN BLOOD BY AUTOMATED COUNT: 13.2 % (ref 11.5–15)
GFR SERPL CREATININE-BSD FRML MDRD: >60 ML/MIN/1.73M2
GLUCOSE SERPL-MCNC: 128 MG/DL (ref 74–99)
HCT VFR BLD AUTO: 35.2 % (ref 34–48)
HGB BLD-MCNC: 12.6 G/DL (ref 11.5–15.5)
IMM GRANULOCYTES # BLD AUTO: 0.07 K/UL (ref 0–0.58)
IMM GRANULOCYTES NFR BLD: 1 % (ref 0–5)
LYMPHOCYTES NFR BLD: 2.24 K/UL (ref 1.5–4)
LYMPHOCYTES RELATIVE PERCENT: 24 % (ref 20–42)
MCH RBC QN AUTO: 29.4 PG (ref 26–35)
MCHC RBC AUTO-ENTMCNC: 35.8 G/DL (ref 32–34.5)
MCV RBC AUTO: 82.2 FL (ref 80–99.9)
MONOCYTES NFR BLD: 0.59 K/UL (ref 0.1–0.95)
MONOCYTES NFR BLD: 6 % (ref 2–12)
NEUTROPHILS NFR BLD: 66 % (ref 43–80)
NEUTS SEG NFR BLD: 6.17 K/UL (ref 1.8–7.3)
PLATELET # BLD AUTO: 250 K/UL (ref 130–450)
PMV BLD AUTO: 10.8 FL (ref 7–12)
POTASSIUM SERPL-SCNC: 3.7 MMOL/L (ref 3.5–5)
PROT SERPL-MCNC: 6.5 G/DL (ref 6.4–8.3)
RBC # BLD AUTO: 4.28 M/UL (ref 3.5–5.5)
SODIUM SERPL-SCNC: 138 MMOL/L (ref 132–146)
TOTAL PROTEIN, URINE: 6 MG/DL (ref 0–12)
URATE SERPL-MCNC: 3.8 MG/DL (ref 2.4–5.7)
URINE TOTAL PROTEIN CREATININE RATIO: 0.1 (ref 0–0.2)
WBC OTHER # BLD: 9.3 K/UL (ref 4.5–11.5)

## 2023-11-13 PROCEDURE — 84550 ASSAY OF BLOOD/URIC ACID: CPT

## 2023-11-13 PROCEDURE — 80053 COMPREHEN METABOLIC PANEL: CPT

## 2023-11-13 PROCEDURE — G0378 HOSPITAL OBSERVATION PER HR: HCPCS

## 2023-11-13 PROCEDURE — 99222 1ST HOSP IP/OBS MODERATE 55: CPT | Performed by: MIDWIFE

## 2023-11-13 PROCEDURE — 84156 ASSAY OF PROTEIN URINE: CPT

## 2023-11-13 PROCEDURE — 85025 COMPLETE CBC W/AUTO DIFF WBC: CPT

## 2023-11-13 PROCEDURE — 82570 ASSAY OF URINE CREATININE: CPT

## 2023-11-14 NOTE — DISCHARGE INSTRUCTIONS
Home Undelivered Discharge Instructions    After Discharge Orders:    Future Appointments   Date Time Provider [address]   11/29/2023  1:45 PM Aline Galan MD [facility]   12/1/2023 11:20 AM Anh Kraft, DO [facility]                     Diet:  normal diet as tolerated    Rest: normal activity as tolerated    Other instructions: Do kick counts once a day on your baby. Choose the time of day your baby is most active. Get in a comfortable lying or sitting position and time how long it takes to feel 10 kicks, twists, turns, swishes, or rolls. Call your physician or midwife if there have not been 10 kicks in 2 hours    Call physician or midwife, return to Labor and Delivery, call 911, or go to the nearest Emergency Room if: increased leakage or fluid, contractions more than  6 per  1 hour, decreased fetal movement, persistent low back pain or cramping, bleeding from vaginal area, difficulty urinating, pain with urination, difficulty breathing, persistent headache, or vision change             High Blood Pressure: Care Instructions  Overview     It's normal for blood pressure to go up and down throughout the day. But if it stays up, you have high blood pressure. Another name for high blood pressure is hypertension. For diagnosis, the top number may be 130 to 140 or higher. The bottom number may be 80 to 90 or higher. Despite what a lot of people think, high blood pressure usually doesn't cause headaches or make you feel dizzy or lightheaded. It usually has no symptoms. But it does increase your risk of stroke, heart attack, and other problems. You and your doctor will talk about your risks of these problems based on your blood pressure. Your doctor will give you a goal for your blood pressure. Your goal will be based on your health and your age. Lifestyle changes, such as eating healthy and being active, are always important to help lower blood pressure.  You might also take WDL

## 2023-11-14 NOTE — PROGRESS NOTES
presents to unit with c/o lower extremity edema. Denies vaginal bleeding, loss of fluid and contractions. Perceives good fetal movement. EDC: 3/8/24_  23 weeks and 3 days gestation. Patient reports no complications with current pregnancy. Spot check done. Call light within reach.

## 2023-11-14 NOTE — PROGRESS NOTES
Spoke with Dr. Epifanio Haro about the patients EFM status. Orders were given that the patient does not need to be monitored at this time.

## 2023-11-14 NOTE — PROGRESS NOTES
Assumed patient care. Patient stated that upon arrival she had a headache and swelling in her legs, but is currently lying in bed and states that her swelling and headache are better since she's been lying down. Her call light is within reach and table at the bed side. Her assessment is as charted.

## 2023-11-14 NOTE — PROGRESS NOTES
Dr. Katarzyna Bonilla answering service called to see if she would like the patient to be monitored continuously or once every shift.

## 2023-11-14 NOTE — H&P
Department of Obstetrics and Gynecology  Nurse Practitioner Obstetrics History and Physical        CHIEF COMPLAINT:  headache and leg pain    HISTORY OF PRESENT ILLNESS:  Louie Hall is a 24 y.o. female , Patient's last menstrual period was 2023.,  at 23w3d. Presents to L&D with headache and leg pain. She is concerned that she has preeclampsia because she had it with a previous pregnancy. States headache is across her forehead, no visual disturbances, says pain radiates down her back at times, she has longstanding back pain. Also c/o leg pain. States it feels like her legs fill up with water when she stands for more than a few minutes, then when she lays down it goes away after about a half hour. Denies contractions, bleeding or LOF, reports + FM. Pregnancy complicated by history of preeclampsia in previous pregnancy        OB History          2    Para   1    Term   1       0    AB   0    Living   1         SAB   0    IAB   0    Ectopic   0    Molar   0    Multiple   0    Live Births   1          Obstetric Comments   Patient presents for initial prenatal visit. Patient was trying to get pregnant. Prenatal cultures, labs, and ultrasound were done today. Patient started taking vitamins. All pregnancy counseling concerns and questions were addressed and answered. P atient's currently taking melatonin, no other medications. Patient will return to the office in 4 weeks. Patient will be offered first trimester testing at that time. Patient denies any family history of congenital defects or chromosomal abnormalities  or genetic disorders that could be pertinent to this pregnancy. Pt has had one deliveries via . Patient developed preeclampsia with previous pregnancy. No cats in house.  All meat must be cooked well done, all lunch meat must be cooked, all dairy m ust be pasteurized, no queso at [facility] [employer], no shellfish, only low mercury-content fish once weekly,

## 2023-11-17 ENCOUNTER — HOSPITAL ENCOUNTER (EMERGENCY)
Age: 23
Discharge: HOME | End: 2023-11-18
Attending: STUDENT IN AN ORGANIZED HEALTH CARE EDUCATION/TRAINING PROGRAM
Payer: COMMERCIAL

## 2023-11-17 VITALS
DIASTOLIC BLOOD PRESSURE: 98 MMHG | BODY MASS INDEX: 55.29 KG/M2 | SYSTOLIC BLOOD PRESSURE: 143 MMHG | RESPIRATION RATE: 20 BRPM | OXYGEN SATURATION: 95 % | TEMPERATURE: 97.6 F | HEART RATE: 117 BPM | WEIGHT: 293 LBS
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
## 2023-11-17 PROCEDURE — 99284 EMERGENCY DEPT VISIT MOD MDM: CPT

## 2023-11-18 PROCEDURE — 90471 IMMUNIZATION ADMIN: CPT

## 2023-11-18 PROCEDURE — 90714 TD VACC NO PRESV 7 YRS+ IM: CPT

## 2023-11-18 PROCEDURE — 6360000002 HC RX W HCPCS

## 2023-11-18 RX ORDER — TETANUS AND DIPHTHERIA TOXOIDS ADSORBED 2; 2 [LF]/.5ML; [LF]/.5ML
0.5 INJECTION INTRAMUSCULAR ONCE
Status: COMPLETED | OUTPATIENT
Start: 2023-11-18 | End: 2023-11-18

## 2023-11-18 RX ADMIN — TETANUS AND DIPHTHERIA TOXOIDS ADSORBED 0.5 ML: 2; 2 INJECTION INTRAMUSCULAR at 00:04

## 2023-11-18 NOTE — ED TRIAGE NOTES
snake bite, middle finger right hand  24 weeks pregnant- advised to be seen- sees natividad  not sure if ate rat prior to bite- did break skin  Snake is a pet ball pathon

## 2023-11-18 NOTE — ED PROVIDER NOTES
Jimmie Closs [address] [facility]        Pt Name: Carlos Morales  MRN: [id number]  [address] [facility], [place] 2000  Date of evaluation: 11/17/2023  Provider: Javier Medina MD  PCP: Shelbie Morley DO  Note Started: 12:04 AM EST 11/18/23    CHIEF COMPLAINT       Chief Complaint   Patient presents with    Animal Bite     snake bite, middle finger right hand  24 weeks pregnant- advised to be seen- sees natividad  not sure if ate rat prior to bite       HISTORY OF PRESENT ILLNESS: 1 or more Elements   History From: Patient. Limitations to history : None    Calros Morales is a 24 y.o. female with a history of anxiety, depression, preeclampsia, postpartum depression who presents to the ED after a snakebite. Patient is 24 weeks pregnant. Patient states that she pets snakes at home. Patient states that she has 3 ball python at home and they are non venomous and non poisonous. She also said that one of the snakes smelled a rat and bit her on the right hand middle finger. She denies chest pain or shortness of breath. She denies fever or headache. She denies abdominal pain constipation, diarrhea, dysuria or any other active complaints lungs. Nursing Notes were all reviewed and agreed with or any disagreements were addressed in the HPI.    ROS:   Pertinent positives and negatives are stated within HPI, all other systems reviewed and are negative.    --------------------------------------------- PAST HISTORY ---------------------------------------------  Past Medical History:  has a past medical history of Anxiety, Asperger syndrome, Autism disorder, Bipolar II disorder ([address]), Depression, Migraine, Morbid obesity due to excess calories ([address]), Postpartum depression, Pre-eclampsia, and Stress incontinence.     Past Surgical History:  has a past surgical history that includes REMOVAL FOREIGN BODY EAR; pr laps surg cholecystectomy w/cholangiography (N/A,

## 2023-11-29 ENCOUNTER — ROUTINE PRENATAL (OUTPATIENT)
Dept: OBGYN CLINIC | Age: 23
End: 2023-11-29
Payer: COMMERCIAL

## 2023-11-29 ENCOUNTER — ANCILLARY PROCEDURE (OUTPATIENT)
Dept: OBGYN CLINIC | Age: 23
End: 2023-11-29
Payer: COMMERCIAL

## 2023-11-29 VITALS
SYSTOLIC BLOOD PRESSURE: 131 MMHG | WEIGHT: 293 LBS | BODY MASS INDEX: 56.29 KG/M2 | DIASTOLIC BLOOD PRESSURE: 88 MMHG | HEART RATE: 112 BPM

## 2023-11-29 DIAGNOSIS — Z82.79 FAMILY HISTORY OF CONGENITAL HEART DISEASE: ICD-10-CM

## 2023-11-29 DIAGNOSIS — Z3A.25 25 WEEKS GESTATION OF PREGNANCY: ICD-10-CM

## 2023-11-29 DIAGNOSIS — O99.210 MATERNAL MORBID OBESITY, ANTEPARTUM (HCC): Primary | ICD-10-CM

## 2023-11-29 DIAGNOSIS — O16.2 ELEVATED BLOOD PRESSURE AFFECTING PREGNANCY IN SECOND TRIMESTER, ANTEPARTUM: ICD-10-CM

## 2023-11-29 DIAGNOSIS — O09.299 HX OF PREECLAMPSIA, PRIOR PREGNANCY, CURRENTLY PREGNANT: ICD-10-CM

## 2023-11-29 DIAGNOSIS — E66.01 MATERNAL MORBID OBESITY, ANTEPARTUM (HCC): Primary | ICD-10-CM

## 2023-11-29 LAB
GLUCOSE URINE, POC: ABNORMAL
PROTEIN UA: NEGATIVE

## 2023-11-29 PROCEDURE — 81002 URINALYSIS NONAUTO W/O SCOPE: CPT | Performed by: OBSTETRICS & GYNECOLOGY

## 2023-11-29 PROCEDURE — 99214 OFFICE O/P EST MOD 30 MIN: CPT | Performed by: OBSTETRICS & GYNECOLOGY

## 2023-11-29 PROCEDURE — G8417 CALC BMI ABV UP PARAM F/U: HCPCS | Performed by: OBSTETRICS & GYNECOLOGY

## 2023-11-29 PROCEDURE — 76805 OB US >/= 14 WKS SNGL FETUS: CPT | Performed by: OBSTETRICS & GYNECOLOGY

## 2023-11-29 PROCEDURE — G8484 FLU IMMUNIZE NO ADMIN: HCPCS | Performed by: OBSTETRICS & GYNECOLOGY

## 2023-11-29 PROCEDURE — G8427 DOCREV CUR MEDS BY ELIG CLIN: HCPCS | Performed by: OBSTETRICS & GYNECOLOGY

## 2023-11-29 PROCEDURE — 1036F TOBACCO NON-USER: CPT | Performed by: OBSTETRICS & GYNECOLOGY

## 2023-11-29 PROCEDURE — 99213 OFFICE O/P EST LOW 20 MIN: CPT | Performed by: OBSTETRICS & GYNECOLOGY

## 2023-11-29 NOTE — PATIENT INSTRUCTIONS
Please arrive for your scheduled appointment at least 15 minutes early with your actual insurance card+ a photo ID. Also if you need any refills ordered or have questions, it may take up 48 hours to reply. Please allow ample time for your refills. Call me when you use last refill. Thank you for your cooperation. Call your primary obstetrician with bleeding, leaking of fluid, abdominal tenderness, headache, blurry vision, epigastric pain and increased urinary frequency. If you are experiencing an emergency and need immediate help, call 911 or go to go emergency room or labor and delivery. if you are sick, not feeling well or have an infectious process going on please reschedule your appointment by calling [phone]. Also if any family members are not feeling well, please do not bring them to your appointment. We appreciate your cooperation. We are doing this in order to protect our pregnant mothers+ their babies. if you are sick, not feeling well or have an infectious process going on please reschedule your appointment by calling [phone]. Also if any family members are not feeling well, please do not bring them to your appointment. We appreciate your cooperation. We are doing this in order to protect our pregnant mothers+ their babies. if you are sick, not feeling well or have an infectious process going on please reschedule your appointment by calling [phone]. Also if any family members are not feeling well, please do not bring them to your appointment. We appreciate your cooperation. We are doing this in order to protect our pregnant mothers+ their babies.

## 2023-11-29 NOTE — PROGRESS NOTES
Pt here for 4 week follow up  Pt denies any bleeding/cramping/lof  Pt c/o legs and feet swelling and right fingertips numb to wrist   Pt states good fetal movement
diagnosed with autism spectrum disorder is rising. It's not clear whether this is due to better detection and reporting or a real increase in the number of cases, or both. A fetal ultrasound evaluation was performed on 10/26/2023. A detailed report is included in the EMR under the imaging tab. A living wright intrauterine fetus was identified in cephalic presentation, with normal fetal heart motion and normal fetal motion noted. The amniotic fluid volume was within normal limits. The placenta was posterior. The biometric measurements were consistent with the patient's established gestational age, within the limits of error the test at the current gestational age. Visualization of the fetal anatomy was limited secondary to poor through-transmission the patient's anterior abdominal wall, in the early gestational age. A fetal ultrasound evaluation was performed on 11/29/2023. A detailed report is included in the EMR under the imaging tab. A living wright intrauterine fetus was identified in cephalic presentation, with normal fetal heart motion and normal fetal motion noted. The amniotic fluid volume was within normal limits. The placenta was posterior. The biometric measurements were consistent with the 47th%. Visualization of the fetal anatomy was limited secondary to poor through-transmission the patient's anterior abdominal wall and the fetal position.                      GENETIC SCREENING/TERATOLOGY COUNSELING                  (Includes patient, FTB, and any affected family members)    Patient Age > 35 Years NO   Thalassemia ( MVC<80) NO   Congential Heart Defect NO   Neural Tube Defect NO   Dre-Sachs NO   Sickle Cell Disease NO   Sickle Cell Trait NO   Sickle C Disease or Trait NO   Hemophilia NO   Muscular Dystrophy NO   Cystic Fibrosis NO   [place] Disease NO   Asperger's:   Patient, her son with a different father, 2 brothers, nephew YES   Mental Retardation NO   History of Fragile

## 2023-12-05 ENCOUNTER — HOSPITAL ENCOUNTER (OUTPATIENT)
Dept: DIABETES SERVICES | Age: 23
Setting detail: THERAPIES SERIES
Discharge: HOME | End: 2023-12-05
Payer: COMMERCIAL

## 2023-12-05 VITALS — BODY MASS INDEX: 56.7 KG/M2 | WEIGHT: 293 LBS

## 2023-12-05 PROCEDURE — G0108 DIAB MANAGE TRN  PER INDIV: HCPCS

## 2023-12-05 ASSESSMENT — PROBLEM AREAS IN DIABETES QUESTIONNAIRE (PAID)
FEELING SCARED WHEN YOU THINK ABOUT LIVING WITH DIABETES: 3
COPING WITH COMPLICATIONS OF DIABETES: 3
PAID-5 TOTAL SCORE: 9
FEELING THAT DIABETES IS TAKING UP TOO MUCH OF YOUR MENTAL AND PHYSICAL ENERGY EVERY DAY: 0
WORRYING ABOUT THE FUTURE AND THE POSSIBILITY OF SERIOUS COMPLICATIONS: 2
FEELING DEPRESSED WHEN YOU THINK ABOUT LIVING WITH DIABETES: 1

## 2023-12-05 NOTE — PROGRESS NOTES
Using medications safely:   -State effects of diabetes medicines on blood glucose levels;  -List diabetes medication taken, action & side effects N/A [] All     []  []   12/5/23 dch  Patient not currently taking diabetes meds     Insulin/Injectables/glucagon  -Name appropriate injection sites; proper storage; supplies needed;  N/A   []       Demonstrate proper technique N/A []      Monitoring blood glucose, interpreting and using results:   -Identify the purpose of testing   -Identify recommended & personal blood glucose targets & HgbA1C target levels  -State the Importance of logging blood glucose levels for pattern recognition;   -State benefits of reading/using pt generated health data  -Verbalize safe lancet disposal/CGM 1 [x] All     []  []    []  []  []  3    -Demonstrate proper testing technique 1 [x]  3 12/5/23 [facility]  Patient tested BG reading was 104   Incorporating physical activity into lifestyle:   -State effect of exercise on blood glucose levels;   -State benefits of regular exercise;   -Define safety considerations/food choices if needed.  -Describe contraindications/maintenance of activity. 1 [x] All     []  []    []  []  3    Incorporating nutritional management into lifestyle:   -Describe effect of type, amount & timing of food on blood glucose  -Describe methods for preparing and planning   healthy meals  -Correctly read food labels for nutritional values  -Name 3 foods high in Carbohydrate  -Plan a sample 4 carbohydrate-controlled meal using Diabetes Plate Method  -Verbalized ability to measure and count carbohydrate gram servings using food labels and carbohydrate food list.    -Plan a carbohydrate-controlled meal based on individual needs/preferences from a Registered Dietitian.   1 [x] All       []    []    []  []      []        []  3 12/5/23 [facility]   ADJ BW used [CW-IBWx. 32+IBW] 362-267=999y.32=73lbs+103=712ptl/2.2=94.2guu93=6946 calories.  Instructed on 2000 with a minimum of 1175 grams carbs to

## 2023-12-15 ENCOUNTER — TELEPHONE (OUTPATIENT)
Dept: OBGYN CLINIC | Age: 23
End: 2023-12-15

## 2023-12-15 NOTE — TELEPHONE ENCOUNTER
Dr. Boris Mckeon recommended pt return in 5 wk for another visit. Left msg on  to call to schedule appt beginning of January.

## 2023-12-22 PROBLEM — O24.410 DIET CONTROLLED GESTATIONAL DIABETES MELLITUS (GDM) IN THIRD TRIMESTER: Status: ACTIVE | Noted: 2023-12-22

## 2023-12-26 PROCEDURE — 93005 ELECTROCARDIOGRAM TRACING: CPT | Performed by: STUDENT IN AN ORGANIZED HEALTH CARE EDUCATION/TRAINING PROGRAM

## 2023-12-26 PROCEDURE — 99285 EMERGENCY DEPT VISIT HI MDM: CPT

## 2023-12-26 NOTE — ED NOTES
Department of Emergency Medicine  FIRST PROVIDER TRIAGE NOTE             Independent MLP           12/26/23  5:43 PM EST    Date of Encounter: 12/26/23   MRN: [id number]      HPI: Modesto Robertson is a 24 y.o. female who presents to the ED for Chest Pain (Ongoing since yesterday, heaviness in chest,  at pivot. Having palpitations)     29 WEEKS PREGNANT. LEG SWELLING STARTED LAST NIGHT. PRE-ECLAMPSIA WITH FIRST PREGNANCY. ROS: Negative for abd pain or back pain. PE: Gen Appearance/Constitutional: alert  HEENT: NC/NT. PERRLA,  Airway patent. Initial Plan of Care: All treatment areas with department are currently occupied. Plan to order/Initiate the following while awaiting opening in ED: labs and EKG.   Initiate Treatment-Testing, Proceed toTreatment Area When Bed Available for ED Attending/MLP to Continue Care    Electronically signed by AZEEM Taylor CNP   DD: 12/26/23      AZEEM Taylor CNP  12/26/23 [id number]

## 2023-12-27 ENCOUNTER — HOSPITAL ENCOUNTER (INPATIENT)
Age: 23
LOS: 1 days | Discharge: HOME | DRG: 566 | End: 2023-12-27
Attending: STUDENT IN AN ORGANIZED HEALTH CARE EDUCATION/TRAINING PROGRAM | Admitting: OBSTETRICS & GYNECOLOGY
Payer: COMMERCIAL

## 2023-12-27 ENCOUNTER — ANCILLARY PROCEDURE (OUTPATIENT)
Dept: OBGYN CLINIC | Age: 23
DRG: 566 | End: 2023-12-27
Payer: COMMERCIAL

## 2023-12-27 ENCOUNTER — APPOINTMENT (OUTPATIENT)
Dept: GENERAL RADIOLOGY | Age: 23
DRG: 566 | End: 2023-12-27
Payer: COMMERCIAL

## 2023-12-27 VITALS
SYSTOLIC BLOOD PRESSURE: 141 MMHG | RESPIRATION RATE: 16 BRPM | TEMPERATURE: 98.1 F | DIASTOLIC BLOOD PRESSURE: 75 MMHG | HEART RATE: 73 BPM | OXYGEN SATURATION: 97 %

## 2023-12-27 DIAGNOSIS — O24.410 DIET CONTROLLED GESTATIONAL DIABETES MELLITUS (GDM) IN THIRD TRIMESTER: ICD-10-CM

## 2023-12-27 DIAGNOSIS — R07.9 CHEST PAIN, UNSPECIFIED TYPE: Primary | ICD-10-CM

## 2023-12-27 DIAGNOSIS — O16.3 HIGH BLOOD PRESSURE AFFECTING PREGNANCY IN THIRD TRIMESTER, ANTEPARTUM: ICD-10-CM

## 2023-12-27 PROBLEM — O24.415 GESTATIONAL DIABETES MELLITUS (GDM) IN THIRD TRIMESTER CONTROLLED ON ORAL HYPOGLYCEMIC DRUG: Status: ACTIVE | Noted: 2023-12-27

## 2023-12-27 PROBLEM — O14.93 PREECLAMPSIA, THIRD TRIMESTER: Status: ACTIVE | Noted: 2023-12-27

## 2023-12-27 PROBLEM — R63.8 INCREASED BMI: Status: ACTIVE | Noted: 2023-12-27

## 2023-12-27 PROBLEM — Z34.90 SECOND PREGNANCY: Status: ACTIVE | Noted: 2023-12-27

## 2023-12-27 PROBLEM — O09.293 HX OF PRE-ECLAMPSIA IN PRIOR PREGNANCY, CURRENTLY PREGNANT, THIRD TRIMESTER: Status: ACTIVE | Noted: 2023-12-27

## 2023-12-27 PROBLEM — Z3A.29 29 WEEKS GESTATION OF PREGNANCY: Status: ACTIVE | Noted: 2023-12-27

## 2023-12-27 LAB
ALBUMIN SERPL-MCNC: 3.7 G/DL (ref 3.5–5.2)
ALP SERPL-CCNC: 99 U/L (ref 35–104)
ALT SERPL-CCNC: 12 U/L (ref 0–32)
ANION GAP SERPL CALCULATED.3IONS-SCNC: 13 MMOL/L (ref 7–16)
AST SERPL-CCNC: 14 U/L (ref 0–31)
BACTERIA URNS QL MICRO: ABNORMAL
BASOPHILS # BLD: 0.05 K/UL (ref 0–0.2)
BASOPHILS NFR BLD: 1 % (ref 0–2)
BILIRUB SERPL-MCNC: 0.3 MG/DL (ref 0–1.2)
BILIRUB UR QL STRIP: NEGATIVE
BNP SERPL-MCNC: <36 PG/ML (ref 0–125)
BUN SERPL-MCNC: 7 MG/DL (ref 6–20)
CALCIUM SERPL-MCNC: 9.4 MG/DL (ref 8.6–10.2)
CHLORIDE SERPL-SCNC: 106 MMOL/L (ref 98–107)
CLARITY UR: ABNORMAL
CO2 SERPL-SCNC: 19 MMOL/L (ref 22–29)
COLOR UR: YELLOW
CREAT SERPL-MCNC: 0.4 MG/DL (ref 0.5–1)
CRYSTALS URNS MICRO: ABNORMAL /HPF
D DIMER: <200 NG/ML DDU (ref 0–232)
EKG ATRIAL RATE: 97 BPM
EKG P AXIS: 18 DEGREES
EKG P-R INTERVAL: 134 MS
EKG Q-T INTERVAL: 374 MS
EKG QRS DURATION: 98 MS
EKG QTC CALCULATION (BAZETT): 474 MS
EKG R AXIS: 43 DEGREES
EKG T AXIS: 26 DEGREES
EKG VENTRICULAR RATE: 97 BPM
EOSINOPHIL # BLD: 0.25 K/UL (ref 0.05–0.5)
EOSINOPHILS RELATIVE PERCENT: 3 % (ref 0–6)
EPI CELLS #/AREA URNS HPF: ABNORMAL /HPF
ERYTHROCYTE [DISTWIDTH] IN BLOOD BY AUTOMATED COUNT: 13.4 % (ref 11.5–15)
GFR SERPL CREATININE-BSD FRML MDRD: >60 ML/MIN/1.73M2
GLUCOSE SERPL-MCNC: 86 MG/DL (ref 74–99)
GLUCOSE UR STRIP-MCNC: NEGATIVE MG/DL
HCT VFR BLD AUTO: 40.2 % (ref 34–48)
HGB BLD-MCNC: 13.8 G/DL (ref 11.5–15.5)
HGB UR QL STRIP.AUTO: NEGATIVE
IMM GRANULOCYTES # BLD AUTO: 0.04 K/UL (ref 0–0.58)
IMM GRANULOCYTES NFR BLD: 0 % (ref 0–5)
KETONES UR STRIP-MCNC: NEGATIVE MG/DL
LACTATE BLDV-SCNC: 1 MMOL/L (ref 0.5–2.2)
LEUKOCYTE ESTERASE UR QL STRIP: NEGATIVE
LYMPHOCYTES NFR BLD: 2.49 K/UL (ref 1.5–4)
LYMPHOCYTES RELATIVE PERCENT: 26 % (ref 20–42)
MAGNESIUM SERPL-MCNC: 1.9 MG/DL (ref 1.6–2.6)
MCH RBC QN AUTO: 29.1 PG (ref 26–35)
MCHC RBC AUTO-ENTMCNC: 34.3 G/DL (ref 32–34.5)
MCV RBC AUTO: 84.8 FL (ref 80–99.9)
MONOCYTES NFR BLD: 0.59 K/UL (ref 0.1–0.95)
MONOCYTES NFR BLD: 6 % (ref 2–12)
NEUTROPHILS NFR BLD: 64 % (ref 43–80)
NEUTS SEG NFR BLD: 6.14 K/UL (ref 1.8–7.3)
NITRITE UR QL STRIP: NEGATIVE
PH UR STRIP: 6 [PH] (ref 5–9)
PLATELET # BLD AUTO: 238 K/UL (ref 130–450)
PMV BLD AUTO: 11.2 FL (ref 7–12)
POTASSIUM SERPL-SCNC: 4 MMOL/L (ref 3.5–5)
PROT SERPL-MCNC: 6.8 G/DL (ref 6.4–8.3)
PROT UR STRIP-MCNC: 30 MG/DL
RBC # BLD AUTO: 4.74 M/UL (ref 3.5–5.5)
RBC #/AREA URNS HPF: ABNORMAL /HPF
SODIUM SERPL-SCNC: 138 MMOL/L (ref 132–146)
SP GR UR STRIP: >1.03 (ref 1–1.03)
TROPONIN I SERPL HS-MCNC: <6 NG/L (ref 0–9)
UROBILINOGEN UR STRIP-ACNC: 0.2 EU/DL (ref 0–1)
WBC #/AREA URNS HPF: ABNORMAL /HPF
WBC OTHER # BLD: 9.6 K/UL (ref 4.5–11.5)

## 2023-12-27 PROCEDURE — 93010 ELECTROCARDIOGRAM REPORT: CPT | Performed by: INTERNAL MEDICINE

## 2023-12-27 PROCEDURE — 83880 ASSAY OF NATRIURETIC PEPTIDE: CPT

## 2023-12-27 PROCEDURE — 81001 URINALYSIS AUTO W/SCOPE: CPT

## 2023-12-27 PROCEDURE — 76819 FETAL BIOPHYS PROFIL W/O NST: CPT | Performed by: OBSTETRICS & GYNECOLOGY

## 2023-12-27 PROCEDURE — 83735 ASSAY OF MAGNESIUM: CPT

## 2023-12-27 PROCEDURE — 71046 X-RAY EXAM CHEST 2 VIEWS: CPT

## 2023-12-27 PROCEDURE — 85025 COMPLETE CBC W/AUTO DIFF WBC: CPT

## 2023-12-27 PROCEDURE — 83605 ASSAY OF LACTIC ACID: CPT

## 2023-12-27 PROCEDURE — 85379 FIBRIN DEGRADATION QUANT: CPT

## 2023-12-27 PROCEDURE — 80053 COMPREHEN METABOLIC PANEL: CPT

## 2023-12-27 PROCEDURE — 99222 1ST HOSP IP/OBS MODERATE 55: CPT | Performed by: MIDWIFE

## 2023-12-27 PROCEDURE — 1220000000 HC SEMI PRIVATE OB R&B

## 2023-12-27 PROCEDURE — 84484 ASSAY OF TROPONIN QUANT: CPT

## 2023-12-27 NOTE — PROGRESS NOTES
Received phone call from ED that patient was being admitted to intermediate unit and needed continuous monitoring for possible pre-eclampsia. Call placed to Dr. Ravin Rausch to clarify monitoring orders. Order given to have patient admitted to antepartum for evaluation and monitoring without telemetry. Order placed in chart.

## 2023-12-27 NOTE — DISCHARGE INSTRUCTIONS
Home Undelivered Discharge Instructions    After Discharge Orders:    Future Appointments   Date Time Provider [address]   1/5/2024 11:40 AM Merle Gilford, DO [facility]   1/5/2024  1:15 PM Afsaneh Chavez MD [facility][1]                   Diet:  normal diet as tolerated    Rest: normal activity as tolerated    Other instructions: Do kick counts once a day on your baby. Choose the time of day your baby is most active. Get in a comfortable lying or sitting position and time how long it takes to feel 10 kicks, twists, turns, swishes, or rolls.  Call your physician or midwife if there have not been 10 kicks in 2 hours    Call physician or midwife, return to Labor and Delivery, call 911, or go to the nearest Emergency Room if: increased leakage or fluid, contractions more than  6 per  45 minutes, decreased fetal movement, persistent low back pain or cramping, bleeding from vaginal area, difficulty urinating, pain with urination, difficulty breathing, new calf pain, persistent headache, or vision change

## 2023-12-27 NOTE — H&P
Department of Obstetrics and Gynecology  Nurse Practitioner Obstetrics History and Physical        CHIEF COMPLAINT:  chest pain    HISTORY OF PRESENT ILLNESS:  Cristopher Thacker is a 24 y.o. female , Patient's last menstrual period was 2023.,  at 29w5d. Presents to L&D after being seen in the ER for chest pain. Her labs were WNL and EKG was normal.  She now just feels \"hungry\". Denies contractions, bleeding or LOF. Reports good FM. Pregnancy complicated by elevated blood pressure, gestational diabetes. She has been checking her blood sugars for about 2 weeks, fastings have been in the 120-130s, 2 hour PP have been elevated as well. OB History          2    Para   1    Term   1       0    AB   0    Living   1         SAB   0    IAB   0    Ectopic   0    Molar   0    Multiple   0    Live Births   1          Obstetric Comments   Patient presents for initial prenatal visit. Patient was trying to get pregnant. Prenatal cultures, labs, and ultrasound were done today. Patient started taking vitamins. All pregnancy counseling concerns and questions were addressed and answered. P jonah's currently taking melatonin, no other medications. Patient will return to the office in 4 weeks. Patient will be offered first trimester testing at that time. Patient denies any family history of congenital defects or chromosomal abnormalities  or genetic disorders that could be pertinent to this pregnancy. Pt has had one deliveries via . Patient developed preeclampsia with previous pregnancy. No cats in house. All meat must be cooked well done, all lunch meat must be cooked, all dairy m ust be pasteurized, no queso at [facility] [employer], no shellfish, only low mercury-content fish once weekly, only Tylenol for headaches or pain. For nausea, patient may take Unisom and vitamin B6 together at bedtime, with vitamin B6 in the morning and 1  vitamin B6 in the afternoon.   Patient may Problem: NORMAL   Goal: Experiences normal transition  Description  INTERVENTIONS:  - Assess and monitor vital signs and lab values.   - Encourage skin-to-skin with caregiver for thermoregulation  - Assess signs, symptoms and risk factors for hypog

## 2023-12-27 NOTE — PROGRESS NOTES
Reviewed and educated patient on discharge instructions. Patient verbalizes understanding and has no further questions at this time.

## 2023-12-27 NOTE — PROGRESS NOTES
Notified Dr. Epifanio Haro of patient ok for discharge per [facility]. [address] [facility] for discharge.

## 2023-12-27 NOTE — PROGRESS NOTES
Dr. Juani Crane at nurse's station. Orders for patient to start Metformin 500mg daily at HS received. Rx sent to pharmacy.

## 2024-01-05 ENCOUNTER — HOSPITAL ENCOUNTER (OUTPATIENT)
Age: 24
Discharge: HOME | End: 2024-01-05
Payer: COMMERCIAL

## 2024-01-05 ENCOUNTER — ANCILLARY PROCEDURE (OUTPATIENT)
Dept: OBGYN CLINIC | Age: 24
End: 2024-01-05
Payer: COMMERCIAL

## 2024-01-05 ENCOUNTER — ROUTINE PRENATAL (OUTPATIENT)
Dept: OBGYN CLINIC | Age: 24
End: 2024-01-05
Payer: COMMERCIAL

## 2024-01-05 VITALS
BODY MASS INDEX: 57.17 KG/M2 | SYSTOLIC BLOOD PRESSURE: 136 MMHG | DIASTOLIC BLOOD PRESSURE: 94 MMHG | HEART RATE: 96 BPM | WEIGHT: 293 LBS

## 2024-01-05 DIAGNOSIS — Z3A.29 29 WEEKS GESTATION OF PREGNANCY: ICD-10-CM

## 2024-01-05 DIAGNOSIS — O24.410 DIET CONTROLLED GESTATIONAL DIABETES MELLITUS (GDM) IN THIRD TRIMESTER: ICD-10-CM

## 2024-01-05 DIAGNOSIS — R63.8 INCREASED BMI: ICD-10-CM

## 2024-01-05 DIAGNOSIS — O16.2 ELEVATED BLOOD PRESSURE AFFECTING PREGNANCY IN SECOND TRIMESTER, ANTEPARTUM: ICD-10-CM

## 2024-01-05 DIAGNOSIS — O09.293 HX OF PRE-ECLAMPSIA IN PRIOR PREGNANCY, CURRENTLY PREGNANT, THIRD TRIMESTER: ICD-10-CM

## 2024-01-05 DIAGNOSIS — Z34.90 SECOND PREGNANCY: ICD-10-CM

## 2024-01-05 DIAGNOSIS — O14.93 PRE-ECLAMPSIA IN THIRD TRIMESTER: ICD-10-CM

## 2024-01-05 DIAGNOSIS — O24.415 GESTATIONAL DIABETES MELLITUS (GDM) IN THIRD TRIMESTER CONTROLLED ON ORAL HYPOGLYCEMIC DRUG: ICD-10-CM

## 2024-01-05 DIAGNOSIS — O16.2 ELEVATED BLOOD PRESSURE AFFECTING PREGNANCY IN SECOND TRIMESTER, ANTEPARTUM: Primary | ICD-10-CM

## 2024-01-05 LAB
ALBUMIN SERPL-MCNC: 3.5 G/DL (ref 3.5–5.2)
ALP SERPL-CCNC: 103 U/L (ref 35–104)
ALT SERPL-CCNC: 12 U/L (ref 0–32)
AMPHET UR QL SCN: NEGATIVE
ANION GAP SERPL CALCULATED.3IONS-SCNC: 11 MMOL/L (ref 7–16)
AST SERPL-CCNC: 17 U/L (ref 0–31)
BARBITURATES UR QL SCN: NEGATIVE
BENZODIAZ UR QL: NEGATIVE
BILIRUB SERPL-MCNC: 0.2 MG/DL (ref 0–1.2)
BUN SERPL-MCNC: 7 MG/DL (ref 6–20)
BUPRENORPHINE UR QL: NEGATIVE
CALCIUM SERPL-MCNC: 9.2 MG/DL (ref 8.6–10.2)
CANNABINOIDS UR QL SCN: NEGATIVE
CHLORIDE SERPL-SCNC: 101 MMOL/L (ref 98–107)
CO2 SERPL-SCNC: 21 MMOL/L (ref 22–29)
COCAINE UR QL SCN: NEGATIVE
CREAT SERPL-MCNC: 0.4 MG/DL (ref 0.5–1)
CREAT UR-MCNC: 81 MG/DL (ref 29–226)
ERYTHROCYTE [DISTWIDTH] IN BLOOD BY AUTOMATED COUNT: 13.4 % (ref 11.5–15)
FENTANYL UR QL: NEGATIVE
GFR SERPL CREATININE-BSD FRML MDRD: >60 ML/MIN/1.73M2
GLUCOSE SERPL-MCNC: 143 MG/DL (ref 74–99)
GLUCOSE URINE, POC: NEGATIVE
HCT VFR BLD AUTO: 38.9 % (ref 34–48)
HGB BLD-MCNC: 13.6 G/DL (ref 11.5–15.5)
MCH RBC QN AUTO: 29.1 PG (ref 26–35)
MCHC RBC AUTO-ENTMCNC: 35 G/DL (ref 32–34.5)
MCV RBC AUTO: 83.3 FL (ref 80–99.9)
METHADONE UR QL: NEGATIVE
OPIATES UR QL SCN: NEGATIVE
OXYCODONE UR QL SCN: NEGATIVE
PCP UR QL SCN: NEGATIVE
PLATELET # BLD AUTO: 226 K/UL (ref 130–450)
PMV BLD AUTO: 11.4 FL (ref 7–12)
POTASSIUM SERPL-SCNC: 4 MMOL/L (ref 3.5–5)
PROT SERPL-MCNC: 6.8 G/DL (ref 6.4–8.3)
PROTEIN UA: ABNORMAL
RBC # BLD AUTO: 4.67 M/UL (ref 3.5–5.5)
SODIUM SERPL-SCNC: 133 MMOL/L (ref 132–146)
TEST INFORMATION: NORMAL
TOTAL PROTEIN, URINE: 69 MG/DL (ref 0–12)
URATE SERPL-MCNC: 4.2 MG/DL (ref 2.4–5.7)
URINE TOTAL PROTEIN CREATININE RATIO: 0.86 (ref 0–0.2)
WBC OTHER # BLD: 9 K/UL (ref 4.5–11.5)

## 2024-01-05 PROCEDURE — 81002 URINALYSIS NONAUTO W/O SCOPE: CPT | Performed by: OBSTETRICS & GYNECOLOGY

## 2024-01-05 PROCEDURE — 76818 FETAL BIOPHYS PROFILE W/NST: CPT | Performed by: OBSTETRICS & GYNECOLOGY

## 2024-01-05 PROCEDURE — 99213 OFFICE O/P EST LOW 20 MIN: CPT | Performed by: OBSTETRICS & GYNECOLOGY

## 2024-01-05 PROCEDURE — 36415 COLL VENOUS BLD VENIPUNCTURE: CPT

## 2024-01-05 PROCEDURE — 84550 ASSAY OF BLOOD/URIC ACID: CPT

## 2024-01-05 PROCEDURE — 85027 COMPLETE CBC AUTOMATED: CPT

## 2024-01-05 PROCEDURE — 76820 UMBILICAL ARTERY ECHO: CPT | Performed by: OBSTETRICS & GYNECOLOGY

## 2024-01-05 PROCEDURE — 80307 DRUG TEST PRSMV CHEM ANLYZR: CPT

## 2024-01-05 PROCEDURE — 82570 ASSAY OF URINE CREATININE: CPT

## 2024-01-05 PROCEDURE — 80053 COMPREHEN METABOLIC PANEL: CPT

## 2024-01-05 PROCEDURE — 84156 ASSAY OF PROTEIN URINE: CPT

## 2024-01-05 PROCEDURE — 76805 OB US >/= 14 WKS SNGL FETUS: CPT | Performed by: OBSTETRICS & GYNECOLOGY

## 2024-01-05 RX ORDER — ACYCLOVIR 400 MG/1
4 TABLET ORAL 4 TIMES DAILY
Qty: 1 EACH | Refills: 8 | Status: ON HOLD | OUTPATIENT
Start: 2024-01-05

## 2024-01-05 RX ORDER — METFORMIN HYDROCHLORIDE 750 MG/1
750 TABLET, EXTENDED RELEASE ORAL
Qty: 30 TABLET | Refills: 3 | Status: ON HOLD | OUTPATIENT
Start: 2024-01-05

## 2024-01-05 RX ORDER — NIFEDIPINE 30 MG/1
30 TABLET, EXTENDED RELEASE ORAL DAILY
Qty: 90 TABLET | Refills: 1 | Status: ON HOLD | OUTPATIENT
Start: 2024-01-05

## 2024-01-05 NOTE — PROGRESS NOTES
Jazzy Carr here for follow up ultrasound today.   She denies bleeding, lof, or cramping. States her BP has been high but did not bring a log.  I asked her to start keeping a log and bring it to her next appointment.  She is complaining of headache, dizziness and shortness of breath.  Positive fetal movement.

## 2024-01-05 NOTE — PROGRESS NOTES
24    Rona Carey, DO  [address] [place] [address]     RE:  DRISS CARR  : 2000   AGE: 23 y.o.    This report has been created using voice recognition software. It may contain errors which are inherent in voice recognition technology.    Dear Dr. Carey:    Driss Carr had an appointment today for the following indications:    Patient Active Problem List   Diagnosis    History of severe major depression    History of mild mixed bipolar II disorder     History of cluster B personality disorder    History of Asperger's disorder    History of attention deficit hyperactivity disorder    History of elevation of level of transaminase and lactic acid dehydrogenase    Gestational diabetes    History of steatosis of liver    Preeclampsia 2024    History of Vitamin D deficiency    Maternal morbid obesity, antepartum    Hx of preeclampsia, prior pregnancy, currently pregnant     Driss Carr is a 23 y.o. female, who is G2(1,0,0,1). She has an Estimated Date of Delivery: 3/8/2024 based on her established dates.  She is currently 31 weeks 0 days gestation based on that assessment.    The patient's initial blood pressure today was 135/93.  Repeat assessments were 150/99 and 136/94.  She had 2+ proteinuria. The patient had no symptoms of hypertension. Her protein creatinine ratio was significantly elevated at 0.86.  Her findings are consistent with mild preeclampsia.    The patient is morbidly obese. This increases her risk for  morbidity and mortality, including, but not limited to; GDM, hypertension, fetal growth abnormalities and pregnancy loss.  She has a history of glucose intolerance.  Based on this I recommended that she do home glucose monitoring rather than doing glucose tolerance testing.  Her hemoglobin A1c from 3/24/2023 was 5.0% which is within normal limits.  She did not bring her blood sugar log in for review on 2024.  She currently takes

## 2024-01-05 NOTE — PATIENT INSTRUCTIONS
it takes to count ten movements (such as kicks, flutters, or rolls).  Pick your baby's most active time of day to count. This may be any time from morning to evening.  If you don't feel 10 movements in an hour, have something to eat or drink and count for another hour. If you don't feel at least 10 movements in the 2-hour period, call your doctor.  When should you call for help?   Call your doctor now or seek immediate medical care if:    You noticed that your baby has stopped moving or is moving much less than normal.   Watch closely for changes in your health, and be sure to contact your doctor if you have any problems.  Where can you learn more?  Go to https://www.[employer].net/patientEd and enter U048 to learn more about \"Counting Your Baby's Kicks: Care Instructions.\"  Current as of: February 23, 2022               Content Version: 13.5  © 2006-2022 [employer].   Care instructions adapted under license by [employer]. If you have questions about a medical condition or this instruction, always ask your healthcare professional. [employer] disclaims any warranty or liability for your use of this information.

## 2024-01-07 ENCOUNTER — HOSPITAL ENCOUNTER (OUTPATIENT)
Age: 24
Setting detail: OBSERVATION
Discharge: HOME | End: 2024-01-08
Attending: OBSTETRICS & GYNECOLOGY | Admitting: OBSTETRICS & GYNECOLOGY
Payer: COMMERCIAL

## 2024-01-07 PROBLEM — O16.3 ELEVATED BLOOD PRESSURE AFFECTING PREGNANCY IN THIRD TRIMESTER, ANTEPARTUM: Status: ACTIVE | Noted: 2024-01-07

## 2024-01-07 LAB
ALBUMIN SERPL-MCNC: 3.3 G/DL (ref 3.5–5.2)
ALP SERPL-CCNC: 99 U/L (ref 35–104)
ALT SERPL-CCNC: 12 U/L (ref 0–32)
ANION GAP SERPL CALCULATED.3IONS-SCNC: 11 MMOL/L (ref 7–16)
AST SERPL-CCNC: 14 U/L (ref 0–31)
BACTERIA URNS QL MICRO: ABNORMAL
BILIRUB SERPL-MCNC: 0.2 MG/DL (ref 0–1.2)
BILIRUB UR QL STRIP: NEGATIVE
BUN SERPL-MCNC: 8 MG/DL (ref 6–20)
CALCIUM SERPL-MCNC: 9.2 MG/DL (ref 8.6–10.2)
CHLORIDE SERPL-SCNC: 103 MMOL/L (ref 98–107)
CLARITY UR: ABNORMAL
CO2 SERPL-SCNC: 21 MMOL/L (ref 22–29)
COLOR UR: YELLOW
CREAT SERPL-MCNC: 0.4 MG/DL (ref 0.5–1)
CREAT UR-MCNC: 156.6 MG/DL (ref 29–226)
EPI CELLS #/AREA URNS HPF: ABNORMAL /HPF
ERYTHROCYTE [DISTWIDTH] IN BLOOD BY AUTOMATED COUNT: 13.4 % (ref 11.5–15)
GFR SERPL CREATININE-BSD FRML MDRD: >60 ML/MIN/1.73M2
GLUCOSE SERPL-MCNC: 118 MG/DL (ref 74–99)
GLUCOSE UR STRIP-MCNC: NEGATIVE MG/DL
HCT VFR BLD AUTO: 37.6 % (ref 34–48)
HGB BLD-MCNC: 13.2 G/DL (ref 11.5–15.5)
HGB UR QL STRIP.AUTO: NEGATIVE
KETONES UR STRIP-MCNC: NEGATIVE MG/DL
LEUKOCYTE ESTERASE UR QL STRIP: NEGATIVE
MCH RBC QN AUTO: 29.3 PG (ref 26–35)
MCHC RBC AUTO-ENTMCNC: 35.1 G/DL (ref 32–34.5)
MCV RBC AUTO: 83.6 FL (ref 80–99.9)
NITRITE UR QL STRIP: NEGATIVE
PH UR STRIP: 5.5 [PH] (ref 5–9)
PLATELET # BLD AUTO: 230 K/UL (ref 130–450)
PMV BLD AUTO: 11.3 FL (ref 7–12)
POTASSIUM SERPL-SCNC: 4 MMOL/L (ref 3.5–5)
PROT SERPL-MCNC: 6.3 G/DL (ref 6.4–8.3)
PROT UR STRIP-MCNC: 100 MG/DL
RBC # BLD AUTO: 4.5 M/UL (ref 3.5–5.5)
RBC #/AREA URNS HPF: ABNORMAL /HPF
SODIUM SERPL-SCNC: 135 MMOL/L (ref 132–146)
SP GR UR STRIP: >1.03 (ref 1–1.03)
TOTAL PROTEIN, URINE: 136 MG/DL (ref 0–12)
URATE SERPL-MCNC: 4.5 MG/DL (ref 2.4–5.7)
URINE TOTAL PROTEIN CREATININE RATIO: 0.87 (ref 0–0.2)
UROBILINOGEN UR STRIP-ACNC: 0.2 EU/DL (ref 0–1)
WBC #/AREA URNS HPF: ABNORMAL /HPF
WBC OTHER # BLD: 9 K/UL (ref 4.5–11.5)

## 2024-01-07 PROCEDURE — 85027 COMPLETE CBC AUTOMATED: CPT

## 2024-01-07 PROCEDURE — 82570 ASSAY OF URINE CREATININE: CPT

## 2024-01-07 PROCEDURE — 99221 1ST HOSP IP/OBS SF/LOW 40: CPT | Performed by: NURSE PRACTITIONER

## 2024-01-07 PROCEDURE — 84156 ASSAY OF PROTEIN URINE: CPT

## 2024-01-07 PROCEDURE — 81001 URINALYSIS AUTO W/SCOPE: CPT

## 2024-01-07 PROCEDURE — G0378 HOSPITAL OBSERVATION PER HR: HCPCS

## 2024-01-07 PROCEDURE — 6370000000 HC RX 637 (ALT 250 FOR IP): Performed by: NURSE PRACTITIONER

## 2024-01-07 PROCEDURE — 84550 ASSAY OF BLOOD/URIC ACID: CPT

## 2024-01-07 PROCEDURE — 80053 COMPREHEN METABOLIC PANEL: CPT

## 2024-01-07 RX ORDER — LABETALOL HYDROCHLORIDE 5 MG/ML
40 INJECTION, SOLUTION INTRAVENOUS
Status: DISCONTINUED | OUTPATIENT
Start: 2024-01-07 | End: 2024-01-08 | Stop reason: HOSPADM

## 2024-01-07 RX ORDER — LABETALOL HYDROCHLORIDE 5 MG/ML
80 INJECTION, SOLUTION INTRAVENOUS
Status: DISCONTINUED | OUTPATIENT
Start: 2024-01-07 | End: 2024-01-08 | Stop reason: HOSPADM

## 2024-01-07 RX ORDER — ACETAMINOPHEN 500 MG
1000 TABLET ORAL EVERY 6 HOURS PRN
Status: DISCONTINUED | OUTPATIENT
Start: 2024-01-07 | End: 2024-01-08 | Stop reason: HOSPADM

## 2024-01-07 RX ORDER — NIFEDIPINE 30 MG/1
60 TABLET, FILM COATED, EXTENDED RELEASE ORAL DAILY
Status: DISCONTINUED | OUTPATIENT
Start: 2024-01-07 | End: 2024-01-08 | Stop reason: HOSPADM

## 2024-01-07 RX ORDER — HYDRALAZINE HYDROCHLORIDE 20 MG/ML
10 INJECTION INTRAMUSCULAR; INTRAVENOUS
Status: DISCONTINUED | OUTPATIENT
Start: 2024-01-07 | End: 2024-01-08 | Stop reason: HOSPADM

## 2024-01-07 RX ORDER — LABETALOL HYDROCHLORIDE 5 MG/ML
20 INJECTION, SOLUTION INTRAVENOUS
Status: DISCONTINUED | OUTPATIENT
Start: 2024-01-07 | End: 2024-01-08 | Stop reason: HOSPADM

## 2024-01-07 RX ORDER — SODIUM CHLORIDE, SODIUM LACTATE, POTASSIUM CHLORIDE, CALCIUM CHLORIDE 600; 310; 30; 20 MG/100ML; MG/100ML; MG/100ML; MG/100ML
INJECTION, SOLUTION INTRAVENOUS CONTINUOUS
Status: DISCONTINUED | OUTPATIENT
Start: 2024-01-07 | End: 2024-01-07

## 2024-01-07 RX ADMIN — ACETAMINOPHEN 1000 MG: 500 TABLET ORAL at 19:53

## 2024-01-07 RX ADMIN — NIFEDIPINE 60 MG: 30 TABLET, EXTENDED RELEASE ORAL at 19:52

## 2024-01-07 ASSESSMENT — PAIN SCALES - GENERAL: PAINLEVEL_OUTOF10: 6

## 2024-01-07 ASSESSMENT — PAIN DESCRIPTION - LOCATION: LOCATION: HEAD

## 2024-01-07 ASSESSMENT — PAIN DESCRIPTION - DESCRIPTORS: DESCRIPTORS: ACHING

## 2024-01-07 NOTE — PROGRESS NOTES
presents to unit with c/o elevated BP reading at home. Pt states her vision is bright, and she has had a persistent headache all day. She took 1000mg of tylenol at 1107 today. States she has had decreased fetal movement throughout the day as well. Denies vaginal bleeding and lof. Pt has a hx of preeclampsia with previous pregnancy, and is a gestational diabetic (metformin) with current pregnancy. Placed on efm. VSS. Call light within reach

## 2024-01-08 ENCOUNTER — ANCILLARY PROCEDURE (OUTPATIENT)
Dept: OBGYN CLINIC | Age: 24
End: 2024-01-08
Payer: COMMERCIAL

## 2024-01-08 VITALS
DIASTOLIC BLOOD PRESSURE: 67 MMHG | HEART RATE: 79 BPM | TEMPERATURE: 98.1 F | RESPIRATION RATE: 14 BRPM | SYSTOLIC BLOOD PRESSURE: 121 MMHG | OXYGEN SATURATION: 99 %

## 2024-01-08 PROCEDURE — G0378 HOSPITAL OBSERVATION PER HR: HCPCS

## 2024-01-08 PROCEDURE — 76820 UMBILICAL ARTERY ECHO: CPT | Performed by: OBSTETRICS & GYNECOLOGY

## 2024-01-08 PROCEDURE — 6370000000 HC RX 637 (ALT 250 FOR IP): Performed by: NURSE PRACTITIONER

## 2024-01-08 PROCEDURE — 99253 IP/OBS CNSLTJ NEW/EST LOW 45: CPT | Performed by: OBSTETRICS & GYNECOLOGY

## 2024-01-08 PROCEDURE — 76819 FETAL BIOPHYS PROFIL W/O NST: CPT | Performed by: OBSTETRICS & GYNECOLOGY

## 2024-01-08 RX ORDER — NIFEDIPINE 30 MG/1
90 TABLET, FILM COATED, EXTENDED RELEASE ORAL DAILY
Qty: 30 TABLET | Refills: 3 | Status: SHIPPED | OUTPATIENT
Start: 2024-01-09

## 2024-01-08 RX ADMIN — ACETAMINOPHEN 1000 MG: 500 TABLET ORAL at 16:48

## 2024-01-08 RX ADMIN — NIFEDIPINE 60 MG: 30 TABLET, EXTENDED RELEASE ORAL at 08:53

## 2024-01-08 RX ADMIN — ACETAMINOPHEN 1000 MG: 500 TABLET ORAL at 06:05

## 2024-01-08 ASSESSMENT — PAIN SCALES - GENERAL
PAINLEVEL_OUTOF10: 5
PAINLEVEL_OUTOF10: 0
PAINLEVEL_OUTOF10: 2

## 2024-01-08 ASSESSMENT — PAIN DESCRIPTION - DESCRIPTORS
DESCRIPTORS: DULL;DISCOMFORT
DESCRIPTORS: ACHING

## 2024-01-08 ASSESSMENT — PAIN DESCRIPTION - LOCATION
LOCATION: HEAD
LOCATION: HEAD

## 2024-01-08 NOTE — CONSULTS
glucose monitoring. She is to check her blood sugars fasting and 2 hours after each meal.  The goal is  to maintain her post prandial blood sugars 80<120. Her fasting blood sugars should be maintained 80<92. She is to call if her blood sugars are outside of these parameters.  The patient requested a continuous glucose monitoring device rather than intermittent home blood sugar monitoring.       The patient was advised to call if she has any increased vaginal discharge, vaginal bleeding, contractions, abdominal pain, back pain or any new significant symptomatology prior to her next visit. I advised her that these are signs and symptoms of cervical change and require follow-up assessment when they occur.    I requested the patient return for a follow-up assessment in 3 days unless there is a clinical reason for her to return prior to that time. She is to call if she has any problems or questions prior to her next visit.     Further evaluation and management will be dependent on her clinical presentation and the results of her testing.     The patient is to continue to follow with you in your office for ongoing obstetric care.    I spent a total of 45 minutes with> 51% of the total time involved with completing the encounter. The total time included the following:    Independently interviewing the patient (HPI, ROS, PMH, PSH, FMH, SH, allergies, and medications)  Independently performing a medically appropriate examination  Reviewing the above documentation  Ordering medications, tests, and/or procedures  Formulating the assessment/plan and reviewing the rationale for the above recommendations  Reviewing available records, results of all previously ordered testing/procedures, and current problem list  Counseling/educating the patient  Coordinating care with other healthcare professionals  Communicating results to the patient's family/caregiver  Documenting clinical information in the patient's electronic health record

## 2024-01-08 NOTE — H&P
lactic acid dehydrogenase (LDH)    External hemorrhoids    Fever    Headache    Impaired fasting glucose    Psoriasis    Steatosis of liver    Vitamin D deficiency    Esophageal reflux    Maternal morbid obesity, antepartum (HCC)    Hx of preeclampsia, prior pregnancy, currently pregnant    Marginal insertion of umbilical cord affecting management of mother    Elevated blood pressure affecting pregnancy in second trimester, antepartum    Family history of congenital heart disease    Diet controlled gestational diabetes mellitus (GDM) in third trimester    Pre-eclampsia in third trimester    Increased BMI    Second pregnancy    Hx of pre-eclampsia in prior pregnancy, currently pregnant, third trimester    Gestational diabetes mellitus (GDM) in third trimester controlled on oral hypoglycemic drug    Elevated blood pressure affecting pregnancy in third trimester, antepartum     Fetus: Reassuring      PLAN:  Discussed with dr nettles covering dr henson  [facility] notified of consults  Cbc  Cmp  Uric acid  Urinalysis  Urine pr/cr ratio  Increased procardia xl from 30 to 60 mg daily will give dose now  Labetalol protocol  jinny Greenberg, AZEEM - CNP  1/7/2024 7:45 PM

## 2024-01-08 NOTE — PROGRESS NOTES
Patient continues to refuse EFM at this time.  FHT obtained, 140-145 with good movement noted by patient.

## 2024-01-08 NOTE — PROGRESS NOTES
Requested binder and EFM to be off for a little bit due to being too hot at this time. Temperature of room decreased. Call light in reach.

## 2024-01-08 NOTE — PROGRESS NOTES
Dr. Carey updated on [facility] recommendations. OK to discharge home and follow their recommendations.

## 2024-01-08 NOTE — PROGRESS NOTES
Patient given verbal and written discharge instructions.  Patient verbalized understanding and all questions were addressed to patient's satisfaction.  Patient ambulated off unit without incident.

## 2024-01-08 NOTE — PROGRESS NOTES
Assumed care of patient.  Patient sitting in bed, with significant other at bedside.  Denies HA, visual disturbances, nausea, vomiting or excessive swelling.  Good fetal movement noted by patient.  Denies LOF, VB and CTX.  Call light within reach, assessment as charted.  VSS.

## 2024-01-11 ENCOUNTER — ROUTINE PRENATAL (OUTPATIENT)
Dept: OBGYN CLINIC | Age: 24
End: 2024-01-11
Payer: COMMERCIAL

## 2024-01-11 ENCOUNTER — ANCILLARY PROCEDURE (OUTPATIENT)
Dept: OBGYN CLINIC | Age: 24
End: 2024-01-11
Payer: COMMERCIAL

## 2024-01-11 VITALS
DIASTOLIC BLOOD PRESSURE: 73 MMHG | BODY MASS INDEX: 57.07 KG/M2 | HEART RATE: 106 BPM | SYSTOLIC BLOOD PRESSURE: 126 MMHG | WEIGHT: 293 LBS

## 2024-01-11 DIAGNOSIS — O24.410 DIET CONTROLLED GESTATIONAL DIABETES MELLITUS (GDM) IN THIRD TRIMESTER: ICD-10-CM

## 2024-01-11 DIAGNOSIS — O09.299 HX OF PREECLAMPSIA, PRIOR PREGNANCY, CURRENTLY PREGNANT: ICD-10-CM

## 2024-01-11 DIAGNOSIS — O16.2 ELEVATED BLOOD PRESSURE AFFECTING PREGNANCY IN SECOND TRIMESTER, ANTEPARTUM: ICD-10-CM

## 2024-01-11 DIAGNOSIS — Z3A.31 31 WEEKS GESTATION OF PREGNANCY: ICD-10-CM

## 2024-01-11 DIAGNOSIS — O24.415 GESTATIONAL DIABETES MELLITUS (GDM) IN THIRD TRIMESTER CONTROLLED ON ORAL HYPOGLYCEMIC DRUG: ICD-10-CM

## 2024-01-11 DIAGNOSIS — O99.210 MATERNAL MORBID OBESITY, ANTEPARTUM (HCC): ICD-10-CM

## 2024-01-11 DIAGNOSIS — O14.93 PRE-ECLAMPSIA IN THIRD TRIMESTER: ICD-10-CM

## 2024-01-11 DIAGNOSIS — O16.3 ELEVATED BLOOD PRESSURE AFFECTING PREGNANCY IN THIRD TRIMESTER, ANTEPARTUM: Primary | ICD-10-CM

## 2024-01-11 DIAGNOSIS — E66.01 MATERNAL MORBID OBESITY, ANTEPARTUM (HCC): ICD-10-CM

## 2024-01-11 LAB
GLUCOSE URINE, POC: NEGATIVE
PROTEIN UA: ABNORMAL

## 2024-01-11 PROCEDURE — G8484 FLU IMMUNIZE NO ADMIN: HCPCS | Performed by: OBSTETRICS & GYNECOLOGY

## 2024-01-11 PROCEDURE — 81002 URINALYSIS NONAUTO W/O SCOPE: CPT | Performed by: OBSTETRICS & GYNECOLOGY

## 2024-01-11 PROCEDURE — 76818 FETAL BIOPHYS PROFILE W/NST: CPT | Performed by: OBSTETRICS & GYNECOLOGY

## 2024-01-11 PROCEDURE — G8417 CALC BMI ABV UP PARAM F/U: HCPCS | Performed by: OBSTETRICS & GYNECOLOGY

## 2024-01-11 PROCEDURE — 1036F TOBACCO NON-USER: CPT | Performed by: OBSTETRICS & GYNECOLOGY

## 2024-01-11 PROCEDURE — 99213 OFFICE O/P EST LOW 20 MIN: CPT | Performed by: OBSTETRICS & GYNECOLOGY

## 2024-01-11 PROCEDURE — 99214 OFFICE O/P EST MOD 30 MIN: CPT | Performed by: OBSTETRICS & GYNECOLOGY

## 2024-01-11 PROCEDURE — G8427 DOCREV CUR MEDS BY ELIG CLIN: HCPCS | Performed by: OBSTETRICS & GYNECOLOGY

## 2024-01-11 PROCEDURE — 1111F DSCHRG MED/CURRENT MED MERGE: CPT | Performed by: OBSTETRICS & GYNECOLOGY

## 2024-01-11 PROCEDURE — 76820 UMBILICAL ARTERY ECHO: CPT | Performed by: OBSTETRICS & GYNECOLOGY

## 2024-01-11 RX ORDER — NIFEDIPINE 90 MG/1
90 TABLET, EXTENDED RELEASE ORAL DAILY
Qty: 90 TABLET | Refills: 1 | Status: SHIPPED | OUTPATIENT
Start: 2024-01-11

## 2024-01-11 NOTE — PATIENT INSTRUCTIONS
Please arrive for your scheduled appointment at least 15 minutes early with your actual insurance card+ a photo ID. Also if you need any refills ordered or have questions, it may take up 48 hours to reply. Please allow ample time for your refills. Call me when you use last refill. Thank you for your cooperation. You might be having an NST at your next appt. Please eat a large snack or breakfast before coming to office. Thank youCall your primary obstetrician with bleeding, leaking of fluid, abdominal tenderness, headache, blurry vision, epigastric pain and increased urinary frequency.If you are experiencing an emergency and need immediate help, call 911 or go to go emergency room or labor and delivery. Do kick counts after dinner. Call your primary obstetrician if less than 10 kicks in 2 hours after dinner.     Call your primary obstetrician with bleeding, leaking of fluid, abdominal tenderness, headache, blurry vision, epigastric pain and increased urinary frequency.if you are sick, not feeling well or have an infectious process going on please reschedule your appointment by calling [phone]. Also if any family members are not feeling well, please do not bring them to your appointment. We appreciate your cooperation. We are doing this in order to protect our pregnant mothers+ their babies.if you are sick, not feeling well or have an infectious process going on please reschedule your appointment by calling [phone]. Also if any family members are not feeling well, please do not bring them to your appointment. We appreciate your cooperation. We are doing this in order to protect our pregnant mothers+ their babies.

## 2024-01-11 NOTE — PROGRESS NOTES
Pt here for twice weekly testing  Pt c/o headache since 1/5/2023  BP med increased and pt having hot and cold spells  Pt states good fetal movement  Pt denies any bleeding/cramping  
INTRAOP CHOLANGIOGRAM performed by Cisco Vasquez MD at [facility] OR    REMOVAL FOREIGN BODY EAR      age 6    TONSILLECTOMY AND ADENOIDECTOMY N/A 07/22/2021    TONSILLECTOMY performed by Ellis Lackey DO at [facility] OR    UPPER GASTROINTESTINAL ENDOSCOPY N/A 03/06/2023    EGD BIOPSY performed by Derick Gabriel MD at [facility] ENDOSCOPY    WISDOM TOOTH EXTRACTION         Allergies   Allergen Reactions    Red Dye Other (See Comments)      Food Allergy - Red Dye - Hyperactivity -     Sumatriptan Other (See Comments)     Extreme headache   Other reaction(s): Other (See Comments)  headaches    Doxycycline Rash and Itching       Current Outpatient Medications:     NIFEdipine (ADALAT CC) 30 MG extended release tablet, Take 3 tablets by mouth daily, Disp: 30 tablet, Rfl: 3    Continuous Blood Gluc Sensor (DEXCOM G7 SENSOR) MISC, 4 applicators by Does not apply route 4 times daily, Disp: 1 each, Rfl: 8    metFORMIN (GLUCOPHAGE-XR) 750 MG extended release tablet, Take 1 tablet by mouth daily (with breakfast) (Patient taking differently: Take 1 tablet by mouth Daily with supper), Disp: 30 tablet, Rfl: 3    busPIRone (BUSPAR) 10 MG tablet, 2 times daily, Disp: , Rfl:     calcium carbonate (TUMS) 500 MG chewable tablet, Take 1 tablet by mouth daily, Disp: , Rfl:     acetaminophen (TYLENOL) 500 MG tablet, Take 2 tablets by mouth every 6 hours as needed for Pain, Disp: , Rfl:     doxyLAMINE succinate (UNISOM SLEEPTABS) 25 MG tablet, Take by mouth nightly as needed for Sleep, Disp: , Rfl:     glucose monitoring kit, 1 kit by Does not apply route 4 times daily Check fasting and 2 hour PPBS as directed. FBS should be 70 to 91. PPBS should be 80 to 120., Disp: 1 kit, Rfl: 0    FreeStyle Lancets MISC, 1 each by Does not apply route 4 times daily Check FBS and 2 hour PPBS as directed. FBS should be 70 to 91. PPBS 80 to 120., Disp: 120 each, Rfl: 8    blood glucose test strips (ASCENSIA AUTODISC VI;ONE TOUCH ULTRA TEST VI) strip, CHeck

## 2024-01-12 ENCOUNTER — HOSPITAL ENCOUNTER (OUTPATIENT)
Age: 24
Discharge: HOME | End: 2024-01-12
Payer: COMMERCIAL

## 2024-01-12 DIAGNOSIS — O14.93 PRE-ECLAMPSIA IN THIRD TRIMESTER: ICD-10-CM

## 2024-01-12 DIAGNOSIS — O24.415 GESTATIONAL DIABETES MELLITUS (GDM) IN THIRD TRIMESTER CONTROLLED ON ORAL HYPOGLYCEMIC DRUG: ICD-10-CM

## 2024-01-12 DIAGNOSIS — O16.3 ELEVATED BLOOD PRESSURE AFFECTING PREGNANCY IN THIRD TRIMESTER, ANTEPARTUM: ICD-10-CM

## 2024-01-12 LAB
ALBUMIN SERPL-MCNC: 3.5 G/DL (ref 3.5–5.2)
ALP SERPL-CCNC: 109 U/L (ref 35–104)
ALT SERPL-CCNC: 14 U/L (ref 0–32)
ANION GAP SERPL CALCULATED.3IONS-SCNC: 12 MMOL/L (ref 7–16)
AST SERPL-CCNC: 17 U/L (ref 0–31)
BILIRUB SERPL-MCNC: 0.2 MG/DL (ref 0–1.2)
BUN SERPL-MCNC: 10 MG/DL (ref 6–20)
CALCIUM SERPL-MCNC: 9.9 MG/DL (ref 8.6–10.2)
CHLORIDE SERPL-SCNC: 104 MMOL/L (ref 98–107)
CO2 SERPL-SCNC: 20 MMOL/L (ref 22–29)
CREAT SERPL-MCNC: 0.5 MG/DL (ref 0.5–1)
CREAT UR-MCNC: 251.8 MG/DL (ref 29–226)
ERYTHROCYTE [DISTWIDTH] IN BLOOD BY AUTOMATED COUNT: 13.4 % (ref 11.5–15)
GFR SERPL CREATININE-BSD FRML MDRD: >60 ML/MIN/1.73M2
GLUCOSE SERPL-MCNC: 164 MG/DL (ref 74–99)
HCT VFR BLD AUTO: 40.6 % (ref 34–48)
HGB BLD-MCNC: 14 G/DL (ref 11.5–15.5)
MCH RBC QN AUTO: 29 PG (ref 26–35)
MCHC RBC AUTO-ENTMCNC: 34.5 G/DL (ref 32–34.5)
MCV RBC AUTO: 84.2 FL (ref 80–99.9)
PLATELET # BLD AUTO: 262 K/UL (ref 130–450)
PMV BLD AUTO: 11.3 FL (ref 7–12)
POTASSIUM SERPL-SCNC: 3.9 MMOL/L (ref 3.5–5)
PROT SERPL-MCNC: 6.8 G/DL (ref 6.4–8.3)
RBC # BLD AUTO: 4.82 M/UL (ref 3.5–5.5)
SODIUM SERPL-SCNC: 136 MMOL/L (ref 132–146)
TOTAL PROTEIN, URINE: 336 MG/DL (ref 0–12)
URATE SERPL-MCNC: 4.8 MG/DL (ref 2.4–5.7)
URINE TOTAL PROTEIN CREATININE RATIO: 1.33 (ref 0–0.2)
WBC OTHER # BLD: 10.1 K/UL (ref 4.5–11.5)

## 2024-01-12 PROCEDURE — 80053 COMPREHEN METABOLIC PANEL: CPT

## 2024-01-12 PROCEDURE — 82570 ASSAY OF URINE CREATININE: CPT

## 2024-01-12 PROCEDURE — 84156 ASSAY OF PROTEIN URINE: CPT

## 2024-01-12 PROCEDURE — 36415 COLL VENOUS BLD VENIPUNCTURE: CPT

## 2024-01-12 PROCEDURE — 84550 ASSAY OF BLOOD/URIC ACID: CPT

## 2024-01-12 PROCEDURE — 85027 COMPLETE CBC AUTOMATED: CPT

## 2024-01-14 ENCOUNTER — APPOINTMENT (OUTPATIENT)
Dept: LABOR AND DELIVERY | Age: 24
End: 2024-01-14
Payer: COMMERCIAL

## 2024-01-14 ENCOUNTER — HOSPITAL ENCOUNTER (OUTPATIENT)
Age: 24
Discharge: HOME | End: 2024-01-14
Attending: OBSTETRICS & GYNECOLOGY | Admitting: OBSTETRICS & GYNECOLOGY
Payer: COMMERCIAL

## 2024-01-14 VITALS — DIASTOLIC BLOOD PRESSURE: 83 MMHG | HEART RATE: 76 BPM | SYSTOLIC BLOOD PRESSURE: 143 MMHG

## 2024-01-14 PROCEDURE — 59025 FETAL NON-STRESS TEST: CPT | Performed by: ADVANCED PRACTICE MIDWIFE

## 2024-01-14 PROCEDURE — 59025 FETAL NON-STRESS TEST: CPT

## 2024-01-14 NOTE — DISCHARGE INSTRUCTIONS
Home Undelivered Discharge Instructions    After Discharge Orders:    Follow up with physician on your next scheduled appointment            Diet:  normal diet as tolerated    Rest: on left side, normal activity as tolerated, no sex, no douching and no tub baths    Other instructions: Do kick counts once a day on your baby. Choose the time of day your baby is most active. Get in a comfortable lying or sitting position and time how long it takes to feel 10 kicks, twists, turns, swishes, or rolls. Call your physician or midwife if there have not been 10 kicks in 2 hours    Call physician or midwife, return to Labor and Delivery, call 911, or go to the nearest Emergency Room if: increased leakage or fluid, contractions more than  6 per  1 hour, decreased fetal movement, persistent low back pain or cramping, bleeding from vaginal area, difficulty urinating, pain with urination, difficulty breathing, new calf pain, persistent headache or vision change.

## 2024-01-14 NOTE — PROCEDURES
Patient is ,Patient's last menstrual period was 2023.,  32w2d here for NST.    Estimated Date of Delivery: 3/8/24    Reason for NST:GDM on metformin 750 mg, gestational hypertension on procardia 90mg now going up to 120    LMP 2023     FHR:125,  Variability: moderate,   Accelerations: present, Decelerations: none    Assessment NST is Reactive    Category 1 tracing    AZEEM Garnett - DOMINIC

## 2024-01-14 NOTE — PROGRESS NOTES
Discharge instructions given to the patient at the bedside. Pt verbalizes understanding of all instructions including when to call provider and/or return to the hospital. Pt verbalizes understanding to keep all scheduled appointments. Pt denies any questions and exits ambulatory in good condition.      Patient returning on Tuesday for repeat NST.

## 2024-01-16 ENCOUNTER — ANCILLARY PROCEDURE (OUTPATIENT)
Dept: OBGYN CLINIC | Age: 24
End: 2024-01-16
Payer: COMMERCIAL

## 2024-01-16 ENCOUNTER — HOSPITAL ENCOUNTER (OUTPATIENT)
Age: 24
Discharge: HOME | End: 2024-01-16
Payer: COMMERCIAL

## 2024-01-16 ENCOUNTER — ROUTINE PRENATAL (OUTPATIENT)
Dept: OBGYN CLINIC | Age: 24
End: 2024-01-16
Payer: COMMERCIAL

## 2024-01-16 VITALS
BODY MASS INDEX: 45.99 KG/M2 | SYSTOLIC BLOOD PRESSURE: 133 MMHG | HEIGHT: 67 IN | HEART RATE: 98 BPM | OXYGEN SATURATION: 98 % | DIASTOLIC BLOOD PRESSURE: 84 MMHG | WEIGHT: 293 LBS

## 2024-01-16 DIAGNOSIS — O16.2 ELEVATED BLOOD PRESSURE AFFECTING PREGNANCY IN SECOND TRIMESTER, ANTEPARTUM: ICD-10-CM

## 2024-01-16 DIAGNOSIS — O24.410 DIET CONTROLLED GESTATIONAL DIABETES MELLITUS (GDM) IN THIRD TRIMESTER: ICD-10-CM

## 2024-01-16 DIAGNOSIS — O09.293 HX OF PRE-ECLAMPSIA IN PRIOR PREGNANCY, CURRENTLY PREGNANT, THIRD TRIMESTER: ICD-10-CM

## 2024-01-16 DIAGNOSIS — O24.415 GESTATIONAL DIABETES MELLITUS (GDM) IN THIRD TRIMESTER CONTROLLED ON ORAL HYPOGLYCEMIC DRUG: ICD-10-CM

## 2024-01-16 DIAGNOSIS — E66.01 MATERNAL MORBID OBESITY, ANTEPARTUM (HCC): ICD-10-CM

## 2024-01-16 DIAGNOSIS — O16.2 ELEVATED BLOOD PRESSURE AFFECTING PREGNANCY IN SECOND TRIMESTER, ANTEPARTUM: Primary | ICD-10-CM

## 2024-01-16 DIAGNOSIS — O16.3 ELEVATED BLOOD PRESSURE AFFECTING PREGNANCY IN THIRD TRIMESTER, ANTEPARTUM: ICD-10-CM

## 2024-01-16 DIAGNOSIS — O14.93 PRE-ECLAMPSIA IN THIRD TRIMESTER: ICD-10-CM

## 2024-01-16 DIAGNOSIS — O99.210 MATERNAL MORBID OBESITY, ANTEPARTUM (HCC): ICD-10-CM

## 2024-01-16 LAB
ALBUMIN SERPL-MCNC: 3.4 G/DL (ref 3.5–5.2)
ALP SERPL-CCNC: 120 U/L (ref 35–104)
ALT SERPL-CCNC: 14 U/L (ref 0–32)
ANION GAP SERPL CALCULATED.3IONS-SCNC: 13 MMOL/L (ref 7–16)
AST SERPL-CCNC: 16 U/L (ref 0–31)
BILIRUB SERPL-MCNC: 0.2 MG/DL (ref 0–1.2)
BUN SERPL-MCNC: 10 MG/DL (ref 6–20)
CALCIUM SERPL-MCNC: 9.2 MG/DL (ref 8.6–10.2)
CHLORIDE SERPL-SCNC: 103 MMOL/L (ref 98–107)
CO2 SERPL-SCNC: 21 MMOL/L (ref 22–29)
CREAT SERPL-MCNC: 0.4 MG/DL (ref 0.5–1)
CREAT UR-MCNC: 179.7 MG/DL (ref 29–226)
ERYTHROCYTE [DISTWIDTH] IN BLOOD BY AUTOMATED COUNT: 13.2 % (ref 11.5–15)
FIBRINOGEN PPP-MCNC: 634 MG/DL (ref 200–400)
GFR SERPL CREATININE-BSD FRML MDRD: >60 ML/MIN/1.73M2
GLUCOSE SERPL-MCNC: 92 MG/DL (ref 74–99)
HBA1C MFR BLD: 5.7 % (ref 4–5.6)
HCT VFR BLD AUTO: 42.2 % (ref 34–48)
HGB BLD-MCNC: 14.6 G/DL (ref 11.5–15.5)
INR PPP: 0.9
MCH RBC QN AUTO: 29 PG (ref 26–35)
MCHC RBC AUTO-ENTMCNC: 34.6 G/DL (ref 32–34.5)
MCV RBC AUTO: 83.7 FL (ref 80–99.9)
PARTIAL THROMBOPLASTIN TIME: 32 SEC (ref 24.5–35.1)
PLATELET # BLD AUTO: 266 K/UL (ref 130–450)
PMV BLD AUTO: 11.3 FL (ref 7–12)
POTASSIUM SERPL-SCNC: 4 MMOL/L (ref 3.5–5)
PROT SERPL-MCNC: 6.8 G/DL (ref 6.4–8.3)
PROTHROMBIN TIME: 10.6 SEC (ref 9.3–12.4)
RBC # BLD AUTO: 5.04 M/UL (ref 3.5–5.5)
SODIUM SERPL-SCNC: 137 MMOL/L (ref 132–146)
TOTAL PROTEIN, URINE: >600 MG/DL (ref 0–12)
URATE SERPL-MCNC: 5 MG/DL (ref 2.4–5.7)
URINE TOTAL PROTEIN CREATININE RATIO: ABNORMAL (ref 0–0.2)
WBC OTHER # BLD: 9.3 K/UL (ref 4.5–11.5)

## 2024-01-16 PROCEDURE — 99214 OFFICE O/P EST MOD 30 MIN: CPT | Performed by: OBSTETRICS & GYNECOLOGY

## 2024-01-16 PROCEDURE — 1111F DSCHRG MED/CURRENT MED MERGE: CPT | Performed by: OBSTETRICS & GYNECOLOGY

## 2024-01-16 PROCEDURE — 59025 FETAL NON-STRESS TEST: CPT | Performed by: OBSTETRICS & GYNECOLOGY

## 2024-01-16 PROCEDURE — 81002 URINALYSIS NONAUTO W/O SCOPE: CPT | Performed by: OBSTETRICS & GYNECOLOGY

## 2024-01-16 PROCEDURE — 85730 THROMBOPLASTIN TIME PARTIAL: CPT

## 2024-01-16 PROCEDURE — 85384 FIBRINOGEN ACTIVITY: CPT

## 2024-01-16 PROCEDURE — 36415 COLL VENOUS BLD VENIPUNCTURE: CPT

## 2024-01-16 PROCEDURE — G8427 DOCREV CUR MEDS BY ELIG CLIN: HCPCS | Performed by: OBSTETRICS & GYNECOLOGY

## 2024-01-16 PROCEDURE — 1036F TOBACCO NON-USER: CPT | Performed by: OBSTETRICS & GYNECOLOGY

## 2024-01-16 PROCEDURE — 83036 HEMOGLOBIN GLYCOSYLATED A1C: CPT

## 2024-01-16 PROCEDURE — 99213 OFFICE O/P EST LOW 20 MIN: CPT | Performed by: OBSTETRICS & GYNECOLOGY

## 2024-01-16 PROCEDURE — 82570 ASSAY OF URINE CREATININE: CPT

## 2024-01-16 PROCEDURE — 80053 COMPREHEN METABOLIC PANEL: CPT

## 2024-01-16 PROCEDURE — 76820 UMBILICAL ARTERY ECHO: CPT | Performed by: OBSTETRICS & GYNECOLOGY

## 2024-01-16 PROCEDURE — 84156 ASSAY OF PROTEIN URINE: CPT

## 2024-01-16 PROCEDURE — 76818 FETAL BIOPHYS PROFILE W/NST: CPT | Performed by: OBSTETRICS & GYNECOLOGY

## 2024-01-16 PROCEDURE — 84550 ASSAY OF BLOOD/URIC ACID: CPT

## 2024-01-16 PROCEDURE — G8484 FLU IMMUNIZE NO ADMIN: HCPCS | Performed by: OBSTETRICS & GYNECOLOGY

## 2024-01-16 PROCEDURE — 85610 PROTHROMBIN TIME: CPT

## 2024-01-16 PROCEDURE — G8417 CALC BMI ABV UP PARAM F/U: HCPCS | Performed by: OBSTETRICS & GYNECOLOGY

## 2024-01-16 PROCEDURE — 85027 COMPLETE CBC AUTOMATED: CPT

## 2024-01-16 NOTE — PROGRESS NOTES
Patient here for NST only \  Complaining of feeling a lot of swelling in arms, legs and hands  States that she has had a headache all week  States no relief from Tylenol  Also feels chest is heavy with some dyspnea  +FM noted  Unable to void at this time

## 2024-01-16 NOTE — PROGRESS NOTES
24    Rona Carey, DO  [address] [place] [address]     RE:  DRISS CARR  : 2000   AGE: 23 y.o.    This report has been created using voice recognition software. It may contain errors which are inherent in voice recognition technology.    Dear Dr. Carey:    Driss Carr had an appointment today for the following indications:    Patient Active Problem List   Diagnosis    History of severe major depression    History of mild mixed bipolar II disorder     History of cluster B personality disorder    History of Asperger's disorder    History of attention deficit hyperactivity disorder    History of elevation of level of transaminase and lactic acid dehydrogenase    Gestational diabetes    History of steatosis of liver    Preeclampsia 2024    History of Vitamin D deficiency    Maternal morbid obesity, antepartum    Hx of preeclampsia, prior pregnancy, currently pregnant     Driss Carr is a 23 y.o. female, who is G2(1,0,0,1). She has an Estimated Date of Delivery: 3/8/2024 based on her established dates.  She is currently 32 weeks 4 days gestation based on that assessment.    The patient's initial blood pressure today was 133/84.  She had 2+ proteinuria. The patient had no symptoms of hypertension. Her protein creatinine ratio was significantly elevated on 2024, at 0.86 and 1.33 on 2024.  Her findings are consistent with mild preeclampsia.     The patient is morbidly obese. This increases her risk for  morbidity and mortality, including, but not limited to; GDM, hypertension, fetal growth abnormalities and pregnancy loss.  She has a history of glucose intolerance.  Based on this I recommended that she do home glucose monitoring rather than doing glucose tolerance testing.  Her hemoglobin A1c from 3/24/2023 was 5.0% which is within normal limits.  She did not bring her blood sugar log in for review on 2024.  She currently takes metformin

## 2024-01-16 NOTE — PATIENT INSTRUCTIONS
Please arrive for your scheduled appointment at least 15 minutes early with your actual insurance card+ a photo ID. Also if you need any refills ordered or have questions, it may take up 48 hours to reply. Please allow ample time for your refills. Call me when you use last refill. Thank you for your cooperation. You might be having an NST at your next appt. Please eat a large snack or breakfast before coming to office. Thank youCall your primary obstetrician with bleeding, leaking of fluid, abdominal tenderness, headache, blurry vision, epigastric pain and increased urinary frequency.Any questions contact Amanda at [phone].If you are experiencing an emergency and need immediate help, call 911 or go to go emergency room or labor and delivery. Do kick counts after dinner. Call your primary obstetrician if less than 10 kicks in 2 hours after dinner.     Call your primary obstetrician with bleeding, leaking of fluid, abdominal tenderness, headache, blurry vision, epigastric pain and increased urinary frequency.if you are sick, not feeling well or have an infectious process going on please reschedule your appointment by calling [phone]. Also if any family members are not feeling well, please do not bring them to your appointment. We appreciate your cooperation. We are doing this in order to protect our pregnant mothers+ their babies.if you are sick, not feeling well or have an infectious process going on please reschedule your appointment by calling [phone]. Also if any family members are not feeling well, please do not bring them to your appointment. We appreciate your cooperation. We are doing this in order to protect our pregnant mothers+ their babies.

## 2024-01-17 PROBLEM — R07.9 CHEST PAIN: Status: ACTIVE | Noted: 2024-01-17

## 2024-01-19 ENCOUNTER — ANCILLARY PROCEDURE (OUTPATIENT)
Dept: OBGYN CLINIC | Age: 24
End: 2024-01-19
Payer: COMMERCIAL

## 2024-01-19 ENCOUNTER — ROUTINE PRENATAL (OUTPATIENT)
Dept: OBGYN CLINIC | Age: 24
End: 2024-01-19
Payer: COMMERCIAL

## 2024-01-19 VITALS
DIASTOLIC BLOOD PRESSURE: 91 MMHG | BODY MASS INDEX: 58.73 KG/M2 | WEIGHT: 293 LBS | SYSTOLIC BLOOD PRESSURE: 138 MMHG | HEART RATE: 88 BPM

## 2024-01-19 DIAGNOSIS — O16.3 ELEVATED BLOOD PRESSURE COMPLICATING PREGNANCY, ANTEPARTUM, THIRD TRIMESTER: Primary | ICD-10-CM

## 2024-01-19 DIAGNOSIS — O24.415 GESTATIONAL DIABETES MELLITUS (GDM) IN THIRD TRIMESTER CONTROLLED ON ORAL HYPOGLYCEMIC DRUG: ICD-10-CM

## 2024-01-19 DIAGNOSIS — E66.01 MATERNAL MORBID OBESITY, ANTEPARTUM (HCC): ICD-10-CM

## 2024-01-19 DIAGNOSIS — O09.299 HX OF PREECLAMPSIA, PRIOR PREGNANCY, CURRENTLY PREGNANT: ICD-10-CM

## 2024-01-19 DIAGNOSIS — O99.210 MATERNAL MORBID OBESITY, ANTEPARTUM (HCC): ICD-10-CM

## 2024-01-19 DIAGNOSIS — O14.93 PRE-ECLAMPSIA IN THIRD TRIMESTER: ICD-10-CM

## 2024-01-19 DIAGNOSIS — Z3A.33 33 WEEKS GESTATION OF PREGNANCY: ICD-10-CM

## 2024-01-19 PROCEDURE — G8484 FLU IMMUNIZE NO ADMIN: HCPCS | Performed by: OBSTETRICS & GYNECOLOGY

## 2024-01-19 PROCEDURE — G8417 CALC BMI ABV UP PARAM F/U: HCPCS | Performed by: OBSTETRICS & GYNECOLOGY

## 2024-01-19 PROCEDURE — 99213 OFFICE O/P EST LOW 20 MIN: CPT | Performed by: OBSTETRICS & GYNECOLOGY

## 2024-01-19 PROCEDURE — 99214 OFFICE O/P EST MOD 30 MIN: CPT | Performed by: OBSTETRICS & GYNECOLOGY

## 2024-01-19 PROCEDURE — 1111F DSCHRG MED/CURRENT MED MERGE: CPT | Performed by: OBSTETRICS & GYNECOLOGY

## 2024-01-19 PROCEDURE — 76820 UMBILICAL ARTERY ECHO: CPT | Performed by: OBSTETRICS & GYNECOLOGY

## 2024-01-19 PROCEDURE — G8427 DOCREV CUR MEDS BY ELIG CLIN: HCPCS | Performed by: OBSTETRICS & GYNECOLOGY

## 2024-01-19 PROCEDURE — 76818 FETAL BIOPHYS PROFILE W/NST: CPT | Performed by: OBSTETRICS & GYNECOLOGY

## 2024-01-19 PROCEDURE — 1036F TOBACCO NON-USER: CPT | Performed by: OBSTETRICS & GYNECOLOGY

## 2024-01-19 NOTE — PROGRESS NOTES
Patient is here for bpp/nst. Patient denies any vaginal bleeding, leakage of fluid or cramping. Patient has lower back pain. Patient has good fetal movement. BP elevated. Patient on BP medication. Patient will provide urine specimen by end of appt.    
MG extended release tablet, Take 1 tablet by mouth daily The patient is to take one 90 mg and one 30 mg nifedipine XL for a total of 120 mg daily., Disp: 90 tablet, Rfl: 1    NIFEdipine (ADALAT CC) 30 MG extended release tablet, Take 3 tablets by mouth daily, Disp: 30 tablet, Rfl: 3    Continuous Blood Gluc Sensor (DEXCOM G7 SENSOR) MISC, 4 applicators by Does not apply route 4 times daily, Disp: 1 each, Rfl: 8    metFORMIN (GLUCOPHAGE-XR) 750 MG extended release tablet, Take 1 tablet by mouth daily (with breakfast) (Patient taking differently: Take 1 tablet by mouth Daily with supper), Disp: 30 tablet, Rfl: 3    busPIRone (BUSPAR) 10 MG tablet, 2 times daily, Disp: , Rfl:     calcium carbonate (TUMS) 500 MG chewable tablet, Take 1 tablet by mouth daily, Disp: , Rfl:     acetaminophen (TYLENOL) 500 MG tablet, Take 2 tablets by mouth every 6 hours as needed for Pain, Disp: , Rfl:     doxyLAMINE succinate (UNISOM SLEEPTABS) 25 MG tablet, Take by mouth nightly as needed for Sleep, Disp: , Rfl:     glucose monitoring kit, 1 kit by Does not apply route 4 times daily Check fasting and 2 hour PPBS as directed. FBS should be 70 to 91. PPBS should be 80 to 120., Disp: 1 kit, Rfl: 0    FreeStyle Lancets MISC, 1 each by Does not apply route 4 times daily Check FBS and 2 hour PPBS as directed. FBS should be 70 to 91. PPBS 80 to 120., Disp: 120 each, Rfl: 8    blood glucose test strips (ASCENSIA AUTODISC VI;ONE TOUCH ULTRA TEST VI) strip, CHeck FBS and 2 hour PPBS after each meal every day. FBS should be 70 to 91. 2 hour PPBS should be 80 to 120., Disp: 120 strip, Rfl: 8    Prenatal Vit-Fe Fumarate-FA (PRENATAL PLUS) 27-1 MG TABS, Take 1 tablet by mouth daily, Disp: 30 tablet, Rfl: 11    Social History     Tobacco Use    Smoking status: Former     Current packs/day: 0.00     Types: Cigarettes, E-Cigarettes     Quit date: 2018     Years since quittin.4    Smokeless tobacco: Never    Tobacco comments:     Weaned off vapes

## 2024-01-22 ENCOUNTER — HOSPITAL ENCOUNTER (OUTPATIENT)
Age: 24
Discharge: HOME | End: 2024-01-22
Payer: COMMERCIAL

## 2024-01-22 DIAGNOSIS — O16.3 ELEVATED BLOOD PRESSURE COMPLICATING PREGNANCY, ANTEPARTUM, THIRD TRIMESTER: ICD-10-CM

## 2024-01-22 DIAGNOSIS — O14.93 PRE-ECLAMPSIA IN THIRD TRIMESTER: ICD-10-CM

## 2024-01-22 LAB
ALBUMIN SERPL-MCNC: 3.2 G/DL (ref 3.5–5.2)
ALP SERPL-CCNC: 110 U/L (ref 35–104)
ALT SERPL-CCNC: 12 U/L (ref 0–32)
ANION GAP SERPL CALCULATED.3IONS-SCNC: 12 MMOL/L (ref 7–16)
AST SERPL-CCNC: 15 U/L (ref 0–31)
BACTERIA URNS QL MICRO: ABNORMAL
BILIRUB SERPL-MCNC: 0.2 MG/DL (ref 0–1.2)
BILIRUB UR QL STRIP: NEGATIVE
BUN SERPL-MCNC: 9 MG/DL (ref 6–20)
CALCIUM SERPL-MCNC: 8.9 MG/DL (ref 8.6–10.2)
CASTS #/AREA URNS LPF: ABNORMAL /LPF
CHLORIDE SERPL-SCNC: 103 MMOL/L (ref 98–107)
CLARITY UR: ABNORMAL
CO2 SERPL-SCNC: 20 MMOL/L (ref 22–29)
COLOR UR: ABNORMAL
CREAT SERPL-MCNC: 0.4 MG/DL (ref 0.5–1)
CREAT UR-MCNC: 136.8 MG/DL (ref 29–226)
EPI CELLS #/AREA URNS HPF: ABNORMAL /HPF
ERYTHROCYTE [DISTWIDTH] IN BLOOD BY AUTOMATED COUNT: 13.1 % (ref 11.5–15)
FIBRINOGEN PPP-MCNC: 663 MG/DL (ref 200–400)
GFR SERPL CREATININE-BSD FRML MDRD: >60 ML/MIN/1.73M2
GLUCOSE SERPL-MCNC: 133 MG/DL (ref 74–99)
GLUCOSE UR STRIP-MCNC: NEGATIVE MG/DL
HCT VFR BLD AUTO: 39.8 % (ref 34–48)
HGB BLD-MCNC: 13.7 G/DL (ref 11.5–15.5)
HGB UR QL STRIP.AUTO: ABNORMAL
KETONES UR STRIP-MCNC: NEGATIVE MG/DL
LEUKOCYTE ESTERASE UR QL STRIP: NEGATIVE
MCH RBC QN AUTO: 29.5 PG (ref 26–35)
MCHC RBC AUTO-ENTMCNC: 34.4 G/DL (ref 32–34.5)
MCV RBC AUTO: 85.8 FL (ref 80–99.9)
NITRITE UR QL STRIP: NEGATIVE
PH UR STRIP: 6 [PH] (ref 5–9)
PLATELET # BLD AUTO: 246 K/UL (ref 130–450)
PMV BLD AUTO: 11.3 FL (ref 7–12)
POTASSIUM SERPL-SCNC: 3.7 MMOL/L (ref 3.5–5)
PROT SERPL-MCNC: 6.2 G/DL (ref 6.4–8.3)
PROT UR STRIP-MCNC: >=300 MG/DL
RBC # BLD AUTO: 4.64 M/UL (ref 3.5–5.5)
RBC #/AREA URNS HPF: ABNORMAL /HPF
SODIUM SERPL-SCNC: 135 MMOL/L (ref 132–146)
SP GR UR STRIP: 1.02 (ref 1–1.03)
TOTAL PROTEIN, URINE: >600 MG/DL (ref 0–12)
URATE SERPL-MCNC: 5.5 MG/DL (ref 2.4–5.7)
URINE TOTAL PROTEIN CREATININE RATIO: ABNORMAL (ref 0–0.2)
UROBILINOGEN UR STRIP-ACNC: 0.2 EU/DL (ref 0–1)
WBC #/AREA URNS HPF: ABNORMAL /HPF
WBC OTHER # BLD: 7.5 K/UL (ref 4.5–11.5)

## 2024-01-22 PROCEDURE — 80053 COMPREHEN METABOLIC PANEL: CPT

## 2024-01-22 PROCEDURE — 81001 URINALYSIS AUTO W/SCOPE: CPT

## 2024-01-22 PROCEDURE — 85027 COMPLETE CBC AUTOMATED: CPT

## 2024-01-22 PROCEDURE — 36415 COLL VENOUS BLD VENIPUNCTURE: CPT

## 2024-01-22 PROCEDURE — 84156 ASSAY OF PROTEIN URINE: CPT

## 2024-01-22 PROCEDURE — 84550 ASSAY OF BLOOD/URIC ACID: CPT

## 2024-01-22 PROCEDURE — 82570 ASSAY OF URINE CREATININE: CPT

## 2024-01-22 PROCEDURE — 85384 FIBRINOGEN ACTIVITY: CPT

## 2024-01-26 ENCOUNTER — ANCILLARY PROCEDURE (OUTPATIENT)
Dept: OBGYN CLINIC | Age: 24
DRG: 540 | End: 2024-01-26
Payer: COMMERCIAL

## 2024-01-26 ENCOUNTER — ROUTINE PRENATAL (OUTPATIENT)
Dept: OBGYN CLINIC | Age: 24
End: 2024-01-26
Payer: COMMERCIAL

## 2024-01-26 ENCOUNTER — HOSPITAL ENCOUNTER (INPATIENT)
Age: 24
LOS: 4 days | Discharge: HOME | DRG: 540 | End: 2024-01-30
Attending: OBSTETRICS & GYNECOLOGY | Admitting: OBSTETRICS & GYNECOLOGY
Payer: COMMERCIAL

## 2024-01-26 VITALS
OXYGEN SATURATION: 98 % | HEART RATE: 104 BPM | DIASTOLIC BLOOD PRESSURE: 100 MMHG | BODY MASS INDEX: 58.78 KG/M2 | SYSTOLIC BLOOD PRESSURE: 174 MMHG | WEIGHT: 293 LBS

## 2024-01-26 DIAGNOSIS — O14.13 SEVERE PRE-ECLAMPSIA IN THIRD TRIMESTER: Primary | ICD-10-CM

## 2024-01-26 DIAGNOSIS — O11.9 CHRONIC HYPERTENSION WITH SUPERIMPOSED PREECLAMPSIA: ICD-10-CM

## 2024-01-26 DIAGNOSIS — G89.18 POST-OPERATIVE PAIN: Primary | ICD-10-CM

## 2024-01-26 DIAGNOSIS — O43.199 MARGINAL INSERTION OF UMBILICAL CORD AFFECTING MANAGEMENT OF MOTHER: ICD-10-CM

## 2024-01-26 DIAGNOSIS — Z3A.34 34 WEEKS GESTATION OF PREGNANCY: ICD-10-CM

## 2024-01-26 DIAGNOSIS — O16.3 SEVERE HYPERTENSION AFFECTING PREGNANCY IN THIRD TRIMESTER: ICD-10-CM

## 2024-01-26 DIAGNOSIS — E66.01 MATERNAL MORBID OBESITY, ANTEPARTUM (HCC): ICD-10-CM

## 2024-01-26 DIAGNOSIS — O09.293 HX OF PRE-ECLAMPSIA IN PRIOR PREGNANCY, CURRENTLY PREGNANT, THIRD TRIMESTER: ICD-10-CM

## 2024-01-26 DIAGNOSIS — O09.299 HX OF PREECLAMPSIA, PRIOR PREGNANCY, CURRENTLY PREGNANT: ICD-10-CM

## 2024-01-26 DIAGNOSIS — O99.210 MATERNAL MORBID OBESITY, ANTEPARTUM (HCC): ICD-10-CM

## 2024-01-26 DIAGNOSIS — O24.415 GESTATIONAL DIABETES MELLITUS (GDM) IN THIRD TRIMESTER CONTROLLED ON ORAL HYPOGLYCEMIC DRUG: ICD-10-CM

## 2024-01-26 LAB
ABO + RH BLD: NORMAL
ALBUMIN SERPL-MCNC: 2.9 G/DL (ref 3.5–5.2)
ALP SERPL-CCNC: 126 U/L (ref 35–104)
ALT SERPL-CCNC: 22 U/L (ref 0–32)
AMORPH SED URNS QL MICRO: PRESENT
AMPHET UR QL SCN: NEGATIVE
ANION GAP SERPL CALCULATED.3IONS-SCNC: 13 MMOL/L (ref 7–16)
ARM BAND NUMBER: NORMAL
AST SERPL-CCNC: 26 U/L (ref 0–31)
BARBITURATES UR QL SCN: NEGATIVE
BENZODIAZ UR QL: NEGATIVE
BILIRUB SERPL-MCNC: 0.2 MG/DL (ref 0–1.2)
BILIRUB UR QL STRIP: NEGATIVE
BLOOD BANK SAMPLE EXPIRATION: NORMAL
BLOOD GROUP ANTIBODIES SERPL: NEGATIVE
BUN SERPL-MCNC: 10 MG/DL (ref 6–20)
BUPRENORPHINE UR QL: NEGATIVE
CALCIUM SERPL-MCNC: 9.3 MG/DL (ref 8.6–10.2)
CANNABINOIDS UR QL SCN: NEGATIVE
CASTS #/AREA URNS LPF: ABNORMAL /LPF
CHLORIDE SERPL-SCNC: 105 MMOL/L (ref 98–107)
CLARITY UR: CLEAR
CO2 SERPL-SCNC: 19 MMOL/L (ref 22–29)
COCAINE UR QL SCN: NEGATIVE
COLOR UR: YELLOW
CREAT SERPL-MCNC: 0.5 MG/DL (ref 0.5–1)
CREAT UR-MCNC: 184.5 MG/DL (ref 29–226)
D DIMER: 224 NG/ML DDU (ref 0–232)
EPI CELLS #/AREA URNS HPF: ABNORMAL /HPF
ERYTHROCYTE [DISTWIDTH] IN BLOOD BY AUTOMATED COUNT: 12.9 % (ref 11.5–15)
FENTANYL UR QL: NEGATIVE
FIBRINOGEN PPP-MCNC: >700 MG/DL (ref 200–400)
GFR SERPL CREATININE-BSD FRML MDRD: >60 ML/MIN/1.73M2
GLUCOSE BLD-MCNC: 215 MG/DL (ref 74–99)
GLUCOSE SERPL-MCNC: 88 MG/DL (ref 74–99)
GLUCOSE UR STRIP-MCNC: NEGATIVE MG/DL
GLUCOSE URINE, POC: NEGATIVE
HCT VFR BLD AUTO: 40.5 % (ref 34–48)
HGB BLD-MCNC: 14.1 G/DL (ref 11.5–15.5)
HGB UR QL STRIP.AUTO: ABNORMAL
INR PPP: 0.9
KETONES UR STRIP-MCNC: NEGATIVE MG/DL
LEUKOCYTE ESTERASE UR QL STRIP: NEGATIVE
MCH RBC QN AUTO: 28.7 PG (ref 26–35)
MCHC RBC AUTO-ENTMCNC: 34.8 G/DL (ref 32–34.5)
MCV RBC AUTO: 82.3 FL (ref 80–99.9)
METHADONE UR QL: NEGATIVE
NITRITE UR QL STRIP: NEGATIVE
OPIATES UR QL SCN: NEGATIVE
OXYCODONE UR QL SCN: NEGATIVE
PARTIAL THROMBOPLASTIN TIME: 30.8 SEC (ref 24.5–35.1)
PCP UR QL SCN: NEGATIVE
PH UR STRIP: 6.5 [PH] (ref 5–9)
PLATELET # BLD AUTO: 268 K/UL (ref 130–450)
PMV BLD AUTO: 11.2 FL (ref 7–12)
POTASSIUM SERPL-SCNC: 4.1 MMOL/L (ref 3.5–5)
PROT SERPL-MCNC: 6.1 G/DL (ref 6.4–8.3)
PROT UR STRIP-MCNC: >=300 MG/DL
PROTEIN UA: POSITIVE
PROTHROMBIN TIME: 9.8 SEC (ref 9.3–12.4)
RBC # BLD AUTO: 4.92 M/UL (ref 3.5–5.5)
RBC #/AREA URNS HPF: ABNORMAL /HPF
SODIUM SERPL-SCNC: 137 MMOL/L (ref 132–146)
SP GR UR STRIP: 1.02 (ref 1–1.03)
TEST INFORMATION: NORMAL
TOTAL PROTEIN, URINE: 3696 MG/DL (ref 0–12)
URATE SERPL-MCNC: 5.5 MG/DL (ref 2.4–5.7)
URINE TOTAL PROTEIN CREATININE RATIO: 20.03 (ref 0–0.2)
UROBILINOGEN UR STRIP-ACNC: 1 EU/DL (ref 0–1)
WBC #/AREA URNS HPF: ABNORMAL /HPF
WBC OTHER # BLD: 6.5 K/UL (ref 4.5–11.5)

## 2024-01-26 PROCEDURE — 99213 OFFICE O/P EST LOW 20 MIN: CPT | Performed by: OBSTETRICS & GYNECOLOGY

## 2024-01-26 PROCEDURE — 82570 ASSAY OF URINE CREATININE: CPT

## 2024-01-26 PROCEDURE — 84156 ASSAY OF PROTEIN URINE: CPT

## 2024-01-26 PROCEDURE — 84550 ASSAY OF BLOOD/URIC ACID: CPT

## 2024-01-26 PROCEDURE — 80053 COMPREHEN METABOLIC PANEL: CPT

## 2024-01-26 PROCEDURE — 80307 DRUG TEST PRSMV CHEM ANLYZR: CPT

## 2024-01-26 PROCEDURE — 86900 BLOOD TYPING SEROLOGIC ABO: CPT

## 2024-01-26 PROCEDURE — 6360000002 HC RX W HCPCS: Performed by: OBSTETRICS & GYNECOLOGY

## 2024-01-26 PROCEDURE — 85027 COMPLETE CBC AUTOMATED: CPT

## 2024-01-26 PROCEDURE — 85384 FIBRINOGEN ACTIVITY: CPT

## 2024-01-26 PROCEDURE — 76816 OB US FOLLOW-UP PER FETUS: CPT | Performed by: OBSTETRICS & GYNECOLOGY

## 2024-01-26 PROCEDURE — 36415 COLL VENOUS BLD VENIPUNCTURE: CPT

## 2024-01-26 PROCEDURE — 81002 URINALYSIS NONAUTO W/O SCOPE: CPT | Performed by: OBSTETRICS & GYNECOLOGY

## 2024-01-26 PROCEDURE — 1220000001 HC SEMI PRIVATE L&D R&B

## 2024-01-26 PROCEDURE — 86850 RBC ANTIBODY SCREEN: CPT

## 2024-01-26 PROCEDURE — 82962 GLUCOSE BLOOD TEST: CPT

## 2024-01-26 PROCEDURE — 6370000000 HC RX 637 (ALT 250 FOR IP): Performed by: OBSTETRICS & GYNECOLOGY

## 2024-01-26 PROCEDURE — 76820 UMBILICAL ARTERY ECHO: CPT | Performed by: OBSTETRICS & GYNECOLOGY

## 2024-01-26 PROCEDURE — 86901 BLOOD TYPING SEROLOGIC RH(D): CPT

## 2024-01-26 PROCEDURE — 81001 URINALYSIS AUTO W/SCOPE: CPT

## 2024-01-26 PROCEDURE — 85730 THROMBOPLASTIN TIME PARTIAL: CPT

## 2024-01-26 PROCEDURE — 85610 PROTHROMBIN TIME: CPT

## 2024-01-26 PROCEDURE — 2580000003 HC RX 258: Performed by: OBSTETRICS & GYNECOLOGY

## 2024-01-26 PROCEDURE — 76818 FETAL BIOPHYS PROFILE W/NST: CPT | Performed by: OBSTETRICS & GYNECOLOGY

## 2024-01-26 PROCEDURE — 85379 FIBRIN DEGRADATION QUANT: CPT

## 2024-01-26 RX ORDER — BUSPIRONE HYDROCHLORIDE 10 MG/1
10 TABLET ORAL 2 TIMES DAILY
Status: DISCONTINUED | OUTPATIENT
Start: 2024-01-26 | End: 2024-01-28

## 2024-01-26 RX ORDER — SODIUM CHLORIDE, SODIUM LACTATE, POTASSIUM CHLORIDE, AND CALCIUM CHLORIDE .6; .31; .03; .02 G/100ML; G/100ML; G/100ML; G/100ML
500 INJECTION, SOLUTION INTRAVENOUS PRN
Status: DISCONTINUED | OUTPATIENT
Start: 2024-01-26 | End: 2024-01-28

## 2024-01-26 RX ORDER — LABETALOL HYDROCHLORIDE 5 MG/ML
40 INJECTION, SOLUTION INTRAVENOUS
Status: COMPLETED | OUTPATIENT
Start: 2024-01-26 | End: 2024-01-26

## 2024-01-26 RX ORDER — LABETALOL HYDROCHLORIDE 5 MG/ML
20 INJECTION, SOLUTION INTRAVENOUS
Status: COMPLETED | OUTPATIENT
Start: 2024-01-26 | End: 2024-01-26

## 2024-01-26 RX ORDER — NIFEDIPINE 30 MG/1
90 TABLET, FILM COATED, EXTENDED RELEASE ORAL DAILY
Status: DISCONTINUED | OUTPATIENT
Start: 2024-01-27 | End: 2024-01-28

## 2024-01-26 RX ORDER — LABETALOL HYDROCHLORIDE 5 MG/ML
80 INJECTION, SOLUTION INTRAVENOUS
Status: COMPLETED | OUTPATIENT
Start: 2024-01-26 | End: 2024-01-26

## 2024-01-26 RX ORDER — BETAMETHASONE SODIUM PHOSPHATE AND BETAMETHASONE ACETATE 3; 3 MG/ML; MG/ML
12 INJECTION, SUSPENSION INTRA-ARTICULAR; INTRALESIONAL; INTRAMUSCULAR; SOFT TISSUE ONCE
Status: COMPLETED | OUTPATIENT
Start: 2024-01-26 | End: 2024-01-27

## 2024-01-26 RX ORDER — PENICILLIN G 3000000 [IU]/50ML
3 INJECTION, SOLUTION INTRAVENOUS EVERY 4 HOURS
Status: DISCONTINUED | OUTPATIENT
Start: 2024-01-26 | End: 2024-01-28

## 2024-01-26 RX ORDER — SODIUM CHLORIDE, SODIUM LACTATE, POTASSIUM CHLORIDE, AND CALCIUM CHLORIDE .6; .31; .03; .02 G/100ML; G/100ML; G/100ML; G/100ML
1000 INJECTION, SOLUTION INTRAVENOUS PRN
Status: DISCONTINUED | OUTPATIENT
Start: 2024-01-26 | End: 2024-01-28

## 2024-01-26 RX ORDER — ONDANSETRON 2 MG/ML
4 INJECTION INTRAMUSCULAR; INTRAVENOUS EVERY 6 HOURS PRN
Status: DISCONTINUED | OUTPATIENT
Start: 2024-01-26 | End: 2024-01-27 | Stop reason: SDUPTHER

## 2024-01-26 RX ORDER — BETAMETHASONE SODIUM PHOSPHATE AND BETAMETHASONE ACETATE 3; 3 MG/ML; MG/ML
12 INJECTION, SUSPENSION INTRA-ARTICULAR; INTRALESIONAL; INTRAMUSCULAR; SOFT TISSUE ONCE
Status: COMPLETED | OUTPATIENT
Start: 2024-01-27 | End: 2024-01-26

## 2024-01-26 RX ORDER — HYDRALAZINE HYDROCHLORIDE 20 MG/ML
10 INJECTION INTRAMUSCULAR; INTRAVENOUS
Status: COMPLETED | OUTPATIENT
Start: 2024-01-26 | End: 2024-01-27

## 2024-01-26 RX ORDER — SODIUM CHLORIDE, SODIUM LACTATE, POTASSIUM CHLORIDE, CALCIUM CHLORIDE 600; 310; 30; 20 MG/100ML; MG/100ML; MG/100ML; MG/100ML
INJECTION, SOLUTION INTRAVENOUS CONTINUOUS
Status: DISCONTINUED | OUTPATIENT
Start: 2024-01-26 | End: 2024-01-27 | Stop reason: SDUPTHER

## 2024-01-26 RX ORDER — MAGNESIUM SULFATE HEPTAHYDRATE 40 MG/ML
4000 INJECTION, SOLUTION INTRAVENOUS ONCE
Status: COMPLETED | OUTPATIENT
Start: 2024-01-26 | End: 2024-01-26

## 2024-01-26 RX ORDER — CALCIUM GLUCONATE 94 MG/ML
1000 INJECTION, SOLUTION INTRAVENOUS PRN
Status: DISCONTINUED | OUTPATIENT
Start: 2024-01-26 | End: 2024-01-28

## 2024-01-26 RX ORDER — MORPHINE SULFATE 2 MG/ML
2 INJECTION, SOLUTION INTRAMUSCULAR; INTRAVENOUS
Status: DISCONTINUED | OUTPATIENT
Start: 2024-01-26 | End: 2024-01-28

## 2024-01-26 RX ADMIN — MAGNESIUM SULFATE HEPTAHYDRATE 2000 MG/HR: 40 INJECTION, SOLUTION INTRAVENOUS at 17:32

## 2024-01-26 RX ADMIN — LABETALOL HYDROCHLORIDE 20 MG: 5 INJECTION, SOLUTION INTRAVENOUS at 20:11

## 2024-01-26 RX ADMIN — MAGNESIUM SULFATE HEPTAHYDRATE 4000 MG: 40 INJECTION, SOLUTION INTRAVENOUS at 17:11

## 2024-01-26 RX ADMIN — LABETALOL HYDROCHLORIDE 40 MG: 5 INJECTION, SOLUTION INTRAVENOUS at 20:29

## 2024-01-26 RX ADMIN — Medication 25 MCG: at 17:45

## 2024-01-26 RX ADMIN — DEXTROSE MONOHYDRATE 5 MILLION UNITS: 50 INJECTION, SOLUTION INTRAVENOUS at 18:31

## 2024-01-26 RX ADMIN — Medication 25 MCG: at 22:01

## 2024-01-26 RX ADMIN — METFORMIN HYDROCHLORIDE 1500 MG: 500 TABLET ORAL at 21:06

## 2024-01-26 RX ADMIN — LABETALOL HYDROCHLORIDE 80 MG: 5 INJECTION, SOLUTION INTRAVENOUS at 20:49

## 2024-01-26 RX ADMIN — PENICILLIN G 3 MILLION UNITS: 3000000 INJECTION, SOLUTION INTRAVENOUS at 21:57

## 2024-01-26 RX ADMIN — BETAMETHASONE SODIUM PHOSPHATE AND BETAMETHASONE ACETATE 12 MG: 3; 3 INJECTION, SUSPENSION INTRA-ARTICULAR; INTRALESIONAL; INTRAMUSCULAR at 16:45

## 2024-01-26 RX ADMIN — SODIUM CHLORIDE, POTASSIUM CHLORIDE, SODIUM LACTATE AND CALCIUM CHLORIDE: 600; 310; 30; 20 INJECTION, SOLUTION INTRAVENOUS at 18:00

## 2024-01-26 NOTE — PROGRESS NOTES
24    Rona Carey, DO  [address] [place] [address]     RE:  DRISS CARR  : 2000   AGE: 23 y.o.    This report has been created using voice recognition software. It may contain errors which are inherent in voice recognition technology.    Dear Dr. Carey:    Driss Carr had an appointment today for the following indications:    Patient Active Problem List   Diagnosis    Asperger's disorder    Steatosis of liver    Maternal morbid obesity, antepartum    Hx of preeclampsia, prior pregnancy, currently pregnant    Marginal insertion of umbilical cord affecting management of mother    Family history of congenital heart disease    Severe pre-eclampsia in third trimester    Hx of pre-eclampsia in prior pregnancy, currently pregnant, third trimester    Gestational diabetes mellitus (GDM) in third trimester controlled on oral hypoglycemic drug    Severe hypertension affecting pregnancy in third trimester    Chronic hypertension with superimposed preeclampsia     Driss Carr is a 23 y.o. female, who is G2(1,0,0,1). She has an Estimated Date of Delivery: 3/8/2024 based on her established dates.  She is currently 34 weeks 0 days gestation based on that assessment.    The patient's initial blood pressure today was 166/120.  The repeat assessment after resting was 170/100.  The patient had symptoms of hypertension. Her protein creatinine ratio was significantly elevated on 2024, at 0.86 and 1.33 on 2024. She had >600 mg/dL of protein in her urine on 2024 and 2024. Her findings are consistent with preeclampsia with severe features.     The patient is morbidly obese. This increases her risk for  morbidity and mortality, including, but not limited to; GDM, hypertension, fetal growth abnormalities and pregnancy loss.  She has a history of glucose intolerance.  Based on this I previously recommended that she do home glucose monitoring rather than doing

## 2024-01-26 NOTE — PROGRESS NOTES
Patient is here for bpp/nst. Patient denies any vaginal bleeding and leakage of fluid. Patient has good fetal movement. Patient had pelvic cramping 2 days prior but none today. Patient BP manually was 174/100. Patient states BP was been running high patient reading this morning was 177/120. Patient states she has been getting \"shaky\" and has Swelling in both ankles and feet. Patient states she has been having a hard time breathing. Patient had visit with Dr Carey and states Dr Carey was concerned about her kidneys due to color of urine. Patient states she has been urinating a lot through out the day but hardly anything comes out. Urine for protein is 4+, and urine for glucose is negative.

## 2024-01-26 NOTE — PROGRESS NOTES
Orders received from Dr. Sherwood. Mag 4/2, cytotec q4, and patient may eat until 0000, after 0000 patient is to be NPO.

## 2024-01-26 NOTE — PROGRESS NOTES
34w0d presents to unit for IOL due to pre-e. Patient denies VB, LOF, & ctxs. Perceives +FM. Patient is GDM on metformin. Patient has a dexcom. EFM applied.

## 2024-01-27 ENCOUNTER — ANESTHESIA (OUTPATIENT)
Dept: LABOR AND DELIVERY | Age: 24
End: 2024-01-27
Payer: COMMERCIAL

## 2024-01-27 ENCOUNTER — ANESTHESIA EVENT (OUTPATIENT)
Dept: LABOR AND DELIVERY | Age: 24
End: 2024-01-27
Payer: COMMERCIAL

## 2024-01-27 PROBLEM — Z3A.34 34 WEEKS GESTATION OF PREGNANCY: Status: ACTIVE | Noted: 2024-01-27

## 2024-01-27 LAB
ALBUMIN SERPL-MCNC: 3.1 G/DL (ref 3.5–5.2)
ALP SERPL-CCNC: 130 U/L (ref 35–104)
ALT SERPL-CCNC: 20 U/L (ref 0–32)
ANION GAP SERPL CALCULATED.3IONS-SCNC: 14 MMOL/L (ref 7–16)
AST SERPL-CCNC: 24 U/L (ref 0–31)
BASOPHILS # BLD: 0.03 K/UL (ref 0–0.2)
BASOPHILS NFR BLD: 0 % (ref 0–2)
BILIRUB SERPL-MCNC: <0.2 MG/DL (ref 0–1.2)
BUN SERPL-MCNC: 12 MG/DL (ref 6–20)
CALCIUM SERPL-MCNC: 8.6 MG/DL (ref 8.6–10.2)
CHLORIDE SERPL-SCNC: 101 MMOL/L (ref 98–107)
CO2 SERPL-SCNC: 18 MMOL/L (ref 22–29)
CREAT SERPL-MCNC: 0.5 MG/DL (ref 0.5–1)
CREAT UR-MCNC: 99.6 MG/DL (ref 29–226)
EOSINOPHIL # BLD: 0.01 K/UL (ref 0.05–0.5)
EOSINOPHILS RELATIVE PERCENT: 0 % (ref 0–6)
ERYTHROCYTE [DISTWIDTH] IN BLOOD BY AUTOMATED COUNT: 13 % (ref 11.5–15)
GFR SERPL CREATININE-BSD FRML MDRD: >60 ML/MIN/1.73M2
GLUCOSE SERPL-MCNC: 127 MG/DL (ref 74–99)
HCT VFR BLD AUTO: 41 % (ref 34–48)
HGB BLD-MCNC: 14.4 G/DL (ref 11.5–15.5)
IMM GRANULOCYTES # BLD AUTO: 0.13 K/UL (ref 0–0.58)
IMM GRANULOCYTES NFR BLD: 2 % (ref 0–5)
LYMPHOCYTES NFR BLD: 1.12 K/UL (ref 1.5–4)
LYMPHOCYTES RELATIVE PERCENT: 15 % (ref 20–42)
MCH RBC QN AUTO: 29 PG (ref 26–35)
MCHC RBC AUTO-ENTMCNC: 35.1 G/DL (ref 32–34.5)
MCV RBC AUTO: 82.7 FL (ref 80–99.9)
MONOCYTES NFR BLD: 0.2 K/UL (ref 0.1–0.95)
MONOCYTES NFR BLD: 3 % (ref 2–12)
NEUTROPHILS NFR BLD: 80 % (ref 43–80)
NEUTS SEG NFR BLD: 5.95 K/UL (ref 1.8–7.3)
PLATELET # BLD AUTO: 273 K/UL (ref 130–450)
PMV BLD AUTO: 11.4 FL (ref 7–12)
POTASSIUM SERPL-SCNC: 4.5 MMOL/L (ref 3.5–5)
PROT SERPL-MCNC: 6.3 G/DL (ref 6.4–8.3)
RBC # BLD AUTO: 4.96 M/UL (ref 3.5–5.5)
SODIUM SERPL-SCNC: 133 MMOL/L (ref 132–146)
TOTAL PROTEIN, URINE: >600 MG/DL (ref 0–12)
URINE TOTAL PROTEIN CREATININE RATIO: ABNORMAL (ref 0–0.2)
WBC OTHER # BLD: 7.4 K/UL (ref 4.5–11.5)

## 2024-01-27 PROCEDURE — 6360000002 HC RX W HCPCS: Performed by: STUDENT IN AN ORGANIZED HEALTH CARE EDUCATION/TRAINING PROGRAM

## 2024-01-27 PROCEDURE — 3609079900 HC CESAREAN SECTION: Performed by: OBSTETRICS & GYNECOLOGY

## 2024-01-27 PROCEDURE — 6370000000 HC RX 637 (ALT 250 FOR IP): Performed by: OBSTETRICS & GYNECOLOGY

## 2024-01-27 PROCEDURE — 2709999900 HC NON-CHARGEABLE SUPPLY: Performed by: OBSTETRICS & GYNECOLOGY

## 2024-01-27 PROCEDURE — 2580000003 HC RX 258

## 2024-01-27 PROCEDURE — 3700000000 HC ANESTHESIA ATTENDED CARE: Performed by: OBSTETRICS & GYNECOLOGY

## 2024-01-27 PROCEDURE — 85025 COMPLETE CBC W/AUTO DIFF WBC: CPT

## 2024-01-27 PROCEDURE — 6370000000 HC RX 637 (ALT 250 FOR IP)

## 2024-01-27 PROCEDURE — 2580000003 HC RX 258: Performed by: OBSTETRICS & GYNECOLOGY

## 2024-01-27 PROCEDURE — 6360000002 HC RX W HCPCS

## 2024-01-27 PROCEDURE — 7100000001 HC PACU RECOVERY - ADDTL 15 MIN: Performed by: OBSTETRICS & GYNECOLOGY

## 2024-01-27 PROCEDURE — 3700000001 HC ADD 15 MINUTES (ANESTHESIA): Performed by: OBSTETRICS & GYNECOLOGY

## 2024-01-27 PROCEDURE — 7100000000 HC PACU RECOVERY - FIRST 15 MIN: Performed by: OBSTETRICS & GYNECOLOGY

## 2024-01-27 PROCEDURE — 82570 ASSAY OF URINE CREATININE: CPT

## 2024-01-27 PROCEDURE — 6360000002 HC RX W HCPCS: Performed by: OBSTETRICS & GYNECOLOGY

## 2024-01-27 PROCEDURE — 80053 COMPREHEN METABOLIC PANEL: CPT

## 2024-01-27 PROCEDURE — 1220000001 HC SEMI PRIVATE L&D R&B

## 2024-01-27 PROCEDURE — 88307 TISSUE EXAM BY PATHOLOGIST: CPT

## 2024-01-27 PROCEDURE — 6360000002 HC RX W HCPCS: Performed by: ANESTHESIOLOGY

## 2024-01-27 PROCEDURE — 84156 ASSAY OF PROTEIN URINE: CPT

## 2024-01-27 PROCEDURE — 3700000025 EPIDURAL BLOCK: Performed by: ANESTHESIOLOGY

## 2024-01-27 PROCEDURE — 2500000003 HC RX 250 WO HCPCS: Performed by: STUDENT IN AN ORGANIZED HEALTH CARE EDUCATION/TRAINING PROGRAM

## 2024-01-27 RX ORDER — FENTANYL CITRATE 50 UG/ML
INJECTION, SOLUTION INTRAMUSCULAR; INTRAVENOUS PRN
Status: DISCONTINUED | OUTPATIENT
Start: 2024-01-27 | End: 2024-01-27 | Stop reason: SDUPTHER

## 2024-01-27 RX ORDER — NALOXONE HYDROCHLORIDE 0.4 MG/ML
INJECTION, SOLUTION INTRAMUSCULAR; INTRAVENOUS; SUBCUTANEOUS PRN
Status: DISCONTINUED | OUTPATIENT
Start: 2024-01-27 | End: 2024-01-28

## 2024-01-27 RX ORDER — SODIUM CHLORIDE 9 MG/ML
INJECTION, SOLUTION INTRAVENOUS PRN
Status: DISCONTINUED | OUTPATIENT
Start: 2024-01-27 | End: 2024-01-28

## 2024-01-27 RX ORDER — MEPERIDINE HYDROCHLORIDE 25 MG/ML
12.5 INJECTION INTRAMUSCULAR; INTRAVENOUS; SUBCUTANEOUS EVERY 5 MIN PRN
Status: DISCONTINUED | OUTPATIENT
Start: 2024-01-27 | End: 2024-01-28

## 2024-01-27 RX ORDER — SODIUM CHLORIDE 0.9 % (FLUSH) 0.9 %
5-40 SYRINGE (ML) INJECTION EVERY 12 HOURS SCHEDULED
Status: DISCONTINUED | OUTPATIENT
Start: 2024-01-27 | End: 2024-01-28

## 2024-01-27 RX ORDER — SODIUM CHLORIDE 0.9 % (FLUSH) 0.9 %
5-40 SYRINGE (ML) INJECTION PRN
Status: DISCONTINUED | OUTPATIENT
Start: 2024-01-27 | End: 2024-01-28

## 2024-01-27 RX ORDER — OXYCODONE HYDROCHLORIDE 5 MG/1
10 TABLET ORAL EVERY 4 HOURS PRN
Status: ACTIVE | OUTPATIENT
Start: 2024-01-27 | End: 2024-01-28

## 2024-01-27 RX ORDER — DIPHENHYDRAMINE HCL 25 MG
50 TABLET ORAL EVERY 6 HOURS PRN
Status: DISCONTINUED | OUTPATIENT
Start: 2024-01-27 | End: 2024-01-30 | Stop reason: HOSPADM

## 2024-01-27 RX ORDER — ONDANSETRON 2 MG/ML
4 INJECTION INTRAMUSCULAR; INTRAVENOUS EVERY 6 HOURS PRN
Status: DISCONTINUED | OUTPATIENT
Start: 2024-01-27 | End: 2024-01-28

## 2024-01-27 RX ORDER — SODIUM CHLORIDE, SODIUM LACTATE, POTASSIUM CHLORIDE, AND CALCIUM CHLORIDE .6; .31; .03; .02 G/100ML; G/100ML; G/100ML; G/100ML
1000 INJECTION, SOLUTION INTRAVENOUS ONCE
Status: DISCONTINUED | OUTPATIENT
Start: 2024-01-27 | End: 2024-01-28

## 2024-01-27 RX ORDER — ONDANSETRON 2 MG/ML
4 INJECTION INTRAMUSCULAR; INTRAVENOUS
Status: DISCONTINUED | OUTPATIENT
Start: 2024-01-27 | End: 2024-01-28

## 2024-01-27 RX ORDER — MORPHINE SULFATE 1 MG/ML
INJECTION, SOLUTION EPIDURAL; INTRATHECAL; INTRAVENOUS PRN
Status: DISCONTINUED | OUTPATIENT
Start: 2024-01-27 | End: 2024-01-27 | Stop reason: SDUPTHER

## 2024-01-27 RX ORDER — HYDRALAZINE HYDROCHLORIDE 20 MG/ML
10 INJECTION INTRAMUSCULAR; INTRAVENOUS
Status: ACTIVE | OUTPATIENT
Start: 2024-01-27 | End: 2024-01-28

## 2024-01-27 RX ORDER — IPRATROPIUM BROMIDE AND ALBUTEROL SULFATE 2.5; .5 MG/3ML; MG/3ML
1 SOLUTION RESPIRATORY (INHALATION)
Status: DISCONTINUED | OUTPATIENT
Start: 2024-01-27 | End: 2024-01-28

## 2024-01-27 RX ORDER — HYDRALAZINE HYDROCHLORIDE 20 MG/ML
10 INJECTION INTRAMUSCULAR; INTRAVENOUS
Status: DISCONTINUED | OUTPATIENT
Start: 2024-01-27 | End: 2024-01-27

## 2024-01-27 RX ORDER — SODIUM CHLORIDE, SODIUM LACTATE, POTASSIUM CHLORIDE, CALCIUM CHLORIDE 600; 310; 30; 20 MG/100ML; MG/100ML; MG/100ML; MG/100ML
INJECTION, SOLUTION INTRAVENOUS CONTINUOUS
Status: DISCONTINUED | OUTPATIENT
Start: 2024-01-27 | End: 2024-01-30 | Stop reason: HOSPADM

## 2024-01-27 RX ORDER — OXYCODONE HYDROCHLORIDE 5 MG/1
5 TABLET ORAL EVERY 4 HOURS PRN
Status: ACTIVE | OUTPATIENT
Start: 2024-01-27 | End: 2024-01-28

## 2024-01-27 RX ORDER — PHENYLEPHRINE HCL IN 0.9% NACL 1 MG/10 ML
SYRINGE (ML) INTRAVENOUS PRN
Status: DISCONTINUED | OUTPATIENT
Start: 2024-01-27 | End: 2024-01-27 | Stop reason: SDUPTHER

## 2024-01-27 RX ORDER — NIFEDIPINE 30 MG/1
TABLET, FILM COATED, EXTENDED RELEASE ORAL
Status: COMPLETED
Start: 2024-01-27 | End: 2024-01-27

## 2024-01-27 RX ORDER — DIPHENHYDRAMINE HCL 25 MG
25 TABLET ORAL EVERY 6 HOURS PRN
Status: ACTIVE | OUTPATIENT
Start: 2024-01-27 | End: 2024-01-28

## 2024-01-27 RX ORDER — ACETAMINOPHEN 650 MG
TABLET, EXTENDED RELEASE ORAL
Status: DISPENSED
Start: 2024-01-27 | End: 2024-01-27

## 2024-01-27 RX ORDER — LABETALOL HYDROCHLORIDE 5 MG/ML
20 INJECTION, SOLUTION INTRAVENOUS
Status: COMPLETED | OUTPATIENT
Start: 2024-01-27 | End: 2024-01-28

## 2024-01-27 RX ORDER — ONDANSETRON 2 MG/ML
4 INJECTION INTRAMUSCULAR; INTRAVENOUS EVERY 6 HOURS PRN
Status: ACTIVE | OUTPATIENT
Start: 2024-01-27 | End: 2024-01-28

## 2024-01-27 RX ORDER — LABETALOL HYDROCHLORIDE 5 MG/ML
40 INJECTION, SOLUTION INTRAVENOUS
Status: COMPLETED | OUTPATIENT
Start: 2024-01-27 | End: 2024-01-28

## 2024-01-27 RX ORDER — ONDANSETRON 2 MG/ML
4 INJECTION INTRAMUSCULAR; INTRAVENOUS EVERY 6 HOURS PRN
Status: DISCONTINUED | OUTPATIENT
Start: 2024-01-27 | End: 2024-01-27 | Stop reason: SDUPTHER

## 2024-01-27 RX ORDER — SODIUM CHLORIDE, SODIUM LACTATE, POTASSIUM CHLORIDE, CALCIUM CHLORIDE 600; 310; 30; 20 MG/100ML; MG/100ML; MG/100ML; MG/100ML
INJECTION, SOLUTION INTRAVENOUS CONTINUOUS
Status: DISCONTINUED | OUTPATIENT
Start: 2024-01-27 | End: 2024-01-27 | Stop reason: SDUPTHER

## 2024-01-27 RX ORDER — NALOXONE HYDROCHLORIDE 0.4 MG/ML
INJECTION, SOLUTION INTRAMUSCULAR; INTRAVENOUS; SUBCUTANEOUS PRN
Status: ACTIVE | OUTPATIENT
Start: 2024-01-27 | End: 2024-01-28

## 2024-01-27 RX ORDER — ONDANSETRON 2 MG/ML
INJECTION INTRAMUSCULAR; INTRAVENOUS PRN
Status: DISCONTINUED | OUTPATIENT
Start: 2024-01-27 | End: 2024-01-27 | Stop reason: SDUPTHER

## 2024-01-27 RX ORDER — LIDOCAINE HYDROCHLORIDE 10 MG/ML
INJECTION, SOLUTION INFILTRATION; PERINEURAL
Status: DISPENSED
Start: 2024-01-27 | End: 2024-01-27

## 2024-01-27 RX ORDER — DIPHENHYDRAMINE HYDROCHLORIDE 50 MG/ML
25 INJECTION INTRAMUSCULAR; INTRAVENOUS EVERY 6 HOURS PRN
Status: ACTIVE | OUTPATIENT
Start: 2024-01-27 | End: 2024-01-28

## 2024-01-27 RX ORDER — SODIUM CHLORIDE 0.9 % (FLUSH) 0.9 %
10 SYRINGE (ML) INJECTION PRN
Status: DISCONTINUED | OUTPATIENT
Start: 2024-01-27 | End: 2024-01-28

## 2024-01-27 RX ORDER — CITRIC ACID/SODIUM CITRATE 334-500MG
30 SOLUTION, ORAL ORAL ONCE
Status: COMPLETED | OUTPATIENT
Start: 2024-01-27 | End: 2024-01-27

## 2024-01-27 RX ORDER — HYDRALAZINE HYDROCHLORIDE 20 MG/ML
5 INJECTION INTRAMUSCULAR; INTRAVENOUS
Status: ACTIVE | OUTPATIENT
Start: 2024-01-27 | End: 2024-01-28

## 2024-01-27 RX ORDER — LABETALOL HYDROCHLORIDE 5 MG/ML
10 INJECTION, SOLUTION INTRAVENOUS
Status: DISCONTINUED | OUTPATIENT
Start: 2024-01-27 | End: 2024-01-27

## 2024-01-27 RX ORDER — ACETAMINOPHEN 325 MG/1
650 TABLET ORAL EVERY 4 HOURS PRN
Status: ACTIVE | OUTPATIENT
Start: 2024-01-27 | End: 2024-01-28

## 2024-01-27 RX ORDER — CHLOROPROCAINE HYDROCHLORIDE 30 MG/ML
INJECTION, SOLUTION EPIDURAL; INFILTRATION; INTRACAUDAL; PERINEURAL PRN
Status: DISCONTINUED | OUTPATIENT
Start: 2024-01-27 | End: 2024-01-27 | Stop reason: SDUPTHER

## 2024-01-27 RX ORDER — SODIUM CHLORIDE, SODIUM LACTATE, POTASSIUM CHLORIDE, CALCIUM CHLORIDE 600; 310; 30; 20 MG/100ML; MG/100ML; MG/100ML; MG/100ML
INJECTION, SOLUTION INTRAVENOUS CONTINUOUS PRN
Status: DISCONTINUED | OUTPATIENT
Start: 2024-01-27 | End: 2024-01-27 | Stop reason: SDUPTHER

## 2024-01-27 RX ORDER — MIDAZOLAM HYDROCHLORIDE 2 MG/2ML
2 INJECTION, SOLUTION INTRAMUSCULAR; INTRAVENOUS
Status: DISCONTINUED | OUTPATIENT
Start: 2024-01-27 | End: 2024-01-28

## 2024-01-27 RX ORDER — SODIUM CHLORIDE 0.9 % (FLUSH) 0.9 %
5-40 SYRINGE (ML) INJECTION EVERY 12 HOURS SCHEDULED
Status: DISCONTINUED | OUTPATIENT
Start: 2024-01-27 | End: 2024-01-30 | Stop reason: HOSPADM

## 2024-01-27 RX ORDER — KETOROLAC TROMETHAMINE 30 MG/ML
30 INJECTION, SOLUTION INTRAMUSCULAR; INTRAVENOUS EVERY 6 HOURS PRN
Status: DISPENSED | OUTPATIENT
Start: 2024-01-27 | End: 2024-01-28

## 2024-01-27 RX ADMIN — Medication 100 MCG: at 13:51

## 2024-01-27 RX ADMIN — Medication 25 MCG: at 02:01

## 2024-01-27 RX ADMIN — SODIUM CHLORIDE, POTASSIUM CHLORIDE, SODIUM LACTATE AND CALCIUM CHLORIDE: 600; 310; 30; 20 INJECTION, SOLUTION INTRAVENOUS at 13:36

## 2024-01-27 RX ADMIN — PENICILLIN G 3 MILLION UNITS: 3000000 INJECTION, SOLUTION INTRAVENOUS at 10:11

## 2024-01-27 RX ADMIN — PENICILLIN G 3 MILLION UNITS: 3000000 INJECTION, SOLUTION INTRAVENOUS at 01:59

## 2024-01-27 RX ADMIN — MORPHINE SULFATE 2 MG: 2 INJECTION, SOLUTION INTRAMUSCULAR; INTRAVENOUS at 01:40

## 2024-01-27 RX ADMIN — MORPHINE SULFATE 2 MG: 2 INJECTION, SOLUTION INTRAMUSCULAR; INTRAVENOUS at 05:36

## 2024-01-27 RX ADMIN — NIFEDIPINE 90 MG: 30 TABLET, EXTENDED RELEASE ORAL at 06:06

## 2024-01-27 RX ADMIN — HYDRALAZINE HYDROCHLORIDE 10 MG: 20 INJECTION, SOLUTION INTRAMUSCULAR; INTRAVENOUS at 05:27

## 2024-01-27 RX ADMIN — FENTANYL CITRATE 50 MCG: 50 INJECTION, SOLUTION INTRAMUSCULAR; INTRAVENOUS at 13:36

## 2024-01-27 RX ADMIN — ONDANSETRON 4 MG: 2 INJECTION INTRAMUSCULAR; INTRAVENOUS at 08:20

## 2024-01-27 RX ADMIN — Medication 15 ML/HR: at 06:55

## 2024-01-27 RX ADMIN — ONDANSETRON 4 MG: 2 INJECTION INTRAMUSCULAR; INTRAVENOUS at 13:56

## 2024-01-27 RX ADMIN — BUSPIRONE HYDROCHLORIDE 10 MG: 10 TABLET ORAL at 21:13

## 2024-01-27 RX ADMIN — FENTANYL CITRATE 50 MCG: 50 INJECTION, SOLUTION INTRAMUSCULAR; INTRAVENOUS at 13:31

## 2024-01-27 RX ADMIN — Medication 909 ML/HR: at 13:56

## 2024-01-27 RX ADMIN — BETAMETHASONE SODIUM PHOSPHATE AND BETAMETHASONE ACETATE 12 MG: 3; 3 INJECTION, SUSPENSION INTRA-ARTICULAR; INTRALESIONAL; INTRAMUSCULAR at 13:25

## 2024-01-27 RX ADMIN — PENICILLIN G 3 MILLION UNITS: 3000000 INJECTION, SOLUTION INTRAVENOUS at 06:05

## 2024-01-27 RX ADMIN — Medication 100 MCG: at 13:44

## 2024-01-27 RX ADMIN — SODIUM CITRATE AND CITRIC ACID MONOHYDRATE 30 ML: 500; 334 SOLUTION ORAL at 13:28

## 2024-01-27 RX ADMIN — CHLOROPROCAINE HYDROCHLORIDE 10 ML: 30 INJECTION, SOLUTION EPIDURAL; INFILTRATION; INTRACAUDAL; PERINEURAL at 13:31

## 2024-01-27 RX ADMIN — Medication 200 MCG: at 13:59

## 2024-01-27 RX ADMIN — KETOROLAC TROMETHAMINE 30 MG: 30 INJECTION, SOLUTION INTRAMUSCULAR; INTRAVENOUS at 18:22

## 2024-01-27 RX ADMIN — CHLOROPROCAINE HYDROCHLORIDE 10 ML: 30 INJECTION, SOLUTION EPIDURAL; INFILTRATION; INTRACAUDAL; PERINEURAL at 13:36

## 2024-01-27 RX ADMIN — MAGNESIUM SULFATE HEPTAHYDRATE 2000 MG/HR: 40 INJECTION, SOLUTION INTRAVENOUS at 14:47

## 2024-01-27 RX ADMIN — MAGNESIUM SULFATE HEPTAHYDRATE 2000 MG/HR: 40 INJECTION, SOLUTION INTRAVENOUS at 04:15

## 2024-01-27 RX ADMIN — NIFEDIPINE 90 MG: 30 TABLET, FILM COATED, EXTENDED RELEASE ORAL at 06:06

## 2024-01-27 RX ADMIN — CHLOROPROCAINE HYDROCHLORIDE 10 ML: 30 INJECTION, SOLUTION EPIDURAL; INFILTRATION; INTRACAUDAL; PERINEURAL at 14:06

## 2024-01-27 RX ADMIN — MORPHINE SULFATE 2 MG: 1 INJECTION, SOLUTION EPIDURAL; INTRATHECAL; INTRAVENOUS at 13:58

## 2024-01-27 RX ADMIN — Medication 100 MCG: at 13:53

## 2024-01-27 RX ADMIN — WATER 3000 MG: 1 INJECTION INTRAMUSCULAR; INTRAVENOUS; SUBCUTANEOUS at 13:29

## 2024-01-27 RX ADMIN — HYDROMORPHONE HYDROCHLORIDE 0.5 MG: 1 INJECTION, SOLUTION INTRAMUSCULAR; INTRAVENOUS; SUBCUTANEOUS at 15:09

## 2024-01-27 RX ADMIN — Medication 100 MCG: at 13:48

## 2024-01-27 RX ADMIN — BUSPIRONE HYDROCHLORIDE 10 MG: 10 TABLET ORAL at 10:11

## 2024-01-27 RX ADMIN — Medication 200 MCG: at 14:07

## 2024-01-27 RX ADMIN — Medication 25 MCG: at 10:12

## 2024-01-27 RX ADMIN — Medication 25 MCG: at 06:15

## 2024-01-27 RX ADMIN — DIPHENHYDRAMINE HCL 50 MG: 25 TABLET ORAL at 21:13

## 2024-01-27 ASSESSMENT — PAIN DESCRIPTION - LOCATION
LOCATION: ABDOMEN
LOCATION: ABDOMEN
LOCATION: ABDOMEN;BACK

## 2024-01-27 ASSESSMENT — PAIN DESCRIPTION - ORIENTATION
ORIENTATION: LOWER

## 2024-01-27 ASSESSMENT — LIFESTYLE VARIABLES: SMOKING_STATUS: 0

## 2024-01-27 ASSESSMENT — PAIN DESCRIPTION - DESCRIPTORS
DESCRIPTORS: BURNING;ACHING
DESCRIPTORS: CRAMPING;DISCOMFORT;SORE
DESCRIPTORS: CRAMPING;DISCOMFORT;SORE
DESCRIPTORS: ACHING;BURNING

## 2024-01-27 ASSESSMENT — PAIN SCALES - GENERAL
PAINLEVEL_OUTOF10: 7
PAINLEVEL_OUTOF10: 5
PAINLEVEL_OUTOF10: 4

## 2024-01-27 NOTE — LACTATION NOTE
Spoke to mom about providing colostrum for her premature baby in the NICU.  Mom had an unexpected, sudden  and is not feeling well and is unsure if she wants to provide colostrum.  Parents aware of benefits of colostrum for prematurity.  Mom will decide in the morinng if she wants to pump colostrum.

## 2024-01-27 NOTE — PROGRESS NOTES
Spoke with Dr. Sherwood, notified of last 5 bps and that 80mg of labetalol protocol was given so far. Also notified that per patient's dexcom blood sugar reading was 258 and last reading was very close to 300 and that patient would like to do a fingerstick as a 2 hour PP.   New order received to order and give patient's metformin dosage and buspar as she takes at home.

## 2024-01-27 NOTE — ANESTHESIA PRE PROCEDURE
AUTODISC VI;ONE TOUCH ULTRA TEST VI) strip CHeck FBS and 2 hour PPBS after each meal every day. FBS should be 70 to 91. 2 hour PPBS should be 80 to 120. 9/7/23   Jeff Osborne MD   Prenatal Vit-Fe Fumarate-FA (PRENATAL PLUS) 27-1 MG TABS Take 1 tablet by mouth daily 8/11/23   Jenny Rollins, AZEEM - CNP       Current medications:    Current Facility-Administered Medications   Medication Dose Route Frequency Provider Last Rate Last Admin    fentaNYL 1.85mcg/ml and BUPivacaine 0.1% 15ml syringe (Home Care) (OB) 15 mL epidural  15 mL Epidural Once VicentaAlayna jeffersone, DO        fentaNYL 1.85mcg/ml and Bupivicaine 0.1% in 0.9% NS 135ml infusion (OB) epidural  15 mL/hr Epidural Continuous VicentaAlayna jeffersone, DO        naloxone 0.4 mg in 10 mL sodium chloride syringe   IntraVENous PRN Alayna Yadave, DO        ondansetron (ZOFRAN) injection 4 mg  4 mg IntraVENous Q6H PRN Kelby Yadavlette, DO        miSOPROStol (CYTOTEC) pre-split tablet TABS 25 mcg  25 mcg Vaginal Q4H Carolina Sherwood MD   25 mcg at 01/27/24 0615    lactated ringers bolus bolus 500 mL  500 mL IntraVENous PRN Jeff Osborne MD        Or    lactated ringers bolus bolus 1,000 mL  1,000 mL IntraVENous PRN Jeff Osborne MD        betamethasone acetate-betamethasone sodium phosphate (CELESTONE) injection 12 mg  12 mg IntraMUSCular Once Jeff Osborne MD        lactated ringers IV soln infusion   IntraVENous Continuous Carolina Sherwood MD 75 mL/hr at 01/26/24 1800 New Bag at 01/26/24 1800    magnesium sulfate (86494 mg/500mL infusion)  2,000 mg/hr IntraVENous Continuous Carolina Sherwood MD 50 mL/hr at 01/27/24 0415 2,000 mg/hr at 01/27/24 0415    calcium gluconate 10 % injection 1,000 mg  1,000 mg IntraVENous PRN Carolina Sherwood MD        NIFEdipine (ADALAT CC) extended release tablet 90 mg  90 mg Oral Daily Carolina Sherwood MD   90 mg at 01/27/24 0606    morphine (PF) injection 2 mg  2 mg IntraVENous Q3H PRN Carolina Sherwood MD

## 2024-01-27 NOTE — PROGRESS NOTES
Called Dr. Sherwood updated that I can not trace patient's contractions, patient is having some intermittent spontaneous decels, moderate variability with accels. Dr. Sherwood states she will be in to rupture and place internals.

## 2024-01-27 NOTE — PROGRESS NOTES
Verbal orders from Dr. Sherwood to restart Mag 2g, run for 24 hours from time of delivery, restart Labetalol protocol if needed. Verbal orders to discontinue Metformin.

## 2024-01-27 NOTE — ANESTHESIA PROCEDURE NOTES
Epidural Block    Patient location during procedure: OB  Start time: 1/27/2024 6:45 AM  End time: 1/27/2024 7:00 AM  Reason for block: labor epidural  Staffing  Performed: resident/CRNA   Anesthesiologist: Denise Yadav DO  Performed by: Pop Nicholson APRN - CRNA  Authorized by: Denise Yadav DO    Epidural  Patient position: sitting  Prep: Betadine  Patient monitoring: cardiac monitor, continuous pulse ox and frequent blood pressure checks  Approach: midline  Location: L3-4  Injection technique: CAROLYNE saline  Provider prep: mask and sterile gloves  Needle  Needle type: Tuohy   Needle gauge: 18 G  Needle length: 3.5 in  Needle insertion depth: 10 cm  Catheter type: end hole  Catheter size: 20 G.  Catheter at skin depth: 18 cm  Test dose: negativeCatheter Secured: tegaderm and tape  Assessment  Hemodynamics: stable  Attempts: 1  Outcomes: uncomplicated and patient tolerated procedure well  Preanesthetic Checklist  Completed: patient identified, IV checked, site marked, risks and benefits discussed, surgical/procedural consents, equipment checked, pre-op evaluation, timeout performed, anesthesia consent given, oxygen available and monitors applied/VS acknowledged

## 2024-01-27 NOTE — OP NOTE
PREOPERATIVE DIAGNOSES:     34 week intrauterine pregnancy.  2.  severe pre-eclampsia  3. NRFHTs  4. Arrest of dilation      POSTOPERATIVE DIAGNOSES:     Same.      PROCEDURE: primary low transverse  section        SURGEON: Carolina Sherwood MD       ASSISTANT:  Dr.Haggerty ROSARIO  In the absence of a resident they provided assistance with retraction, exposure, delivery of the infant and suture cutting/management throughout the entire procedure      ESTIMATED BLOOD LOSS:  700ml        COMPLICATIONS:  None.         ANESTHESIA:   Epidural anesthesia        FINDINGS:   Live Born  Sex:  Male  Fetal Position:  Cephalic, left occiput posterior  Apgars:  1 minute: 8; 5 minute: 9  Weight:  2330 grams  Tubes, uterus, ovaries:  normal          DETAILS OF PROCEDURE:    The patient was taken to the operating room where Spinal anesthesia was administered and found to be adequate. Abdomen was prepped   and draped in the normal sterile fashion. Wild catheter had previously been   placed. Pfannenstiel skin incision made with a scalpel, carried down to the fascia with cautery. The fascia was nicked in the midline and extended laterally with   cautery. Muscles were  in the midline. Peritoneum was grasped with   hemostats, tented up, and entered sharply. Peritoneal incision was extended   superiorly and inferiorly with good visualization of bladder.  Delee bladder blade was introduced. Bladder flap was created with sharp dissection. Low transverse uterine incision was made with a scalpel and extended bluntly. Membranes ruptured for Clear fluid. A viable male infant was delivered in the cephalic presentation without diffiuclty.  Baby was bulb suctioned on the abdomen. Cord was clamped and cut, and handed to waiting R.N. Cord gases and blood were obtained. Placenta was manually extracted with 3 vessel cord. Uterus was exteriorized, cleared of all clot and debris. Uterine incision   was closed with vicryl in a running

## 2024-01-27 NOTE — PROGRESS NOTES
Spoke with Dr. Sherwood,   Notified of bps and that labetalol (hydralazine) protocol has been exhausted. Also notified of sve 1/50/-2 at 0540 before giving morphine.   States to give procardia now, and redraw cbc and cmp.   Also states patient can get an epidural now as requested.

## 2024-01-27 NOTE — PROGRESS NOTES
Patient sitting up, states back hurts and needs a minute off monitor. Abd binder loosened.   Patient educated that she can't be off the monitor for more than a few minutes.   Patient verbalized understanding.

## 2024-01-28 ENCOUNTER — ANESTHESIA EVENT (OUTPATIENT)
Dept: MOTHER INFANT UNIT | Age: 24
DRG: 540 | End: 2024-01-28
Payer: COMMERCIAL

## 2024-01-28 ENCOUNTER — ANESTHESIA (OUTPATIENT)
Dept: MOTHER INFANT UNIT | Age: 24
DRG: 540 | End: 2024-01-28
Payer: COMMERCIAL

## 2024-01-28 LAB
ERYTHROCYTE [DISTWIDTH] IN BLOOD BY AUTOMATED COUNT: 13.2 % (ref 11.5–15)
GLUCOSE BLD-MCNC: 107 MG/DL (ref 74–99)
GLUCOSE BLD-MCNC: 127 MG/DL (ref 74–99)
GLUCOSE BLD-MCNC: 148 MG/DL (ref 74–99)
HCT VFR BLD AUTO: 38.5 % (ref 34–48)
HGB BLD-MCNC: 13.1 G/DL (ref 11.5–15.5)
MCH RBC QN AUTO: 29.2 PG (ref 26–35)
MCHC RBC AUTO-ENTMCNC: 34 G/DL (ref 32–34.5)
MCV RBC AUTO: 85.7 FL (ref 80–99.9)
PLATELET # BLD AUTO: 266 K/UL (ref 130–450)
PMV BLD AUTO: 11 FL (ref 7–12)
RBC # BLD AUTO: 4.49 M/UL (ref 3.5–5.5)
WBC OTHER # BLD: 10.6 K/UL (ref 4.5–11.5)

## 2024-01-28 PROCEDURE — 6370000000 HC RX 637 (ALT 250 FOR IP): Performed by: OBSTETRICS & GYNECOLOGY

## 2024-01-28 PROCEDURE — 82962 GLUCOSE BLOOD TEST: CPT

## 2024-01-28 PROCEDURE — 6360000002 HC RX W HCPCS: Performed by: ANESTHESIOLOGY

## 2024-01-28 PROCEDURE — 85027 COMPLETE CBC AUTOMATED: CPT

## 2024-01-28 PROCEDURE — 1220000000 HC SEMI PRIVATE OB R&B

## 2024-01-28 PROCEDURE — 6360000002 HC RX W HCPCS: Performed by: OBSTETRICS & GYNECOLOGY

## 2024-01-28 RX ORDER — OXYCODONE HYDROCHLORIDE 5 MG/1
10 TABLET ORAL EVERY 4 HOURS PRN
Status: DISCONTINUED | OUTPATIENT
Start: 2024-01-28 | End: 2024-01-30 | Stop reason: HOSPADM

## 2024-01-28 RX ORDER — MODIFIED LANOLIN
OINTMENT (GRAM) TOPICAL
Status: DISCONTINUED | OUTPATIENT
Start: 2024-01-28 | End: 2024-01-30 | Stop reason: HOSPADM

## 2024-01-28 RX ORDER — FERROUS SULFATE 325(65) MG
325 TABLET ORAL EVERY OTHER DAY
Status: DISCONTINUED | OUTPATIENT
Start: 2024-01-28 | End: 2024-01-30 | Stop reason: HOSPADM

## 2024-01-28 RX ORDER — SIMETHICONE 80 MG
80 TABLET,CHEWABLE ORAL 4 TIMES DAILY
Status: DISCONTINUED | OUTPATIENT
Start: 2024-01-28 | End: 2024-01-30 | Stop reason: HOSPADM

## 2024-01-28 RX ORDER — ONDANSETRON 2 MG/ML
4 INJECTION INTRAMUSCULAR; INTRAVENOUS EVERY 6 HOURS PRN
Status: DISCONTINUED | OUTPATIENT
Start: 2024-01-28 | End: 2024-01-30 | Stop reason: HOSPADM

## 2024-01-28 RX ORDER — ONDANSETRON 4 MG/1
4 TABLET, ORALLY DISINTEGRATING ORAL EVERY 8 HOURS PRN
Status: DISCONTINUED | OUTPATIENT
Start: 2024-01-28 | End: 2024-01-30 | Stop reason: HOSPADM

## 2024-01-28 RX ORDER — MISOPROSTOL 200 UG/1
800 TABLET ORAL PRN
Status: DISCONTINUED | OUTPATIENT
Start: 2024-01-28 | End: 2024-01-30 | Stop reason: HOSPADM

## 2024-01-28 RX ORDER — OXYCODONE HYDROCHLORIDE 5 MG/1
5 TABLET ORAL EVERY 4 HOURS PRN
Status: DISCONTINUED | OUTPATIENT
Start: 2024-01-28 | End: 2024-01-30 | Stop reason: HOSPADM

## 2024-01-28 RX ORDER — SODIUM CHLORIDE 0.9 % (FLUSH) 0.9 %
5-40 SYRINGE (ML) INJECTION PRN
Status: DISCONTINUED | OUTPATIENT
Start: 2024-01-28 | End: 2024-01-30 | Stop reason: HOSPADM

## 2024-01-28 RX ORDER — SODIUM CHLORIDE, SODIUM LACTATE, POTASSIUM CHLORIDE, CALCIUM CHLORIDE 600; 310; 30; 20 MG/100ML; MG/100ML; MG/100ML; MG/100ML
INJECTION, SOLUTION INTRAVENOUS CONTINUOUS
Status: DISCONTINUED | OUTPATIENT
Start: 2024-01-28 | End: 2024-01-30 | Stop reason: HOSPADM

## 2024-01-28 RX ORDER — NIFEDIPINE 30 MG/1
90 TABLET, FILM COATED, EXTENDED RELEASE ORAL DAILY
Status: DISCONTINUED | OUTPATIENT
Start: 2024-01-28 | End: 2024-01-30 | Stop reason: HOSPADM

## 2024-01-28 RX ORDER — SODIUM CHLORIDE 0.9 % (FLUSH) 0.9 %
5-40 SYRINGE (ML) INJECTION EVERY 12 HOURS SCHEDULED
Status: DISCONTINUED | OUTPATIENT
Start: 2024-01-28 | End: 2024-01-30 | Stop reason: HOSPADM

## 2024-01-28 RX ORDER — PRENATAL WITH FERROUS FUM AND FOLIC ACID 3080; 920; 120; 400; 22; 1.84; 3; 20; 10; 1; 12; 200; 27; 25; 2 [IU]/1; [IU]/1; MG/1; [IU]/1; MG/1; MG/1; MG/1; MG/1; MG/1; MG/1; UG/1; MG/1; MG/1; MG/1; MG/1
1 TABLET ORAL DAILY
Status: DISCONTINUED | OUTPATIENT
Start: 2024-01-28 | End: 2024-01-28 | Stop reason: CLARIF

## 2024-01-28 RX ORDER — M-VIT,TX,IRON,MINS/CALC/FOLIC 27MG-0.4MG
1 TABLET ORAL DAILY
Status: DISCONTINUED | OUTPATIENT
Start: 2024-01-28 | End: 2024-01-30 | Stop reason: HOSPADM

## 2024-01-28 RX ORDER — METOCLOPRAMIDE HYDROCHLORIDE 5 MG/ML
10 INJECTION INTRAMUSCULAR; INTRAVENOUS ONCE
Status: DISCONTINUED | OUTPATIENT
Start: 2024-01-28 | End: 2024-01-30 | Stop reason: HOSPADM

## 2024-01-28 RX ORDER — CARBOPROST TROMETHAMINE 250 UG/ML
250 INJECTION, SOLUTION INTRAMUSCULAR PRN
Status: DISCONTINUED | OUTPATIENT
Start: 2024-01-28 | End: 2024-01-30 | Stop reason: HOSPADM

## 2024-01-28 RX ORDER — METHYLERGONOVINE MALEATE 0.2 MG/ML
200 INJECTION INTRAVENOUS PRN
Status: DISCONTINUED | OUTPATIENT
Start: 2024-01-28 | End: 2024-01-30 | Stop reason: HOSPADM

## 2024-01-28 RX ORDER — ACETAMINOPHEN 325 MG/1
975 TABLET ORAL ONCE
Status: DISCONTINUED | OUTPATIENT
Start: 2024-01-28 | End: 2024-01-30 | Stop reason: HOSPADM

## 2024-01-28 RX ORDER — BISACODYL 10 MG
10 SUPPOSITORY, RECTAL RECTAL PRN
Status: DISCONTINUED | OUTPATIENT
Start: 2024-01-28 | End: 2024-01-30 | Stop reason: HOSPADM

## 2024-01-28 RX ORDER — BUSPIRONE HYDROCHLORIDE 10 MG/1
10 TABLET ORAL 2 TIMES DAILY
Status: DISCONTINUED | OUTPATIENT
Start: 2024-01-28 | End: 2024-01-30 | Stop reason: HOSPADM

## 2024-01-28 RX ORDER — ACETAMINOPHEN 500 MG
1000 TABLET ORAL EVERY 8 HOURS SCHEDULED
Status: DISCONTINUED | OUTPATIENT
Start: 2024-01-28 | End: 2024-01-30 | Stop reason: HOSPADM

## 2024-01-28 RX ORDER — IBUPROFEN 800 MG/1
800 TABLET ORAL EVERY 8 HOURS SCHEDULED
Status: DISCONTINUED | OUTPATIENT
Start: 2024-01-28 | End: 2024-01-30 | Stop reason: HOSPADM

## 2024-01-28 RX ORDER — DOCUSATE SODIUM 100 MG/1
100 CAPSULE, LIQUID FILLED ORAL 2 TIMES DAILY
Status: DISCONTINUED | OUTPATIENT
Start: 2024-01-28 | End: 2024-01-30 | Stop reason: HOSPADM

## 2024-01-28 RX ORDER — SODIUM CHLORIDE 9 MG/ML
INJECTION, SOLUTION INTRAVENOUS PRN
Status: DISCONTINUED | OUTPATIENT
Start: 2024-01-28 | End: 2024-01-30 | Stop reason: HOSPADM

## 2024-01-28 RX ORDER — SODIUM CHLORIDE, SODIUM LACTATE, POTASSIUM CHLORIDE, AND CALCIUM CHLORIDE .6; .31; .03; .02 G/100ML; G/100ML; G/100ML; G/100ML
1000 INJECTION, SOLUTION INTRAVENOUS ONCE
Status: DISCONTINUED | OUTPATIENT
Start: 2024-01-28 | End: 2024-01-30 | Stop reason: HOSPADM

## 2024-01-28 RX ADMIN — FERROUS SULFATE TAB 325 MG (65 MG ELEMENTAL FE) 325 MG: 325 (65 FE) TAB at 09:11

## 2024-01-28 RX ADMIN — KETOROLAC TROMETHAMINE 30 MG: 30 INJECTION, SOLUTION INTRAMUSCULAR; INTRAVENOUS at 01:07

## 2024-01-28 RX ADMIN — ACETAMINOPHEN 1000 MG: 500 TABLET ORAL at 15:13

## 2024-01-28 RX ADMIN — DOCUSATE SODIUM 100 MG: 100 CAPSULE, LIQUID FILLED ORAL at 09:11

## 2024-01-28 RX ADMIN — LABETALOL HYDROCHLORIDE 40 MG: 5 INJECTION INTRAVENOUS at 06:32

## 2024-01-28 RX ADMIN — DIPHENHYDRAMINE HCL 25 MG: 25 TABLET ORAL at 11:24

## 2024-01-28 RX ADMIN — MAGNESIUM SULFATE HEPTAHYDRATE 2000 MG/HR: 40 INJECTION, SOLUTION INTRAVENOUS at 10:46

## 2024-01-28 RX ADMIN — SIMETHICONE 80 MG: 80 TABLET, CHEWABLE ORAL at 20:41

## 2024-01-28 RX ADMIN — KETOROLAC TROMETHAMINE 30 MG: 30 INJECTION, SOLUTION INTRAMUSCULAR; INTRAVENOUS at 07:53

## 2024-01-28 RX ADMIN — NIFEDIPINE 90 MG: 30 TABLET, EXTENDED RELEASE ORAL at 07:50

## 2024-01-28 RX ADMIN — BUSPIRONE HYDROCHLORIDE 10 MG: 10 TABLET ORAL at 19:55

## 2024-01-28 RX ADMIN — DIPHENHYDRAMINE HCL 25 MG: 25 TABLET ORAL at 09:35

## 2024-01-28 RX ADMIN — SIMETHICONE 80 MG: 80 TABLET, CHEWABLE ORAL at 09:10

## 2024-01-28 RX ADMIN — OXYCODONE 5 MG: 5 TABLET ORAL at 20:41

## 2024-01-28 RX ADMIN — BUSPIRONE HYDROCHLORIDE 10 MG: 10 TABLET ORAL at 09:11

## 2024-01-28 RX ADMIN — LABETALOL HYDROCHLORIDE 20 MG: 5 INJECTION INTRAVENOUS at 00:36

## 2024-01-28 RX ADMIN — IBUPROFEN 800 MG: 800 TABLET, FILM COATED ORAL at 11:24

## 2024-01-28 RX ADMIN — SIMETHICONE 80 MG: 80 TABLET, CHEWABLE ORAL at 15:13

## 2024-01-28 RX ADMIN — IBUPROFEN 800 MG: 800 TABLET, FILM COATED ORAL at 19:55

## 2024-01-28 RX ADMIN — MAGNESIUM SULFATE HEPTAHYDRATE 2000 MG/HR: 40 INJECTION, SOLUTION INTRAVENOUS at 01:12

## 2024-01-28 ASSESSMENT — PAIN DESCRIPTION - LOCATION
LOCATION: ABDOMEN
LOCATION: ABDOMEN;INCISION
LOCATION: ABDOMEN

## 2024-01-28 ASSESSMENT — PAIN DESCRIPTION - DESCRIPTORS
DESCRIPTORS: ACHING;CRAMPING;DISCOMFORT
DESCRIPTORS: ACHING;DISCOMFORT
DESCRIPTORS: ACHING;CRAMPING;DISCOMFORT
DESCRIPTORS: ACHING
DESCRIPTORS: ACHING;DISCOMFORT
DESCRIPTORS: DISCOMFORT

## 2024-01-28 ASSESSMENT — PAIN SCALES - GENERAL
PAINLEVEL_OUTOF10: 3
PAINLEVEL_OUTOF10: 2
PAINLEVEL_OUTOF10: 2
PAINLEVEL_OUTOF10: 3
PAINLEVEL_OUTOF10: 6
PAINLEVEL_OUTOF10: 4

## 2024-01-28 ASSESSMENT — PAIN DESCRIPTION - ORIENTATION
ORIENTATION: LOWER
ORIENTATION: LOWER
ORIENTATION: LOWER;MID
ORIENTATION: LOWER

## 2024-01-28 NOTE — ANESTHESIA POSTPROCEDURE EVALUATION
Department of Anesthesiology  Postprocedure Note    Patient: Jazzy Carr  MRN: [id number]  YOB: 2000  Date of evaluation: 1/28/2024    Procedure Summary       Date: 01/28/24 Room / Location:     Anesthesia Start:  Anesthesia Stop:     Procedure: Labor Analgesia Diagnosis:     Scheduled Providers:  Responsible Provider:     Anesthesia Type: Not recorded ASA Status: Not recorded            Anesthesia Type: No value filed.    Maria Luisa Phase I: Maria Luisa Score: 9    Maria Luisa Phase II: Maria Luisa Score: 10    Anesthesia Post Evaluation    Patient location during evaluation: bedside  Patient participation: complete - patient participated  Level of consciousness: awake and awake and alert  Pain score: 0  Airway patency: patent  Nausea & Vomiting: no nausea and no vomiting  Cardiovascular status: hemodynamically stable and blood pressure returned to baseline  Respiratory status: acceptable  Hydration status: euvolemic  Comments: Patient in the process of getting up to the wheelchair, assisted by nurses.   Pain management: adequate        No notable events documented.

## 2024-01-28 NOTE — PROGRESS NOTES
Patient up to bathroom. Wild removed. Krystle care competed Patient unable to void at this time. Patient tolerated well and walked to wheelchair for transfer

## 2024-01-28 NOTE — LACTATION NOTE
Checked in with patient to see if she had made a decision about pumping/providing colostrum. Adamant about formula feeding, but open to providing some colostrum for the first few days. We attempted pumping with EBP, but she vocalized that she \"hates it\", did not want to continue, and prefers to just hand express drops. Expressed some drops and got a few swabs to send down to NICU. Showed her how to use the hand expression cup for the primo lacto and encouraged her to call if she needed any more swabs or materials. Left pump in room just in case.     RAMESH Gould

## 2024-01-28 NOTE — PROGRESS NOTES
Assumed pt care. Pt states she has some pain at incision site but it tolerable. Pt not requesting pain management at this time. Call light within reach.     Linda Ortiz RN

## 2024-01-28 NOTE — PROGRESS NOTES
Subjective:     Postpartum Day 1:  Delivery    The patient feels well.  Pain is well controlled with current medications. Baby is feeding via breast. Urinary output is adequate. The patient is not ambulating well. The patient is tolerating a normal diet. Flatus denies been passed.    Objective:        Vitals:    24 0749   BP: (!) 154/92   Pulse: 75   Resp: 16   Temp:    SpO2: 97%         Intake/Output Summary (Last 24 hours) at 2024 0928  Last data filed at 2024 0649  Gross per 24 hour   Intake 1500 ml   Output 3470 ml   Net -1970 ml     Lab Results   Component Value Date    WBC 10.6 2024    HGB 13.1 2024    HCT 38.5 2024    MCV 85.7 2024     2024       General:    alert, appears stated age, and cooperative   Lungs: clear to auscultation bilaterally   Lochia:  appropriate   Uterine    firm   Incision:  healing well, no significant drainage, no dehiscence, no significant erythema   DVT Evaluation:  No evidence of DVT seen on physical exam.     Assessment:     Status post  section. Doing well postoperatively.     Plan:     Continue current care.  Status post  for severe preeclampsia and failure to progress  Currently on magnesium sulfate will discontinue after 24 hours

## 2024-01-28 NOTE — PROGRESS NOTES
Pt requesting something for congestion, Dr. Sherwood called, new orders received for 50mg oral Benadryl Q6hr PRN.

## 2024-01-29 LAB
GLUCOSE BLD-MCNC: 102 MG/DL (ref 74–99)
GLUCOSE BLD-MCNC: 112 MG/DL (ref 74–99)
GLUCOSE BLD-MCNC: 117 MG/DL (ref 74–99)
GLUCOSE BLD-MCNC: 130 MG/DL (ref 74–99)

## 2024-01-29 PROCEDURE — 82962 GLUCOSE BLOOD TEST: CPT

## 2024-01-29 PROCEDURE — 2580000003 HC RX 258: Performed by: OBSTETRICS & GYNECOLOGY

## 2024-01-29 PROCEDURE — 1220000000 HC SEMI PRIVATE OB R&B

## 2024-01-29 PROCEDURE — 6370000000 HC RX 637 (ALT 250 FOR IP): Performed by: OBSTETRICS & GYNECOLOGY

## 2024-01-29 RX ADMIN — BUSPIRONE HYDROCHLORIDE 10 MG: 10 TABLET ORAL at 09:10

## 2024-01-29 RX ADMIN — NIFEDIPINE 90 MG: 30 TABLET, EXTENDED RELEASE ORAL at 09:11

## 2024-01-29 RX ADMIN — DIPHENHYDRAMINE HCL 50 MG: 25 TABLET ORAL at 09:09

## 2024-01-29 RX ADMIN — SIMETHICONE 80 MG: 80 TABLET, CHEWABLE ORAL at 09:10

## 2024-01-29 RX ADMIN — SIMETHICONE 80 MG: 80 TABLET, CHEWABLE ORAL at 13:23

## 2024-01-29 RX ADMIN — SODIUM CHLORIDE, PRESERVATIVE FREE 10 ML: 5 INJECTION INTRAVENOUS at 09:14

## 2024-01-29 RX ADMIN — IBUPROFEN 800 MG: 800 TABLET, FILM COATED ORAL at 03:25

## 2024-01-29 RX ADMIN — SIMETHICONE 80 MG: 80 TABLET, CHEWABLE ORAL at 19:58

## 2024-01-29 RX ADMIN — SIMETHICONE 80 MG: 80 TABLET, CHEWABLE ORAL at 17:15

## 2024-01-29 RX ADMIN — DOCUSATE SODIUM 100 MG: 100 CAPSULE, LIQUID FILLED ORAL at 19:58

## 2024-01-29 RX ADMIN — OXYCODONE 10 MG: 5 TABLET ORAL at 22:59

## 2024-01-29 RX ADMIN — ACETAMINOPHEN 1000 MG: 500 TABLET ORAL at 19:59

## 2024-01-29 RX ADMIN — DOCUSATE SODIUM 100 MG: 100 CAPSULE, LIQUID FILLED ORAL at 09:10

## 2024-01-29 RX ADMIN — Medication 1 TABLET: at 09:10

## 2024-01-29 RX ADMIN — BUSPIRONE HYDROCHLORIDE 10 MG: 10 TABLET ORAL at 19:59

## 2024-01-29 RX ADMIN — IBUPROFEN 800 MG: 800 TABLET, FILM COATED ORAL at 17:14

## 2024-01-29 RX ADMIN — ACETAMINOPHEN 1000 MG: 500 TABLET ORAL at 09:08

## 2024-01-29 ASSESSMENT — PAIN SCALES - GENERAL
PAINLEVEL_OUTOF10: 7
PAINLEVEL_OUTOF10: 3
PAINLEVEL_OUTOF10: 4
PAINLEVEL_OUTOF10: 3
PAINLEVEL_OUTOF10: 4

## 2024-01-29 ASSESSMENT — PAIN DESCRIPTION - DESCRIPTORS
DESCRIPTORS: ACHING;DISCOMFORT
DESCRIPTORS: SORE
DESCRIPTORS: SORE
DESCRIPTORS: ACHING;CRAMPING;DISCOMFORT

## 2024-01-29 ASSESSMENT — PAIN - FUNCTIONAL ASSESSMENT
PAIN_FUNCTIONAL_ASSESSMENT: PREVENTS OR INTERFERES SOME ACTIVE ACTIVITIES AND ADLS
PAIN_FUNCTIONAL_ASSESSMENT: ACTIVITIES ARE NOT PREVENTED
PAIN_FUNCTIONAL_ASSESSMENT: PREVENTS OR INTERFERES SOME ACTIVE ACTIVITIES AND ADLS
PAIN_FUNCTIONAL_ASSESSMENT: ACTIVITIES ARE NOT PREVENTED

## 2024-01-29 ASSESSMENT — PAIN DESCRIPTION - ORIENTATION
ORIENTATION: ANTERIOR;LOWER
ORIENTATION: ANTERIOR;LOWER

## 2024-01-29 ASSESSMENT — PAIN DESCRIPTION - LOCATION
LOCATION: INCISION
LOCATION: ABDOMEN;INCISION;BACK
LOCATION: ABDOMEN;INCISION;BACK
LOCATION: INCISION

## 2024-01-29 NOTE — PROGRESS NOTES
Returned from nicu.Heplock intact.Mepilex dressing intact. Small amoount of old drainage noted. No increase.Encouraged to ambulate to nicu next time.Ebp at bedside.

## 2024-01-29 NOTE — LACTATION NOTE
Mom has been expressing colostrum for her premature baby in the NICU and plans on continuing to express milk when she goes home.  Mother needs to stimulate and maintain milk supply for infant admitted to NICU- will be facilitated with electric breast pump for home in order to supply adequate milk for baby.

## 2024-01-29 NOTE — PROGRESS NOTES
Progress Note    SUBJECTIVE:   Pt comfortable; +void; +flatus; +ambulation    OBJECTIVE:    VITALS:  /81   Pulse 87   Temp 98 °F (36.7 °C) (Oral)   Resp 16   LMP 06/02/2023   SpO2 97%   Breastfeeding Unknown   Physical Exam  ABDOMEN:  normal bowel sounds, non-distended, non-tender and Incision d/i  Ext: nt    DATA:  Hemoglobin/Hematocrit:    Lab Results   Component Value Date/Time    HGB 13.1 01/28/2024 06:22 AM    HCT 38.5 01/28/2024 06:22 AM       ASSESSMENT AND PLAN:  S/p ltcs  Active Problems:    34 weeks gestation of pregnancy  Resolved Problems:    * No resolved hospital problems. *    POD#2  Plan discharge home tomorrow    Electronically signed by Rona Carey DO on 1/29/2024 at 10:12 AM

## 2024-01-30 VITALS
TEMPERATURE: 97.9 F | RESPIRATION RATE: 16 BRPM | HEART RATE: 80 BPM | SYSTOLIC BLOOD PRESSURE: 126 MMHG | OXYGEN SATURATION: 97 % | DIASTOLIC BLOOD PRESSURE: 79 MMHG

## 2024-01-30 LAB
GLUCOSE BLD-MCNC: 100 MG/DL (ref 74–99)
GLUCOSE BLD-MCNC: 98 MG/DL (ref 74–99)

## 2024-01-30 PROCEDURE — 82962 GLUCOSE BLOOD TEST: CPT

## 2024-01-30 PROCEDURE — 6370000000 HC RX 637 (ALT 250 FOR IP): Performed by: OBSTETRICS & GYNECOLOGY

## 2024-01-30 RX ORDER — OXYCODONE HYDROCHLORIDE 5 MG/1
5 TABLET ORAL EVERY 6 HOURS PRN
Qty: 12 TABLET | Refills: 0 | Status: SHIPPED | OUTPATIENT
Start: 2024-01-30 | End: 2024-02-02

## 2024-01-30 RX ORDER — IBUPROFEN 800 MG/1
800 TABLET ORAL EVERY 8 HOURS PRN
Qty: 30 TABLET | Refills: 0 | Status: SHIPPED | OUTPATIENT
Start: 2024-01-30

## 2024-01-30 RX ORDER — NIFEDIPINE 30 MG/1
90 TABLET, FILM COATED, EXTENDED RELEASE ORAL DAILY
Qty: 30 TABLET | Refills: 1 | Status: SHIPPED | OUTPATIENT
Start: 2024-01-31

## 2024-01-30 RX ADMIN — Medication 1 TABLET: at 09:45

## 2024-01-30 RX ADMIN — IBUPROFEN 800 MG: 800 TABLET, FILM COATED ORAL at 12:06

## 2024-01-30 RX ADMIN — IBUPROFEN 800 MG: 800 TABLET, FILM COATED ORAL at 00:57

## 2024-01-30 RX ADMIN — DOXYLAMINE SUCCINATE 25 MG: 25 TABLET ORAL at 00:27

## 2024-01-30 RX ADMIN — SIMETHICONE 80 MG: 80 TABLET, CHEWABLE ORAL at 09:45

## 2024-01-30 RX ADMIN — NIFEDIPINE 90 MG: 30 TABLET, EXTENDED RELEASE ORAL at 09:45

## 2024-01-30 RX ADMIN — ACETAMINOPHEN 1000 MG: 500 TABLET ORAL at 05:52

## 2024-01-30 RX ADMIN — BUSPIRONE HYDROCHLORIDE 10 MG: 10 TABLET ORAL at 09:45

## 2024-01-30 ASSESSMENT — PAIN DESCRIPTION - ORIENTATION: ORIENTATION: LOWER

## 2024-01-30 ASSESSMENT — PAIN SCALES - GENERAL
PAINLEVEL_OUTOF10: 4
PAINLEVEL_OUTOF10: 3
PAINLEVEL_OUTOF10: 3

## 2024-01-30 ASSESSMENT — PAIN DESCRIPTION - DESCRIPTORS: DESCRIPTORS: SORE;TENDER;CRAMPING;DISCOMFORT

## 2024-01-30 ASSESSMENT — PAIN DESCRIPTION - LOCATION
LOCATION: INCISION
LOCATION: BACK

## 2024-01-30 ASSESSMENT — PAIN - FUNCTIONAL ASSESSMENT: PAIN_FUNCTIONAL_ASSESSMENT: ACTIVITIES ARE NOT PREVENTED

## 2024-01-30 ASSESSMENT — PAIN DESCRIPTION - RADICULAR PAIN: RADICULAR_PAIN: ABSENT

## 2024-01-30 NOTE — CARE COORDINATION
SW Discharge Planning   SW received consult for \"PPD 11 \"    SW met with Jazzy and introduced self and role. SW provided information, support and resources on post partum depression. SW also spent a length of time discussing Jazzy's anxiety of baby in the NICU and provided support.      Electronically signed by KYLER Boyd on 1/30/2024 at 2:01 PM

## 2024-01-30 NOTE — PROGRESS NOTES
Assuming care of patient. Patient resting in bed awake. Plan of care discussed with patient; patient verbalizes understanding. Call light in reach. RN checked patients 2 hour post dinner blood sugar it was 130, patient medicated see mar and patient stated she wanted to take a shower, rn removed iv. RN reminded patient to watch postpartum videos and do ppd paper.

## 2024-01-30 NOTE — LACTATION NOTE
Mom continues to express colostrum for premature baby in the NICU.  Gave mom more nipple cream.  Breast pump script needs signed instructed to take it to the NICU for signature.

## 2024-01-30 NOTE — PROGRESS NOTES
All patient paperwork was reviewed with patient. All patient questions were answered. Patient was discharged via wheelchair.

## 2024-01-30 NOTE — PROGRESS NOTES
Patient in wheel chair with significant other going down to NICU to see  and pump, rn stated to patient to call rn when she returns.

## 2024-01-31 LAB — SURGICAL PATHOLOGY REPORT: NORMAL

## 2024-01-31 NOTE — PROGRESS NOTES
CLINICAL PHARMACY NOTE: MEDS TO BEDS    Total # of Prescriptions Filled: 3   The following medications were delivered to the patient:  Oxycodone 5 mg   Nifedipione ER 30 mg   Ibuprofen 800 mg     Additional Documentation:

## 2024-02-09 ENCOUNTER — APPOINTMENT (OUTPATIENT)
Dept: CT IMAGING | Age: 24
End: 2024-02-09
Payer: COMMERCIAL

## 2024-02-09 ENCOUNTER — HOSPITAL ENCOUNTER (EMERGENCY)
Age: 24
Discharge: ANOTHER HOSPITAL | End: 2024-02-10
Attending: STUDENT IN AN ORGANIZED HEALTH CARE EDUCATION/TRAINING PROGRAM
Payer: COMMERCIAL

## 2024-02-09 DIAGNOSIS — R51.9 ACUTE NONINTRACTABLE HEADACHE, UNSPECIFIED HEADACHE TYPE: ICD-10-CM

## 2024-02-09 DIAGNOSIS — K65.1 INTRA-ABDOMINAL ABSCESS (HCC): Primary | ICD-10-CM

## 2024-02-09 LAB
ALBUMIN SERPL-MCNC: 3.4 G/DL (ref 3.5–5.2)
ALP SERPL-CCNC: 147 U/L (ref 35–104)
ALT SERPL-CCNC: 36 U/L (ref 0–32)
ANION GAP SERPL CALCULATED.3IONS-SCNC: 11 MMOL/L (ref 7–16)
AST SERPL-CCNC: 26 U/L (ref 0–31)
BASOPHILS # BLD: 0.09 K/UL (ref 0–0.2)
BASOPHILS NFR BLD: 1 % (ref 0–2)
BILIRUB SERPL-MCNC: 0.2 MG/DL (ref 0–1.2)
BILIRUB UR QL STRIP: NEGATIVE
BUN SERPL-MCNC: 14 MG/DL (ref 6–20)
CALCIUM SERPL-MCNC: 9.2 MG/DL (ref 8.6–10.2)
CHLORIDE SERPL-SCNC: 105 MMOL/L (ref 98–107)
CLARITY UR: CLEAR
CO2 SERPL-SCNC: 24 MMOL/L (ref 22–29)
COLOR UR: YELLOW
CREAT SERPL-MCNC: 0.5 MG/DL (ref 0.5–1)
EOSINOPHIL # BLD: 0.36 K/UL (ref 0.05–0.5)
EOSINOPHILS RELATIVE PERCENT: 4 % (ref 0–6)
ERYTHROCYTE [DISTWIDTH] IN BLOOD BY AUTOMATED COUNT: 12.9 % (ref 11.5–15)
GFR SERPL CREATININE-BSD FRML MDRD: >60 ML/MIN/1.73M2
GLUCOSE SERPL-MCNC: 145 MG/DL (ref 74–99)
GLUCOSE UR STRIP-MCNC: NEGATIVE MG/DL
HCT VFR BLD AUTO: 35.3 % (ref 34–48)
HGB BLD-MCNC: 12.7 G/DL (ref 11.5–15.5)
HGB UR QL STRIP.AUTO: ABNORMAL
IMM GRANULOCYTES # BLD AUTO: 0.05 K/UL (ref 0–0.58)
IMM GRANULOCYTES NFR BLD: 1 % (ref 0–5)
KETONES UR STRIP-MCNC: NEGATIVE MG/DL
LACTATE BLDV-SCNC: 1.8 MMOL/L (ref 0.5–2.2)
LEUKOCYTE ESTERASE UR QL STRIP: NEGATIVE
LYMPHOCYTES NFR BLD: 2.88 K/UL (ref 1.5–4)
LYMPHOCYTES RELATIVE PERCENT: 29 % (ref 20–42)
MCH RBC QN AUTO: 29.6 PG (ref 26–35)
MCHC RBC AUTO-ENTMCNC: 36 G/DL (ref 32–34.5)
MCV RBC AUTO: 82.3 FL (ref 80–99.9)
MONOCYTES NFR BLD: 0.58 K/UL (ref 0.1–0.95)
MONOCYTES NFR BLD: 6 % (ref 2–12)
NEUTROPHILS NFR BLD: 61 % (ref 43–80)
NEUTS SEG NFR BLD: 6.16 K/UL (ref 1.8–7.3)
NITRITE UR QL STRIP: NEGATIVE
PH UR STRIP: 6 [PH] (ref 5–9)
PLATELET # BLD AUTO: 313 K/UL (ref 130–450)
PMV BLD AUTO: 9.7 FL (ref 7–12)
POTASSIUM SERPL-SCNC: 4 MMOL/L (ref 3.5–5)
PROT SERPL-MCNC: 6.8 G/DL (ref 6.4–8.3)
PROT UR STRIP-MCNC: 100 MG/DL
RBC # BLD AUTO: 4.29 M/UL (ref 3.5–5.5)
RBC #/AREA URNS HPF: ABNORMAL /HPF
SODIUM SERPL-SCNC: 140 MMOL/L (ref 132–146)
SP GR UR STRIP: 1.02 (ref 1–1.03)
UROBILINOGEN UR STRIP-ACNC: 0.2 EU/DL (ref 0–1)
WBC #/AREA URNS HPF: ABNORMAL /HPF
WBC OTHER # BLD: 10.1 K/UL (ref 4.5–11.5)

## 2024-02-09 PROCEDURE — 6360000004 HC RX CONTRAST MEDICATION: Performed by: RADIOLOGY

## 2024-02-09 PROCEDURE — 87040 BLOOD CULTURE FOR BACTERIA: CPT

## 2024-02-09 PROCEDURE — 99285 EMERGENCY DEPT VISIT HI MDM: CPT

## 2024-02-09 PROCEDURE — 6360000002 HC RX W HCPCS: Performed by: STUDENT IN AN ORGANIZED HEALTH CARE EDUCATION/TRAINING PROGRAM

## 2024-02-09 PROCEDURE — 87070 CULTURE OTHR SPECIMN AEROBIC: CPT

## 2024-02-09 PROCEDURE — 96375 TX/PRO/DX INJ NEW DRUG ADDON: CPT

## 2024-02-09 PROCEDURE — 81001 URINALYSIS AUTO W/SCOPE: CPT

## 2024-02-09 PROCEDURE — 85025 COMPLETE CBC W/AUTO DIFF WBC: CPT

## 2024-02-09 PROCEDURE — 83615 LACTATE (LD) (LDH) ENZYME: CPT

## 2024-02-09 PROCEDURE — 74177 CT ABD & PELVIS W/CONTRAST: CPT

## 2024-02-09 PROCEDURE — 87077 CULTURE AEROBIC IDENTIFY: CPT

## 2024-02-09 PROCEDURE — 87075 CULTR BACTERIA EXCEPT BLOOD: CPT

## 2024-02-09 PROCEDURE — 2580000003 HC RX 258: Performed by: STUDENT IN AN ORGANIZED HEALTH CARE EDUCATION/TRAINING PROGRAM

## 2024-02-09 PROCEDURE — 82570 ASSAY OF URINE CREATININE: CPT

## 2024-02-09 PROCEDURE — 84156 ASSAY OF PROTEIN URINE: CPT

## 2024-02-09 PROCEDURE — 83605 ASSAY OF LACTIC ACID: CPT

## 2024-02-09 PROCEDURE — 70450 CT HEAD/BRAIN W/O DYE: CPT

## 2024-02-09 PROCEDURE — 80053 COMPREHEN METABOLIC PANEL: CPT

## 2024-02-09 PROCEDURE — 87205 SMEAR GRAM STAIN: CPT

## 2024-02-09 RX ORDER — 0.9 % SODIUM CHLORIDE 0.9 %
1000 INTRAVENOUS SOLUTION INTRAVENOUS ONCE
Status: COMPLETED | OUTPATIENT
Start: 2024-02-09 | End: 2024-02-09

## 2024-02-09 RX ORDER — PROCHLORPERAZINE EDISYLATE 5 MG/ML
10 INJECTION INTRAMUSCULAR; INTRAVENOUS ONCE
Status: COMPLETED | OUTPATIENT
Start: 2024-02-09 | End: 2024-02-09

## 2024-02-09 RX ADMIN — IOPAMIDOL 75 ML: 755 INJECTION, SOLUTION INTRAVENOUS at 22:23

## 2024-02-09 RX ADMIN — SODIUM CHLORIDE 1000 ML: 9 INJECTION, SOLUTION INTRAVENOUS at 21:46

## 2024-02-09 RX ADMIN — PROCHLORPERAZINE EDISYLATE 10 MG: 5 INJECTION INTRAMUSCULAR; INTRAVENOUS at 21:47

## 2024-02-09 ASSESSMENT — PAIN - FUNCTIONAL ASSESSMENT: PAIN_FUNCTIONAL_ASSESSMENT: NONE - DENIES PAIN

## 2024-02-10 ENCOUNTER — HOSPITAL ENCOUNTER (INPATIENT)
Age: 24
LOS: 1 days | Discharge: HOME | DRG: 561 | End: 2024-02-13
Attending: OBSTETRICS & GYNECOLOGY | Admitting: OBSTETRICS & GYNECOLOGY
Payer: COMMERCIAL

## 2024-02-10 VITALS
RESPIRATION RATE: 18 BRPM | BODY MASS INDEX: 45.99 KG/M2 | SYSTOLIC BLOOD PRESSURE: 153 MMHG | WEIGHT: 293 LBS | DIASTOLIC BLOOD PRESSURE: 81 MMHG | HEART RATE: 91 BPM | TEMPERATURE: 97.6 F | HEIGHT: 67 IN | OXYGEN SATURATION: 97 %

## 2024-02-10 DIAGNOSIS — T81.49XA POST-OPERATIVE WOUND ABSCESS: Primary | ICD-10-CM

## 2024-02-10 LAB
ASO AB SERPL-ACNC: 72 IU/ML (ref 0–200)
CREAT UR-MCNC: 64.2 MG/DL (ref 29–226)
CRP SERPL HS-MCNC: 13 MG/L (ref 0–5)
ERYTHROCYTE [SEDIMENTATION RATE] IN BLOOD BY WESTERGREN METHOD: 58 MM/HR (ref 0–20)
LDH SERPL-CCNC: 257 U/L (ref 135–214)
TOTAL PROTEIN, URINE: 94 MG/DL (ref 0–12)
URINE TOTAL PROTEIN CREATININE RATIO: 1.46 (ref 0–0.2)

## 2024-02-10 PROCEDURE — 96366 THER/PROPH/DIAG IV INF ADDON: CPT

## 2024-02-10 PROCEDURE — 6360000002 HC RX W HCPCS

## 2024-02-10 PROCEDURE — 6370000000 HC RX 637 (ALT 250 FOR IP): Performed by: MIDWIFE

## 2024-02-10 PROCEDURE — 6360000002 HC RX W HCPCS: Performed by: EMERGENCY MEDICINE

## 2024-02-10 PROCEDURE — 2580000003 HC RX 258: Performed by: STUDENT IN AN ORGANIZED HEALTH CARE EDUCATION/TRAINING PROGRAM

## 2024-02-10 PROCEDURE — 96365 THER/PROPH/DIAG IV INF INIT: CPT

## 2024-02-10 PROCEDURE — 86063 ANTISTREPTOLYSIN O SCREEN: CPT

## 2024-02-10 PROCEDURE — G0378 HOSPITAL OBSERVATION PER HR: HCPCS

## 2024-02-10 PROCEDURE — 2580000003 HC RX 258: Performed by: SPECIALIST

## 2024-02-10 PROCEDURE — 96367 TX/PROPH/DG ADDL SEQ IV INF: CPT

## 2024-02-10 PROCEDURE — G0379 DIRECT REFER HOSPITAL OBSERV: HCPCS

## 2024-02-10 PROCEDURE — 6360000002 HC RX W HCPCS: Performed by: STUDENT IN AN ORGANIZED HEALTH CARE EDUCATION/TRAINING PROGRAM

## 2024-02-10 PROCEDURE — 96375 TX/PRO/DX INJ NEW DRUG ADDON: CPT

## 2024-02-10 PROCEDURE — 86140 C-REACTIVE PROTEIN: CPT

## 2024-02-10 PROCEDURE — 2580000003 HC RX 258: Performed by: EMERGENCY MEDICINE

## 2024-02-10 PROCEDURE — 6360000002 HC RX W HCPCS: Performed by: SPECIALIST

## 2024-02-10 PROCEDURE — 85652 RBC SED RATE AUTOMATED: CPT

## 2024-02-10 RX ORDER — SODIUM CHLORIDE 0.9 % (FLUSH) 0.9 %
5-40 SYRINGE (ML) INJECTION EVERY 12 HOURS SCHEDULED
Status: DISCONTINUED | OUTPATIENT
Start: 2024-02-10 | End: 2024-02-10 | Stop reason: HOSPADM

## 2024-02-10 RX ORDER — SODIUM CHLORIDE 9 MG/ML
INJECTION, SOLUTION INTRAVENOUS PRN
Status: DISCONTINUED | OUTPATIENT
Start: 2024-02-10 | End: 2024-02-10 | Stop reason: HOSPADM

## 2024-02-10 RX ORDER — MAGNESIUM SULFATE IN WATER 40 MG/ML
INJECTION, SOLUTION INTRAVENOUS
Status: COMPLETED
Start: 2024-02-10 | End: 2024-02-10

## 2024-02-10 RX ORDER — BUSPIRONE HYDROCHLORIDE 5 MG/1
10 TABLET ORAL 2 TIMES DAILY
Status: DISCONTINUED | OUTPATIENT
Start: 2024-02-10 | End: 2024-02-13 | Stop reason: HOSPADM

## 2024-02-10 RX ORDER — MAGNESIUM SULFATE HEPTAHYDRATE 40 MG/ML
4000 INJECTION, SOLUTION INTRAVENOUS ONCE
Status: COMPLETED | OUTPATIENT
Start: 2024-02-10 | End: 2024-02-10

## 2024-02-10 RX ORDER — NIFEDIPINE 30 MG/1
30 TABLET, EXTENDED RELEASE ORAL ONCE
Status: DISCONTINUED | OUTPATIENT
Start: 2024-02-10 | End: 2024-02-10 | Stop reason: HOSPADM

## 2024-02-10 RX ORDER — LABETALOL HYDROCHLORIDE 5 MG/ML
40 INJECTION, SOLUTION INTRAVENOUS
Status: DISCONTINUED | OUTPATIENT
Start: 2024-02-10 | End: 2024-02-10 | Stop reason: HOSPADM

## 2024-02-10 RX ORDER — LABETALOL HYDROCHLORIDE 5 MG/ML
80 INJECTION, SOLUTION INTRAVENOUS
Status: DISCONTINUED | OUTPATIENT
Start: 2024-02-10 | End: 2024-02-10 | Stop reason: HOSPADM

## 2024-02-10 RX ORDER — SODIUM CHLORIDE 0.9 % (FLUSH) 0.9 %
5-40 SYRINGE (ML) INJECTION PRN
Status: DISCONTINUED | OUTPATIENT
Start: 2024-02-10 | End: 2024-02-10 | Stop reason: HOSPADM

## 2024-02-10 RX ORDER — DIPHENHYDRAMINE HYDROCHLORIDE 50 MG/ML
50 INJECTION INTRAMUSCULAR; INTRAVENOUS ONCE
Status: COMPLETED | OUTPATIENT
Start: 2024-02-10 | End: 2024-02-10

## 2024-02-10 RX ORDER — LABETALOL HYDROCHLORIDE 5 MG/ML
20 INJECTION, SOLUTION INTRAVENOUS
Status: DISCONTINUED | OUTPATIENT
Start: 2024-02-10 | End: 2024-02-10 | Stop reason: HOSPADM

## 2024-02-10 RX ORDER — HYDRALAZINE HYDROCHLORIDE 20 MG/ML
10 INJECTION INTRAMUSCULAR; INTRAVENOUS
Status: DISCONTINUED | OUTPATIENT
Start: 2024-02-10 | End: 2024-02-10 | Stop reason: HOSPADM

## 2024-02-10 RX ORDER — SODIUM CHLORIDE, SODIUM LACTATE, POTASSIUM CHLORIDE, CALCIUM CHLORIDE 600; 310; 30; 20 MG/100ML; MG/100ML; MG/100ML; MG/100ML
INJECTION, SOLUTION INTRAVENOUS CONTINUOUS
Status: DISCONTINUED | OUTPATIENT
Start: 2024-02-10 | End: 2024-02-10 | Stop reason: HOSPADM

## 2024-02-10 RX ORDER — NIFEDIPINE 30 MG/1
90 TABLET, FILM COATED, EXTENDED RELEASE ORAL DAILY
Status: DISCONTINUED | OUTPATIENT
Start: 2024-02-10 | End: 2024-02-13 | Stop reason: HOSPADM

## 2024-02-10 RX ORDER — HYDRALAZINE HYDROCHLORIDE 20 MG/ML
10 INJECTION INTRAMUSCULAR; INTRAVENOUS ONCE
Status: COMPLETED | OUTPATIENT
Start: 2024-02-10 | End: 2024-02-10

## 2024-02-10 RX ORDER — CALCIUM GLUCONATE 94 MG/ML
1000 INJECTION, SOLUTION INTRAVENOUS PRN
Status: DISCONTINUED | OUTPATIENT
Start: 2024-02-10 | End: 2024-02-10 | Stop reason: HOSPADM

## 2024-02-10 RX ORDER — ACETAMINOPHEN 325 MG/1
650 TABLET ORAL EVERY 4 HOURS PRN
Status: DISCONTINUED | OUTPATIENT
Start: 2024-02-10 | End: 2024-02-11

## 2024-02-10 RX ADMIN — PIPERACILLIN AND TAZOBACTAM 4500 MG: 4; .5 INJECTION, POWDER, FOR SOLUTION INTRAVENOUS at 00:12

## 2024-02-10 RX ADMIN — MAGNESIUM SULFATE HEPTAHYDRATE 2000 MG/HR: 40 INJECTION, SOLUTION INTRAVENOUS at 10:43

## 2024-02-10 RX ADMIN — SODIUM CHLORIDE, POTASSIUM CHLORIDE, SODIUM LACTATE AND CALCIUM CHLORIDE: 600; 310; 30; 20 INJECTION, SOLUTION INTRAVENOUS at 10:12

## 2024-02-10 RX ADMIN — ACETAMINOPHEN 650 MG: 325 TABLET ORAL at 19:56

## 2024-02-10 RX ADMIN — VANCOMYCIN HYDROCHLORIDE 1250 MG: 1 INJECTION, POWDER, LYOPHILIZED, FOR SOLUTION INTRAVENOUS at 00:48

## 2024-02-10 RX ADMIN — AMPICILLIN SODIUM AND SULBACTAM SODIUM 3000 MG: 2; 1 INJECTION, POWDER, FOR SOLUTION INTRAMUSCULAR; INTRAVENOUS at 15:51

## 2024-02-10 RX ADMIN — BUSPIRONE HYDROCHLORIDE 10 MG: 5 TABLET ORAL at 19:49

## 2024-02-10 RX ADMIN — MAGNESIUM SULFATE HEPTAHYDRATE 4000 MG: 40 INJECTION, SOLUTION INTRAVENOUS at 09:49

## 2024-02-10 RX ADMIN — HYDRALAZINE HYDROCHLORIDE 10 MG: 20 INJECTION, SOLUTION INTRAMUSCULAR; INTRAVENOUS at 05:20

## 2024-02-10 RX ADMIN — NIFEDIPINE 90 MG: 30 TABLET, EXTENDED RELEASE ORAL at 16:11

## 2024-02-10 RX ADMIN — DIPHENHYDRAMINE HYDROCHLORIDE 50 MG: 50 INJECTION INTRAMUSCULAR; INTRAVENOUS at 01:53

## 2024-02-10 RX ADMIN — AMPICILLIN SODIUM AND SULBACTAM SODIUM 3000 MG: 2; 1 INJECTION, POWDER, FOR SOLUTION INTRAMUSCULAR; INTRAVENOUS at 19:52

## 2024-02-10 RX ADMIN — ACETAMINOPHEN 650 MG: 325 TABLET ORAL at 14:16

## 2024-02-10 ASSESSMENT — ENCOUNTER SYMPTOMS
DIARRHEA: 0
SHORTNESS OF BREATH: 0
BACK PAIN: 0
NAUSEA: 0
COUGH: 0
SORE THROAT: 0
PHOTOPHOBIA: 0
ABDOMINAL PAIN: 1

## 2024-02-10 ASSESSMENT — PAIN - FUNCTIONAL ASSESSMENT
PAIN_FUNCTIONAL_ASSESSMENT: 0-10
PAIN_FUNCTIONAL_ASSESSMENT: NONE - DENIES PAIN
PAIN_FUNCTIONAL_ASSESSMENT: NONE - DENIES PAIN

## 2024-02-10 ASSESSMENT — PAIN SCALES - GENERAL: PAINLEVEL_OUTOF10: 0

## 2024-02-10 NOTE — CONSULTS
CHOLECYSTECTOMY LAPAROSCOPIC WITH INTRAOP CHOLANGIOGRAM performed by Cisco Vasquez MD at [facility] OR    REMOVAL FOREIGN BODY EAR      age 6    TONSILLECTOMY AND ADENOIDECTOMY N/A 2021    TONSILLECTOMY performed by Ellis Lackey DO at [facility] OR    UPPER GASTROINTESTINAL ENDOSCOPY N/A 2023    EGD BIOPSY performed by Derick Gabriel MD at [facility] ENDOSCOPY    WISDOM TOOTH EXTRACTION       Current Medications:   Scheduled Meds:   NIFEdipine  90 mg Oral Daily    busPIRone  10 mg Oral BID     Continuous Infusions:  PRN Meds:acetaminophen    Allergies:  Red dye, Sumatriptan, Vancomycin, and Doxycycline    Social History:   Social History     Socioeconomic History    Marital status: Single   Tobacco Use    Smoking status: Former     Current packs/day: 0.00     Types: Cigarettes, E-Cigarettes     Quit date: 2018     Years since quittin.5    Smokeless tobacco: Never    Tobacco comments:     Weaned off vapes once she found out she was pregnant. Took about 4 weeks      Stopped smoking cigarettes in .    Vaping Use    Vaping Use: Former    Start date: 10/2/2015    Substances: Nicotine, Flavoring    Devices: Disposable, Pre-filled or refillable cartridge, Refillable tank, Pre-filled pod   Substance and Sexual Activity    Alcohol use: Not Currently     Comment: Only on occasion    Drug use: Not Currently     Comment: Took gummies with CBD until she found out about the pregnnacy    Sexual activity: Yes     Partners: Male     Social Determinants of Health     Food Insecurity: No Food Insecurity (2/10/2024)    Hunger Vital Sign     Worried About Running Out of Food in the Last Year: Never true     Ran Out of Food in the Last Year: Never true   Transportation Needs: No Transportation Needs (2/10/2024)    PRAPARE - Transportation     Lack of Transportation (Medical): No     Lack of Transportation (Non-Medical): No   Housing Stability: Low Risk  (2/10/2024)    Housing Stability Vital Sign     Unable to Pay

## 2024-02-10 NOTE — ED PROVIDER NOTES
Patient noted to continually be hypertensive despite treatment overnight with IV hydralazine.  She did come in with chief complaint of headache.  Will start ED preeclampsia protocol orders which includes IV mag, IV labetalol, seizure precautions, etc.  She is neurovascularly intact at this time.  Still awaiting transport to [facility].  There has been a significant delay by [facility] emergency service.  Attempted to outsourced to another ambulance service but was unsuccessful.  Will monitor for routine medications, breast pumping needs and IV antibiotic every 8-12 hours.     Nicol Mosqueda, DO  02/10/24 0938    
Patient BP slightly elevated at 136/94, which downtrended.  After shared decision-making, patient given IV Compazine.  CT head negative for intracranial hemorrhage or any acute pathology.  CT abdomen and pelvis was obtained, showed fluid collection in anterior abdominal wall surgical site with largest measuring 8.6 x 2.8 cm.  Cultures obtained and patient given vancomycin and Zosyn IV.  I did discuss patient with Arleen, certified midwife with Dr. Carey, patient's OB/GYN, who accepted patient for admission.  Patient did get relief of headache with Compazine.  She is to be transferred to [facility] pending bed and transport availability.    Differential diagnoses included but are not limited to: Preeclampsia, migraine, intracranial hemorrhage, CVA, postop infection, etc.    Amount and/or Complexity of Data Reviewed  Labs: ordered.     Details: All labs reviewed by me  Radiology: ordered and independent interpretation performed.     Details: All imaging reviewed by me, final interpretation per radiologist    Risk  Prescription drug management.        Is this patient to be included in the SEP-1 core measure? No Exclusion criteria - the patient is NOT to be included for SEP-1 Core Measure due to: 2+ SIRS criteria are not met      ED Course as of 02/10/24 0218   Sat Feb 10, 2024   0004 Spoke to Arleen, certified nurse midwife with Dr. Carey, OB/GYN.  She accepts patient for transfer to [place]. [AP]      ED Course User Index  [AP] Suze Mendoza MD            Counseling:  I have spoken with the patient and discussed today’s results, in addition to providing specific details for the plan of care and counseling regarding the diagnosis and prognosis.  Their questions are answered at this time and they are agreeable with the plan of admission.    --------------------------------- ADDITIONAL PROVIDER NOTES ---------------------------------    This patient's ED course included: a personal history and

## 2024-02-11 LAB
ERYTHROCYTE [DISTWIDTH] IN BLOOD BY AUTOMATED COUNT: 12.8 % (ref 11.5–15)
HCT VFR BLD AUTO: 36.9 % (ref 34–48)
HGB BLD-MCNC: 12.4 G/DL (ref 11.5–15.5)
MCH RBC QN AUTO: 29.2 PG (ref 26–35)
MCHC RBC AUTO-ENTMCNC: 33.6 G/DL (ref 32–34.5)
MCV RBC AUTO: 86.8 FL (ref 80–99.9)
MICROORGANISM SPEC CULT: NORMAL
MICROORGANISM SPEC CULT: NORMAL
MICROORGANISM/AGENT SPEC: NORMAL
PLATELET # BLD AUTO: 326 K/UL (ref 130–450)
PMV BLD AUTO: 10 FL (ref 7–12)
RBC # BLD AUTO: 4.25 M/UL (ref 3.5–5.5)
SERVICE CMNT-IMP: NORMAL
SPECIMEN DESCRIPTION: NORMAL
SPECIMEN DESCRIPTION: NORMAL
WBC OTHER # BLD: 7.8 K/UL (ref 4.5–11.5)

## 2024-02-11 PROCEDURE — 2580000003 HC RX 258: Performed by: SPECIALIST

## 2024-02-11 PROCEDURE — 6370000000 HC RX 637 (ALT 250 FOR IP): Performed by: MIDWIFE

## 2024-02-11 PROCEDURE — G0378 HOSPITAL OBSERVATION PER HR: HCPCS

## 2024-02-11 PROCEDURE — 36415 COLL VENOUS BLD VENIPUNCTURE: CPT

## 2024-02-11 PROCEDURE — 96366 THER/PROPH/DIAG IV INF ADDON: CPT

## 2024-02-11 PROCEDURE — 85027 COMPLETE CBC AUTOMATED: CPT

## 2024-02-11 PROCEDURE — 6360000002 HC RX W HCPCS: Performed by: SPECIALIST

## 2024-02-11 RX ORDER — IBUPROFEN 600 MG/1
600 TABLET ORAL EVERY 6 HOURS PRN
Status: DISCONTINUED | OUTPATIENT
Start: 2024-02-11 | End: 2024-02-13 | Stop reason: HOSPADM

## 2024-02-11 RX ORDER — ACETAMINOPHEN 325 MG/1
650 TABLET ORAL EVERY 4 HOURS PRN
Status: DISCONTINUED | OUTPATIENT
Start: 2024-02-11 | End: 2024-02-13 | Stop reason: HOSPADM

## 2024-02-11 RX ADMIN — IBUPROFEN 600 MG: 600 TABLET, FILM COATED ORAL at 13:49

## 2024-02-11 RX ADMIN — ACETAMINOPHEN 650 MG: 325 TABLET ORAL at 00:08

## 2024-02-11 RX ADMIN — AMPICILLIN SODIUM AND SULBACTAM SODIUM 3000 MG: 2; 1 INJECTION, POWDER, FOR SOLUTION INTRAMUSCULAR; INTRAVENOUS at 08:48

## 2024-02-11 RX ADMIN — AMPICILLIN SODIUM AND SULBACTAM SODIUM 3000 MG: 2; 1 INJECTION, POWDER, FOR SOLUTION INTRAMUSCULAR; INTRAVENOUS at 02:46

## 2024-02-11 RX ADMIN — AMPICILLIN SODIUM AND SULBACTAM SODIUM 3000 MG: 2; 1 INJECTION, POWDER, FOR SOLUTION INTRAMUSCULAR; INTRAVENOUS at 20:10

## 2024-02-11 RX ADMIN — NIFEDIPINE 90 MG: 30 TABLET, EXTENDED RELEASE ORAL at 08:40

## 2024-02-11 RX ADMIN — ACETAMINOPHEN 650 MG: 325 TABLET ORAL at 08:43

## 2024-02-11 RX ADMIN — IBUPROFEN 600 MG: 600 TABLET, FILM COATED ORAL at 23:38

## 2024-02-11 RX ADMIN — BUSPIRONE HYDROCHLORIDE 10 MG: 5 TABLET ORAL at 08:40

## 2024-02-11 RX ADMIN — AMPICILLIN SODIUM AND SULBACTAM SODIUM 3000 MG: 2; 1 INJECTION, POWDER, FOR SOLUTION INTRAMUSCULAR; INTRAVENOUS at 16:03

## 2024-02-11 RX ADMIN — BUSPIRONE HYDROCHLORIDE 10 MG: 5 TABLET ORAL at 20:08

## 2024-02-12 LAB
INR PPP: 1
MICROORGANISM SPEC CULT: NORMAL
PARTIAL THROMBOPLASTIN TIME: 37.4 SEC (ref 24.5–35.1)
PROTHROMBIN TIME: 11.6 SEC (ref 9.3–12.4)
SPECIMEN DESCRIPTION: NORMAL

## 2024-02-12 PROCEDURE — 6370000000 HC RX 637 (ALT 250 FOR IP): Performed by: MIDWIFE

## 2024-02-12 PROCEDURE — 6360000002 HC RX W HCPCS: Performed by: SPECIALIST

## 2024-02-12 PROCEDURE — 85730 THROMBOPLASTIN TIME PARTIAL: CPT

## 2024-02-12 PROCEDURE — 1200000000 HC SEMI PRIVATE

## 2024-02-12 PROCEDURE — 85610 PROTHROMBIN TIME: CPT

## 2024-02-12 PROCEDURE — 6370000000 HC RX 637 (ALT 250 FOR IP): Performed by: REGISTERED NURSE

## 2024-02-12 PROCEDURE — 36415 COLL VENOUS BLD VENIPUNCTURE: CPT

## 2024-02-12 PROCEDURE — 2580000003 HC RX 258: Performed by: SPECIALIST

## 2024-02-12 PROCEDURE — 96366 THER/PROPH/DIAG IV INF ADDON: CPT

## 2024-02-12 RX ORDER — LINEZOLID 600 MG/1
600 TABLET, FILM COATED ORAL EVERY 12 HOURS SCHEDULED
Status: DISCONTINUED | OUTPATIENT
Start: 2024-02-12 | End: 2024-02-13

## 2024-02-12 RX ADMIN — AMPICILLIN SODIUM AND SULBACTAM SODIUM 3000 MG: 2; 1 INJECTION, POWDER, FOR SOLUTION INTRAMUSCULAR; INTRAVENOUS at 15:44

## 2024-02-12 RX ADMIN — BUSPIRONE HYDROCHLORIDE 10 MG: 5 TABLET ORAL at 21:17

## 2024-02-12 RX ADMIN — IBUPROFEN 600 MG: 600 TABLET, FILM COATED ORAL at 10:37

## 2024-02-12 RX ADMIN — LINEZOLID 600 MG: 600 TABLET, FILM COATED ORAL at 21:17

## 2024-02-12 RX ADMIN — AMPICILLIN SODIUM AND SULBACTAM SODIUM 3000 MG: 2; 1 INJECTION, POWDER, FOR SOLUTION INTRAMUSCULAR; INTRAVENOUS at 09:28

## 2024-02-12 RX ADMIN — AMPICILLIN SODIUM AND SULBACTAM SODIUM 3000 MG: 2; 1 INJECTION, POWDER, FOR SOLUTION INTRAMUSCULAR; INTRAVENOUS at 02:47

## 2024-02-12 RX ADMIN — IBUPROFEN 600 MG: 600 TABLET, FILM COATED ORAL at 17:49

## 2024-02-12 RX ADMIN — AMPICILLIN SODIUM AND SULBACTAM SODIUM 3000 MG: 2; 1 INJECTION, POWDER, FOR SOLUTION INTRAMUSCULAR; INTRAVENOUS at 21:19

## 2024-02-12 RX ADMIN — BUSPIRONE HYDROCHLORIDE 10 MG: 5 TABLET ORAL at 09:15

## 2024-02-12 RX ADMIN — ACETAMINOPHEN 650 MG: 325 TABLET ORAL at 13:51

## 2024-02-12 RX ADMIN — NIFEDIPINE 90 MG: 30 TABLET, EXTENDED RELEASE ORAL at 09:15

## 2024-02-12 NOTE — CARE COORDINATION
Social Work discharge planning  SW met with pt, and discussed ID plan \"probably on IV and oral antibiotics for 10 to 14 days\". Pt has [facility].  She said  she does NOT want an infusion center due to having a  baby. Discussed [facility]hc choices and she had no preference. Referral called to [facility]. Will se IF they can staff pt. Will check insurance for zyvox coverage once have Rx for pharmacy.  Electronically signed by JASE Samson on 2024 at 2:44 PM

## 2024-02-12 NOTE — CARE COORDINATION
Social Work discharge planning  SW met with pt for discharge planning. She anticipates IR drain today. She lives with her partner. She  said her baby is in NICU at [facility].  She said Dr Villatoro is her PCP. She uses [facility] on [address]. Pt on IV Unasyn presently. Pt said she hopes for po atb at discharge. SW will continue to follow for discharge planning.  Electronically signed by JASE Samson on 2/12/2024 at 10:07 AM

## 2024-02-13 VITALS
BODY MASS INDEX: 45.99 KG/M2 | RESPIRATION RATE: 18 BRPM | TEMPERATURE: 98 F | SYSTOLIC BLOOD PRESSURE: 136 MMHG | OXYGEN SATURATION: 96 % | WEIGHT: 293 LBS | HEIGHT: 67 IN | HEART RATE: 99 BPM | DIASTOLIC BLOOD PRESSURE: 86 MMHG

## 2024-02-13 LAB
MICROORGANISM SPEC CULT: ABNORMAL
MICROORGANISM/AGENT SPEC: ABNORMAL
SPECIMEN DESCRIPTION: ABNORMAL

## 2024-02-13 PROCEDURE — 6370000000 HC RX 637 (ALT 250 FOR IP): Performed by: MIDWIFE

## 2024-02-13 PROCEDURE — 2580000003 HC RX 258: Performed by: SPECIALIST

## 2024-02-13 PROCEDURE — 6370000000 HC RX 637 (ALT 250 FOR IP): Performed by: REGISTERED NURSE

## 2024-02-13 PROCEDURE — 6360000002 HC RX W HCPCS: Performed by: SPECIALIST

## 2024-02-13 RX ORDER — AMOXICILLIN AND CLAVULANATE POTASSIUM 600; 42.9 MG/5ML; MG/5ML
1800 POWDER, FOR SUSPENSION ORAL EVERY 12 HOURS SCHEDULED
Status: DISCONTINUED | OUTPATIENT
Start: 2024-02-13 | End: 2024-02-13 | Stop reason: HOSPADM

## 2024-02-13 RX ORDER — AMOXICILLIN AND CLAVULANATE POTASSIUM 600; 42.9 MG/5ML; MG/5ML
1800 POWDER, FOR SUSPENSION ORAL EVERY 12 HOURS SCHEDULED
Qty: 300 ML | Refills: 0 | Status: SHIPPED | OUTPATIENT
Start: 2024-02-13 | End: 2024-02-23

## 2024-02-13 RX ORDER — NIFEDIPINE 30 MG/1
90 TABLET, FILM COATED, EXTENDED RELEASE ORAL DAILY
Qty: 30 TABLET | Refills: 3 | Status: SHIPPED | OUTPATIENT
Start: 2024-02-14

## 2024-02-13 RX ADMIN — NIFEDIPINE 90 MG: 30 TABLET, EXTENDED RELEASE ORAL at 09:02

## 2024-02-13 RX ADMIN — LINEZOLID 600 MG: 600 TABLET, FILM COATED ORAL at 09:02

## 2024-02-13 RX ADMIN — AMPICILLIN SODIUM AND SULBACTAM SODIUM 3000 MG: 2; 1 INJECTION, POWDER, FOR SOLUTION INTRAMUSCULAR; INTRAVENOUS at 02:47

## 2024-02-13 RX ADMIN — AMPICILLIN SODIUM AND SULBACTAM SODIUM 3000 MG: 2; 1 INJECTION, POWDER, FOR SOLUTION INTRAMUSCULAR; INTRAVENOUS at 09:04

## 2024-02-13 RX ADMIN — BUSPIRONE HYDROCHLORIDE 10 MG: 5 TABLET ORAL at 09:02

## 2024-02-13 NOTE — CARE COORDINATION
Social Work discharge planning  Per Aliza with [facility], they can see pt at home today IF iv atb needed and pt gets picc. CELE faxed pharmacy form to Aliza and original in pt's chart.   Electronically signed by JASE Samson on 2/13/2024 at 9:20 AM     Addendum - Noted ID plan for po atb at discharge. CELE cancelled [facility] with liaison Aliza.  Electronically signed by JASE Samson on 2/13/2024 at 2:20 PM

## 2024-02-14 NOTE — PROGRESS NOTES
After review of case with DR Knowles it was determined that there is not enough fluid in the superficial collection for abscess drain placement at this time.  
Arleen NP with Dr henson notified that IR drain will not happen til Monday   
CLINICAL PHARMACY NOTE: MEDS TO BEDS    Total # of Prescriptions Filled: 1   The following medications were delivered to the patient:  Amox-pot clav 600/42.9 vito    Additional Documentation:    
Call placed to Dr Carey for admitting orders  
Left message at Dr Carey's office regarding IR not being able to drain fluid, too small amount  
[facility]  [facility]  Progress Note      SUBJECTIVE:  Patient is sitting up in bed   in room  No fevers  Says she is great  Tolerating antibiotics     Review of systems:  As stated above in the chief complaint, otherwise negative.    Medications:  Scheduled Meds:   linezolid  600 mg Oral 2 times per day    NIFEdipine  90 mg Oral Daily    busPIRone  10 mg Oral BID    ampicillin-sulbactam  3,000 mg IntraVENous Q6H     Continuous Infusions:  PRN Meds:ibuprofen, acetaminophen    OBJECTIVE:  /86   Pulse 99   Temp 98 °F (36.7 °C) (Oral)   Resp 18   Ht 1.702 m (5' 7\")   Wt (!) 158.2 kg (348 lb 12.3 oz)   LMP 2023   SpO2 96%   BMI 54.62 kg/m²   Temp  Av.5 °F (36.9 °C)  Min: 98 °F (36.7 °C)  Max: 99 °F (37.2 °C)  Constitutional: The patient is awake, alert, and oriented. Sitting up in bed,  in room  Skin: Warm and dry. No rashes were noted.   HEENT: Round and reactive pupils.  Moist mucous membranes.  No ulcerations or thrush.  Neck: Supple to movements.   Chest: No respiratory distress. Symmetrical expansion.  No wheezing, crackles or rhonchi.  Cardiovascular: heart sounds rhythmic and regular. No murmurs appreciated.   Abdomen: Positive bowel sounds to auscultation. Benign to palpation. No masses felt. Lower abdominal wound dressed, minimal to no drainage noted   Extremities: No edema.  Lines: peripheral    Laboratory and Tests Review:  Lab Results   Component Value Date    CRP 13.0 (H) 02/10/2024     Lab Results   Component Value Date    SEDRATE 58 (H) 02/10/2024    SEDRATE 19 2023     Radiology:  Reviewed    Microbiology:  Blood cultures : negative so far  Wound culture from incision : MSSA, mixed GNR, Diphtheroids     Assessment:  Status post  2024  Surgical site infection  Abdominal wall abscess  Red person syndrome with Vancomycin     Plan:    Continue Unasyn & Linezolid for now   Labs and cultures reviewed     Vicki 
Page out to Arleen ESQUIVEL with Dr Carey  
Progress Note    SUBJECTIVE:   Pt comfortable; +void; +flatus; +ambulation; somewhat anxious for IR procedure    OBJECTIVE:    VITALS:  /76   Pulse (!) 101   Temp 98.4 °F (36.9 °C) (Oral)   Resp 16   Ht 1.702 m (5' 7\")   Wt (!) 159.7 kg (352 lb)   LMP 06/02/2023   SpO2 95%   BMI 55.13 kg/m²   Physical Exam  ABDOMEN:  normal bowel sounds, non-distended, non-tender and Incision intact  Ext: nt    DATA:  Hemoglobin/Hematocrit:    Lab Results   Component Value Date/Time    HGB 12.4 02/11/2024 04:03 AM    HCT 36.9 02/11/2024 04:03 AM       ASSESSMENT AND PLAN:  S/p ltcs  Wound incision infection/abscess  Principal Problem:    Postoperative wound infection  Resolved Problems:    * No resolved hospital problems. *  Continue IV antibiotics  Anticipate IR drainage today  Plan discharge home     Electronically signed by Rona Carey DO on 2/12/2024 at 7:14 AM  
Samy Bacon who is on for dr henson, updated on ID note  
[facility]  [facility]  Progress Note      No chief complaint on file.      SUBJECTIVE:  Patient is tolerating medications. No reported adverse drug reactions.  No nausea, vomiting, diarrhea.    Review of systems:  As stated above in the chief complaint, otherwise negative.    Medications:  Scheduled Meds:   NIFEdipine  90 mg Oral Daily    busPIRone  10 mg Oral BID    ampicillin-sulbactam  3,000 mg IntraVENous Q6H     Continuous Infusions:  PRN Meds:acetaminophen    OBJECTIVE:  /65   Pulse 98   Temp 98.6 °F (37 °C) (Oral)   Resp 16   Ht 1.702 m (5' 7\")   Wt (!) 160.1 kg (353 lb)   LMP 2023   SpO2 95%   BMI 55.29 kg/m²   Temp  Av.1 °F (36.7 °C)  Min: 97.4 °F (36.3 °C)  Max: 98.6 °F (37 °C)  Constitutional: The patient is awake, alert, and oriented.   Skin: Warm and dry. No rashes were noted.   HEENT: Round and reactive pupils.  Moist mucous membranes.  No ulcerations or thrush.  Neck: Supple to movements.   Chest: No use of accessory muscles to breathe. Symmetrical expansion.  No wheezing, crackles or rhonchi.  Cardiovascular: S1 and S2 are rhythmic and regular. No murmurs appreciated.   Abdomen: Positive bowel sounds to auscultation. Benign to palpation. No masses felt. No hepatosplenomegaly.  Genitourinary:   Extremities: No clubbing, no cyanosis, no edema.  Lines: peripheral    Laboratory and Tests Review:  Lab Results   Component Value Date    WBC 7.8 2024    WBC 10.1 2024    WBC 10.6 2024    HGB 12.4 2024    HCT 36.9 2024    MCV 86.8 2024     2024     Lab Results   Component Value Date    NEUTROABS 6.16 2024    NEUTROABS 5.95 2024    NEUTROABS 6.14 2023     No results found for: \"CRPHS\"  Lab Results   Component Value Date    ALT 36 (H) 2024    AST 26 2024    ALKPHOS 147 (H) 2024    BILITOT 0.2 2024     Lab Results   Component Value Date/Time     2024 09:30 
[facility]  [facility]  Progress Note      [facility]I      SUBJECTIVE:  Sitting up in bed, visitors present  In no distress  No fevers  Tolerating antibiotics     Review of systems:  As stated above in the chief complaint, otherwise negative.    Medications:  Scheduled Meds:   NIFEdipine  90 mg Oral Daily    busPIRone  10 mg Oral BID    ampicillin-sulbactam  3,000 mg IntraVENous Q6H     Continuous Infusions:  PRN Meds:ibuprofen, acetaminophen    OBJECTIVE:  /60   Pulse 88   Temp 98.6 °F (37 °C) (Oral)   Resp 18   Ht 1.702 m (5' 7\")   Wt (!) 159.7 kg (352 lb)   LMP 2023   SpO2 98%   BMI 55.13 kg/m²   Temp  Av.5 °F (36.9 °C)  Min: 98.4 °F (36.9 °C)  Max: 98.6 °F (37 °C)  Constitutional: The patient is awake, alert, and oriented. Sitting up in bed, visitors in room  Skin: Warm and dry. No rashes were noted.   HEENT: Round and reactive pupils.  Moist mucous membranes.  No ulcerations or thrush.  Neck: Supple to movements.   Chest: No use of accessory muscles to breathe. Symmetrical expansion.  No wheezing, crackles or rhonchi.  Cardiovascular: S1 and S2 are rhythmic and regular. No murmurs appreciated.   Abdomen: Positive bowel sounds to auscultation. Benign to palpation. No masses felt. Lower abdominal wound dressed.  Extremities: No edema.  Lines: peripheral    Laboratory and Tests Review:  Lab Results   Component Value Date    CRP 13.0 (H) 02/10/2024     Lab Results   Component Value Date    SEDRATE 58 (H) 02/10/2024    SEDRATE 19 2023     Radiology:  Reviewed    Microbiology:  Blood cultures : negative so far  Wound culture from incision : mixed GPO, mixed GNR     Assessment:  Status post  2024  Surgical site infection  Abdominal wall abscess  Red person syndrome with Vancomycin     Plan:    Continue Unasyn  Awaiting wound cultures  IR was consulted to drain fluid collection but deemed it too small   OB/GYN following     Vicki Traore APRN - 
Arleen Segovia, APRN - CNM   10 mg at 24 0840    ampicillin-sulbactam (UNASYN) 3,000 mg in sodium chloride 0.9 % 100 mL IVPB  3,000 mg IntraVENous Q6H Tc Machado MD   Stopped at 24 0930      Allergies:  Red dye, Sumatriptan, Doxycycline, and Vancomycin     Patient Active Problem List   Diagnosis    Severe major depression (HCC)    Major depressive disorder, severe (HCC)    Mild mixed bipolar II disorder (HCC)    Cluster B personality disorder (HCC)    Acne    Adjustment reaction    Anal fissure    Asperger's disorder    Atopic dermatitis    Attention deficit hyperactivity disorder    Dysthymia    Diverticulitis of colon    Dizziness    Dysuria    Elevation of level of transaminase and lactic acid dehydrogenase (LDH)    External hemorrhoids    Fever    Headache    Impaired fasting glucose    Psoriasis    Steatosis of liver    Vitamin D deficiency    Esophageal reflux    Maternal morbid obesity, antepartum (HCC)    Hx of preeclampsia, prior pregnancy, currently pregnant    Marginal insertion of umbilical cord affecting management of mother    Family history of congenital heart disease    Severe pre-eclampsia in third trimester    Hx of pre-eclampsia in prior pregnancy, currently pregnant, third trimester    Gestational diabetes mellitus (GDM) in third trimester controlled on oral hypoglycemic drug    Severe hypertension affecting pregnancy in third trimester    Chest pain    Chronic hypertension with superimposed preeclampsia    34 weeks gestation of pregnancy    Postoperative wound infection       ROS:  Pertinent items are noted in HPI.    ASSESSMENT:   23 y.o. female   No diagnosis found.    PLAN:     Orders Placed This Encounter   Procedures    IR Interventional Radiology Procedure Request    C-Reactive Protein    Sedimentation Rate    Antistreptolysin O screen    Protime-INR    APTT    CBC    ADULT DIET; Regular    Diet NPO    Misc nursing order (specify)    Inpatient consult to

## 2024-02-15 LAB
MICROORGANISM SPEC CULT: NORMAL
SERVICE CMNT-IMP: NORMAL
SPECIMEN DESCRIPTION: NORMAL

## 2024-03-03 NOTE — PLAN OF CARE
Problem: Pain  Goal: Verbalizes/displays adequate comfort level or baseline comfort level  2/12/2024 2235 by Piedad Candelario, RN  Outcome: Progressing     Problem: Safety - Adult  Goal: Free from fall injury  2/12/2024 2235 by Piedad Candelario, RN  Outcome: Progressing     
no

## 2024-06-07 PROBLEM — F60.89 CLUSTER B PERSONALITY DISORDER (HCC): Status: RESOLVED | Noted: 2021-08-12 | Resolved: 2024-06-07

## 2024-12-28 ENCOUNTER — APPOINTMENT (OUTPATIENT)
Dept: GENERAL RADIOLOGY | Age: 24
End: 2024-12-28
Payer: COMMERCIAL

## 2024-12-28 ENCOUNTER — HOSPITAL ENCOUNTER (EMERGENCY)
Age: 24
Discharge: HOME | End: 2024-12-28
Attending: EMERGENCY MEDICINE
Payer: COMMERCIAL

## 2024-12-28 VITALS
SYSTOLIC BLOOD PRESSURE: 155 MMHG | OXYGEN SATURATION: 97 % | HEART RATE: 110 BPM | BODY MASS INDEX: 45.99 KG/M2 | RESPIRATION RATE: 30 BRPM | TEMPERATURE: 98 F | WEIGHT: 293 LBS | DIASTOLIC BLOOD PRESSURE: 78 MMHG | HEIGHT: 67 IN

## 2024-12-28 DIAGNOSIS — J10.1 INFLUENZA A: Primary | ICD-10-CM

## 2024-12-28 LAB
ANION GAP SERPL CALCULATED.3IONS-SCNC: 15 MMOL/L (ref 7–16)
BASOPHILS # BLD: 0.06 K/UL (ref 0–0.2)
BASOPHILS NFR BLD: 1 % (ref 0–2)
BUN SERPL-MCNC: 9 MG/DL (ref 6–20)
CALCIUM SERPL-MCNC: 9.3 MG/DL (ref 8.6–10.2)
CHLORIDE SERPL-SCNC: 101 MMOL/L (ref 98–107)
CO2 SERPL-SCNC: 22 MMOL/L (ref 22–29)
CREAT SERPL-MCNC: 0.6 MG/DL (ref 0.5–1)
D-DIMER QUANTITATIVE: <200 NG/ML DDU (ref 0–230)
EKG ATRIAL RATE: 112 BPM
EKG P AXIS: 28 DEGREES
EKG P-R INTERVAL: 156 MS
EKG Q-T INTERVAL: 344 MS
EKG QRS DURATION: 102 MS
EKG QTC CALCULATION (BAZETT): 469 MS
EKG R AXIS: 46 DEGREES
EKG T AXIS: 30 DEGREES
EKG VENTRICULAR RATE: 112 BPM
EOSINOPHIL # BLD: 0.15 K/UL (ref 0.05–0.5)
EOSINOPHILS RELATIVE PERCENT: 2 % (ref 0–6)
ERYTHROCYTE [DISTWIDTH] IN BLOOD BY AUTOMATED COUNT: 13.2 % (ref 11.5–15)
GFR, ESTIMATED: >90 ML/MIN/1.73M2
GLUCOSE SERPL-MCNC: 127 MG/DL (ref 74–99)
HCT VFR BLD AUTO: 39.8 % (ref 34–48)
HGB BLD-MCNC: 13.6 G/DL (ref 11.5–15.5)
IMM GRANULOCYTES # BLD AUTO: <0.03 K/UL (ref 0–0.58)
IMM GRANULOCYTES NFR BLD: 0 % (ref 0–5)
LYMPHOCYTES NFR BLD: 2.5 K/UL (ref 1.5–4)
LYMPHOCYTES RELATIVE PERCENT: 32 % (ref 20–42)
MCH RBC QN AUTO: 26.7 PG (ref 26–35)
MCHC RBC AUTO-ENTMCNC: 34.2 G/DL (ref 32–34.5)
MCV RBC AUTO: 78.2 FL (ref 80–99.9)
MONOCYTES NFR BLD: 0.68 K/UL (ref 0.1–0.95)
MONOCYTES NFR BLD: 9 % (ref 2–12)
NEUTROPHILS NFR BLD: 57 % (ref 43–80)
NEUTS SEG NFR BLD: 4.54 K/UL (ref 1.8–7.3)
PLATELET # BLD AUTO: 254 K/UL (ref 130–450)
PMV BLD AUTO: 9.9 FL (ref 7–12)
POTASSIUM SERPL-SCNC: 3.8 MMOL/L (ref 3.5–5)
RBC # BLD AUTO: 5.09 M/UL (ref 3.5–5.5)
SODIUM SERPL-SCNC: 138 MMOL/L (ref 132–146)
TROPONIN I SERPL HS-MCNC: <6 NG/L (ref 0–9)
WBC OTHER # BLD: 7.9 K/UL (ref 4.5–11.5)

## 2024-12-28 PROCEDURE — 2580000003 HC RX 258: Performed by: EMERGENCY MEDICINE

## 2024-12-28 PROCEDURE — 85379 FIBRIN DEGRADATION QUANT: CPT

## 2024-12-28 PROCEDURE — 80048 BASIC METABOLIC PNL TOTAL CA: CPT

## 2024-12-28 PROCEDURE — 84484 ASSAY OF TROPONIN QUANT: CPT

## 2024-12-28 PROCEDURE — 96360 HYDRATION IV INFUSION INIT: CPT

## 2024-12-28 PROCEDURE — 71045 X-RAY EXAM CHEST 1 VIEW: CPT

## 2024-12-28 PROCEDURE — 99285 EMERGENCY DEPT VISIT HI MDM: CPT

## 2024-12-28 PROCEDURE — 93005 ELECTROCARDIOGRAM TRACING: CPT | Performed by: EMERGENCY MEDICINE

## 2024-12-28 PROCEDURE — 85025 COMPLETE CBC W/AUTO DIFF WBC: CPT

## 2024-12-28 RX ORDER — 0.9 % SODIUM CHLORIDE 0.9 %
1000 INTRAVENOUS SOLUTION INTRAVENOUS ONCE
Status: COMPLETED | OUTPATIENT
Start: 2024-12-28 | End: 2024-12-28

## 2024-12-28 RX ADMIN — SODIUM CHLORIDE 1000 ML: 9 INJECTION, SOLUTION INTRAVENOUS at 04:27

## 2024-12-28 ASSESSMENT — PAIN - FUNCTIONAL ASSESSMENT: PAIN_FUNCTIONAL_ASSESSMENT: NONE - DENIES PAIN

## 2024-12-28 NOTE — ED PROVIDER NOTES
[facility] EMERGENCY DEPARTMENT  EMERGENCY DEPARTMENT ENCOUNTER        Pt Name: Jazzy Carr  MRN: [id number]  Birthdate 2000  Date of evaluation: 2024  Provider: Melvi Isidro DO  PCP: Jacob Villatoro DO  Note Started: 5:14 AM EST 24    CHIEF COMPLAINT       Chief Complaint   Patient presents with    Influenza     Positive for flu A, c/o cough       HISTORY OF PRESENT ILLNESS: 1 or more Elements        Limitations to history : None    Jazzy Carr is a 24 y.o. female who presents for evaluation after being diagnosed with influenza A.  She states she had a cough, body aches and had a few episodes of hemoptysis after forceful coughing.  Denies shortness of breath.  Denies chest pain.  Denies any previous similar issues.      Nursing Notes were all reviewed and agreed with or any disagreements were addressed in the HPI.      REVIEW OF EXTERNAL NOTE :       Reviewed previous office visit with OB/GYN on 2024      Chart Review/External Note Review    Last Echo reviewed by Me:  No results found for: \"LVEF\", \"LVEFMODE\"          Controlled Substance Monitoring:    Acute and Chronic Pain Monitoring:        No data to display                    REVIEW OF SYSTEMS :      Positives and Pertinent negatives as per HPI.     SURGICAL HISTORY     Past Surgical History:   Procedure Laterality Date     SECTION N/A 2024     SECTION performed by Carolina Sherwood MD at [facility] OR    CHOLECYSTECTOMY      INTRAUTERINE DEVICE REMOVAL  2022    strings came off and IUD became stuck    LAPAROSCOPY      NM LAPS SURG CHOLECYSTECTOMY W/CHOLANGIOGRAPHY N/A 03/15/2018    CHOLECYSTECTOMY LAPAROSCOPIC WITH INTRAOP CHOLANGIOGRAM performed by Cisco Vasquez MD at [facility] OR    REMOVAL FOREIGN BODY EAR      age 6    TONSILLECTOMY AND ADENOIDECTOMY N/A 2021    TONSILLECTOMY performed by Ellis Lackey DO at [facility] OR    UPPER GASTROINTESTINAL ENDOSCOPY  The patient is a 83y Female complaining of altered mental status.

## 2024-12-30 LAB
EKG ATRIAL RATE: 112 BPM
EKG P AXIS: 28 DEGREES
EKG P-R INTERVAL: 156 MS
EKG Q-T INTERVAL: 344 MS
EKG QRS DURATION: 102 MS
EKG QTC CALCULATION (BAZETT): 469 MS
EKG R AXIS: 46 DEGREES
EKG T AXIS: 30 DEGREES
EKG VENTRICULAR RATE: 112 BPM

## 2024-12-30 PROCEDURE — 93010 ELECTROCARDIOGRAM REPORT: CPT | Performed by: INTERNAL MEDICINE

## 2025-01-12 ENCOUNTER — HOSPITAL ENCOUNTER (EMERGENCY)
Age: 25
Discharge: HOME | End: 2025-01-12
Attending: EMERGENCY MEDICINE
Payer: COMMERCIAL

## 2025-01-12 ENCOUNTER — APPOINTMENT (OUTPATIENT)
Dept: CT IMAGING | Age: 25
End: 2025-01-12
Payer: COMMERCIAL

## 2025-01-12 VITALS
OXYGEN SATURATION: 98 % | DIASTOLIC BLOOD PRESSURE: 111 MMHG | RESPIRATION RATE: 16 BRPM | BODY MASS INDEX: 45.99 KG/M2 | TEMPERATURE: 97.7 F | SYSTOLIC BLOOD PRESSURE: 151 MMHG | HEART RATE: 110 BPM | WEIGHT: 293 LBS | HEIGHT: 67 IN

## 2025-01-12 DIAGNOSIS — R51.9 NONINTRACTABLE HEADACHE, UNSPECIFIED CHRONICITY PATTERN, UNSPECIFIED HEADACHE TYPE: ICD-10-CM

## 2025-01-12 DIAGNOSIS — G43.809 OTHER MIGRAINE WITHOUT STATUS MIGRAINOSUS, NOT INTRACTABLE: Primary | ICD-10-CM

## 2025-01-12 DIAGNOSIS — J01.90 ACUTE SINUSITIS, RECURRENCE NOT SPECIFIED, UNSPECIFIED LOCATION: ICD-10-CM

## 2025-01-12 DIAGNOSIS — E23.6 ENLARGED PITUITARY GLAND (HCC): ICD-10-CM

## 2025-01-12 DIAGNOSIS — J32.9 CHRONIC SINUSITIS, UNSPECIFIED LOCATION: ICD-10-CM

## 2025-01-12 DIAGNOSIS — I10 ESSENTIAL HYPERTENSION: ICD-10-CM

## 2025-01-12 PROBLEM — G43.909 MIGRAINE: Status: ACTIVE | Noted: 2025-01-12

## 2025-01-12 PROCEDURE — 99284 EMERGENCY DEPT VISIT MOD MDM: CPT

## 2025-01-12 PROCEDURE — 70450 CT HEAD/BRAIN W/O DYE: CPT

## 2025-01-12 PROCEDURE — 6370000000 HC RX 637 (ALT 250 FOR IP): Performed by: EMERGENCY MEDICINE

## 2025-01-12 RX ORDER — ONDANSETRON 4 MG/1
4 TABLET, ORALLY DISINTEGRATING ORAL ONCE
Status: COMPLETED | OUTPATIENT
Start: 2025-01-12 | End: 2025-01-12

## 2025-01-12 RX ORDER — OXYCODONE AND ACETAMINOPHEN 5; 325 MG/1; MG/1
1 TABLET ORAL ONCE
Status: COMPLETED | OUTPATIENT
Start: 2025-01-12 | End: 2025-01-12

## 2025-01-12 RX ADMIN — AMOXICILLIN AND CLAVULANATE POTASSIUM 1 TABLET: 875; 125 TABLET, FILM COATED ORAL at 20:52

## 2025-01-12 RX ADMIN — ONDANSETRON 4 MG: 4 TABLET, ORALLY DISINTEGRATING ORAL at 20:51

## 2025-01-12 RX ADMIN — OXYCODONE HYDROCHLORIDE AND ACETAMINOPHEN 1 TABLET: 5; 325 TABLET ORAL at 20:51

## 2025-01-12 ASSESSMENT — PAIN SCALES - GENERAL
PAINLEVEL_OUTOF10: 5
PAINLEVEL_OUTOF10: 5

## 2025-01-12 ASSESSMENT — PAIN - FUNCTIONAL ASSESSMENT: PAIN_FUNCTIONAL_ASSESSMENT: 0-10

## 2025-01-12 ASSESSMENT — PAIN DESCRIPTION - ONSET: ONSET: ON-GOING

## 2025-01-12 ASSESSMENT — PAIN DESCRIPTION - PAIN TYPE: TYPE: ACUTE PAIN

## 2025-01-12 ASSESSMENT — PAIN DESCRIPTION - DESCRIPTORS
DESCRIPTORS: ACHING
DESCRIPTORS: DISCOMFORT;THROBBING;ACHING

## 2025-01-12 ASSESSMENT — PAIN DESCRIPTION - LOCATION
LOCATION: HEAD
LOCATION: HEAD

## 2025-01-12 ASSESSMENT — PAIN DESCRIPTION - FREQUENCY: FREQUENCY: CONTINUOUS

## 2025-01-13 NOTE — DISCHARGE INSTRUCTIONS
Have your blood pressure rechecked as soon as possible return if increased headache fevers vomiting stiff neck or any neurological deficits or change in vision you need to follow-up with an MRI as soon as possible for an enlarged pituitary gland as well as disc

## 2025-01-13 NOTE — ED PROVIDER NOTES
rales, or rhonchi. Not in respiratory distress  Cardiovascular:  Regular rate and rhythm, no murmurs, gallops, or rubs. 2+ distal pulses  Abdomen: Soft, non tender, non distended,   Extremities: Moves all extremities x 4. Warm and well perfused  Skin: warm and dry without rash  Neurologic: GCS 15,  Psych: Normal Affect  NIH score of 0    ------------------------------ ED COURSE/MEDICAL DECISION MAKING----------------------  Medications   oxyCODONE-acetaminophen (PERCOCET) 5-325 MG per tablet 1 tablet (has no administration in time range)   ondansetron (ZOFRAN-ODT) disintegrating tablet 4 mg (has no administration in time range)   amoxicillin-clavulanate (AUGMENTIN) 875-125 MG per tablet 1 tablet (has no administration in time range)         Medical Decision Making:       Jazzy Carr is a 24 y.o. female presenting to the ED for for headache she has a history of chronic headaches this is not a new type of headache she also has known sinus infection she has had headaches like this before she has had no fevers no vomiting no stiff neck this is her typical migraine her CAT scan was unremarkable other than showed a sinus infection I did change her antibiotic to Augmentin told her to follow-up with neurology as well as ENT and to get an MRI of her head and return if any new neurological deficits her NIH score is 0, beginning days ago.  The complaint has been persistent, moderate in severity, and worsened by nothing.  She has no chest pain no trouble breathing    Counseling:   The emergency provider has spoken with the patient and discussed today’s results, in addition to providing specific details for the plan of care and counseling regarding the diagnosis and prognosis.  Questions are answered at this time and they are agreeable with the plan.      --------------------------------- IMPRESSION AND DISPOSITION ---------------------------------    IMPRESSION  1. Other migraine without status migrainosus, not intractable

## 2025-01-14 ENCOUNTER — APPOINTMENT (OUTPATIENT)
Dept: CT IMAGING | Age: 25
End: 2025-01-14
Payer: COMMERCIAL

## 2025-01-14 ENCOUNTER — HOSPITAL ENCOUNTER (EMERGENCY)
Age: 25
Discharge: HOME | End: 2025-01-14
Attending: EMERGENCY MEDICINE
Payer: COMMERCIAL

## 2025-01-14 VITALS
HEART RATE: 98 BPM | TEMPERATURE: 97.9 F | SYSTOLIC BLOOD PRESSURE: 139 MMHG | DIASTOLIC BLOOD PRESSURE: 91 MMHG | BODY MASS INDEX: 45.99 KG/M2 | RESPIRATION RATE: 16 BRPM | WEIGHT: 293 LBS | HEIGHT: 67 IN | OXYGEN SATURATION: 100 %

## 2025-01-14 DIAGNOSIS — R51.9 NONINTRACTABLE HEADACHE, UNSPECIFIED CHRONICITY PATTERN, UNSPECIFIED HEADACHE TYPE: Primary | ICD-10-CM

## 2025-01-14 DIAGNOSIS — E23.6 PITUITARY MASS (HCC): ICD-10-CM

## 2025-01-14 LAB
ANION GAP SERPL CALCULATED.3IONS-SCNC: 11 MMOL/L (ref 7–16)
BACTERIA URNS QL MICRO: ABNORMAL
BASOPHILS # BLD: 0.04 K/UL (ref 0–0.2)
BASOPHILS NFR BLD: 0 % (ref 0–2)
BILIRUB UR QL STRIP: NEGATIVE
BUN SERPL-MCNC: 10 MG/DL (ref 6–20)
CALCIUM SERPL-MCNC: 9.1 MG/DL (ref 8.6–10.2)
CHLORIDE SERPL-SCNC: 101 MMOL/L (ref 98–107)
CLARITY UR: CLEAR
CO2 SERPL-SCNC: 20 MMOL/L (ref 22–29)
COLOR UR: YELLOW
CREAT SERPL-MCNC: 0.5 MG/DL (ref 0.5–1)
EOSINOPHIL # BLD: 0.01 K/UL (ref 0.05–0.5)
EOSINOPHILS RELATIVE PERCENT: 0 % (ref 0–6)
ERYTHROCYTE [DISTWIDTH] IN BLOOD BY AUTOMATED COUNT: 13.6 % (ref 11.5–15)
GFR, ESTIMATED: >90 ML/MIN/1.73M2
GLUCOSE SERPL-MCNC: 245 MG/DL (ref 74–99)
GLUCOSE UR STRIP-MCNC: >=1000 MG/DL
HCG, URINE, POC: NEGATIVE
HCT VFR BLD AUTO: 43.2 % (ref 34–48)
HGB BLD-MCNC: 14.4 G/DL (ref 11.5–15.5)
HGB UR QL STRIP.AUTO: NEGATIVE
IMM GRANULOCYTES # BLD AUTO: 0.08 K/UL (ref 0–0.58)
IMM GRANULOCYTES NFR BLD: 1 % (ref 0–5)
KETONES UR STRIP-MCNC: NEGATIVE MG/DL
LEUKOCYTE ESTERASE UR QL STRIP: NEGATIVE
LYMPHOCYTES NFR BLD: 1.7 K/UL (ref 1.5–4)
LYMPHOCYTES RELATIVE PERCENT: 14 % (ref 20–42)
Lab: NORMAL
MCH RBC QN AUTO: 26.9 PG (ref 26–35)
MCHC RBC AUTO-ENTMCNC: 33.3 G/DL (ref 32–34.5)
MCV RBC AUTO: 80.6 FL (ref 80–99.9)
MONOCYTES NFR BLD: 0.67 K/UL (ref 0.1–0.95)
MONOCYTES NFR BLD: 5 % (ref 2–12)
NEGATIVE QC PASS/FAIL: NORMAL
NEUTROPHILS NFR BLD: 80 % (ref 43–80)
NEUTS SEG NFR BLD: 9.96 K/UL (ref 1.8–7.3)
NITRITE UR QL STRIP: NEGATIVE
PH UR STRIP: 7.5 [PH] (ref 5–9)
PLATELET # BLD AUTO: 403 K/UL (ref 130–450)
PMV BLD AUTO: 10.5 FL (ref 7–12)
POSITIVE QC PASS/FAIL: NORMAL
POTASSIUM SERPL-SCNC: 4.3 MMOL/L (ref 3.5–5)
PROLACTIN SERPL-MCNC: 17.49 NG/ML
PROT UR STRIP-MCNC: 100 MG/DL
RBC # BLD AUTO: 5.36 M/UL (ref 3.5–5.5)
RBC #/AREA URNS HPF: ABNORMAL /HPF
SODIUM SERPL-SCNC: 132 MMOL/L (ref 132–146)
SP GR UR STRIP: 1.02 (ref 1–1.03)
TSH SERPL DL<=0.05 MIU/L-ACNC: 0.29 UIU/ML (ref 0.27–4.2)
UROBILINOGEN UR STRIP-ACNC: 0.2 EU/DL (ref 0–1)
WBC #/AREA URNS HPF: ABNORMAL /HPF
WBC OTHER # BLD: 12.5 K/UL (ref 4.5–11.5)

## 2025-01-14 PROCEDURE — 84443 ASSAY THYROID STIM HORMONE: CPT

## 2025-01-14 PROCEDURE — 93005 ELECTROCARDIOGRAM TRACING: CPT

## 2025-01-14 PROCEDURE — 70496 CT ANGIOGRAPHY HEAD: CPT

## 2025-01-14 PROCEDURE — 96374 THER/PROPH/DIAG INJ IV PUSH: CPT

## 2025-01-14 PROCEDURE — 96375 TX/PRO/DX INJ NEW DRUG ADDON: CPT

## 2025-01-14 PROCEDURE — 85025 COMPLETE CBC W/AUTO DIFF WBC: CPT

## 2025-01-14 PROCEDURE — 99285 EMERGENCY DEPT VISIT HI MDM: CPT

## 2025-01-14 PROCEDURE — 81001 URINALYSIS AUTO W/SCOPE: CPT

## 2025-01-14 PROCEDURE — 6360000004 HC RX CONTRAST MEDICATION: Performed by: RADIOLOGY

## 2025-01-14 PROCEDURE — 6360000002 HC RX W HCPCS

## 2025-01-14 PROCEDURE — 80048 BASIC METABOLIC PNL TOTAL CA: CPT

## 2025-01-14 PROCEDURE — 84146 ASSAY OF PROLACTIN: CPT

## 2025-01-14 PROCEDURE — 70498 CT ANGIOGRAPHY NECK: CPT

## 2025-01-14 PROCEDURE — 6370000000 HC RX 637 (ALT 250 FOR IP)

## 2025-01-14 RX ORDER — SODIUM CHLORIDE 0.9 % (FLUSH) 0.9 %
10 SYRINGE (ML) INJECTION
Status: DISCONTINUED | OUTPATIENT
Start: 2025-01-14 | End: 2025-01-14 | Stop reason: HOSPADM

## 2025-01-14 RX ORDER — ACETAMINOPHEN 500 MG
1000 TABLET ORAL ONCE
Status: COMPLETED | OUTPATIENT
Start: 2025-01-14 | End: 2025-01-14

## 2025-01-14 RX ORDER — DROPERIDOL 2.5 MG/ML
1.25 INJECTION, SOLUTION INTRAMUSCULAR; INTRAVENOUS ONCE
Status: COMPLETED | OUTPATIENT
Start: 2025-01-14 | End: 2025-01-14

## 2025-01-14 RX ORDER — DIPHENHYDRAMINE HYDROCHLORIDE 50 MG/ML
25 INJECTION INTRAMUSCULAR; INTRAVENOUS ONCE
Status: COMPLETED | OUTPATIENT
Start: 2025-01-14 | End: 2025-01-14

## 2025-01-14 RX ORDER — IOPAMIDOL 755 MG/ML
75 INJECTION, SOLUTION INTRAVASCULAR
Status: COMPLETED | OUTPATIENT
Start: 2025-01-14 | End: 2025-01-14

## 2025-01-14 RX ADMIN — ACETAMINOPHEN 1000 MG: 500 TABLET, FILM COATED ORAL at 07:14

## 2025-01-14 RX ADMIN — IOPAMIDOL 75 ML: 755 INJECTION, SOLUTION INTRAVENOUS at 08:18

## 2025-01-14 RX ADMIN — DIPHENHYDRAMINE HYDROCHLORIDE 25 MG: 50 INJECTION INTRAMUSCULAR; INTRAVENOUS at 07:18

## 2025-01-14 RX ADMIN — DROPERIDOL 1.25 MG: 2.5 INJECTION, SOLUTION INTRAMUSCULAR; INTRAVENOUS at 07:18

## 2025-01-14 ASSESSMENT — PAIN SCALES - GENERAL
PAINLEVEL_OUTOF10: 7
PAINLEVEL_OUTOF10: 7

## 2025-01-14 ASSESSMENT — PAIN DESCRIPTION - ORIENTATION: ORIENTATION: RIGHT;LEFT

## 2025-01-14 ASSESSMENT — PAIN DESCRIPTION - LOCATION: LOCATION: HEAD

## 2025-01-14 ASSESSMENT — PAIN DESCRIPTION - DESCRIPTORS
DESCRIPTORS: THROBBING
DESCRIPTORS: SHARP;PRESSURE

## 2025-01-14 ASSESSMENT — PAIN - FUNCTIONAL ASSESSMENT
PAIN_FUNCTIONAL_ASSESSMENT: 0-10
PAIN_FUNCTIONAL_ASSESSMENT: PREVENTS OR INTERFERES SOME ACTIVE ACTIVITIES AND ADLS

## 2025-01-14 NOTE — ED PROVIDER NOTES
ATTENDING PROVIDER ATTESTATION:     Jazzy Carr presented to the emergency department for evaluation of Headache (Patient complaining of a headache x8 days.  States it woke her up from her sleep.)   and was initially evaluated by the Medical Resident. See Original ED Note for H&P and ED course above.     I have reviewed and discussed the case, including pertinent history (medical, surgical, family and social) and exam findings with the Medical Resident assigned to Jazzy Carr.  I have personally performed and/or participated in the history, exam, medical decision making, and procedures and agree with all pertinent clinical information and any additional changes or corrections are noted below in my assessment and plan. I have discussed this patient in detail with the resident, and provided the instruction and education,       I have reviewed my findings and recommendations with the assigned Medical Resident, Jazzy Carr and members of family present at the time of disposition.      I have performed a history and physical examination of this patient and directly supervised the resident caring for this patient              [facility] EMERGENCY DEPARTMENT  EMERGENCY DEPARTMENT ENCOUNTER        Pt Name: Jazzy Carr  MRN: [id number]  Birthdate 2000  Date of evaluation: 1/14/2025  Provider: Jay Giraldo MD  PCP: Linda Carr APRN - NP  Note Started: 6:49 AM EST 1/14/25    CHIEF COMPLAINT       Chief Complaint   Patient presents with    Headache     Patient complaining of a headache x8 days.  States it woke her up from her sleep.       HISTORY OF PRESENT ILLNESS: 1 or more Elements       Jazzy Carr is a 24 y.o. female who presents for headache.  Patient reports that she has had a headache for 8 days.  She reports gradual onset headache that is bitemporal nature and radiates from side-to-side.  She reports the pain radiates on the back of her head and neck as well.  
abnormality.   2. Scattered opacification of the paranasal sinuses.   3. Suggestion of a cystic lesion within the anterior sella.  Further   evaluation with a nonemergent/outpatient pituitary protocol MRI can be   performed as warranted.   4.  Unremarkable CTA of the head and neck.   5. Major dural venous sinuses appear grossly patent without evidence of a   thrombus.         CTA NECK W CONTRAST   Final Result   1. No acute intracranial abnormality.   2. Scattered opacification of the paranasal sinuses.   3. Suggestion of a cystic lesion within the anterior sella.  Further   evaluation with a nonemergent/outpatient pituitary protocol MRI can be   performed as warranted.   4.  Unremarkable CTA of the head and neck.   5. Major dural venous sinuses appear grossly patent without evidence of a   thrombus.         CTA VENOUS HEAD W WO CONTRAST   Final Result   1. No acute intracranial abnormality.   2. Scattered opacification of the paranasal sinuses.   3. Suggestion of a cystic lesion within the anterior sella.  Further   evaluation with a nonemergent/outpatient pituitary protocol MRI can be   performed as warranted.   4.  Unremarkable CTA of the head and neck.   5. Major dural venous sinuses appear grossly patent without evidence of a   thrombus.           CT HEAD WO CONTRAST    Result Date: 1/12/2025  EXAMINATION: CT OF THE HEAD WITHOUT CONTRAST  1/12/2025 7:10 pm TECHNIQUE: CT of the head was performed without the administration of intravenous contrast. Automated exposure control, iterative reconstruction, and/or weight based adjustment of the mA/kV was utilized to reduce the radiation dose to as low as reasonably achievable. COMPARISON: CT head 02/09/2024. HISTORY: ORDERING SYSTEM PROVIDED HISTORY: Evaluate intracranial abnormality TECHNOLOGIST PROVIDED HISTORY: Has a \"code stroke\" or \"stroke alert\" been called?->No Reason for exam:->Evaluate intracranial abnormality Reason for exam:->headache hypertension Decision

## 2025-01-14 NOTE — DISCHARGE INSTRUCTIONS
CTA HEAD W CONTRAST   Final Result   1. No acute intracranial abnormality.   2. Scattered opacification of the paranasal sinuses.   3. Suggestion of a cystic lesion within the anterior sella.  Further   evaluation with a nonemergent/outpatient pituitary protocol MRI can be   performed as warranted.   4.  Unremarkable CTA of the head and neck.   5. Major dural venous sinuses appear grossly patent without evidence of a   thrombus.         CTA NECK W CONTRAST   Final Result   1. No acute intracranial abnormality.   2. Scattered opacification of the paranasal sinuses.   3. Suggestion of a cystic lesion within the anterior sella.  Further   evaluation with a nonemergent/outpatient pituitary protocol MRI can be   performed as warranted.   4.  Unremarkable CTA of the head and neck.   5. Major dural venous sinuses appear grossly patent without evidence of a   thrombus.         CTA VENOUS HEAD W WO CONTRAST   Final Result   1. No acute intracranial abnormality.   2. Scattered opacification of the paranasal sinuses.   3. Suggestion of a cystic lesion within the anterior sella.  Further   evaluation with a nonemergent/outpatient pituitary protocol MRI can be   performed as warranted.   4.  Unremarkable CTA of the head and neck.   5. Major dural venous sinuses appear grossly patent without evidence of a   thrombus.

## 2025-01-17 LAB
EKG ATRIAL RATE: 98 BPM
EKG P AXIS: 10 DEGREES
EKG P-R INTERVAL: 156 MS
EKG Q-T INTERVAL: 378 MS
EKG QRS DURATION: 98 MS
EKG QTC CALCULATION (BAZETT): 482 MS
EKG R AXIS: 83 DEGREES
EKG T AXIS: 41 DEGREES
EKG VENTRICULAR RATE: 98 BPM

## 2025-01-17 PROCEDURE — 93010 ELECTROCARDIOGRAM REPORT: CPT | Performed by: INTERNAL MEDICINE

## 2025-02-05 ENCOUNTER — HOSPITAL ENCOUNTER (EMERGENCY)
Age: 25
Discharge: HOME | End: 2025-02-05
Attending: EMERGENCY MEDICINE
Payer: COMMERCIAL

## 2025-02-05 VITALS
DIASTOLIC BLOOD PRESSURE: 79 MMHG | HEART RATE: 68 BPM | SYSTOLIC BLOOD PRESSURE: 132 MMHG | RESPIRATION RATE: 18 BRPM | OXYGEN SATURATION: 99 % | TEMPERATURE: 97.6 F | WEIGHT: 293 LBS | BODY MASS INDEX: 57.95 KG/M2

## 2025-02-05 DIAGNOSIS — R51.9 ACUTE NONINTRACTABLE HEADACHE, UNSPECIFIED HEADACHE TYPE: Primary | ICD-10-CM

## 2025-02-05 LAB — HCG UR QL: NEGATIVE

## 2025-02-05 PROCEDURE — 2580000003 HC RX 258: Performed by: EMERGENCY MEDICINE

## 2025-02-05 PROCEDURE — 96374 THER/PROPH/DIAG INJ IV PUSH: CPT

## 2025-02-05 PROCEDURE — 96375 TX/PRO/DX INJ NEW DRUG ADDON: CPT

## 2025-02-05 PROCEDURE — 84703 CHORIONIC GONADOTROPIN ASSAY: CPT

## 2025-02-05 PROCEDURE — 99284 EMERGENCY DEPT VISIT MOD MDM: CPT

## 2025-02-05 PROCEDURE — 6360000002 HC RX W HCPCS: Performed by: EMERGENCY MEDICINE

## 2025-02-05 RX ORDER — DIPHENHYDRAMINE HYDROCHLORIDE 50 MG/ML
25 INJECTION INTRAMUSCULAR; INTRAVENOUS ONCE
Status: COMPLETED | OUTPATIENT
Start: 2025-02-05 | End: 2025-02-05

## 2025-02-05 RX ORDER — 0.9 % SODIUM CHLORIDE 0.9 %
1000 INTRAVENOUS SOLUTION INTRAVENOUS ONCE
Status: COMPLETED | OUTPATIENT
Start: 2025-02-05 | End: 2025-02-05

## 2025-02-05 RX ORDER — METOCLOPRAMIDE 10 MG/1
10 TABLET ORAL EVERY 6 HOURS PRN
Qty: 20 TABLET | Refills: 0 | Status: SHIPPED | OUTPATIENT
Start: 2025-02-05

## 2025-02-05 RX ORDER — METOCLOPRAMIDE HYDROCHLORIDE 5 MG/ML
10 INJECTION INTRAMUSCULAR; INTRAVENOUS ONCE
Status: COMPLETED | OUTPATIENT
Start: 2025-02-05 | End: 2025-02-05

## 2025-02-05 RX ORDER — KETOROLAC TROMETHAMINE 30 MG/ML
15 INJECTION, SOLUTION INTRAMUSCULAR; INTRAVENOUS ONCE
Status: COMPLETED | OUTPATIENT
Start: 2025-02-05 | End: 2025-02-05

## 2025-02-05 RX ADMIN — DIPHENHYDRAMINE HYDROCHLORIDE 25 MG: 50 INJECTION INTRAMUSCULAR; INTRAVENOUS at 11:04

## 2025-02-05 RX ADMIN — METOCLOPRAMIDE 10 MG: 5 INJECTION, SOLUTION INTRAMUSCULAR; INTRAVENOUS at 11:03

## 2025-02-05 RX ADMIN — SODIUM CHLORIDE 1000 ML: 9 INJECTION, SOLUTION INTRAVENOUS at 10:44

## 2025-02-05 RX ADMIN — KETOROLAC TROMETHAMINE 15 MG: 30 INJECTION, SOLUTION INTRAMUSCULAR at 11:05

## 2025-02-05 ASSESSMENT — PAIN DESCRIPTION - DESCRIPTORS
DESCRIPTORS: ACHING;THROBBING;SHARP;STABBING
DESCRIPTORS: ACHING;DULL;DISCOMFORT

## 2025-02-05 ASSESSMENT — PAIN - FUNCTIONAL ASSESSMENT: PAIN_FUNCTIONAL_ASSESSMENT: 0-10

## 2025-02-05 ASSESSMENT — PAIN DESCRIPTION - LOCATION
LOCATION: HEAD
LOCATION: HEAD

## 2025-02-05 ASSESSMENT — PAIN SCALES - GENERAL
PAINLEVEL_OUTOF10: 4
PAINLEVEL_OUTOF10: 5

## 2025-02-05 ASSESSMENT — PAIN DESCRIPTION - PAIN TYPE: TYPE: ACUTE PAIN

## 2025-02-05 NOTE — ED PROVIDER NOTES
HPI:  25, Time: 10:33 AM TRISTON Carr is a 24 y.o. female presenting to the ED for headaches beginning 1 month ago.  Patient states that she had 2 ED visits last month for similar headaches.  She underwent a head CT followed by a CTA head and neck with contrast most recently on 2025.  Patient was found to have a pituitary growth.  She has an outpatient MRI scheduled for next week.  She also has outpatient follow-up scheduled with neurology as well as her PCP next week.  She presents to the ED today requesting a migraine cocktail.  She states the migraine cocktail is the only thing that gives her some relief.  This headache is the same headache that she has had at her 2 prior ED visits.  She has not fallen.  No focal deficits or fevers.    Review of Systems:  Pertinent positives and negatives as per HPI.    --------------------------------------------- PAST HISTORY ---------------------------------------------  Past Medical History:  has a past medical history of Anxiety, Asperger syndrome, Autism disorder, Bipolar II disorder (HCC), Depression, Diet controlled gestational diabetes mellitus (GDM) in third trimester, GDM (gestational diabetes mellitus), Migraine, Morbid obesity due to excess calories, Postpartum depression, Pre-eclampsia, Preeclampsia, Steatosis of liver, and Stress incontinence.    Past Surgical History:  has a past surgical history that includes REMOVAL FOREIGN BODY EAR; pr laps surg cholecystectomy w/cholangiography (N/A, 03/15/2018); Cholecystectomy; Tonsillectomy and adenoidectomy (N/A, 2021); [place] tooth extraction; Upper gastrointestinal endoscopy (N/A, 2023); laparoscopy; Intrauterine device removal (2022); and  section (N/A, 2024).    Social History:  reports that she quit smoking about 6 years ago. Her smoking use included cigarettes and e-cigarettes. She has never used smokeless tobacco. She reports that she does not currently use

## 2025-02-06 ENCOUNTER — CLINICAL DOCUMENTATION (OUTPATIENT)
Dept: NUTRITION | Age: 25
End: 2025-02-06

## 2025-02-06 NOTE — PROGRESS NOTES
Medical Nutrition Therapy      Date: 25    Time: 2:30pm  Patient Name: Jazzy Carr   PCP - General: Linda Carr APRN - NP   Fax: [phone]    Reason for referral / Diagnosis: Obesity - BMI >30   BMI: 57.95    PMHx: PCOS, PreDM, diverticulosis, cholecystectomy, pituitary mass     Age/: 23yo, 99  Gender: F    SMART Goals for preventative care/outcomes monitoring:  Pt to include better nutrient balance with portion control per MyPlate guidelines for wt loss of 1 - 2 pounds per week.   Consume balanced breakfast within 2 hours of waking at least 5 days per week.  Increase intake of fruits and vegetables daily to 3-5 servings per day. (Start w/ at least 2 cups non starchy vegetables & 2 servings fruit per day.)  Limit intake of refined carbohydrates / added sugars to 4 servings per week.    Maintain a food log with types of foods, amounts and time of day consumed - incorporate 3 meals + 1 snack per day.  Limit soda to 20 ounces per day.  Consume at least 64 ounces water per day.     Nutrition History / Current Eating Pattern:  Rise 7-9am  Shortly after baby wakes up, will make breakfast: pancakes, eggs, sausage   Grazing  Every 3 hours baby eats and pt will also eat   Lunch 11-12pm meat vegetables carb for the baby and pt will graze- little bits of food here and there  Grazing on processed snacks  Dinner - 25% of the time going out for fast food   Eat everything at night   \"I eat here and there\"   Night time snacking: ice cream, chocolate cake   Bedtime 9pm  Fluids: 40 ounces Dr Pepper per day, 40 ounces water per day, 2% or whole milk   Occasionally coffee 3x per week    Allergies/Food Sensitivities:   States that she does not like spicy foods.   Avoids nuts d/t diverticulosis      Weight Hx:  Pt reports that she gained 75 lb w/ her pregnancy. Pt reports wt gain of 35 lb in the last 2 months. States that wt was stable before that.     Ht: 5'7\"  CBW: 370 lb as of 25 per EMR  IBW: 135 lb  %IBW:

## 2025-02-11 ENCOUNTER — HOSPITAL ENCOUNTER (OUTPATIENT)
Dept: MRI IMAGING | Age: 25
Discharge: HOME | End: 2025-02-13
Payer: COMMERCIAL

## 2025-02-11 DIAGNOSIS — D35.2 BENIGN NEOPLASM OF PITUITARY GLAND AND CRANIOPHARYNGEAL DUCT (POUCH) (HCC): ICD-10-CM

## 2025-02-11 DIAGNOSIS — D35.3 BENIGN NEOPLASM OF PITUITARY GLAND AND CRANIOPHARYNGEAL DUCT (POUCH) (HCC): ICD-10-CM

## 2025-02-11 PROCEDURE — 70551 MRI BRAIN STEM W/O DYE: CPT

## 2025-02-21 ENCOUNTER — OFFICE VISIT (OUTPATIENT)
Dept: BARIATRICS/WEIGHT MGMT | Age: 25
End: 2025-02-21

## 2025-02-21 ENCOUNTER — TELEPHONE (OUTPATIENT)
Dept: BARIATRICS/WEIGHT MGMT | Age: 25
End: 2025-02-21

## 2025-02-21 VITALS
DIASTOLIC BLOOD PRESSURE: 97 MMHG | RESPIRATION RATE: 20 BRPM | HEIGHT: 67 IN | WEIGHT: 293 LBS | HEART RATE: 81 BPM | TEMPERATURE: 97.2 F | BODY MASS INDEX: 45.99 KG/M2 | SYSTOLIC BLOOD PRESSURE: 162 MMHG

## 2025-02-21 DIAGNOSIS — Z13.89 SCREENING FOR SUBSTANCE ABUSE: ICD-10-CM

## 2025-02-21 DIAGNOSIS — E66.01 MORBID OBESITY DUE TO EXCESS CALORIES: Primary | ICD-10-CM

## 2025-02-21 NOTE — PATIENT INSTRUCTIONS
What is the next step to proceed with weight loss surgery?    Please be aware that any co-pays or deductibles may be requested prior to testing and / or procedures.    You will need to schedule a psychological evaluation for weight loss surgery.  Patients will be required to complete all psychological testing as required by the mental health provider. Patients must also follow all of the provider's recommendations before weight loss surgery can be scheduled.     The evaluation must be done a standard way for weight loss surgery. We strongly recommend that you contact one of our preferred providers listed below to arrange this:      Dr. Lucy Grace, PhD , [facility]  [address], [place], [place]    [phone]      Dr. Daljit Morales, PhD     [address] [place], [place]      [phone]    Dr. Blanca Reed, [facility]  [address], [place], [place]   [phone]    Sara Gibbs, [facility] [address] , [address] [facility]  [phone]    Jc Paredes, [facility] [address], [place]    [phone]        You will also need to plan on attending a 2 hour nutrition class at [facility] prior to your surgery.  We will schedule this for you when we schedule your surgery.    Please remember to have your labs drawn 10 days prior to your first scheduled dietary appointment.    Please remember, that while we will submit your case to insurance for surgery authorization, it is your responsibility to know if your plan covers weight loss surgery and keep up-to-date with changes to your insurance coverage.  We will do everything possible to help you get approved for weight loss surgery, but cannot guarantee an approval.     Please note that you will not be submitted to your insurance company until all pre-operative testing requirements are met.    In order to promote healing and prevent gastric

## 2025-02-21 NOTE — PROGRESS NOTES
Jazzy Carr  2/21/2025  [facility]    Restart Evaluation  Bariatric Surgery       Subjective:  CHIEF COMPLAINT: Morbid obesity, malnutrition, malnutrition, Depression, Anxiety, Binge Eating Disorder, History of Cholelithiasis, and Non-alcoholic Steatohepatitis (SAEZ)     HISTORY OF PRESENT ILLNESS: Jazzy Carr is a morbidly-obese 24 y.o.  female, who weighs (!) 170.6 kg (376 lb). She is 213 pounds over her ideal body weight. The severity is severe with Body mass index is 58.89 kg/m².  She has multiple medical problems aggravated by her obesity. They have been overweight since age 12. She wishes to have bariatric surgery so that she can lose a large amount of weight to a goal of their ideal body weight based on height, build and sex, and keep the weight off. I have met with her in the [facility] where we discussed the surgery in great detail and went over the risks and benefits. She has watched our informational video so she understands all of the extensive risks involved. She states that she understands all of the risks and wishes to proceed with the evaluation. We have discussed the different surgical options in detail with use of diagrams and drawings to detail the procedures, risks and alternatives. We discussed the postoperative diet and proposed life long diet for weight management after surgery.    Patient previously met with our office for counciling and to discuss bariatric surgery options in 2023  Patient completed EGD and counciling but then became pregnant  Patient has gained 48lbs     Starting weight on initial visit 329lbs  Lowest weight last one year 315lbs  Highest weight last one year 385lbs     They are a smoker  They have significant exposure to second hand smoke    Past Medical History  Patient Active Problem List   Diagnosis    Severe major depression (HCC)    Major depressive disorder, severe (HCC)    Mild mixed bipolar II disorder

## 2025-02-25 ENCOUNTER — HOSPITAL ENCOUNTER (OUTPATIENT)
Age: 25
Discharge: HOME | End: 2025-02-25
Payer: COMMERCIAL

## 2025-02-25 ENCOUNTER — INITIAL CONSULT (OUTPATIENT)
Dept: BARIATRICS/WEIGHT MGMT | Age: 25
End: 2025-02-25
Payer: COMMERCIAL

## 2025-02-25 VITALS — BODY MASS INDEX: 45.99 KG/M2 | WEIGHT: 293 LBS | HEIGHT: 67 IN

## 2025-02-25 DIAGNOSIS — E66.01 MORBID OBESITY DUE TO EXCESS CALORIES: Primary | ICD-10-CM

## 2025-02-25 DIAGNOSIS — Z71.3 ENCOUNTER FOR DIETARY COUNSELING AND SURVEILLANCE: ICD-10-CM

## 2025-02-25 DIAGNOSIS — Z00.8 NUTRITIONAL ASSESSMENT: ICD-10-CM

## 2025-02-25 DIAGNOSIS — E66.01 MORBID OBESITY: ICD-10-CM

## 2025-02-25 DIAGNOSIS — Z71.3 NUTRITIONAL COUNSELING: ICD-10-CM

## 2025-02-25 DIAGNOSIS — Z02.6 ENCOUNTER FOR EXAMINATION FOR INSURANCE PURPOSES: ICD-10-CM

## 2025-02-25 DIAGNOSIS — E66.01 MORBID OBESITY DUE TO EXCESS CALORIES: ICD-10-CM

## 2025-02-25 DIAGNOSIS — Z13.89 SCREENING FOR SUBSTANCE ABUSE: ICD-10-CM

## 2025-02-25 LAB
25(OH)D3 SERPL-MCNC: 12.4 NG/ML (ref 30–100)
CHOLEST SERPL-MCNC: 152 MG/DL
FERRITIN SERPL-MCNC: 88 NG/ML
FOLATE SERPL-MCNC: 10 NG/ML (ref 4.8–24.2)
HBA1C MFR BLD: 7.2 % (ref 4–5.6)
HDLC SERPL-MCNC: 43 MG/DL
LDLC SERPL CALC-MCNC: 92 MG/DL
PREALB SERPL-MCNC: 21 MG/DL (ref 20–40)
TRIGL SERPL-MCNC: 84 MG/DL
VIT B12 SERPL-MCNC: 487 PG/ML (ref 211–946)
VLDLC SERPL CALC-MCNC: 17 MG/DL

## 2025-02-25 PROCEDURE — G8428 CUR MEDS NOT DOCUMENT: HCPCS | Performed by: DIETITIAN, REGISTERED

## 2025-02-25 PROCEDURE — 84134 ASSAY OF PREALBUMIN: CPT

## 2025-02-25 PROCEDURE — 82306 VITAMIN D 25 HYDROXY: CPT

## 2025-02-25 PROCEDURE — 84425 ASSAY OF VITAMIN B-1: CPT

## 2025-02-25 PROCEDURE — 36415 COLL VENOUS BLD VENIPUNCTURE: CPT

## 2025-02-25 PROCEDURE — 82607 VITAMIN B-12: CPT

## 2025-02-25 PROCEDURE — 84590 ASSAY OF VITAMIN A: CPT

## 2025-02-25 PROCEDURE — 84630 ASSAY OF ZINC: CPT

## 2025-02-25 PROCEDURE — 82525 ASSAY OF COPPER: CPT

## 2025-02-25 PROCEDURE — 97802 MEDICAL NUTRITION INDIV IN: CPT | Performed by: DIETITIAN, REGISTERED

## 2025-02-25 PROCEDURE — 82746 ASSAY OF FOLIC ACID SERUM: CPT

## 2025-02-25 PROCEDURE — 83036 HEMOGLOBIN GLYCOSYLATED A1C: CPT

## 2025-02-25 PROCEDURE — G0480 DRUG TEST DEF 1-7 CLASSES: HCPCS

## 2025-02-25 PROCEDURE — 80061 LIPID PANEL: CPT

## 2025-02-25 PROCEDURE — 82728 ASSAY OF FERRITIN: CPT

## 2025-02-25 PROCEDURE — G8417 CALC BMI ABV UP PARAM F/U: HCPCS | Performed by: DIETITIAN, REGISTERED

## 2025-02-25 NOTE — PATIENT INSTRUCTIONS
[facility]  Dietary Initial Appointment Patient Instructions    By: Kimberley Mcdonald RD / KASSANDRA  [phone]      At your initial dietary appointment you have received the following information packets:    Please be aware at each visit you have been instructed that in order for your insurance company to approve your surgery you must show a consistent weight loss of 2 lbs or greater at each visit. We can not guarantee an approval by your insurance company we can only provide the information given to us it is up to you the patient to show compliancy to your insurance company.  If you do gain weight during your supervised weight loss counseling sessions insurance companies starting in 2018 are denying patients for not showing consistent weight loss results when part of a supervised weight loss counseling program. Pt was instructed on 2/25/25. Pt verbalized understanding.      Low-Fat Diet  - Please be sure to begin your low-fat diet from today's date until your scheduled surgery date.  If you are not at goal weight by your history and physical appointment with your surgeon your surgery will be cancelled.  Goal Weight: 355 lbs, BMI: 55.5  Low-Fat Diet - Patient Acknowledge  Supplement List - Please be sure to be saving to purchase your 3 month supply of supplements.  If you do not bring supplements to your history and physical appointment your surgery will be cancelled.  Supplement Contract - Patient Acknowledge  Please be sure to take a daily Multi-vitamin from now until surgery to reduce your incidence of infection.  If your insurance company requires additional dietary counseling you will be scheduled to see the dietitian the following month.   If your psychological evaluation is completed and all your dietary requirements for your individual insurance company have been completed you will be submitted to insurance. Please make sure to check with us to see if we have

## 2025-02-25 NOTE — PROGRESS NOTES
Initial Consult for this pt was completed today: 2/25/25. This patient received Medical Nutrition Management; initial assessment and intervention, in an individual, face-to-face encounter with an RD/LD at the [facility].        [facility]:  Initial Nutrition Consultation    Today's Date: 2/25/2025  Patients Name:Jazzy Carr     Height: 1.702 m (5' 7\")   Weight: (!) 170.1 kg (375 lb)   IBW: 163 lbs  %IBW: 230%  Excess Weight: 212 lbs  BMI: Body mass index is 58.73 kg/m².     Subjective Information:   Patient has tried multiple means of weight loss including diet and exercise in the past and has been unable to maintain a healthy weight.  Pt states he / she has tried the following  - Self-Imposed Calorie Deficit Diet, Keto Diet and Self-Imposed Low CHO Diet. Patients overall goal is to be able to lose weight with diet and exercise by changing lifestyle, habits and maintain a healthy weight for a lifetime.    Are your currently Diabetic  - No    Most successful diet in the past? Self-Imposed Calorie Deficit  Length of time patient was on the diet listed above? Jan 1st 2022  Weight lost on the diet listed above? 20 lbs  Patient stated he / she maintained his / her weight for the following time? Couple Month's    Patient takes the following vitamins, minerals and dietary supplements? No - I do not currently take a daily complete multi-vitamin.  Rd / Ld has educated the patient that we recommend in order to decrease infection rates patient start a daily complete multi-vitamin from now until surgery if proceeding with surgery.     Patient consumes the following beverage daily? Water with Low Calorie Flavoring    Patient states he / she has the following problems:  no - Eating  no - Chewing  no - Swallowing  no - Nausea  no - Vomiting  no - Diarrhea  no - Constipation  no - Hair Changes  no - Missing Teeth  no - Wears Dentures  Food Allergies: no  -

## 2025-02-28 LAB
COPPER SERPL-MCNC: 117.7 UG/DL (ref 80–155)
ZINC SERPL-MCNC: 75.9 UG/DL (ref 60–120)

## 2025-03-01 LAB
RETINYL PALMITATE: <0.02 MG/L (ref 0–0.1)
VITAMIN A LEVEL: 0.49 MG/L (ref 0.3–1.2)
VITAMIN A, INTERP: NORMAL

## 2025-03-02 LAB
COTININE: 111 NG/ML
NICOTINE: <5 NG/ML
VIT B1 PYROPHOSHATE BLD-SCNC: 131 NMOL/L (ref 70–180)

## 2025-03-11 NOTE — PROGRESS NOTES
[facility]    Patient Name: Jazzy Carr  Age: 24 y.o.   : 2000  Date of Visit: 3/12/25    Assessment and Plan:   1. Chronic insomnia  - Begin ramelteon  - CBT-I provided, with focus on optimizing sleep hygiene  - Side effects and red-flag symptoms strongly emphasized  - Patient to let me or nursing team know immediately of any adverse events      2. JACQUELINE (obstructive sleep apnea)  high pre-test probability of JACQUELINE.We will pursue home sleep testing for further evaluation given symptoms listed below.      History:    HPI   Jazzy Carr is a 24 y.o. female with  has a past medical history of Anxiety, Asperger syndrome, Autism disorder, Bipolar II disorder (HCC), Depression, Diet controlled gestational diabetes mellitus (GDM) in third trimester (2023), GDM (gestational diabetes mellitus), Migraine, Morbid obesity due to excess calories, Postpartum depression, Pre-eclampsia, Preeclampsia, Steatosis of liver (2020), and Stress incontinence. who presents as a follow-up patient to Sleep Clinic for Sleep Apnea (Sleep study 2022 was told she was negative but willing to do a new one. Has gained significant amount of weight since c/o snoring and daytime fatigue. Problems initiating and maintaining sleep has tried melatonin and clonidine to help with nightmares but did not help. Anxiety meds did not help )  .   Has been having issues with falling asleep, staying asleep  Has been told she is snoring loudly  No choking/gasping for air  Wakes up with dry mouth frequently  Wants to nap at 12-3 every day  Taking 1-2 hours to fall asleep nightly  No alcohol, tobacco, or drug use  On Adderall for ADHD  On xanax very rarely for panic attacks  Taking clonidine for post partum nightmares    Current Outpatient Medications   Medication Sig Dispense Refill    ramelteon (ROZEREM) 8 MG tablet Take 1 tablet by mouth nightly as needed for Sleep 30 tablet 2    etonogestrel (NEXPLANON) 68 MG implant

## 2025-03-12 ENCOUNTER — OFFICE VISIT (OUTPATIENT)
Dept: SLEEP CENTER | Age: 25
End: 2025-03-12
Payer: COMMERCIAL

## 2025-03-12 VITALS
BODY MASS INDEX: 45.99 KG/M2 | TEMPERATURE: 98.8 F | RESPIRATION RATE: 16 BRPM | SYSTOLIC BLOOD PRESSURE: 102 MMHG | DIASTOLIC BLOOD PRESSURE: 65 MMHG | HEIGHT: 67 IN | HEART RATE: 88 BPM | OXYGEN SATURATION: 98 % | WEIGHT: 293 LBS

## 2025-03-12 DIAGNOSIS — G47.33 OSA (OBSTRUCTIVE SLEEP APNEA): ICD-10-CM

## 2025-03-12 DIAGNOSIS — F51.04 CHRONIC INSOMNIA: Primary | ICD-10-CM

## 2025-03-12 PROCEDURE — 1036F TOBACCO NON-USER: CPT | Performed by: STUDENT IN AN ORGANIZED HEALTH CARE EDUCATION/TRAINING PROGRAM

## 2025-03-12 PROCEDURE — G8417 CALC BMI ABV UP PARAM F/U: HCPCS | Performed by: STUDENT IN AN ORGANIZED HEALTH CARE EDUCATION/TRAINING PROGRAM

## 2025-03-12 PROCEDURE — 3078F DIAST BP <80 MM HG: CPT | Performed by: STUDENT IN AN ORGANIZED HEALTH CARE EDUCATION/TRAINING PROGRAM

## 2025-03-12 PROCEDURE — 3074F SYST BP LT 130 MM HG: CPT | Performed by: STUDENT IN AN ORGANIZED HEALTH CARE EDUCATION/TRAINING PROGRAM

## 2025-03-12 PROCEDURE — G8428 CUR MEDS NOT DOCUMENT: HCPCS | Performed by: STUDENT IN AN ORGANIZED HEALTH CARE EDUCATION/TRAINING PROGRAM

## 2025-03-12 PROCEDURE — 99204 OFFICE O/P NEW MOD 45 MIN: CPT | Performed by: STUDENT IN AN ORGANIZED HEALTH CARE EDUCATION/TRAINING PROGRAM

## 2025-03-12 RX ORDER — RAMELTEON 8 MG/1
8 TABLET ORAL NIGHTLY PRN
Qty: 30 TABLET | Refills: 2 | Status: SHIPPED | OUTPATIENT
Start: 2025-03-12 | End: 2026-03-12

## 2025-03-12 ASSESSMENT — SLEEP AND FATIGUE QUESTIONNAIRES
HOW LIKELY ARE YOU TO NOD OFF OR FALL ASLEEP WHILE SITTING AND READING: SLIGHT CHANCE OF DOZING
HOW LIKELY ARE YOU TO NOD OFF OR FALL ASLEEP WHILE SITTING INACTIVE IN A PUBLIC PLACE: WOULD NEVER DOZE
HOW LIKELY ARE YOU TO NOD OFF OR FALL ASLEEP WHEN YOU ARE A PASSENGER IN A CAR FOR AN HOUR WITHOUT A BREAK: SLIGHT CHANCE OF DOZING
HOW LIKELY ARE YOU TO NOD OFF OR FALL ASLEEP WHILE LYING DOWN TO REST IN THE AFTERNOON WHEN CIRCUMSTANCES PERMIT: MODERATE CHANCE OF DOZING
HOW LIKELY ARE YOU TO NOD OFF OR FALL ASLEEP WHILE SITTING AND TALKING TO SOMEONE: SLIGHT CHANCE OF DOZING
HOW LIKELY ARE YOU TO NOD OFF OR FALL ASLEEP WHILE WATCHING TV: SLIGHT CHANCE OF DOZING
ESS TOTAL SCORE: 8
HOW LIKELY ARE YOU TO NOD OFF OR FALL ASLEEP WHILE SITTING QUIETLY AFTER LUNCH WITHOUT ALCOHOL: SLIGHT CHANCE OF DOZING
HOW LIKELY ARE YOU TO NOD OFF OR FALL ASLEEP IN A CAR, WHILE STOPPED FOR A FEW MINUTES IN TRAFFIC: SLIGHT CHANCE OF DOZING

## 2025-03-28 ENCOUNTER — OFFICE VISIT (OUTPATIENT)
Dept: BARIATRICS/WEIGHT MGMT | Age: 25
End: 2025-03-28

## 2025-03-28 VITALS
BODY MASS INDEX: 45.99 KG/M2 | TEMPERATURE: 80 F | HEART RATE: 85 BPM | SYSTOLIC BLOOD PRESSURE: 136 MMHG | DIASTOLIC BLOOD PRESSURE: 82 MMHG | HEIGHT: 67 IN | WEIGHT: 293 LBS

## 2025-03-28 DIAGNOSIS — Z71.3 NUTRITIONAL COUNSELING: ICD-10-CM

## 2025-03-28 DIAGNOSIS — E66.01 MORBID OBESITY DUE TO EXCESS CALORIES: Primary | ICD-10-CM

## 2025-03-28 NOTE — PATIENT INSTRUCTIONS
What is the next step to proceed with weight loss surgery?    Please be aware that any co-pays or deductibles may be requested prior to testing and / or procedures.    You will need to schedule a psychological evaluation for weight loss surgery.  Patients will be required to complete all psychological testing as required by the mental health provider. Patients must also follow all of the provider's recommendations before weight loss surgery can be scheduled.     The evaluation must be done a standard way for weight loss surgery. We strongly recommend that you contact one of our preferred providers listed below to arrange this:      Dr. Lucy Grace, PhD , [facility]  [address], [place], [place]    [phone]      Dr. Daljit Morales, PhD     [address] [place], [place]      [phone]    Dr. Blanca Reed, [facility]  [address], [place], [place]   [phone]    Sara Gibbs, [facility] [address] , [address] [facility]  [phone]    Jc Paredes, [facility] [address] [place], [place]    [phone]        You will also need to plan on attending a 2 hour nutrition class at the [facility] prior to your surgery.  We will schedule this for you when we schedule your surgery.    Please remember to have your labs drawn 10 days prior to your first scheduled dietary appointment.    Please remember, that while we will submit your case to insurance for surgery authorization, it is your responsibility to know if your plan covers weight loss surgery and keep up-to-date with changes to your insurance coverage.  We will do everything possible to help you get approved for weight loss surgery, but cannot guarantee an approval.     Please note that you will not be submitted to your insurance company until all pre-operative testing requirements are met.    In order to promote healing and prevent gastric

## 2025-03-28 NOTE — PROGRESS NOTES
Places Lived in the Last Year: 1     Unstable Housing in the Last Year: No           A complete 10 system review was performed and are otherwise negative unless mentioned in the above HPI. Specific negatives are listed below but may not include all those reviewed.    General ROS: negative obtundation, AMS  ENT ROS: negative rhinorrhea, epistaxis  Allergy and Immunology ROS: negative itchy/watery eyes or nasal congestion  Hematological and Lymphatic ROS: negative spontaneous bleeding or bruising  Endocrine ROS: negative  lethargy, mood swings, palpitations or polydipsia/polyuria  Respiratory ROS: negative sputum changes, stridor, tachypnea or wheezing  Cardiovascular ROS: negative for - loss of consciousness, murmur or orthopnea  Gastrointestinal ROS: negative for - hematochezia or hematemesis  Genito-Urinary ROS: negative for -  genital discharge or hematuria  Musculoskeletal ROS: negative for - focal weakness, gangrene  Psych/Neuro ROS: negative for - visual or auditory hallucinations, suicidal ideation    Physical exam:   /82 (BP Site: Right Upper Arm, Patient Position: Sitting, BP Cuff Size: Large Adult)   Pulse 85   Temp (!) 80 °F (26.7 °C)   Ht 1.702 m (5' 7\")   Wt (!) 167.4 kg (369 lb)   BMI 57.79 kg/m²   General appearance: AAO, NAD  Eyes: PERRL, EOMI, red conjunctiva  Lungs: Equal chest rise bilateral  Chest wall: atraumatic, no tenderness, no ecchymosis or abrasions  Heart: reg rate, no murmur  Abdomen: soft, non distended, tender minimal, no hernias, no peritioneal signs  Extremities: full ROM all 4 ext, no gross motor or sensory deficits  Pulses: 2+ distal  Skin: warm and dry  Neurologic: spontanous eye opening, purposeful, follows complex commands  Psych: No tremor, no suicidal ideation, no hallucinations      Assessment/Plan:   1. Morbid obesity due to excess calories  See below    2. Nutritional counseling  Continue with current diet and exercise plan  Make sure to have smaller more frequent

## 2025-04-08 ENCOUNTER — CLINICAL DOCUMENTATION (OUTPATIENT)
Dept: NUTRITION | Age: 25
End: 2025-04-08

## 2025-04-08 NOTE — PROGRESS NOTES
a variety of fruits, vegetables, whole grains, lean proteins, and healthy fats. Reviewed education on MyPlate method for balanced meals, consistency and portion control.  Emphasized importance of meal planning and preparation to have components of balanced meals on-hand. Reviewed foods lists per categories of MyPlate to create shopping list. Reviewed food lists for foods to choose and foods to limit/avoid. Provided components and suggestions for healthy snacking.     Emphasized importance of dietary fiber, to improve satiety and prevent incidence of over eating, and encourage adequate hydration for optimal metabolic function.  Reviewed fiber-rich foods list. Provided education on consistent carbohydrate intake within balanced meals to stabilize blood sugar and reduce cravings.  Emphasized importance of adequate protein intake and reviewed lean protein foods list.     Recommended to pt to avoid trans-fat, limit saturated fat, added sugars and sodium, and opt for unsaturated fat.  Emphasized importance of limiting processed foods and opting for whole foods. Emphasized importance of avoiding sugar-sweetened beverages.      Emphasized importance of balanced breakfast within 2 hours of waking and no more than 4-5 hours between meals. Emphasized importance of avoiding grazing and meals/snacks within 3 hours of bedtime for optimal metabolic function.    All questions answered at this time and handouts provided. Pt voiced understanding of information and plan. Encouraged pt to reach out with any questions/concerns.     Smart Goals:   Consume 3 balanced meals per day with no more than 4-5 hours between meals.   Meal plan & prep at least 2x per week    Swap regular soda for 0-sugar soda    Follow up plan: Pt to contact for another visit as needed.     Electronically signed by: Jeanne Peralta RD on 4/8/25 at 2:37 PM EDT

## 2025-04-18 ENCOUNTER — HOSPITAL ENCOUNTER (OUTPATIENT)
Dept: SLEEP CENTER | Age: 25
Discharge: HOME | End: 2025-04-18
Payer: COMMERCIAL

## 2025-04-18 ENCOUNTER — OFFICE VISIT (OUTPATIENT)
Dept: BARIATRICS/WEIGHT MGMT | Age: 25
End: 2025-04-18
Payer: COMMERCIAL

## 2025-04-18 VITALS
SYSTOLIC BLOOD PRESSURE: 151 MMHG | HEIGHT: 67 IN | WEIGHT: 293 LBS | HEART RATE: 90 BPM | BODY MASS INDEX: 45.99 KG/M2 | RESPIRATION RATE: 20 BRPM | TEMPERATURE: 97.9 F | DIASTOLIC BLOOD PRESSURE: 91 MMHG

## 2025-04-18 DIAGNOSIS — G47.33 OSA (OBSTRUCTIVE SLEEP APNEA): ICD-10-CM

## 2025-04-18 DIAGNOSIS — Z71.3 NUTRITIONAL COUNSELING: ICD-10-CM

## 2025-04-18 DIAGNOSIS — E66.01 MORBID OBESITY DUE TO EXCESS CALORIES: Primary | ICD-10-CM

## 2025-04-18 PROCEDURE — 95810 POLYSOM 6/> YRS 4/> PARAM: CPT

## 2025-04-18 PROCEDURE — 99211 OFF/OP EST MAY X REQ PHY/QHP: CPT | Performed by: NURSE PRACTITIONER

## 2025-04-18 NOTE — PROGRESS NOTES
Jazzy Carr  4/18/2025  Bariatric Weight loss appointment for Obesity  Nutrition Education Session      Subjective:   Jazzy Carr is a 24 y.o. female here for pre-surgical dietary education today. Patient is accompanied by her  at today's visit. Patient reports that they are doing good following their previous dietary education.     Questions today include none.     Weight=(!) 167.8 kg (370 lb)  Today's weight represents a total weight loss to date of 6 pounds. Patient is 15 pounds away from their pre-surgical weight loss goal.    Since the last dietary visit they have tried the following to lose weight: she has cut down on her coffee. She is drinking diet pop instead of regular. She has been eating less sweets. She does admit to being non-compliant one day last week.    Patients previous 24 hour dietary recall includes:  Breakfast: blueberry muffin made from sour dough  Snack: none  Lunch: butter chicken with 1 cup rice  Snack: none  Dinner: Northern Irish hot pocket - beef, rice, cheese  Snack: none  Water intake: >100 ounces  Other beverages:diet coke  Exercise: walk daily with her dog, about 30 minutes     Prior to Admission medications    Medication Sig Start Date End Date Taking? Authorizing Provider   ramelteon (ROZEREM) 8 MG tablet Take 1 tablet by mouth nightly as needed for Sleep 3/12/25 3/12/26 Yes Jose Conte MD   etonogestrel (NEXPLANON) 68 MG implant 68 mg by Subdermal route once   Yes ProviderLauro MD   metoclopramide (REGLAN) 10 MG tablet Take 1 tablet by mouth every 6 hours as needed (nausea) 2/5/25  Yes Helen Molina MD   Ubrogepant (UBRELVY) 100 MG TABS Take 100 mg by mouth as needed (migraines) 12/4/24  Yes Bryon Shah APRN - CNS   TRINTELLIX 5 MG tablet 1 tablet   Yes ProviderLauro MD   amphetamine-dextroamphetamine (ADDERALL XR) 5 MG extended release capsule  9/10/24  Yes Lauro Barton MD   cloNIDine (CATAPRES) 0.2 MG tablet  9/10/24

## 2025-04-18 NOTE — PATIENT INSTRUCTIONS
What is the next step to proceed with weight loss surgery?    Please be aware that any co-pays or deductibles may be requested prior to testing and / or procedures.    You will need to schedule a psychological evaluation for weight loss surgery.  Patients will be required to complete all psychological testing as required by the mental health provider. Patients must also follow all of the provider's recommendations before weight loss surgery can be scheduled.     The evaluation must be done a standard way for weight loss surgery. We strongly recommend that you contact one of our preferred providers listed below to arrange this:      Dr. Lucy Grace, PhD , [facility]  [address], [place], [place]    [phone]      Dr. Daljit Morales, PhD     [address] [place], [place]      [phone]    Dr. Blanca Reed, [facility]  [address], [place], [place]   [phone]    Sara Gibbs, [facility] [address] , [address] [facility]  [phone]    Jc Paredes, [facility] [address], [place]    [phone]        You will also need to plan on attending a 2 hour nutrition class [facility] prior to your [facility].  We will schedule this for you when we schedule your surgery.    Please remember to have your labs drawn 10 days prior to your first scheduled dietary appointment.    Please remember, that while we will submit your case to insurance for surgery authorization, it is your responsibility to know if your plan covers weight loss surgery and keep up-to-date with changes to your insurance coverage.  We will do everything possible to help you get approved for weight loss surgery, but cannot guarantee an approval.     Please note that you will not be submitted to your insurance company until all pre-operative testing requirements are met.    In order to promote healing and prevent gastric

## 2025-04-21 DIAGNOSIS — Z01.818 PRE-OP EVALUATION: Primary | ICD-10-CM

## 2025-05-03 NOTE — ED NOTES
Instructions provided and questions answered. Prescriptions verified with patient. ACE wrap to foot per [facility]. circ check wnl.      María Santa RN  03/03/21 2002
---

## 2025-05-23 ENCOUNTER — SCHEDULED TELEPHONE ENCOUNTER (OUTPATIENT)
Age: 25
End: 2025-05-23
Payer: COMMERCIAL

## 2025-05-23 DIAGNOSIS — D35.2 PITUITARY ADENOMA (HCC): ICD-10-CM

## 2025-05-23 DIAGNOSIS — G43.909 MIGRAINE WITHOUT STATUS MIGRAINOSUS, NOT INTRACTABLE, UNSPECIFIED MIGRAINE TYPE: Primary | ICD-10-CM

## 2025-05-23 PROCEDURE — 99213 OFFICE O/P EST LOW 20 MIN: CPT | Performed by: CLINICAL NURSE SPECIALIST

## 2025-05-23 NOTE — PROGRESS NOTES
01/14/2025 06:54 AM    EOSABS 0.01 01/14/2025 06:54 AM    BASOSABS 0.04 01/14/2025 06:54 AM     CMP:    Lab Results   Component Value Date/Time     01/14/2025 06:54 AM    K 4.3 01/14/2025 06:54 AM    K 4.2 01/16/2023 05:20 AM     01/14/2025 06:54 AM    CO2 20 01/14/2025 06:54 AM    BUN 10 01/14/2025 06:54 AM    CREATININE 0.5 01/14/2025 06:54 AM    GFRAA >60 09/22/2022 08:52 PM    LABGLOM >90 01/14/2025 06:54 AM    LABGLOM >60 02/09/2024 09:30 PM    GLUCOSE 245 01/14/2025 06:54 AM    CALCIUM 9.1 01/14/2025 06:54 AM    BILITOT 0.2 02/09/2024 09:30 PM    ALKPHOS 147 02/09/2024 09:30 PM    AST 26 02/09/2024 09:30 PM    ALT 36 02/09/2024 09:30 PM     IR - CSF opening pressure 7    CT Head: 2024  No acute intracranial abnormality.     MRI Brain 2023  1. 3 tiny foci of T2 hyperintensity in the cerebral white matter of doubtful  clinical significance.  They may represent sequela of chronic microvascular  ischemic changes, post infectious/inflammatory insults and/or the result of  migraine headaches.  2. Otherwise, unremarkable pre and post contrast enhanced MRI of the brain.  3. Moderate mucosal thickening with small air-fluid levels in the maxillary  sinuses.  Clinical correlation for possible acute sinusitis recommended.    MRI Brain: 2025  1. New cystic sellar mass containing a fluid/fluid level.  This lesion may  represent a cystic pituitary adenoma versus pituitary apoplexy.  2. There is mild mass effect on the optic chiasm.  3. No evidence of acute intracranial ischemia or edema involving the brain  parenchyma.  4. Pansinusitis.    I independently reviewed the labs and imaging studies today.     Assessment:     Patient with migraine headaches which responded well to Qulipta and Ubrelvy administration   Previous LP headache -after CSF analysis and opening pressure analysis.  Opening pressure at that time was 7    Now with pituitary adenoma-not previously present on imaging per radiology reporting  Upon

## 2025-05-30 ENCOUNTER — TELEPHONE (OUTPATIENT)
Age: 25
End: 2025-05-30

## 2025-06-04 ENCOUNTER — TELEPHONE (OUTPATIENT)
Dept: SLEEP MEDICINE | Age: 25
End: 2025-06-04

## 2025-06-04 NOTE — TELEPHONE ENCOUNTER
----- Message from Dr. Jose Conte MD sent at 5/26/2025  8:57 PM EDT -----  Continue routine follow-up; no JACQUELINE.

## 2025-06-04 NOTE — TELEPHONE ENCOUNTER
Call to patientANNIE that sleep study was negative for JACQUELINE and she can continue routine follow ups for insomnia.

## 2025-06-10 ENCOUNTER — TELEPHONE (OUTPATIENT)
Dept: CARDIOLOGY CLINIC | Age: 25
End: 2025-06-10

## 2025-06-13 ENCOUNTER — TELEPHONE (OUTPATIENT)
Dept: CARDIOLOGY CLINIC | Age: 25
End: 2025-06-13

## 2025-07-03 ENCOUNTER — TELEPHONE (OUTPATIENT)
Age: 25
End: 2025-07-03

## 2025-07-03 NOTE — TELEPHONE ENCOUNTER
Insurance coverage: [employer]    EGD:Needs done  USGB:S/P  Labs:Done    Last visit to provider/NP: 4/18/25    Medical clearance: Not done    Cardiac clearance good through: Needed, cx 5/16/25 with Chasidy    Psych clearance good through: needed          Labs needed: Negative Nicotine: was pos 2/25/25, pt vaping                         UDS;                                                                 What needs completed as of 7/3/25: Psych, clean nicotine, 4th dietary ( cx 5/30/25)  MS

## 2025-07-09 ENCOUNTER — TELEPHONE (OUTPATIENT)
Dept: SLEEP CENTER | Age: 25
End: 2025-07-09

## 2025-07-09 NOTE — TELEPHONE ENCOUNTER
Call to patient to reschedule her appt today with physician as he is out ill. LVM for her to call us back.

## 2025-07-10 NOTE — PROGRESS NOTES
Jazzy Carr, was evaluated through a synchronous (real-time) audio-video encounter. The patient (or guardian if applicable) is aware that this is a billable service, which includes applicable co-pays. This Virtual Visit was conducted with patient's (and/or legal guardian's) consent. Patient identification was verified, and a caregiver was present when appropriate.   The patient was located at Home: [address] [place] [id number]  Provider was located at Facility (Appt Dept): [address] [place] [address]  Confirm you are appropriately licensed, registered, or certified to deliver care in the state where the patient is located as indicated above. If you are not or unsure, please re-schedule the visit: Yes, I confirm.     Jazzy Carr (:  2000) is a Established patient, presenting virtually for evaluation of the following:      Below is the assessment and plan developed based on review of pertinent history, physical exam, labs, studies, and medications.     Assessment & Plan  Chronic insomnia   Chronic, not at goal (unstable), changes made today: stop ramelteon. Add sonata.    - Begin zaleplon  - CBT-I provided, with focus on optimizing sleep hygiene  - Side effects and red-flag symptoms strongly emphasized  - Patient to let me or nursing team know immediately of any adverse events      Orders:    zaleplon (SONATA) 10 MG capsule; Take 1 capsule by mouth nightly for 90 days. Max Daily Amount: 10 mg    JACQUELINE (obstructive sleep apnea)   - repeat HST in the setting of severe REM JACQUELINE. Likely will have sleep apnea with home sleep testing and rojas need in laboratory titration.    Orders:    PAT - Home Sleep Test; Future      No follow-ups on file.       Subjective   - Ramelteon not helpful from previous appointment  - Sleep study consistent with severe REM JACQUELINE  - However, did not meet 4% criteria for hypopneas and cannot qualify for therapy  - Still having episodes of waking up in

## 2025-07-11 ENCOUNTER — TELEMEDICINE (OUTPATIENT)
Dept: SLEEP MEDICINE | Age: 25
End: 2025-07-11
Payer: COMMERCIAL

## 2025-07-11 DIAGNOSIS — F51.04 CHRONIC INSOMNIA: Primary | ICD-10-CM

## 2025-07-11 DIAGNOSIS — G47.33 OSA (OBSTRUCTIVE SLEEP APNEA): ICD-10-CM

## 2025-07-11 PROCEDURE — 99214 OFFICE O/P EST MOD 30 MIN: CPT | Performed by: STUDENT IN AN ORGANIZED HEALTH CARE EDUCATION/TRAINING PROGRAM

## 2025-07-11 RX ORDER — ZALEPLON 10 MG/1
10 CAPSULE ORAL NIGHTLY
Qty: 30 CAPSULE | Refills: 2 | Status: SHIPPED | OUTPATIENT
Start: 2025-07-11 | End: 2025-10-09

## 2025-07-13 ENCOUNTER — HOSPITAL ENCOUNTER (EMERGENCY)
Age: 25
Discharge: HOME | End: 2025-07-13
Attending: EMERGENCY MEDICINE
Payer: COMMERCIAL

## 2025-07-13 VITALS
HEART RATE: 86 BPM | WEIGHT: 293 LBS | BODY MASS INDEX: 45.99 KG/M2 | DIASTOLIC BLOOD PRESSURE: 80 MMHG | TEMPERATURE: 98.3 F | OXYGEN SATURATION: 97 % | HEIGHT: 67 IN | SYSTOLIC BLOOD PRESSURE: 132 MMHG | RESPIRATION RATE: 18 BRPM

## 2025-07-13 DIAGNOSIS — G43.809 OTHER MIGRAINE WITHOUT STATUS MIGRAINOSUS, NOT INTRACTABLE: Primary | ICD-10-CM

## 2025-07-13 PROCEDURE — 2580000003 HC RX 258: Performed by: EMERGENCY MEDICINE

## 2025-07-13 PROCEDURE — 96374 THER/PROPH/DIAG INJ IV PUSH: CPT

## 2025-07-13 PROCEDURE — 6360000002 HC RX W HCPCS: Performed by: EMERGENCY MEDICINE

## 2025-07-13 PROCEDURE — 96375 TX/PRO/DX INJ NEW DRUG ADDON: CPT

## 2025-07-13 PROCEDURE — 99284 EMERGENCY DEPT VISIT MOD MDM: CPT

## 2025-07-13 RX ORDER — DIPHENHYDRAMINE HYDROCHLORIDE 50 MG/ML
12.5 INJECTION, SOLUTION INTRAMUSCULAR; INTRAVENOUS ONCE
Status: COMPLETED | OUTPATIENT
Start: 2025-07-13 | End: 2025-07-13

## 2025-07-13 RX ORDER — METOCLOPRAMIDE HYDROCHLORIDE 5 MG/ML
10 INJECTION INTRAMUSCULAR; INTRAVENOUS ONCE
Status: COMPLETED | OUTPATIENT
Start: 2025-07-13 | End: 2025-07-13

## 2025-07-13 RX ORDER — KETOROLAC TROMETHAMINE 30 MG/ML
15 INJECTION, SOLUTION INTRAMUSCULAR; INTRAVENOUS ONCE
Status: COMPLETED | OUTPATIENT
Start: 2025-07-13 | End: 2025-07-13

## 2025-07-13 RX ORDER — 0.9 % SODIUM CHLORIDE 0.9 %
500 INTRAVENOUS SOLUTION INTRAVENOUS ONCE
Status: COMPLETED | OUTPATIENT
Start: 2025-07-13 | End: 2025-07-13

## 2025-07-13 RX ADMIN — DIPHENHYDRAMINE HYDROCHLORIDE 12.5 MG: 50 INJECTION INTRAMUSCULAR; INTRAVENOUS at 20:16

## 2025-07-13 RX ADMIN — KETOROLAC TROMETHAMINE 15 MG: 30 INJECTION, SOLUTION INTRAMUSCULAR at 20:16

## 2025-07-13 RX ADMIN — METOCLOPRAMIDE 10 MG: 5 INJECTION, SOLUTION INTRAMUSCULAR; INTRAVENOUS at 20:16

## 2025-07-13 RX ADMIN — SODIUM CHLORIDE 500 ML: 9 INJECTION, SOLUTION INTRAVENOUS at 20:22

## 2025-07-13 ASSESSMENT — PAIN DESCRIPTION - PAIN TYPE: TYPE: ACUTE PAIN

## 2025-07-13 ASSESSMENT — PAIN - FUNCTIONAL ASSESSMENT
PAIN_FUNCTIONAL_ASSESSMENT: 0-10
PAIN_FUNCTIONAL_ASSESSMENT: INTOLERABLE, UNABLE TO DO ANY ACTIVE OR PASSIVE ACTIVITIES

## 2025-07-13 ASSESSMENT — PAIN DESCRIPTION - FREQUENCY: FREQUENCY: CONTINUOUS

## 2025-07-13 ASSESSMENT — PAIN DESCRIPTION - LOCATION: LOCATION: HEAD

## 2025-07-13 ASSESSMENT — PAIN SCALES - GENERAL: PAINLEVEL_OUTOF10: 6

## 2025-07-13 ASSESSMENT — PAIN DESCRIPTION - ONSET: ONSET: ON-GOING

## 2025-07-13 ASSESSMENT — PAIN DESCRIPTION - DESCRIPTORS: DESCRIPTORS: ACHING

## 2025-07-14 NOTE — ED PROVIDER NOTES
HPI:  24,   Time: 8:13 PM EDT         Jazzy Carr is a 24 y.o. female presenting to the ED for migraine headache, beginning several year ago.  The complaint has been persistent, mild in severity, and worsened by nothing.  She has had CTs and MRIs before this is not anything new she came in just to get treatment she does not want any further evaluation her neck is supple she has had no trauma no fevers no vomiting her her neurological exam is negative she is her NIH score is 0 her Jose Coma Scale is 15    ROS:   Pertinent positives and negatives are stated within HPI, all other systems reviewed and are negative.  --------------------------------------------- PAST HISTORY ---------------------------------------------  Past Medical History:  has a past medical history of Anxiety, Asperger syndrome, Autism disorder, Bipolar II disorder (HCC), Depression, Diet controlled gestational diabetes mellitus (GDM) in third trimester, GDM (gestational diabetes mellitus), Migraine, Morbid obesity due to excess calories (HCC), Postpartum depression, Pre-eclampsia, Preeclampsia, Steatosis of liver, and Stress incontinence.    Past Surgical History:  has a past surgical history that includes REMOVAL FOREIGN BODY EAR; pr laps surg cholecystectomy w/cholangiography (N/A, 03/15/2018); Cholecystectomy; Tonsillectomy and adenoidectomy (N/A, 2021); [place] tooth extraction; Upper gastrointestinal endoscopy (N/A, 2023); laparoscopy; Intrauterine device removal (2022); and  section (N/A, 2024).    Social History:  reports that she quit smoking about 6 years ago. Her smoking use included cigarettes and e-cigarettes. She has never used smokeless tobacco. She reports that she does not currently use alcohol. She reports that she does not currently use drugs.    Family History: family history includes Diabetes in her maternal grandmother; Endometriosis in her mother; Heart Surgery in her father; Migraines

## 2025-07-14 NOTE — DISCHARGE INSTRUCTIONS
Return if any change in headache fevers vomiting stiff neck any neurological deficits you are not to drive while on the Benadryl

## 2025-07-29 ENCOUNTER — OFFICE VISIT (OUTPATIENT)
Dept: ENDOCRINOLOGY | Age: 25
End: 2025-07-29
Payer: COMMERCIAL

## 2025-07-29 VITALS
SYSTOLIC BLOOD PRESSURE: 140 MMHG | BODY MASS INDEX: 57.98 KG/M2 | WEIGHT: 293 LBS | HEART RATE: 101 BPM | TEMPERATURE: 98.5 F | OXYGEN SATURATION: 100 % | DIASTOLIC BLOOD PRESSURE: 83 MMHG

## 2025-07-29 DIAGNOSIS — E55.9 VITAMIN D DEFICIENCY: ICD-10-CM

## 2025-07-29 DIAGNOSIS — E11.65 TYPE 2 DIABETES MELLITUS WITH HYPERGLYCEMIA, WITHOUT LONG-TERM CURRENT USE OF INSULIN (HCC): ICD-10-CM

## 2025-07-29 DIAGNOSIS — E66.813 CLASS 3 SEVERE OBESITY DUE TO EXCESS CALORIES WITHOUT SERIOUS COMORBIDITY WITH BODY MASS INDEX (BMI) OF 50.0 TO 59.9 IN ADULT (HCC): ICD-10-CM

## 2025-07-29 DIAGNOSIS — D35.2 PITUITARY ADENOMA (HCC): Primary | ICD-10-CM

## 2025-07-29 PROCEDURE — G8427 DOCREV CUR MEDS BY ELIG CLIN: HCPCS | Performed by: CLINICAL NURSE SPECIALIST

## 2025-07-29 PROCEDURE — 99205 OFFICE O/P NEW HI 60 MIN: CPT | Performed by: CLINICAL NURSE SPECIALIST

## 2025-07-29 PROCEDURE — G8417 CALC BMI ABV UP PARAM F/U: HCPCS | Performed by: CLINICAL NURSE SPECIALIST

## 2025-07-29 PROCEDURE — 1036F TOBACCO NON-USER: CPT | Performed by: CLINICAL NURSE SPECIALIST

## 2025-07-29 PROCEDURE — 3051F HG A1C>EQUAL 7.0%<8.0%: CPT | Performed by: CLINICAL NURSE SPECIALIST

## 2025-07-29 PROCEDURE — 3077F SYST BP >= 140 MM HG: CPT | Performed by: CLINICAL NURSE SPECIALIST

## 2025-07-29 PROCEDURE — 3079F DIAST BP 80-89 MM HG: CPT | Performed by: CLINICAL NURSE SPECIALIST

## 2025-07-29 PROCEDURE — 2022F DILAT RTA XM EVC RTNOPTHY: CPT | Performed by: CLINICAL NURSE SPECIALIST

## 2025-07-29 RX ORDER — ERGOCALCIFEROL 1.25 MG/1
50000 CAPSULE, LIQUID FILLED ORAL WEEKLY
COMMUNITY
Start: 2025-07-16

## 2025-07-29 RX ORDER — GLIPIZIDE 5 MG/1
5 TABLET, FILM COATED, EXTENDED RELEASE ORAL DAILY
COMMUNITY
Start: 2025-06-04 | End: 2025-07-29

## 2025-07-29 NOTE — PROGRESS NOTES
06:54 AM    BUN 10 01/14/2025 06:54 AM    CREATININE 0.5 01/14/2025 06:54 AM    CALCIUM 9.1 01/14/2025 06:54 AM    LABGLOM >90 01/14/2025 06:54 AM    LABGLOM >60 02/09/2024 09:30 PM    GFRAA >60 09/22/2022 08:52 PM      Lab Results   Component Value Date/Time    TSH 0.29 01/14/2025 06:54 AM     Lab Results   Component Value Date/Time    LABA1C 7.2 02/25/2025 09:20 AM    GLUCOSE 245 01/14/2025 06:54 AM     Lab Results   Component Value Date/Time    LABA1C 7.2 02/25/2025 09:20 AM    LABA1C 5.7 01/16/2024 03:46 PM    LABA1C 5.0 03/24/2023 10:51 AM     Lab Results   Component Value Date/Time    TRIG 84 02/25/2025 09:20 AM    HDL 43 02/25/2025 09:20 AM    CHOL 152 02/25/2025 09:20 AM     Lab Results   Component Value Date/Time    VITD25 12.4 02/25/2025 09:20 AM    VITD25 15 03/24/2023 10:51 AM       ASSESSMENT & RECOMMENDATIONS   Jazzy Carr, a 24 y.o.-old female seen in for the following issues       Assessment:      Diagnosis Orders   1. Pituitary adenoma (HCC)  Cortisol, Urine, Free    Creatinine, urine, timed      2. Class 3 severe obesity due to excess calories without serious comorbidity with body mass index (BMI) of 50.0 to 59.9 in adult ([facility])        3. Vitamin D deficiency        4. Type 2 diabetes mellitus with hyperglycemia, without long-term current use of insulin ([facility])            Plan:     1. Pituitary adenoma (HCC)   MRI of the pituitary showed 1.5 x 1.1 x 1.4 cm adenoma  Biochemical workup has been negative  Patient is on birth control Nexplanon  Will obtain 24-hour urine cortisol  Patient has met with neurosurgeon at [facility]  MRI has been ordered and she is getting it completed in August  Plan will be to have surgical removal  Patient met with ophthalmology for visual field testing  Visual field testing was negative per patient  Will follow results and proceed accordingly  Patient has plans for pregnancy in the future  I recommend surgical treatment prior to pregnancy  Patient verbalized

## 2025-07-31 ENCOUNTER — HOSPITAL ENCOUNTER (OUTPATIENT)
Dept: SLEEP CENTER | Age: 25
Discharge: HOME | End: 2025-07-31
Payer: COMMERCIAL

## 2025-07-31 DIAGNOSIS — G47.33 OSA (OBSTRUCTIVE SLEEP APNEA): ICD-10-CM

## 2025-07-31 PROCEDURE — 95800 SLP STDY UNATTENDED: CPT

## 2025-08-11 ENCOUNTER — HOSPITAL ENCOUNTER (OUTPATIENT)
Dept: LAB | Age: 25
Discharge: HOME | End: 2025-08-11
Payer: COMMERCIAL

## 2025-08-11 DIAGNOSIS — D35.2 PITUITARY ADENOMA (HCC): ICD-10-CM

## 2025-08-11 PROCEDURE — 36415 COLL VENOUS BLD VENIPUNCTURE: CPT

## 2025-08-11 PROCEDURE — 82530 CORTISOL FREE: CPT

## 2025-08-13 ENCOUNTER — HOSPITAL ENCOUNTER (OUTPATIENT)
Dept: LAB | Age: 25
Discharge: HOME | End: 2025-08-13
Payer: COMMERCIAL

## 2025-08-13 DIAGNOSIS — D35.2 PITUITARY ADENOMA (HCC): ICD-10-CM

## 2025-08-13 PROCEDURE — 36415 COLL VENOUS BLD VENIPUNCTURE: CPT

## 2025-08-13 PROCEDURE — 82570 ASSAY OF URINE CREATININE: CPT

## 2025-08-14 LAB
COLLECT DURATION TIME SPEC: 24 H
CREATINE 24H UR-MRATE: 1755 MG/24 H (ref 720–1510)
CREATININE URINE: 130 MG/DL
SPECIMEN VOL UR: 1350 ML

## 2025-08-15 ENCOUNTER — TELEPHONE (OUTPATIENT)
Dept: ENDOCRINOLOGY | Age: 25
End: 2025-08-15

## 2025-08-15 LAB
COLLECT DURATION TIME SPEC: NORMAL H
CORTISOL (UR), FREE: NORMAL UG/D
CORTISOL URINE, FREE (/G CRT): 21.1 UG/L
CORTISOL, URINE RATIO CREATININE: 17.58 UG/G CRT
CORTISOL, URINE: NORMAL
CREAT 24H UR-MCNC: 120 MG/DL
CREATININE URINE /24 HR: NORMAL MG/D (ref 700–1600)

## 2025-08-17 ENCOUNTER — HOSPITAL ENCOUNTER (EMERGENCY)
Age: 25
Discharge: HOME | End: 2025-08-18
Attending: EMERGENCY MEDICINE
Payer: COMMERCIAL

## 2025-08-17 ENCOUNTER — APPOINTMENT (OUTPATIENT)
Dept: CT IMAGING | Age: 25
End: 2025-08-17
Payer: COMMERCIAL

## 2025-08-17 VITALS
OXYGEN SATURATION: 99 % | HEART RATE: 93 BPM | SYSTOLIC BLOOD PRESSURE: 140 MMHG | TEMPERATURE: 98.6 F | RESPIRATION RATE: 16 BRPM | BODY MASS INDEX: 57.95 KG/M2 | WEIGHT: 293 LBS | DIASTOLIC BLOOD PRESSURE: 78 MMHG

## 2025-08-17 DIAGNOSIS — R51.9 NONINTRACTABLE HEADACHE, UNSPECIFIED CHRONICITY PATTERN, UNSPECIFIED HEADACHE TYPE: Primary | ICD-10-CM

## 2025-08-17 DIAGNOSIS — J32.9 CHRONIC SINUSITIS, UNSPECIFIED LOCATION: ICD-10-CM

## 2025-08-17 PROCEDURE — 99284 EMERGENCY DEPT VISIT MOD MDM: CPT

## 2025-08-17 PROCEDURE — 70450 CT HEAD/BRAIN W/O DYE: CPT

## 2025-08-17 PROCEDURE — 6370000000 HC RX 637 (ALT 250 FOR IP): Performed by: EMERGENCY MEDICINE

## 2025-08-17 RX ORDER — ONDANSETRON 4 MG/1
4 TABLET, ORALLY DISINTEGRATING ORAL ONCE
Status: COMPLETED | OUTPATIENT
Start: 2025-08-17 | End: 2025-08-17

## 2025-08-17 RX ORDER — OXYCODONE AND ACETAMINOPHEN 5; 325 MG/1; MG/1
1 TABLET ORAL ONCE
Refills: 0 | Status: COMPLETED | OUTPATIENT
Start: 2025-08-17 | End: 2025-08-17

## 2025-08-17 RX ADMIN — ONDANSETRON 4 MG: 4 TABLET, ORALLY DISINTEGRATING ORAL at 23:53

## 2025-08-17 RX ADMIN — OXYCODONE AND ACETAMINOPHEN 1 TABLET: 5; 325 TABLET ORAL at 23:53

## 2025-08-17 ASSESSMENT — PAIN SCALES - GENERAL: PAINLEVEL_OUTOF10: 5

## 2025-08-17 ASSESSMENT — PAIN - FUNCTIONAL ASSESSMENT
PAIN_FUNCTIONAL_ASSESSMENT: PREVENTS OR INTERFERES WITH MANY ACTIVE NOT PASSIVE ACTIVITIES
PAIN_FUNCTIONAL_ASSESSMENT: 0-10

## 2025-08-17 ASSESSMENT — PAIN DESCRIPTION - PAIN TYPE: TYPE: ACUTE PAIN

## 2025-08-17 ASSESSMENT — PAIN DESCRIPTION - LOCATION: LOCATION: HEAD;EYE

## 2025-08-17 ASSESSMENT — PAIN DESCRIPTION - ORIENTATION: ORIENTATION: RIGHT;LEFT

## 2025-08-17 ASSESSMENT — PAIN DESCRIPTION - DESCRIPTORS: DESCRIPTORS: ACHING;SORE;DISCOMFORT;TENDER

## 2025-08-18 PROCEDURE — 6370000000 HC RX 637 (ALT 250 FOR IP): Performed by: EMERGENCY MEDICINE

## 2025-08-18 RX ADMIN — AMOXICILLIN AND CLAVULANATE POTASSIUM 1 TABLET: 875; 125 TABLET, FILM COATED ORAL at 00:27

## 2025-08-18 ASSESSMENT — PAIN DESCRIPTION - ORIENTATION: ORIENTATION: RIGHT;LEFT

## 2025-08-18 ASSESSMENT — PAIN DESCRIPTION - FREQUENCY: FREQUENCY: CONTINUOUS

## 2025-08-18 ASSESSMENT — PAIN DESCRIPTION - LOCATION: LOCATION: EYE

## 2025-08-18 ASSESSMENT — PAIN DESCRIPTION - DESCRIPTORS: DESCRIPTORS: ACHING;THROBBING

## 2025-08-18 ASSESSMENT — PAIN SCALES - GENERAL: PAINLEVEL_OUTOF10: 3

## 2025-08-22 ENCOUNTER — HOSPITAL ENCOUNTER (OUTPATIENT)
Dept: LAB | Age: 25
Discharge: HOME | End: 2025-08-22
Payer: COMMERCIAL

## 2025-08-22 LAB
ANION GAP SERPL CALCULATED.3IONS-SCNC: 14 MMOL/L (ref 7–16)
BUN SERPL-MCNC: 9 MG/DL (ref 6–20)
CALCIUM SERPL-MCNC: 9.7 MG/DL (ref 8.6–10)
CHLORIDE SERPL-SCNC: 103 MMOL/L (ref 98–107)
CO2 SERPL-SCNC: 22 MMOL/L (ref 22–29)
CREAT SERPL-MCNC: 0.5 MG/DL (ref 0.5–1)
GFR, ESTIMATED: >90 ML/MIN/1.73M2
GLUCOSE SERPL-MCNC: 105 MG/DL (ref 74–99)
POTASSIUM SERPL-SCNC: 3.9 MMOL/L (ref 3.5–5.1)
SODIUM SERPL-SCNC: 138 MMOL/L (ref 136–145)

## 2025-08-22 PROCEDURE — 80048 BASIC METABOLIC PNL TOTAL CA: CPT

## 2025-08-22 PROCEDURE — 36415 COLL VENOUS BLD VENIPUNCTURE: CPT

## 2025-08-25 ENCOUNTER — HOSPITAL ENCOUNTER (OUTPATIENT)
Dept: MRI IMAGING | Age: 25
Discharge: HOME | End: 2025-08-24
Payer: COMMERCIAL

## 2025-08-25 DIAGNOSIS — D35.3 BENIGN NEOPLASM OF PITUITARY GLAND AND CRANIOPHARYNGEAL DUCT (POUCH) (HCC): ICD-10-CM

## 2025-08-25 DIAGNOSIS — D35.2 BENIGN NEOPLASM OF PITUITARY GLAND AND CRANIOPHARYNGEAL DUCT (POUCH) (HCC): ICD-10-CM

## 2025-08-25 PROCEDURE — 70553 MRI BRAIN STEM W/O & W/DYE: CPT

## 2025-08-25 PROCEDURE — 6360000004 HC RX CONTRAST MEDICATION: Performed by: RADIOLOGY

## 2025-08-25 PROCEDURE — A9579 GAD-BASE MR CONTRAST NOS,1ML: HCPCS | Performed by: RADIOLOGY

## 2025-08-25 RX ORDER — GADOTERIDOL 279.3 MG/ML
20 INJECTION INTRAVENOUS
Status: COMPLETED | OUTPATIENT
Start: 2025-08-25 | End: 2025-08-25

## 2025-08-25 RX ADMIN — GADOTERIDOL 20 ML: 279.3 INJECTION, SOLUTION INTRAVENOUS at 15:29

## 2025-08-27 ENCOUNTER — E-VISIT (OUTPATIENT)
Dept: NEUROLOGY | Age: 25
End: 2025-08-27
Payer: COMMERCIAL

## 2025-08-27 ENCOUNTER — TELEPHONE (OUTPATIENT)
Age: 25
End: 2025-08-27

## 2025-08-27 DIAGNOSIS — G43.909 MIGRAINE WITHOUT STATUS MIGRAINOSUS, NOT INTRACTABLE, UNSPECIFIED MIGRAINE TYPE: Primary | ICD-10-CM

## 2025-08-27 PROCEDURE — 99422 OL DIG E/M SVC 11-20 MIN: CPT | Performed by: CLINICAL NURSE SPECIALIST

## 2025-08-28 ENCOUNTER — TELEPHONE (OUTPATIENT)
Age: 25
End: 2025-08-28

## (undated) DEVICE — SURGICAL PROCEDURE PACK EENT CUST

## (undated) DEVICE — KENDALL 450 SERIES MONITORING FOAM ELECTRODE - RECTANGULAR SHAPE ( 3/PK): Brand: KENDALL

## (undated) DEVICE — STANDARD HYPODERMIC NEEDLE,ALUMINUM HUB: Brand: MONOJECT

## (undated) DEVICE — GOWN ISOLATN REG YEL M WT MULTIPLY SIDETIE LEV 2

## (undated) DEVICE — BASIC SINGLE BASIN 1-LF: Brand: MEDLINE INDUSTRIES, INC.

## (undated) DEVICE — TUBING, SUCTION, 1/4" X 10', STRAIGHT: Brand: MEDLINE

## (undated) DEVICE — SUTURE ABSORBABLE MONOFILAMENT 3-0 CT1 18 IN UD MONOCRYL + SXMP1B429

## (undated) DEVICE — SOLUTION IV IRRIG WATER 1000ML POUR BRL 2F7114

## (undated) DEVICE — EVAC 70 XTRA WAND: Brand: COBLATION

## (undated) DEVICE — NEEDLE HYPO 18GA L1.5IN PNK POLYPR HUB S STL REG BVL STR

## (undated) DEVICE — COAGULATOR SUCT 10FR LAIN FTSWCH ACTIVATION DISP VALLEYLAB

## (undated) DEVICE — SYRINGE MED 10ML LUERLOCK TIP W/O SFTY DISP

## (undated) DEVICE — APPLICATOR PREP 26ML 0.7% IOD POVACRYLEX 74% ISO ALC ST

## (undated) DEVICE — SUTURE MNCRYL STRATAFIX PS 4-0 30CM

## (undated) DEVICE — 4-PORT MANIFOLD: Brand: NEPTUNE 2

## (undated) DEVICE — CONTAINER,SPEC,PNEUM TUBE,3OZ,STRL PATH: Brand: MEDLINE

## (undated) DEVICE — 1LYRTR 16FR10ML 100%SILI SNAP: Brand: MEDLINE INDUSTRIES, INC.

## (undated) DEVICE — 34" SINGLE PATIENT USE HOVERMATT BREATHABLE: Brand: SINGLE PATIENT USE HOVERMATT

## (undated) DEVICE — GLOVE ORANGE PI 7   MSG9070

## (undated) DEVICE — SPONGE GZ 4IN 4IN 4 PLY N WVN AVANT

## (undated) DEVICE — SPONGE,TONSIL,DBL STRNG,XRAY,MED,1",STRL: Brand: MEDLINE INDUSTRIES, INC.

## (undated) DEVICE — CESAREAN BIRTH PACK: Brand: MEDLINE INDUSTRIES, INC.

## (undated) DEVICE — STRIP,CLOSURE,WOUND,MEDI-STRIP,1/2X4: Brand: MEDLINE

## (undated) DEVICE — SWABSTCK, BENZOIN TINCTURE, 1/PK, STRL: Brand: APLICARE

## (undated) DEVICE — ADAPTER CLEANING PORPOISE CLEANING

## (undated) DEVICE — SOLUTION IV 500ML 0.9% SOD CHL PH 5 INJ USP VIAFLX PLAS

## (undated) DEVICE — LIGHT SOURCE WHT

## (undated) DEVICE — 6 X 9  1.75MIL 4-WALL LABGUARD: Brand: MINIGRIP COMMERCIAL LLC

## (undated) DEVICE — BAG SPECIMEN BIOHAZARD 6IN X 9IN

## (undated) DEVICE — SYRINGE,EAR/ULCER, 2 OZ, STERILE: Brand: MEDLINE

## (undated) DEVICE — KIT,ANTI FOG,W/SPONGE & FLUID,SOFT PACK: Brand: MEDLINE

## (undated) DEVICE — ELECTRODE PT RET AD L9FT HI MOIST COND ADH HYDRGEL CORDED

## (undated) DEVICE — YANKAUER,BULB TIP,W/O VENT,RIGID,STERILE: Brand: MEDLINE

## (undated) DEVICE — CONTAINER SPEC 64OZ POLYPR PATH SNAP LOK CAP W/ LID

## (undated) DEVICE — SUTURE VCRL + SZ 0 L36IN ABSRB VLT L36MM CT-1 1/2 CIR VCP346H

## (undated) DEVICE — GLOVE SURG SZ 65 L12IN FNGR THK79MIL GRN LTX FREE

## (undated) DEVICE — GLOVE ORANGE PI 8 1/2   MSG9085

## (undated) DEVICE — LUBRICANT SURG JELLY ST BACTER TUBE 4.25OZ

## (undated) DEVICE — SET T AND A PASCOLINI

## (undated) DEVICE — VALVE SUCTION AIR H2O HYDR H2O JET CONN STRL ORCA POD + DISP

## (undated) DEVICE — TOWEL,OR,DSP,ST,BLUE,STD,6/PK,12PK/CS: Brand: MEDLINE

## (undated) DEVICE — SUTURE STRATAFIX SYMMETRIC PDS + SZ 1 L18IN ABSRB VLT L48MM SXPP1A400

## (undated) DEVICE — FORCEPS BX L240CM JAW DIA2.8MM L CAP W/ NDL MIC MESH TOOTH

## (undated) DEVICE — SYRINGE 20ML LL S/C 50

## (undated) DEVICE — CATHETER,RED SUCT,POLY-CATH,10: Brand: MEDLINE INDUSTRIES, INC.

## (undated) DEVICE — CONTAINER SPEC 480ML CLR POLYSTYR 10% NEUT BUFF FRMLN ZN

## (undated) DEVICE — TUBING, SUCTION, 3/16" X 12', STRAIGHT: Brand: MEDLINE

## (undated) DEVICE — DOUBLE BASIN SET: Brand: MEDLINE INDUSTRIES, INC.

## (undated) DEVICE — KIT BEDSIDE REVITAL OX 500ML

## (undated) DEVICE — Device: Brand: PORTEX

## (undated) DEVICE — CONTAINER SPEC COLL 960ML POLYPR TRIANG GRAD INTAKE/OUTPUT

## (undated) DEVICE — MASK,FACE,MAXFLUIDPROTECT,SHIELD/ERLPS: Brand: MEDLINE

## (undated) DEVICE — DUAL LUMEN STOMACH TUBE: Brand: SALEM SUMP

## (undated) DEVICE — BLOCK BITE 60FR CAREGUARD

## (undated) DEVICE — VACUETTE® TUBE 6 ML Z SERUM CLOT ACTIVATOR 13X100 RED CAP-BLACK RING, NON-RIDGED: Brand: VACUETTE